# Patient Record
Sex: FEMALE | Race: WHITE | Employment: UNEMPLOYED | URBAN - METROPOLITAN AREA
[De-identification: names, ages, dates, MRNs, and addresses within clinical notes are randomized per-mention and may not be internally consistent; named-entity substitution may affect disease eponyms.]

---

## 2017-10-03 ENCOUNTER — HOSPITAL ENCOUNTER (EMERGENCY)
Age: 47
Discharge: HOME | End: 2017-10-03
Attending: EMERGENCY MEDICINE

## 2017-10-03 VITALS
HEIGHT: 67 IN | SYSTOLIC BLOOD PRESSURE: 140 MMHG | RESPIRATION RATE: 18 BRPM | HEART RATE: 89 BPM | DIASTOLIC BLOOD PRESSURE: 90 MMHG | TEMPERATURE: 98.9 F | OXYGEN SATURATION: 99 % | BODY MASS INDEX: 25.9 KG/M2 | WEIGHT: 165 LBS

## 2017-10-03 DIAGNOSIS — L03.211 FACIAL CELLULITIS: ICD-10-CM

## 2017-10-03 DIAGNOSIS — T78.40XA ALLERGIC REACTION, INITIAL ENCOUNTER: Primary | ICD-10-CM

## 2017-10-03 PROCEDURE — 99282 EMERGENCY DEPT VISIT SF MDM: CPT

## 2017-10-03 RX ORDER — SULFAMETHOXAZOLE AND TRIMETHOPRIM 800; 160 MG/1; MG/1
1 TABLET ORAL 2 TIMES DAILY
Qty: 20 TABLET | Refills: 0 | Status: SHIPPED | OUTPATIENT
Start: 2017-10-03 | End: 2017-10-13

## 2017-10-03 RX ORDER — BUSPIRONE HYDROCHLORIDE 10 MG/1
10 TABLET ORAL 3 TIMES DAILY
COMMUNITY
End: 2018-02-15

## 2017-10-03 RX ORDER — PREDNISONE 20 MG/1
20 TABLET ORAL DAILY
Qty: 10 TABLET | Refills: 0 | Status: SHIPPED | OUTPATIENT
Start: 2017-10-03 | End: 2021-04-21

## 2017-10-03 RX ORDER — DIPHENHYDRAMINE HCL 25 MG
25 CAPSULE ORAL EVERY 6 HOURS PRN
Qty: 20 CAPSULE | Refills: 0 | Status: SHIPPED | OUTPATIENT
Start: 2017-10-03 | End: 2021-04-21

## 2017-10-03 ASSESSMENT — PAIN SCALES - GENERAL: PAINLEVEL_OUTOF10: 10

## 2017-10-03 ASSESSMENT — ENCOUNTER SYMPTOMS
DIARRHEA: 0
SHORTNESS OF BREATH: 0
CONSTIPATION: 0
COUGH: 0
ABDOMINAL PAIN: 0
RHINORRHEA: 0
VOMITING: 0
SORE THROAT: 0
COLOR CHANGE: 0
WHEEZING: 0
NAUSEA: 0
FACIAL SWELLING: 1
SINUS PRESSURE: 0

## 2017-10-03 ASSESSMENT — PAIN DESCRIPTION - PAIN TYPE: TYPE: ACUTE PAIN

## 2017-10-03 ASSESSMENT — PAIN DESCRIPTION - LOCATION: LOCATION: FACE

## 2017-10-03 ASSESSMENT — PAIN DESCRIPTION - DESCRIPTORS: DESCRIPTORS: BURNING

## 2017-10-03 NOTE — ED NOTES
Pt to ED with facial swelling redness and burning pain after having a allergic reaction to new makeup from last week.      Gita Mccabe RN  10/03/17 7042

## 2017-10-03 NOTE — ED PROVIDER NOTES
[address] [facility] ED  Emergency Department  Faculty Attestation     Pt Name: Ghazala Dasilva  MRN: [id number]  [place] 1970  Date of evaluation: 10/3/17    I was personally available for consultation in the Emergency Department. Have reviewed everything on the chart that is available and agree with the documentation provided by the [facility] AND [facility], including discussion about the assessment, treatment plan and disposition. Ghazala Dasilva is a 52 y.o. female who presents with Rash (facial rash w/ burning pasin past 4 days)      Vitals:   Vitals:    10/03/17 1318   BP: (!) 140/90   Pulse: 89   Resp: 18   Temp: 98.9 °F (37.2 °C)   TempSrc: Oral   SpO2: 99%   Weight: 165 lb (74.8 kg)   Height: 5' 7\" (1.702 m)       Impression:   1. Allergic reaction, initial encounter    2.  Facial cellulitis        Nuha Briggs MD  Attending Emergency Physician      (Please note that portions of this note were completed with a voice recognition program.  Efforts were made to edit the dictations but occasionally words are mis-transcribed.)       Nuha Briggs MD  10/03/17 0405

## 2017-10-03 NOTE — ED AVS SNAPSHOT
Important Information if you smoke or are exposed to smoking       SMOKING: QUIT SMOKING. THIS IS THE MOST IMPORTANT ACTION YOU CAN TAKE TO IMPROVE YOUR CURRENT AND FUTURE HEALTH. Call the [facility] [place] NOW ([phone])    Smoking harms nonsmokers. When nonsmokers are around people who smoke, they absorb nicotine, carbon monoxide, and other ingredients of tobacco smoke. DO NOT SMOKE AROUND CHILDREN     Children exposed to secondhand smoke are at an increased risk of:  Sudden Infant Death Syndrome (SIDS), acute respiratory infections, inflammation of the middle ear, and severe asthma. Over a longer time, it causes heart disease and lung cancer. There is no safe level of exposure to secondhand smoke. Important information for a smoker       SMOKING: QUIT SMOKING. THIS IS THE MOST IMPORTANT ACTION YOU CAN TAKE TO IMPROVE YOUR CURRENT AND FUTURE HEALTH. Call the [facility] [place] NOW ([phone])    Smoking harms nonsmokers. When nonsmokers are around people who smoke, they absorb nicotine, carbon monoxide, and other ingredients of tobacco smoke. DO NOT SMOKE AROUND CHILDREN     Children exposed to secondhand smoke are at an increased risk of:  Sudden Infant Death Syndrome (SIDS), acute respiratory infections, inflammation of the middle ear, and severe asthma. Over a longer time, it causes heart disease and lung cancer. There is no safe level of exposure to secondhand smoke. [employer] Signup     MyChart allows you to send messages to your doctor, view your test results, renew your prescriptions, schedule appointments, view visit notes, and more. How Do I Sign Up? 1. In your Internet browser, go to [email or web address]  2. Click on the Sign Up Now link in the Sign In box. You will see the New Member Sign Up page. 3. Enter your Igea Access Code exactly as it appears below. You will not need to use this code after youve completed the sign-up process. If you do not sign up before the expiration date, you must request a new code. Igea Access Code: [email or web address]  Expires: 12/2/2017  1:28 PM    4. Enter your Social Security Number ([id number]) and Date of Birth (mm/dd/yyyy) as indicated and click Submit. You will be taken to the next sign-up page. 5. Create a Inoappst ID. This will be your Igea login ID and cannot be changed, so think of one that is secure and easy to remember. 6. Create a Igea password. You can change your password at any time. 7. Enter your Password Reset Question and Answer. This can be used at a later time if you forget your password. 8. Enter your e-mail address. You will receive e-mail notification when new information is available in [address]. 9. Click Sign Up. You can now view your medical record. Additional Information  If you have questions, please contact the physician practice where you receive care. Remember, Igea is NOT to be used for urgent needs. For medical emergencies, dial 911. For questions regarding your Inoapp[employer] account call [phone]. If you have a clinical question, please call your doctor's office. View your information online  ? Review your current list of  medications, immunization, and allergies. ? Review your future test results online . ? Review your discharge instructions provided by your caregivers at discharge    Certain functionality such as prescription refills, scheduling appointments or sending messages to your provider are not activated if your provider does not use [employer] in his/her office    For questions regarding your Inoapp[employer] account call [phone]. If you have a clinical question, please call your doctor's office.          The information on all pages of the After Visit Summary has been reviewed ¨ Wash the wound with clean water 2 times a day. Don't use hydrogen peroxide or alcohol, which can slow healing. ¨ You may cover the wound with a thin layer of petroleum jelly, such as Vaseline, and a nonstick bandage. ¨ Apply more petroleum jelly and replace the bandage as needed. · Be safe with medicines. Take pain medicines exactly as directed. ¨ If the doctor gave you a prescription medicine for pain, take it as prescribed. ¨ If you are not taking a prescription pain medicine, ask your doctor if you can take an over-the-counter medicine. To prevent cellulitis in the future  · Try to prevent cuts, scrapes, or other injuries to your skin. Cellulitis most often occurs where there is a break in the skin. · If you get a scrape, cut, mild burn, or bite, wash the wound with clean water as soon as you can to help avoid infection. Don't use hydrogen peroxide or alcohol, which can slow healing. · If you have swelling in your legs (edema), support stockings and good skin care may help prevent leg sores and cellulitis. · Take care of your feet, especially if you have diabetes or other conditions that increase the risk of infection. Wear shoes and socks. Do not go barefoot. If you have athlete's foot or other skin problems on your feet, talk to your doctor about how to treat them. When should you call for help? Call your doctor now or seek immediate medical care if:  · You have signs that your infection is getting worse, such as:  ¨ Increased pain, swelling, warmth, or redness. ¨ Red streaks leading from the area. ¨ Pus draining from the area. ¨ A fever. · You get a rash. Watch closely for changes in your health, and be sure to contact your doctor if:  · You are not getting better after 1 day (24 hours). · You do not get better as expected. Where can you learn more? Go to [email or web address] and sign in to your MyChart account. Enter N609 in the [facility] box to learn more about \"Cellulitis: Care Instructions. \"     If you do not have an account, please click on the \"Sign Up Now\" link. Current as of: October 13, 2016  Content Version: 11.3  © 8383-8605 [employer]. Care instructions adapted under license by [facility] ([facility]). If you have questions about a medical condition or this instruction, always ask your healthcare professional. [address] any warranty or liability for your use of this information.

## 2017-10-03 NOTE — ED PROVIDER NOTES
[address] [place] [address] ED  eMERGENCY dEPARTMENT eNCOUnter      Pt Name: Ghazala Dasilva  MRN: [id number]  [place] 1970  Date of evaluation: 10/3/2017  Provider: Tiffanie Esteban NP, Daniel 1943       Chief Complaint   Patient presents with    Rash     facial rash w/ burning pasin past 4 days         HISTORY OF PRESENT ILLNESS  (Location/Symptom, Timing/Onset, Context/Setting, Quality, Duration, Modifying Factors, Severity.)   Ghazala Dasilva is a 52 y.o. female who presents to the emergency department Today by private vehicle for evaluation of a facial rash with swelling. Patient states 3 or 4 days ago she had put a new makeup products on her face. She states it until he broke out with redness and swelling to her face after this happened. She states that she was not seen by anybody for this. Now she has open sores with an increase in redness and tenderness to the face. She rates her pain as 10 on a 0-10 scale. She denies any associated fevers or chills. She states that she has had a slight headache and is slightly nauseated. Nursing Notes were reviewed. ALLERGIES     Compazine [prochlorperazine maleate]; Imitrex [sumatriptan];  Thorazine [chlorpromazine]; Cardizem [diltiazem]; and Pcn [penicillins]    CURRENT MEDICATIONS       Discharge Medication List as of 10/3/2017  1:28 PM      CONTINUE these medications which have NOT CHANGED    Details   busPIRone (BUSPAR) 10 MG tablet Take 10 mg by mouth 3 times dailyHistorical Med      SUBOXONE 8-2 MG FILM VENTURA      naloxone (NARCAN) 2 MG/2ML injection Historical Med      polyethylene glycol (GLYCOLAX) powder Historical Med      traZODone (DESYREL) 50 MG tablet Historical Med      ibuprofen (ADVIL;MOTRIN) 600 MG tablet Take 1 tablet by mouth every 6 hours as needed for Pain, Disp-30 tablet, R-0      albuterol sulfate HFA (PROVENTIL HFA) 108 (90 BASE) MCG/ACT inhaler Inhale 1-2 puffs into the lungs every 4 hours as needed for Wheezing, Disp-1 Inhaler, does not drink alcohol or use illicit drugs. REVIEW OF SYSTEMS    (2-9 systems for level 4, 10 or more for level 5)     Review of Systems   Constitutional: Negative for chills, fever and unexpected weight change. HENT: Positive for facial swelling. Negative for congestion, rhinorrhea, sinus pressure and sore throat. Respiratory: Negative for cough, shortness of breath and wheezing. Cardiovascular: Negative for chest pain and palpitations. Gastrointestinal: Negative for abdominal pain, constipation, diarrhea, nausea and vomiting. Genitourinary: Negative for dysuria and hematuria. Musculoskeletal: Negative for arthralgias and myalgias. Skin: Negative for color change and rash. Neurological: Negative for dizziness, weakness and headaches. Hematological: Negative for adenopathy. Except as noted above the remainder of the review of systems was reviewed and negative. PHYSICAL EXAM    (up to 7 for level 4, 8 or more for level 5)   ED Triage Vitals   BP Temp Temp Source Pulse Resp SpO2 Height Weight   10/03/17 1318 10/03/17 1318 10/03/17 1318 10/03/17 1318 10/03/17 1318 10/03/17 1318 10/03/17 1318 10/03/17 1318   140/90 98.9 °F (37.2 °C) Oral 89 18 99 % 5' 7\" (1.702 m) 165 lb (74.8 kg)       Physical Exam   Constitutional: She is oriented to person, place, and time. She appears well-developed and well-nourished. HENT:   Head: Normocephalic and atraumatic. Mouth/Throat: Oropharynx is clear and moist.   Eyes: Conjunctivae and EOM are normal. Pupils are equal, round, and reactive to light. Neck: Normal range of motion. Neck supple. Cardiovascular: Normal rate and regular rhythm. Pulmonary/Chest: Effort normal and breath sounds normal. No stridor. No respiratory distress. Abdominal: Soft. Bowel sounds are normal.   Musculoskeletal: Normal range of motion. Lymphadenopathy:     She has no cervical adenopathy. Neurological: She is alert and oriented to person, place, and time.

## 2018-02-15 ENCOUNTER — HOSPITAL ENCOUNTER (EMERGENCY)
Age: 48
Discharge: HOME | End: 2018-02-15
Attending: EMERGENCY MEDICINE

## 2018-02-15 ENCOUNTER — APPOINTMENT (OUTPATIENT)
Dept: GENERAL RADIOLOGY | Age: 48
End: 2018-02-15

## 2018-02-15 VITALS
HEIGHT: 67 IN | RESPIRATION RATE: 18 BRPM | HEART RATE: 101 BPM | WEIGHT: 169.8 LBS | BODY MASS INDEX: 26.65 KG/M2 | DIASTOLIC BLOOD PRESSURE: 66 MMHG | TEMPERATURE: 98.1 F | OXYGEN SATURATION: 97 % | SYSTOLIC BLOOD PRESSURE: 122 MMHG

## 2018-02-15 DIAGNOSIS — B34.9 VIRAL ILLNESS: Primary | ICD-10-CM

## 2018-02-15 LAB
DIRECT EXAM: NORMAL
Lab: NORMAL
SPECIMEN DESCRIPTION: NORMAL
STATUS: NORMAL

## 2018-02-15 PROCEDURE — 71046 X-RAY EXAM CHEST 2 VIEWS: CPT

## 2018-02-15 PROCEDURE — 87804 INFLUENZA ASSAY W/OPTIC: CPT

## 2018-02-15 PROCEDURE — 99284 EMERGENCY DEPT VISIT MOD MDM: CPT

## 2018-02-15 ASSESSMENT — PAIN DESCRIPTION - DESCRIPTORS: DESCRIPTORS: ACHING

## 2018-02-15 ASSESSMENT — PAIN SCALES - WONG BAKER: WONGBAKER_NUMERICALRESPONSE: 4

## 2018-02-15 ASSESSMENT — PAIN DESCRIPTION - LOCATION: LOCATION: HEAD;GENERALIZED

## 2018-02-15 ASSESSMENT — PAIN SCALES - GENERAL: PAINLEVEL_OUTOF10: 4

## 2018-02-15 ASSESSMENT — PAIN DESCRIPTION - FREQUENCY: FREQUENCY: CONTINUOUS

## 2018-02-16 NOTE — ED NOTES
Pt ambulatory to room 6 with c/o fevers, generalized body aches, and N/V/D, onset approximately 3 days ago. Pt reports she has a hx of opiate abuse, states that she took her last PO Suboxone on Sunday and did heroin on Tuesday to \"Help with the withdrawal symptoms\". Pt reports N/V ceased yesterday, denies any since but does report diarrhea still. Pt reports intermittent lower abd cramping, denies any at this time. Pt is afebrile at this time. Pt denies any CP, SOB, or urinary symptoms. Respirations even and non labored. Skin PWD, MMM. NAD noted.       Jaime Vasquez RN  02/15/18 5538

## 2019-08-20 ENCOUNTER — HOSPITAL ENCOUNTER (EMERGENCY)
Age: 49
Discharge: HOME | End: 2019-08-20
Attending: EMERGENCY MEDICINE

## 2019-08-20 ENCOUNTER — APPOINTMENT (OUTPATIENT)
Dept: GENERAL RADIOLOGY | Age: 49
End: 2019-08-20

## 2019-08-20 VITALS
SYSTOLIC BLOOD PRESSURE: 118 MMHG | BODY MASS INDEX: 33.74 KG/M2 | TEMPERATURE: 98.8 F | HEART RATE: 80 BPM | HEIGHT: 67 IN | RESPIRATION RATE: 18 BRPM | OXYGEN SATURATION: 98 % | DIASTOLIC BLOOD PRESSURE: 75 MMHG | WEIGHT: 215 LBS

## 2019-08-20 DIAGNOSIS — L03.115 CELLULITIS OF RIGHT FOOT: Primary | ICD-10-CM

## 2019-08-20 DIAGNOSIS — M79.671 RIGHT FOOT PAIN: ICD-10-CM

## 2019-08-20 PROCEDURE — 99283 EMERGENCY DEPT VISIT LOW MDM: CPT

## 2019-08-20 PROCEDURE — 6370000000 HC RX 637 (ALT 250 FOR IP): Performed by: EMERGENCY MEDICINE

## 2019-08-20 PROCEDURE — 73630 X-RAY EXAM OF FOOT: CPT

## 2019-08-20 RX ORDER — NAPROXEN 500 MG/1
500 TABLET ORAL ONCE
Status: COMPLETED | OUTPATIENT
Start: 2019-08-20 | End: 2019-08-20

## 2019-08-20 RX ORDER — FLUOXETINE HYDROCHLORIDE 40 MG/1
50 CAPSULE ORAL DAILY
COMMUNITY
End: 2021-12-08

## 2019-08-20 RX ORDER — GABAPENTIN 600 MG/1
600 TABLET ORAL 3 TIMES DAILY
COMMUNITY

## 2019-08-20 RX ORDER — CEPHALEXIN 500 MG/1
1000 CAPSULE ORAL 2 TIMES DAILY
Qty: 28 CAPSULE | Refills: 0 | Status: SHIPPED | OUTPATIENT
Start: 2019-08-20 | End: 2019-08-27

## 2019-08-20 RX ORDER — CEPHALEXIN 500 MG/1
1000 CAPSULE ORAL ONCE
Status: COMPLETED | OUTPATIENT
Start: 2019-08-20 | End: 2019-08-20

## 2019-08-20 RX ADMIN — NAPROXEN 500 MG: 500 TABLET ORAL at 15:40

## 2019-08-20 RX ADMIN — CEPHALEXIN 1000 MG: 500 CAPSULE ORAL at 15:38

## 2019-08-20 ASSESSMENT — PAIN SCALES - GENERAL
PAINLEVEL_OUTOF10: 10
PAINLEVEL_OUTOF10: 6
PAINLEVEL_OUTOF10: 8

## 2019-08-20 ASSESSMENT — ENCOUNTER SYMPTOMS
BACK PAIN: 0
COLOR CHANGE: 1

## 2019-08-20 ASSESSMENT — PAIN DESCRIPTION - ORIENTATION: ORIENTATION: RIGHT

## 2019-08-20 ASSESSMENT — PAIN DESCRIPTION - LOCATION: LOCATION: FOOT

## 2019-08-20 ASSESSMENT — PAIN DESCRIPTION - DESCRIPTORS: DESCRIPTORS: PRESSURE;ACHING;TIGHTNESS

## 2019-08-20 ASSESSMENT — PAIN DESCRIPTION - PAIN TYPE: TYPE: ACUTE PAIN

## 2019-08-20 NOTE — ED PROVIDER NOTES
EMERGENCY DEPARTMENT ENCOUNTER    Pt Name: Araceli George  MRN: [id number]  [place] 1970  Date of evaluation: 8/20/19  CHIEF COMPLAINT       Chief Complaint   Patient presents with    Foot Pain     rt foot pain with swelling /redness past 4 days     HISTORY OF PRESENT ILLNESS   Presents with c/o right foot pain since 8/15. Unsure if c/w insect bite, recurrent fracture from old injury, or cellulitis. Denies recent trauma. Previous h/o cellulitis. Notes pain, rather than itching. Has not tried Motrin, though she tolerates it. Patient is on Gabapentin, but no for peripheral neuropathy. States she has normal sensation in her feet and does not suspect foreign body. H/o hysterectomy    The history is provided by the patient and the spouse. Foot Problem   Location:  Foot  Time since incident:  6 days  Injury: no    Foot location:  R foot  Pain details:     Quality:  Aching    Radiates to:  Does not radiate    Severity:  Moderate    Onset quality:  Gradual    Duration:  6 days    Timing:  Constant    Progression:  Worsening  Chronicity:  New  Foreign body present:  No foreign bodies  Relieved by:  Nothing  Worsened by:  Bearing weight (palpation)  Ineffective treatments: aspirin. Associated symptoms: swelling    Associated symptoms: no back pain, no fever, no itching, no numbness and no tingling    Risk factors: no frequent fractures    Risk factors comment:  Previous cellulitis      REVIEW OF SYSTEMS     Review of Systems   Constitutional: Negative for fever. Musculoskeletal: Negative for back pain. Skin: Positive for color change. Negative for itching. Neurological: Negative for weakness and numbness.      PASTMEDICAL HISTORY     Past Medical History:   Diagnosis Date    Asthma     Connective tissue disease, undifferentiated (HCC) 1987    Dr. Janette Riley ([place]he[place].)    DDD (degenerative disc disease) 2000    Dr. Reyes [facility] Depression     Lumbar radicular pain     Migraine 1988    Dr. Riley Thapa Mitral valve prolapse     Dr. Thea Wiggins addiction ([facility] [address] 2013    Dr. Tete Hackett / on 10/3/17 pt states she completed tx 2016     SURGICAL HISTORY       Past Surgical History:   Procedure Laterality Date   [address]    as child    HYSTERECTOMY      2007    NERVE BLOCK Right 2015    right lumbar JHONY    NOSE SURGERY      After nose was broken. Dr. Kodak Amin Left     Dr. Tiffanie San      Dr. Richardson Files    Dr. Paris Cure       Discharge Medication List as of 2019  3:58 PM      CONTINUE these medications which have NOT CHANGED    Details   METHADONE HCL PO Take 140 mg by mouthHistorical Med      FLUoxetine (PROZAC) 40 MG capsule Take 40 mg by mouth dailyHistorical Med      gabapentin (NEURONTIN) 600 MG tablet Take 600 mg by mouth 3 times daily. Historical Med      predniSONE (DELTASONE) 20 MG tablet Take 1 tablet by mouth daily Take 3 tablets day 1-2. 2 tablets day 3-4 and 1 tablet day 5, Disp-10 tablet, R-0Print      diphenhydrAMINE (BENADRYL) 25 MG capsule Take 1 capsule by mouth every 6 hours as needed for Itching, Disp-20 capsule, R-0Print      naloxone (NARCAN) 2 MG/2ML injection Historical Med      ibuprofen (ADVIL;MOTRIN) 600 MG tablet Take 1 tablet by mouth every 6 hours as needed for Pain, Disp-30 tablet, R-0      albuterol sulfate HFA (PROVENTIL HFA) 108 (90 BASE) MCG/ACT inhaler Inhale 1-2 puffs into the lungs every 4 hours as needed for Wheezing, Disp-1 Inhaler, R-0           ALLERGIES     is allergic to compazine [prochlorperazine maleate]; imitrex [sumatriptan]; thorazine [chlorpromazine]; cardizem [diltiazem]; and pcn [penicillins]. FAMILY HISTORY     She indicated that her mother is . She indicated that her father is alive. She indicated that her brother is alive. She indicated that her maternal grandmother is . She indicated that her maternal grandfather is .  She

## 2019-08-20 NOTE — ED NOTES
Bed: 17  Expected date:   Expected time:   Means of arrival:   Comments:  ashutosh March RN  08/20/19 4926

## 2020-02-20 ENCOUNTER — HOSPITAL ENCOUNTER (EMERGENCY)
Age: 50
Discharge: HOME | End: 2020-02-20
Attending: EMERGENCY MEDICINE

## 2020-02-20 ENCOUNTER — APPOINTMENT (OUTPATIENT)
Dept: GENERAL RADIOLOGY | Age: 50
End: 2020-02-20

## 2020-02-20 VITALS
HEIGHT: 67 IN | WEIGHT: 205 LBS | TEMPERATURE: 98.2 F | DIASTOLIC BLOOD PRESSURE: 83 MMHG | SYSTOLIC BLOOD PRESSURE: 118 MMHG | RESPIRATION RATE: 16 BRPM | OXYGEN SATURATION: 96 % | BODY MASS INDEX: 32.18 KG/M2 | HEART RATE: 91 BPM

## 2020-02-20 PROCEDURE — 99284 EMERGENCY DEPT VISIT MOD MDM: CPT

## 2020-02-20 PROCEDURE — 6370000000 HC RX 637 (ALT 250 FOR IP): Performed by: PHYSICIAN ASSISTANT

## 2020-02-20 PROCEDURE — 73130 X-RAY EXAM OF HAND: CPT

## 2020-02-20 RX ORDER — IBUPROFEN 600 MG/1
600 TABLET ORAL ONCE
Status: COMPLETED | OUTPATIENT
Start: 2020-02-20 | End: 2020-02-20

## 2020-02-20 RX ORDER — HYDROCODONE BITARTRATE AND ACETAMINOPHEN 5; 325 MG/1; MG/1
1 TABLET ORAL ONCE
Status: DISCONTINUED | OUTPATIENT
Start: 2020-02-20 | End: 2020-02-20 | Stop reason: HOSPADM

## 2020-02-20 RX ORDER — IBUPROFEN 600 MG/1
600 TABLET ORAL EVERY 6 HOURS PRN
Qty: 30 TABLET | Refills: 0 | Status: ON HOLD | OUTPATIENT
Start: 2020-02-20 | End: 2021-03-05

## 2020-02-20 RX ADMIN — IBUPROFEN 600 MG: 600 TABLET, FILM COATED ORAL at 12:21

## 2020-02-20 ASSESSMENT — PAIN DESCRIPTION - DESCRIPTORS: DESCRIPTORS: SHARP

## 2020-02-20 ASSESSMENT — PAIN SCALES - GENERAL
PAINLEVEL_OUTOF10: 6
PAINLEVEL_OUTOF10: 6

## 2020-02-20 ASSESSMENT — PAIN DESCRIPTION - PAIN TYPE: TYPE: ACUTE PAIN

## 2020-02-20 ASSESSMENT — PAIN DESCRIPTION - LOCATION: LOCATION: WRIST

## 2020-02-20 ASSESSMENT — PAIN DESCRIPTION - ORIENTATION: ORIENTATION: RIGHT

## 2020-02-20 NOTE — ED PROVIDER NOTES
[address]  Emergency Department  Independent Attestation     Pt Name: Lenny Hughes  MRN: [id number]  [place] 1970  Date of evaluation: 2/20/20       Lenny Hughes is a 52 y.o. female who presents with Wrist Injury (right)      I was personally available for consultation in the Emergency Department.     Ray Palomino DO  Attending Emergency Physician  [address] ED      (Please note that portions of this note were completed with a voice recognition program.  Efforts were made to edit the dictations but occasionally words are mis-transcribed.)        Ray Palomino DO  02/20/20 1234

## 2020-02-20 NOTE — ED PROVIDER NOTES
Right 2015    right lumbar JHONY    NOSE SURGERY      After nose was broken. Dr. Flaquito Mcpherson Left 2007    Dr. Wilmer Davenport      Dr. Elizabeth Zepeda       Discharge Medication List as of 2020 12:19 PM      CONTINUE these medications which have NOT CHANGED    Details   METHADONE HCL PO Take 140 mg by mouthHistorical Med      FLUoxetine (PROZAC) 40 MG capsule Take 40 mg by mouth dailyHistorical Med      gabapentin (NEURONTIN) 600 MG tablet Take 600 mg by mouth 3 times daily. Historical Med      predniSONE (DELTASONE) 20 MG tablet Take 1 tablet by mouth daily Take 3 tablets day 1-2. 2 tablets day 3-4 and 1 tablet day 5, Disp-10 tablet, R-0Print      diphenhydrAMINE (BENADRYL) 25 MG capsule Take 1 capsule by mouth every 6 hours as needed for Itching, Disp-20 capsule, R-0Print      naloxone (NARCAN) 2 MG/2ML injection Historical Med      !! ibuprofen (ADVIL;MOTRIN) 600 MG tablet Take 1 tablet by mouth every 6 hours as needed for Pain, Disp-30 tablet, R-0      albuterol sulfate HFA (PROVENTIL HFA) 108 (90 BASE) MCG/ACT inhaler Inhale 1-2 puffs into the lungs every 4 hours as needed for Wheezing, Disp-1 Inhaler, R-0       !! - Potential duplicate medications found. Please discuss with provider. ALLERGIES     is allergic to compazine [prochlorperazine maleate]; imitrex [sumatriptan]; thorazine [chlorpromazine]; cardizem [diltiazem]; and pcn [penicillins]. FAMILY HISTORY     She indicated that her mother is . She indicated that her father is alive. She indicated that her brother is alive. She indicated that her maternal grandmother is . She indicated that her maternal grandfather is . She indicated that her paternal grandmother is . She indicated that her paternal grandfather is . She indicated that her daughter is alive. She indicated that both of her sons are alive. SOCIAL HISTORY      reports that she has been smoking cigarettes. She has been smoking about 0.50 packs per day. She has never used smokeless tobacco. She reports current drug use. Drug: Marijuana. She reports that she does not drink alcohol. PHYSICAL EXAM     INITIAL VITALS: /83   Pulse 91   Temp 98.2 °F (36.8 °C)   Resp 16   Ht 5' 7\" (1.702 m)   Wt 205 lb (93 kg)   SpO2 96%   BMI 32.11 kg/m²      Physical Exam  Vitals signs and nursing note reviewed. Constitutional:       Appearance: She is well-developed. HENT:      Head: Normocephalic and atraumatic. Cardiovascular:      Rate and Rhythm: Normal rate and regular rhythm. Pulses: Normal pulses. Heart sounds: Normal heart sounds. Pulmonary:      Effort: Pulmonary effort is normal.      Breath sounds: Normal breath sounds. Musculoskeletal:         General: Swelling, tenderness, deformity and signs of injury present. Right wrist: She exhibits decreased range of motion, tenderness, bony tenderness, swelling and deformity. Skin:     Capillary Refill: Capillary refill takes less than 2 seconds. Neurological:      Mental Status: She is alert and oriented to person, place, and time. MEDICAL DECISION MAKING:     Splint ice elevate Tylenol Motrin for pain. Close follow-up with orthopedics. Keep splint dry. Return if any worsening symptoms any other concerns. DIAGNOSTIC RESULTS     EKG: All EKG's are interpreted by the Emergency Department Physician who either signs or Co-signs this chart in the absence of acardiologist.        RADIOLOGY:Allplain film, CT, MRI, and formal ultrasound images (except ED bedside ultrasound) are read by the radiologist and the images and interpretations are directly viewed by the emergency physician. LABS:All lab results were reviewed by myself, and all abnormals are listed below.   Labs Reviewed - No data to display      EMERGENCY DEPARTMENT COURSE:   Vitals:    Vitals:

## 2021-03-03 ENCOUNTER — APPOINTMENT (OUTPATIENT)
Dept: GENERAL RADIOLOGY | Age: 51
DRG: 220 | End: 2021-03-03
Payer: MEDICARE

## 2021-03-03 ENCOUNTER — HOSPITAL ENCOUNTER (INPATIENT)
Age: 51
LOS: 22 days | Discharge: HOME | DRG: 220 | End: 2021-03-26
Attending: EMERGENCY MEDICINE | Admitting: SURGERY
Payer: MEDICARE

## 2021-03-03 DIAGNOSIS — K25.1 ACUTE GASTRIC ULCER WITH PERFORATION (HCC): Primary | ICD-10-CM

## 2021-03-03 LAB
ABSOLUTE EOS #: 0 K/UL (ref 0–0.4)
ABSOLUTE IMMATURE GRANULOCYTE: 0 K/UL (ref 0–0.3)
ABSOLUTE LYMPH #: 2.32 K/UL (ref 1–4.8)
ABSOLUTE MONO #: 2.32 K/UL (ref 0.1–0.8)
ALBUMIN SERPL-MCNC: 3.7 G/DL (ref 3.5–5.2)
ALBUMIN/GLOBULIN RATIO: 0.8 (ref 1–2.5)
ALP BLD-CCNC: 98 U/L (ref 35–104)
ALT SERPL-CCNC: 9 U/L (ref 5–33)
ANION GAP SERPL CALCULATED.3IONS-SCNC: 11 MMOL/L (ref 9–17)
AST SERPL-CCNC: 17 U/L
BASOPHILS # BLD: 0 % (ref 0–2)
BASOPHILS ABSOLUTE: 0 K/UL (ref 0–0.2)
BILIRUB SERPL-MCNC: 0.81 MG/DL (ref 0.3–1.2)
BUN BLDV-MCNC: 19 MG/DL (ref 6–20)
BUN/CREAT BLD: ABNORMAL (ref 9–20)
CALCIUM SERPL-MCNC: 9 MG/DL (ref 8.6–10.4)
CHLORIDE BLD-SCNC: 96 MMOL/L (ref 98–107)
CO2: 26 MMOL/L (ref 20–31)
CREAT SERPL-MCNC: 0.58 MG/DL (ref 0.5–0.9)
DIFFERENTIAL TYPE: ABNORMAL
EOSINOPHILS RELATIVE PERCENT: 0 % (ref 1–4)
GFR AFRICAN AMERICAN: >60 ML/MIN
GFR NON-AFRICAN AMERICAN: >60 ML/MIN
GFR SERPL CREATININE-BSD FRML MDRD: ABNORMAL ML/MIN/{1.73_M2}
GFR SERPL CREATININE-BSD FRML MDRD: ABNORMAL ML/MIN/{1.73_M2}
GLUCOSE BLD-MCNC: 130 MG/DL (ref 70–99)
HCT VFR BLD CALC: 49.7 % (ref 36.3–47.1)
HEMOGLOBIN: 16.2 G/DL (ref 11.9–15.1)
IMMATURE GRANULOCYTES: 0 %
INR BLD: 1.2
LACTIC ACID, SEPSIS WHOLE BLOOD: 1.8 MMOL/L (ref 0.5–1.9)
LACTIC ACID, SEPSIS: NORMAL MMOL/L (ref 0.5–1.9)
LIPASE: 9 U/L (ref 13–60)
LYMPHOCYTES # BLD: 7 % (ref 24–44)
MAGNESIUM: 1.9 MG/DL (ref 1.6–2.6)
MCH RBC QN AUTO: 27.9 PG (ref 25.2–33.5)
MCHC RBC AUTO-ENTMCNC: 32.6 G/DL (ref 28.4–34.8)
MCV RBC AUTO: 85.5 FL (ref 82.6–102.9)
MONOCYTES # BLD: 7 % (ref 1–7)
MORPHOLOGY: NORMAL
NRBC AUTOMATED: 0 PER 100 WBC
PARTIAL THROMBOPLASTIN TIME: 26.2 SEC (ref 20.5–30.5)
PDW BLD-RTO: 13.1 % (ref 11.8–14.4)
PLATELET # BLD: 369 K/UL (ref 138–453)
PLATELET ESTIMATE: ABNORMAL
PMV BLD AUTO: 9.5 FL (ref 8.1–13.5)
POTASSIUM SERPL-SCNC: 3.4 MMOL/L (ref 3.7–5.3)
PROCALCITONIN: 1.36 NG/ML
PROTHROMBIN TIME: 12.4 SEC (ref 9.1–12.3)
RBC # BLD: 5.81 M/UL (ref 3.95–5.11)
RBC # BLD: ABNORMAL 10*6/UL
SEG NEUTROPHILS: 86 % (ref 36–66)
SEGMENTED NEUTROPHILS ABSOLUTE COUNT: 28.46 K/UL (ref 1.8–7.7)
SODIUM BLD-SCNC: 133 MMOL/L (ref 135–144)
TOTAL PROTEIN: 8.1 G/DL (ref 6.4–8.3)
WBC # BLD: 33.1 K/UL (ref 3.5–11.3)
WBC # BLD: ABNORMAL 10*3/UL

## 2021-03-03 PROCEDURE — 87040 BLOOD CULTURE FOR BACTERIA: CPT

## 2021-03-03 PROCEDURE — 51701 INSERT BLADDER CATHETER: CPT

## 2021-03-03 PROCEDURE — 36415 COLL VENOUS BLD VENIPUNCTURE: CPT

## 2021-03-03 PROCEDURE — 84145 PROCALCITONIN (PCT): CPT

## 2021-03-03 PROCEDURE — 6370000000 HC RX 637 (ALT 250 FOR IP): Performed by: STUDENT IN AN ORGANIZED HEALTH CARE EDUCATION/TRAINING PROGRAM

## 2021-03-03 PROCEDURE — 80053 COMPREHEN METABOLIC PANEL: CPT

## 2021-03-03 PROCEDURE — 96366 THER/PROPH/DIAG IV INF ADDON: CPT

## 2021-03-03 PROCEDURE — 83605 ASSAY OF LACTIC ACID: CPT

## 2021-03-03 PROCEDURE — 96372 THER/PROPH/DIAG INJ SC/IM: CPT

## 2021-03-03 PROCEDURE — 2580000003 HC RX 258: Performed by: STUDENT IN AN ORGANIZED HEALTH CARE EDUCATION/TRAINING PROGRAM

## 2021-03-03 PROCEDURE — 84484 ASSAY OF TROPONIN QUANT: CPT

## 2021-03-03 PROCEDURE — 6360000002 HC RX W HCPCS: Performed by: EMERGENCY MEDICINE

## 2021-03-03 PROCEDURE — 96375 TX/PRO/DX INJ NEW DRUG ADDON: CPT

## 2021-03-03 PROCEDURE — 93005 ELECTROCARDIOGRAM TRACING: CPT | Performed by: STUDENT IN AN ORGANIZED HEALTH CARE EDUCATION/TRAINING PROGRAM

## 2021-03-03 PROCEDURE — 85610 PROTHROMBIN TIME: CPT

## 2021-03-03 PROCEDURE — U0002 COVID-19 LAB TEST NON-CDC: HCPCS

## 2021-03-03 PROCEDURE — 83735 ASSAY OF MAGNESIUM: CPT

## 2021-03-03 PROCEDURE — 71045 X-RAY EXAM CHEST 1 VIEW: CPT

## 2021-03-03 PROCEDURE — 99284 EMERGENCY DEPT VISIT MOD MDM: CPT

## 2021-03-03 PROCEDURE — 85730 THROMBOPLASTIN TIME PARTIAL: CPT

## 2021-03-03 PROCEDURE — 96365 THER/PROPH/DIAG IV INF INIT: CPT

## 2021-03-03 PROCEDURE — 83690 ASSAY OF LIPASE: CPT

## 2021-03-03 PROCEDURE — 2500000003 HC RX 250 WO HCPCS: Performed by: STUDENT IN AN ORGANIZED HEALTH CARE EDUCATION/TRAINING PROGRAM

## 2021-03-03 PROCEDURE — 85025 COMPLETE CBC W/AUTO DIFF WBC: CPT

## 2021-03-03 PROCEDURE — 87086 URINE CULTURE/COLONY COUNT: CPT

## 2021-03-03 PROCEDURE — 6360000002 HC RX W HCPCS: Performed by: STUDENT IN AN ORGANIZED HEALTH CARE EDUCATION/TRAINING PROGRAM

## 2021-03-03 PROCEDURE — 96368 THER/DIAG CONCURRENT INF: CPT

## 2021-03-03 RX ORDER — SODIUM CHLORIDE, SODIUM LACTATE, POTASSIUM CHLORIDE, AND CALCIUM CHLORIDE .6; .31; .03; .02 G/100ML; G/100ML; G/100ML; G/100ML
30 INJECTION, SOLUTION INTRAVENOUS ONCE
Status: DISCONTINUED | OUTPATIENT
Start: 2021-03-03 | End: 2021-03-03

## 2021-03-03 RX ORDER — KETOROLAC TROMETHAMINE 15 MG/ML
15 INJECTION, SOLUTION INTRAMUSCULAR; INTRAVENOUS ONCE
Status: COMPLETED | OUTPATIENT
Start: 2021-03-03 | End: 2021-03-03

## 2021-03-03 RX ORDER — ONDANSETRON 2 MG/ML
4 INJECTION INTRAMUSCULAR; INTRAVENOUS ONCE
Status: COMPLETED | OUTPATIENT
Start: 2021-03-03 | End: 2021-03-03

## 2021-03-03 RX ORDER — ACETAMINOPHEN 500 MG
1000 TABLET ORAL ONCE
Status: COMPLETED | OUTPATIENT
Start: 2021-03-03 | End: 2021-03-03

## 2021-03-03 RX ORDER — SODIUM CHLORIDE, SODIUM LACTATE, POTASSIUM CHLORIDE, AND CALCIUM CHLORIDE .6; .31; .03; .02 G/100ML; G/100ML; G/100ML; G/100ML
30 INJECTION, SOLUTION INTRAVENOUS ONCE
Status: COMPLETED | OUTPATIENT
Start: 2021-03-03 | End: 2021-03-04

## 2021-03-03 RX ADMIN — ONDANSETRON 4 MG: 2 INJECTION INTRAMUSCULAR; INTRAVENOUS at 22:11

## 2021-03-03 RX ADMIN — ACETAMINOPHEN 1000 MG: 500 TABLET ORAL at 23:18

## 2021-03-03 RX ADMIN — Medication 1750 MG: at 23:59

## 2021-03-03 RX ADMIN — SODIUM CHLORIDE, POTASSIUM CHLORIDE, SODIUM LACTATE AND CALCIUM CHLORIDE 1000 ML: 600; 310; 30; 20 INJECTION, SOLUTION INTRAVENOUS at 22:11

## 2021-03-03 RX ADMIN — CEFEPIME HYDROCHLORIDE 1000 MG: 1 INJECTION, POWDER, FOR SOLUTION INTRAMUSCULAR; INTRAVENOUS at 23:04

## 2021-03-03 RX ADMIN — KETOROLAC TROMETHAMINE 15 MG: 15 INJECTION, SOLUTION INTRAMUSCULAR; INTRAVENOUS at 22:11

## 2021-03-03 ASSESSMENT — PAIN SCALES - GENERAL
PAINLEVEL_OUTOF10: 8
PAINLEVEL_OUTOF10: 8

## 2021-03-03 ASSESSMENT — PAIN DESCRIPTION - LOCATION: LOCATION: HEAD

## 2021-03-04 ENCOUNTER — APPOINTMENT (OUTPATIENT)
Dept: CT IMAGING | Age: 51
DRG: 220 | End: 2021-03-04
Payer: MEDICARE

## 2021-03-04 PROBLEM — K25.2 ACUTE PERFORATED GASTRIC ULCER WITH HEMORRHAGE (HCC): Status: ACTIVE | Noted: 2021-03-04

## 2021-03-04 LAB
ABSOLUTE EOS #: 0 K/UL (ref 0–0.4)
ABSOLUTE IMMATURE GRANULOCYTE: 0 K/UL (ref 0–0.3)
ABSOLUTE LYMPH #: 0.93 K/UL (ref 1–4.8)
ABSOLUTE MONO #: 1.86 K/UL (ref 0.1–0.8)
ANION GAP SERPL CALCULATED.3IONS-SCNC: 8 MMOL/L (ref 9–17)
BASOPHILS # BLD: 0 % (ref 0–2)
BASOPHILS ABSOLUTE: 0 K/UL (ref 0–0.2)
BILIRUBIN URINE: NEGATIVE
BUN BLDV-MCNC: 14 MG/DL (ref 6–20)
BUN/CREAT BLD: ABNORMAL (ref 9–20)
CALCIUM SERPL-MCNC: 8.4 MG/DL (ref 8.6–10.4)
CHLORIDE BLD-SCNC: 97 MMOL/L (ref 98–107)
CO2: 28 MMOL/L (ref 20–31)
COLOR: YELLOW
COMMENT UA: ABNORMAL
CREAT SERPL-MCNC: 0.53 MG/DL (ref 0.5–0.9)
CULTURE: NORMAL
DIFFERENTIAL TYPE: ABNORMAL
DIRECT EXAM: NORMAL
EOSINOPHILS RELATIVE PERCENT: 0 % (ref 1–4)
GFR AFRICAN AMERICAN: >60 ML/MIN
GFR NON-AFRICAN AMERICAN: >60 ML/MIN
GFR SERPL CREATININE-BSD FRML MDRD: ABNORMAL ML/MIN/{1.73_M2}
GFR SERPL CREATININE-BSD FRML MDRD: ABNORMAL ML/MIN/{1.73_M2}
GLUCOSE BLD-MCNC: 102 MG/DL (ref 70–99)
GLUCOSE URINE: NEGATIVE
HCT VFR BLD CALC: 42 % (ref 36.3–47.1)
HEMOGLOBIN: 13.8 G/DL (ref 11.9–15.1)
IMMATURE GRANULOCYTES: 0 %
INR BLD: 1.1
KETONES, URINE: NEGATIVE
LACTIC ACID, SEPSIS WHOLE BLOOD: 1 MMOL/L (ref 0.5–1.9)
LACTIC ACID, SEPSIS: NORMAL MMOL/L (ref 0.5–1.9)
LACTIC ACID, WHOLE BLOOD: 1.3 MMOL/L (ref 0.7–2.1)
LACTIC ACID: NORMAL MMOL/L
LEUKOCYTE ESTERASE, URINE: NEGATIVE
LYMPHOCYTES # BLD: 3 % (ref 24–44)
Lab: NORMAL
MAGNESIUM: 2.3 MG/DL (ref 1.6–2.6)
MCH RBC QN AUTO: 28.6 PG (ref 25.2–33.5)
MCHC RBC AUTO-ENTMCNC: 32.9 G/DL (ref 28.4–34.8)
MCV RBC AUTO: 87 FL (ref 82.6–102.9)
MONOCYTES # BLD: 6 % (ref 1–7)
MORPHOLOGY: NORMAL
NITRITE, URINE: NEGATIVE
NRBC AUTOMATED: 0 PER 100 WBC
PDW BLD-RTO: 12.9 % (ref 11.8–14.4)
PH UA: 6.5 (ref 5–8)
PHOSPHORUS: 2 MG/DL (ref 2.6–4.5)
PHOSPHORUS: 3 MG/DL (ref 2.6–4.5)
PLATELET # BLD: 271 K/UL (ref 138–453)
PLATELET ESTIMATE: ABNORMAL
PMV BLD AUTO: 9.5 FL (ref 8.1–13.5)
POTASSIUM SERPL-SCNC: 2.9 MMOL/L (ref 3.7–5.3)
POTASSIUM SERPL-SCNC: 3.5 MMOL/L (ref 3.7–5.3)
PROTEIN UA: ABNORMAL
PROTHROMBIN TIME: 11.8 SEC (ref 9.1–12.3)
RBC # BLD: 4.83 M/UL (ref 3.95–5.11)
RBC # BLD: ABNORMAL 10*6/UL
SARS-COV-2, RAPID: NOT DETECTED
SEG NEUTROPHILS: 91 % (ref 36–66)
SEGMENTED NEUTROPHILS ABSOLUTE COUNT: 28.21 K/UL (ref 1.8–7.7)
SODIUM BLD-SCNC: 133 MMOL/L (ref 135–144)
SPECIFIC GRAVITY UA: 1.04 (ref 1–1.03)
SPECIMEN DESCRIPTION: NORMAL
SPECIMEN DESCRIPTION: NORMAL
TROPONIN INTERP: NORMAL
TROPONIN INTERP: NORMAL
TROPONIN T: NORMAL NG/ML
TROPONIN T: NORMAL NG/ML
TROPONIN, HIGH SENSITIVITY: 6 NG/L (ref 0–14)
TROPONIN, HIGH SENSITIVITY: 9 NG/L (ref 0–14)
TURBIDITY: CLEAR
URINE HGB: ABNORMAL
UROBILINOGEN, URINE: NORMAL
WBC # BLD: 31 K/UL (ref 3.5–11.3)
WBC # BLD: ABNORMAL 10*3/UL

## 2021-03-04 PROCEDURE — 74174 CTA ABD&PLVS W/CONTRAST: CPT

## 2021-03-04 PROCEDURE — 2580000003 HC RX 258: Performed by: STUDENT IN AN ORGANIZED HEALTH CARE EDUCATION/TRAINING PROGRAM

## 2021-03-04 PROCEDURE — 84132 ASSAY OF SERUM POTASSIUM: CPT

## 2021-03-04 PROCEDURE — 84484 ASSAY OF TROPONIN QUANT: CPT

## 2021-03-04 PROCEDURE — 87075 CULTR BACTERIA EXCEPT BLOOD: CPT

## 2021-03-04 PROCEDURE — 6360000002 HC RX W HCPCS: Performed by: STUDENT IN AN ORGANIZED HEALTH CARE EDUCATION/TRAINING PROGRAM

## 2021-03-04 PROCEDURE — 83735 ASSAY OF MAGNESIUM: CPT

## 2021-03-04 PROCEDURE — 2500000003 HC RX 250 WO HCPCS: Performed by: STUDENT IN AN ORGANIZED HEALTH CARE EDUCATION/TRAINING PROGRAM

## 2021-03-04 PROCEDURE — 6360000002 HC RX W HCPCS: Performed by: RADIOLOGY

## 2021-03-04 PROCEDURE — 6360000004 HC RX CONTRAST MEDICATION: Performed by: STUDENT IN AN ORGANIZED HEALTH CARE EDUCATION/TRAINING PROGRAM

## 2021-03-04 PROCEDURE — 2580000003 HC RX 258: Performed by: PHYSICIAN ASSISTANT

## 2021-03-04 PROCEDURE — 6370000000 HC RX 637 (ALT 250 FOR IP): Performed by: STUDENT IN AN ORGANIZED HEALTH CARE EDUCATION/TRAINING PROGRAM

## 2021-03-04 PROCEDURE — 2709999900 CT ABSCESS DRAINAGE

## 2021-03-04 PROCEDURE — 94640 AIRWAY INHALATION TREATMENT: CPT

## 2021-03-04 PROCEDURE — 85025 COMPLETE CBC W/AUTO DIFF WBC: CPT

## 2021-03-04 PROCEDURE — 93005 ELECTROCARDIOGRAM TRACING: CPT | Performed by: STUDENT IN AN ORGANIZED HEALTH CARE EDUCATION/TRAINING PROGRAM

## 2021-03-04 PROCEDURE — 96375 TX/PRO/DX INJ NEW DRUG ADDON: CPT

## 2021-03-04 PROCEDURE — C9113 INJ PANTOPRAZOLE SODIUM, VIA: HCPCS | Performed by: STUDENT IN AN ORGANIZED HEALTH CARE EDUCATION/TRAINING PROGRAM

## 2021-03-04 PROCEDURE — 85610 PROTHROMBIN TIME: CPT

## 2021-03-04 PROCEDURE — 0W9G30Z DRAINAGE OF PERITONEAL CAVITY WITH DRAINAGE DEVICE, PERCUTANEOUS APPROACH: ICD-10-PCS | Performed by: RADIOLOGY

## 2021-03-04 PROCEDURE — 87205 SMEAR GRAM STAIN: CPT

## 2021-03-04 PROCEDURE — 84100 ASSAY OF PHOSPHORUS: CPT

## 2021-03-04 PROCEDURE — 36415 COLL VENOUS BLD VENIPUNCTURE: CPT

## 2021-03-04 PROCEDURE — 87070 CULTURE OTHR SPECIMN AEROBIC: CPT

## 2021-03-04 PROCEDURE — 2060000000 HC ICU INTERMEDIATE R&B

## 2021-03-04 PROCEDURE — 94760 N-INVAS EAR/PLS OXIMETRY 1: CPT

## 2021-03-04 PROCEDURE — 80048 BASIC METABOLIC PNL TOTAL CA: CPT

## 2021-03-04 PROCEDURE — 2709999900 HC NON-CHARGEABLE SUPPLY

## 2021-03-04 PROCEDURE — 83605 ASSAY OF LACTIC ACID: CPT

## 2021-03-04 PROCEDURE — 81003 URINALYSIS AUTO W/O SCOPE: CPT

## 2021-03-04 RX ORDER — POTASSIUM CHLORIDE 7.45 MG/ML
10 INJECTION INTRAVENOUS PRN
Status: DISCONTINUED | OUTPATIENT
Start: 2021-03-04 | End: 2021-03-04

## 2021-03-04 RX ORDER — FLUCONAZOLE 2 MG/ML
400 INJECTION, SOLUTION INTRAVENOUS EVERY 24 HOURS
Status: DISCONTINUED | OUTPATIENT
Start: 2021-03-05 | End: 2021-03-05

## 2021-03-04 RX ORDER — FLUCONAZOLE 2 MG/ML
400 INJECTION, SOLUTION INTRAVENOUS EVERY 24 HOURS
Status: DISCONTINUED | OUTPATIENT
Start: 2021-03-04 | End: 2021-03-04

## 2021-03-04 RX ORDER — MAGNESIUM SULFATE IN WATER 40 MG/ML
2000 INJECTION, SOLUTION INTRAVENOUS ONCE
Status: COMPLETED | OUTPATIENT
Start: 2021-03-04 | End: 2021-03-04

## 2021-03-04 RX ORDER — ONDANSETRON 2 MG/ML
4 INJECTION INTRAMUSCULAR; INTRAVENOUS ONCE
Status: DISCONTINUED | OUTPATIENT
Start: 2021-03-04 | End: 2021-03-04

## 2021-03-04 RX ORDER — ACETAMINOPHEN 650 MG/1
650 SUPPOSITORY RECTAL EVERY 4 HOURS PRN
Status: DISCONTINUED | OUTPATIENT
Start: 2021-03-04 | End: 2021-03-07

## 2021-03-04 RX ORDER — IPRATROPIUM BROMIDE AND ALBUTEROL SULFATE 2.5; .5 MG/3ML; MG/3ML
1 SOLUTION RESPIRATORY (INHALATION) 2 TIMES DAILY
Status: DISCONTINUED | OUTPATIENT
Start: 2021-03-04 | End: 2021-03-26 | Stop reason: HOSPADM

## 2021-03-04 RX ORDER — FENTANYL CITRATE 50 UG/ML
INJECTION, SOLUTION INTRAMUSCULAR; INTRAVENOUS
Status: COMPLETED | OUTPATIENT
Start: 2021-03-04 | End: 2021-03-04

## 2021-03-04 RX ORDER — SODIUM CHLORIDE 0.9 % (FLUSH) 0.9 %
10 SYRINGE (ML) INJECTION EVERY 12 HOURS SCHEDULED
Status: DISCONTINUED | OUTPATIENT
Start: 2021-03-04 | End: 2021-03-09

## 2021-03-04 RX ORDER — ONDANSETRON 2 MG/ML
4 INJECTION INTRAMUSCULAR; INTRAVENOUS EVERY 6 HOURS PRN
Status: DISCONTINUED | OUTPATIENT
Start: 2021-03-04 | End: 2021-03-26 | Stop reason: HOSPADM

## 2021-03-04 RX ORDER — MIDAZOLAM HYDROCHLORIDE 2 MG/2ML
INJECTION, SOLUTION INTRAMUSCULAR; INTRAVENOUS
Status: COMPLETED | OUTPATIENT
Start: 2021-03-04 | End: 2021-03-04

## 2021-03-04 RX ORDER — ACETAMINOPHEN 325 MG/1
650 TABLET ORAL EVERY 4 HOURS PRN
Status: DISCONTINUED | OUTPATIENT
Start: 2021-03-04 | End: 2021-03-04

## 2021-03-04 RX ORDER — SODIUM CHLORIDE 9 MG/ML
10 INJECTION INTRAVENOUS 2 TIMES DAILY
Status: DISCONTINUED | OUTPATIENT
Start: 2021-03-04 | End: 2021-03-09

## 2021-03-04 RX ORDER — MORPHINE SULFATE 4 MG/ML
4 INJECTION, SOLUTION INTRAMUSCULAR; INTRAVENOUS ONCE
Status: COMPLETED | OUTPATIENT
Start: 2021-03-04 | End: 2021-03-04

## 2021-03-04 RX ORDER — SODIUM CHLORIDE 0.9 % (FLUSH) 0.9 %
10 SYRINGE (ML) INJECTION 2 TIMES DAILY
Status: DISCONTINUED | OUTPATIENT
Start: 2021-03-04 | End: 2021-03-09

## 2021-03-04 RX ORDER — POTASSIUM CHLORIDE 7.45 MG/ML
40 INJECTION INTRAVENOUS ONCE
Status: DISCONTINUED | OUTPATIENT
Start: 2021-03-04 | End: 2021-03-04

## 2021-03-04 RX ORDER — DICYCLOMINE HYDROCHLORIDE 10 MG/ML
20 INJECTION INTRAMUSCULAR ONCE
Status: COMPLETED | OUTPATIENT
Start: 2021-03-04 | End: 2021-03-04

## 2021-03-04 RX ORDER — SODIUM CHLORIDE, SODIUM LACTATE, POTASSIUM CHLORIDE, CALCIUM CHLORIDE 600; 310; 30; 20 MG/100ML; MG/100ML; MG/100ML; MG/100ML
INJECTION, SOLUTION INTRAVENOUS CONTINUOUS
Status: DISCONTINUED | OUTPATIENT
Start: 2021-03-04 | End: 2021-03-05

## 2021-03-04 RX ORDER — BISACODYL 10 MG
10 SUPPOSITORY, RECTAL RECTAL DAILY PRN
Status: DISCONTINUED | OUTPATIENT
Start: 2021-03-04 | End: 2021-03-26 | Stop reason: HOSPADM

## 2021-03-04 RX ORDER — CIPROFLOXACIN 2 MG/ML
400 INJECTION, SOLUTION INTRAVENOUS EVERY 12 HOURS
Status: DISPENSED | OUTPATIENT
Start: 2021-03-04 | End: 2021-03-09

## 2021-03-04 RX ORDER — PANTOPRAZOLE SODIUM 40 MG/10ML
40 INJECTION, POWDER, LYOPHILIZED, FOR SOLUTION INTRAVENOUS 2 TIMES DAILY
Status: DISCONTINUED | OUTPATIENT
Start: 2021-03-04 | End: 2021-03-08

## 2021-03-04 RX ORDER — MORPHINE SULFATE 4 MG/ML
2 INJECTION, SOLUTION INTRAMUSCULAR; INTRAVENOUS EVERY 4 HOURS PRN
Status: DISCONTINUED | OUTPATIENT
Start: 2021-03-04 | End: 2021-03-05

## 2021-03-04 RX ORDER — SODIUM CHLORIDE 0.9 % (FLUSH) 0.9 %
10 SYRINGE (ML) INJECTION PRN
Status: DISCONTINUED | OUTPATIENT
Start: 2021-03-04 | End: 2021-03-26 | Stop reason: HOSPADM

## 2021-03-04 RX ADMIN — IOPAMIDOL 100 ML: 755 INJECTION, SOLUTION INTRAVENOUS at 01:47

## 2021-03-04 RX ADMIN — POTASSIUM CHLORIDE 10 MEQ: 7.46 INJECTION, SOLUTION INTRAVENOUS at 07:44

## 2021-03-04 RX ADMIN — ONDANSETRON 4 MG: 2 INJECTION INTRAMUSCULAR; INTRAVENOUS at 20:11

## 2021-03-04 RX ADMIN — METRONIDAZOLE 500 MG: 500 INJECTION, SOLUTION INTRAVENOUS at 20:11

## 2021-03-04 RX ADMIN — MIDAZOLAM HYDROCHLORIDE 0.5 MG: 1 INJECTION, SOLUTION INTRAMUSCULAR; INTRAVENOUS at 09:20

## 2021-03-04 RX ADMIN — ONDANSETRON 4 MG: 2 INJECTION INTRAMUSCULAR; INTRAVENOUS at 06:22

## 2021-03-04 RX ADMIN — MORPHINE SULFATE 2 MG: 4 INJECTION INTRAVENOUS at 08:29

## 2021-03-04 RX ADMIN — METRONIDAZOLE 500 MG: 500 INJECTION, SOLUTION INTRAVENOUS at 13:04

## 2021-03-04 RX ADMIN — ONDANSETRON 4 MG: 2 INJECTION INTRAMUSCULAR; INTRAVENOUS at 13:04

## 2021-03-04 RX ADMIN — FLUCONAZOLE 400 MG: 2 INJECTION, SOLUTION INTRAVENOUS at 06:38

## 2021-03-04 RX ADMIN — MIDAZOLAM HYDROCHLORIDE 1 MG: 1 INJECTION, SOLUTION INTRAMUSCULAR; INTRAVENOUS at 09:14

## 2021-03-04 RX ADMIN — SODIUM CHLORIDE, PRESERVATIVE FREE 10 ML: 5 INJECTION INTRAVENOUS at 20:12

## 2021-03-04 RX ADMIN — POTASSIUM PHOSPHATE, MONOBASIC AND POTASSIUM PHOSPHATE, DIBASIC 30 MMOL: 224; 236 INJECTION, SOLUTION, CONCENTRATE INTRAVENOUS at 11:33

## 2021-03-04 RX ADMIN — SODIUM CHLORIDE, PRESERVATIVE FREE 10 ML: 5 INJECTION INTRAVENOUS at 20:11

## 2021-03-04 RX ADMIN — PANTOPRAZOLE SODIUM 40 MG: 40 INJECTION, POWDER, FOR SOLUTION INTRAVENOUS at 11:34

## 2021-03-04 RX ADMIN — CIPROFLOXACIN 400 MG: 2 INJECTION, SOLUTION INTRAVENOUS at 12:59

## 2021-03-04 RX ADMIN — MORPHINE SULFATE 4 MG: 4 INJECTION INTRAVENOUS at 03:07

## 2021-03-04 RX ADMIN — FENTANYL CITRATE 50 MCG: 50 INJECTION, SOLUTION INTRAMUSCULAR; INTRAVENOUS at 09:12

## 2021-03-04 RX ADMIN — SODIUM CHLORIDE, POTASSIUM CHLORIDE, SODIUM LACTATE AND CALCIUM CHLORIDE 2790 ML: 600; 310; 30; 20 INJECTION, SOLUTION INTRAVENOUS at 01:36

## 2021-03-04 RX ADMIN — SODIUM CHLORIDE, POTASSIUM CHLORIDE, SODIUM LACTATE AND CALCIUM CHLORIDE: 600; 310; 30; 20 INJECTION, SOLUTION INTRAVENOUS at 06:22

## 2021-03-04 RX ADMIN — MORPHINE SULFATE 2 MG: 4 INJECTION INTRAVENOUS at 21:43

## 2021-03-04 RX ADMIN — IPRATROPIUM BROMIDE AND ALBUTEROL SULFATE 1 AMPULE: .5; 3 SOLUTION RESPIRATORY (INHALATION) at 08:24

## 2021-03-04 RX ADMIN — DICYCLOMINE HYDROCHLORIDE 20 MG: 10 INJECTION INTRAMUSCULAR at 01:15

## 2021-03-04 RX ADMIN — MORPHINE SULFATE 2 MG: 4 INJECTION INTRAVENOUS at 13:04

## 2021-03-04 RX ADMIN — MORPHINE SULFATE 2 MG: 4 INJECTION INTRAVENOUS at 17:08

## 2021-03-04 RX ADMIN — METRONIDAZOLE 500 MG: 500 INJECTION, SOLUTION INTRAVENOUS at 04:35

## 2021-03-04 RX ADMIN — FENTANYL CITRATE 50 MCG: 50 INJECTION, SOLUTION INTRAMUSCULAR; INTRAVENOUS at 09:20

## 2021-03-04 RX ADMIN — PANTOPRAZOLE SODIUM 40 MG: 40 INJECTION, POWDER, FOR SOLUTION INTRAVENOUS at 20:11

## 2021-03-04 RX ADMIN — SODIUM CHLORIDE, POTASSIUM CHLORIDE, SODIUM LACTATE AND CALCIUM CHLORIDE: 600; 310; 30; 20 INJECTION, SOLUTION INTRAVENOUS at 15:24

## 2021-03-04 RX ADMIN — SODIUM CHLORIDE, PRESERVATIVE FREE 10 ML: 5 INJECTION INTRAVENOUS at 11:34

## 2021-03-04 RX ADMIN — MAGNESIUM SULFATE 2000 MG: 2 INJECTION INTRAVENOUS at 02:34

## 2021-03-04 ASSESSMENT — ENCOUNTER SYMPTOMS
EYE ITCHING: 0
EYE REDNESS: 0
COUGH: 0
BLOOD IN STOOL: 0
RHINORRHEA: 0
ABDOMINAL PAIN: 1
VOMITING: 1
NAUSEA: 1
SHORTNESS OF BREATH: 0
DIARRHEA: 1
SORE THROAT: 0

## 2021-03-04 ASSESSMENT — PAIN DESCRIPTION - PAIN TYPE
TYPE: ACUTE PAIN

## 2021-03-04 ASSESSMENT — PAIN DESCRIPTION - ORIENTATION: ORIENTATION: MID

## 2021-03-04 ASSESSMENT — PAIN DESCRIPTION - FREQUENCY: FREQUENCY: CONTINUOUS

## 2021-03-04 ASSESSMENT — PAIN DESCRIPTION - LOCATION
LOCATION: ABDOMEN
LOCATION: ABDOMEN

## 2021-03-04 ASSESSMENT — PAIN SCALES - GENERAL
PAINLEVEL_OUTOF10: 9
PAINLEVEL_OUTOF10: 9
PAINLEVEL_OUTOF10: 8
PAINLEVEL_OUTOF10: 8
PAINLEVEL_OUTOF10: 9
PAINLEVEL_OUTOF10: 9

## 2021-03-04 NOTE — ED NOTES
Bed: 11  Expected date:   Expected time:   Means of arrival:   Comments:  LSYoanna Bueno RN  03/03/21 2123

## 2021-03-04 NOTE — H&P
Consult - General Surgery    PATIENT NAME: Teddy Walton  AGE: 48 y.o. MEDICAL RECORD NO. [id number]  DATE: 3/4/2021  SURGEON: Pilo Simons  PRIMARY CARE PHYSICIAN: Yan Olivier MD    Patient evaluated at the request of  Dr. Ronda Bhatti  Reason for evaluation: possible perforated gastric ulcer    Patient information was obtained from patient. History/Exam limitations: none. Patient presented to the Emergency Department by ambulance     IMPRESSION:   1. 48 y.o. female with 3 days of abdominal pain, N/V, diarrhea      MEDICAL DECISION MAKING AND PLAN:     · CT abdomen/pelvis with pneumoperitoneum and large air-fluid collection. Appears to be contained. Differential includes gastric ulcer vs abscess. Will discuss possibility of drain placement with IR. If unable to access or if patient's clinical picture worsens, will need to consider more aggressive surgical approach. · Covid: Negative  · Diet: N.p.o.  · Hold AC  · Admit to: step down      HISTORY:   History of Chief Complaint:    Ibrahima Tafoya is a 48 y.o. female with history of GERD, connective tissue disease, IVDU who presents with 3 days of abdominal pain. Patient reports 4 days ago the pain was precipitated by N/V, and diarrhea, and reports the abdominal pain came on 3/1 gradually. Pain is in the epigastric and left upper quadrant region. Denies radiation. Patient denies CP, S OB; admits fever up to 101 at home. Patient reports occasional acid reflux. Patient denies hematochezia and hematemesis, however she reports she did have some emesis that looked like coffee grounds. Patient denies ever receiving a colonoscopy or EGD. Patient has a history of IV heroin use, clean for 3 years this month. Patient is a smoker with 40 pack years. Denies heavy alcohol use, denies history of pancreatitis. Patient reports she takes [place] lot\" of aspirin daily for her migraines. Last meal was Sunday, 2/28.     Past Medical History   has a past medical history of Asthma, Connective tissue disease, undifferentiated ([facility] [address] DDD (degenerative disc disease), Depression, Lumbar radicular pain, Migraine, Mitral valve prolapse, and Opiate addiction ([facility] [address] Past Surgical History   has a past surgical history that includes tomy and bso (cervix removed) (2007); hernia repair (1972); Nose surgery (1989); Tubal ligation (1997); ovarian cyst removal (Left, 2007); Nerve Block (Right, 7/27/2015); and Hysterectomy. Medications  Prior to Admission medications    Medication Sig Start Date End Date Taking? Authorizing Provider   ibuprofen (IBU) 600 MG tablet Take 1 tablet by mouth every 6 hours as needed for Pain 2/20/20   Faustino Potter PA-C   METHADONE HCL PO Take 140 mg by mouth    Historical Provider, MD   FLUoxetine (PROZAC) 40 MG capsule Take 40 mg by mouth daily    Historical Provider, MD   gabapentin (NEURONTIN) 600 MG tablet Take 600 mg by mouth 3 times daily.     Historical Provider, MD   predniSONE (DELTASONE) 20 MG tablet Take 1 tablet by mouth daily Take 3 tablets day 1-2. 2 tablets day 3-4 and 1 tablet day 5 10/3/17   ABRAHAM Miranda CNP   diphenhydrAMINE (BENADRYL) 25 MG capsule Take 1 capsule by mouth every 6 hours as needed for Itching 10/3/17   ABRAHAM Miranda CNP   naloxone [facility]) 2 MG/2ML injection  11/1/16   Historical Provider, MD   ibuprofen (ADVIL;MOTRIN) 600 MG tablet Take 1 tablet by mouth every 6 hours as needed for Pain 10/16/16   Donaldo Clarke MD   albuterol sulfate HFA (PROVENTIL HFA) 108 (90 BASE) MCG/ACT inhaler Inhale 1-2 puffs into the lungs every 4 hours as needed for Wheezing 3/29/16   Angelo Narayanan MD    Scheduled Meds:   magnesium sulfate  2,000 mg Intravenous Once    vancomycin  1,500 mg Intravenous Q12H    vancomycin (VANCOCIN) intermittent dosing (placeholder)   Other RX Placeholder    metroNIDAZOLE  500 mg Intravenous Once    morphine  4 mg Intravenous Once    lactated ringers bolus  30 mL/kg Intravenous Once     Continuous Infusions:  PRN Meds:. Allergies  is allergic to compazine [prochlorperazine maleate]; imitrex [sumatriptan]; thorazine [chlorpromazine]; cardizem [diltiazem]; and pcn [penicillins]. Family History  family history includes ADHD in her son; Alcohol Abuse in her paternal grandfather; Anxiety Disorder in her daughter; Arthritis in her father and mother; Asthma in her daughter and son; Depression in her daughter and son; Diabetes in her maternal grandmother and son; Heart Attack in her father, maternal grandmother, and paternal grandfather; High Cholesterol in her father; Mental Illness in her paternal grandmother; Other in her brother and son; Other (age of onset: 64) in her mother; Other (age of onset: 6) in her maternal grandfather. Social History   reports that she has been smoking cigarettes. She has been smoking about 1.00 pack per day. She has never used smokeless tobacco.   reports no history of alcohol use. reports previous drug use. Drugs: Marijuana, IV, and Opiates . Review of Systems  As reviewed in HPI    PHYSICAL:   VITALS:  weight is 205 lb (93 kg). Her oral temperature is 100.7 °F (38.2 °C). Her blood pressure is 136/86 and her pulse is 109. Her respiration is 22 and oxygen saturation is 95%. CONSTITUTIONAL: awake, alert, cooperative, moderate distress due to pain, HEENT: Head is normocephalic, atraumatic. EOMI, PERRLA  NECK: Soft, trachea midline and straight  LUNGS: Chest expands equally bilaterally upon respiration, no accessory muscle used. Unlabored breathing on RA  CARDIOVASCULAR: Heart sounds are normal.  Regular rate and rhythm without murmur, gallop or rub. ABDOMEN: Obese, soft, mild distention, diffuse TTP with exquisite TTP LUQ and epigastric region with focal peritonitis at these locations. Voluntary guarding. Without rigidity/rebound. NEUROLOGIC: CN II-XII are grossly intact.  There are no focalizing motor or sensory deficits  EXTREMITIES: no cyanosis, clubbing or edema    LABS: Recent Labs     03/03/21 2151   WBC 33.1*   HGB 16.2*   HCT 49.7*      *   K 3.4*   CL 96*   CO2 26   BUN 19   CREATININE 0.58   MG 1.9   CALCIUM 9.0   INR 1.2   AST 17   ALT 9   BILITOT 0.81     Recent Labs     03/03/21 2151   ALKPHOS 98   ALT 9   AST 17   BILITOT 0.81   LABALBU 3.7   LIPASE 9*     CBC with Differential:    Lab Results   Component Value Date    WBC 33.1 03/03/2021    RBC 5.81 03/03/2021    RBC 5.02 09/02/2017    HGB 16.2 03/03/2021    HCT 49.7 03/03/2021     03/03/2021    MCV 85.5 03/03/2021    MCH 27.9 03/03/2021    MCHC 32.6 03/03/2021    RDW 13.1 03/03/2021    LYMPHOPCT 7 03/03/2021    LYMPHOPCT 29.7 09/02/2017    MONOPCT 7 03/03/2021    MONOPCT 0.6 09/02/2017    BASOPCT 0 03/03/2021    BASOPCT 0.1 09/02/2017    MONOSABS 2.32 03/03/2021    LYMPHSABS 2.32 03/03/2021    EOSABS 0.00 03/03/2021    BASOSABS 0.00 03/03/2021    DIFFTYPE NOT REPORTED 03/03/2021     CMP:    Lab Results   Component Value Date     03/03/2021    K 3.4 03/03/2021    CL 96 03/03/2021    CO2 26 03/03/2021    BUN 19 03/03/2021    CREATININE 0.58 03/03/2021    GFRAA >60 03/03/2021    LABGLOM >60 03/03/2021    GLUCOSE 130 03/03/2021    GLUCOSE 90 09/02/2017    PROT 8.1 03/03/2021    LABALBU 3.7 03/03/2021    CALCIUM 9.0 03/03/2021    BILITOT 0.81 03/03/2021    ALKPHOS 98 03/03/2021    AST 17 03/03/2021    ALT 9 03/03/2021     BMP:    Lab Results   Component Value Date     03/03/2021    K 3.4 03/03/2021    CL 96 03/03/2021    CO2 26 03/03/2021    BUN 19 03/03/2021    LABALBU 3.7 03/03/2021    CREATININE 0.58 03/03/2021    CALCIUM 9.0 03/03/2021    GFRAA >60 03/03/2021    LABGLOM >60 03/03/2021    GLUCOSE 130 03/03/2021    GLUCOSE 90 09/02/2017     Urine Culture:  No components found for: ADAMAINE  Blood Culture:  No components found for: CBLOOD, CFUNGUSBL    RADIOLOGY:   Xr Chest Portable    Result Date: 3/3/2021  Linear atelectasis/scarring at the lung bases more pronounced on the right.

## 2021-03-04 NOTE — PRE SEDATION
Sedation Pre-Procedure Note    Patient Name: Judy Donovan   YOB: 1970  Room/Bed: GERMAN/GERMAN  Medical Record Number: [id number]  Date: 3/4/2021   Time: 9:00AM    Indication:  Abdominal abscess    Consent: I have discussed with the patient and/or the patient representative the indication, alternatives, and the possible risks and/or complications of the planned procedure and the anesthesia methods. The patient and/or patient representative appear to understand and agree to proceed. Vital Signs:   Vitals:    03/04/21 0925   BP: (!) 164/101   Pulse: 101   Resp: 20   Temp:    SpO2: 96%       Past Medical History:   has a past medical history of Asthma, Connective tissue disease, undifferentiated ([address] DDD (degenerative disc disease), Depression, Lumbar radicular pain, Migraine, Mitral valve prolapse, and Opiate addiction ([facility] [address] Past Surgical History:   has a past surgical history that includes tomy and bso (cervix removed) (2007); hernia repair (1972); Nose surgery (1989); Tubal ligation (1997); ovarian cyst removal (Left, 2007); Nerve Block (Right, 7/27/2015); and Hysterectomy. Medications:   Scheduled Meds:    ipratropium-albuterol  1 ampule Inhalation BID    sodium chloride flush  10 mL Intravenous 2 times per day    [START ON 3/5/2021] fluconazole  400 mg Intravenous Q24H    pantoprazole  40 mg Intravenous BID    And    sodium chloride (PF)  10 mL Intravenous BID    ciprofloxacin  400 mg Intravenous Q12H    metroNIDAZOLE  500 mg Intravenous Q8H    potassium phosphate IVPB  30 mmol Intravenous Once    sodium chloride flush  10 mL Intracatheter BID     Continuous Infusions:    lactated ringers 125 mL/hr at 03/04/21 0622     PRN Meds: sodium chloride flush, morphine, acetaminophen, bisacodyl, ondansetron, potassium chloride  Home Meds:   Prior to Admission medications    Medication Sig Start Date End Date Taking?  Authorizing Provider   ibuprofen (IBU) 600 MG tablet Take 1 tablet by mouth every 6 hours as needed for Pain 2/20/20   Maria De Jesus Potter PA-C   METHADONE HCL PO Take 140 mg by mouth    Historical Provider, MD   FLUoxetine (PROZAC) 40 MG capsule Take 40 mg by mouth daily    Historical Provider, MD   gabapentin (NEURONTIN) 600 MG tablet Take 600 mg by mouth 3 times daily. Historical Provider, MD   predniSONE (DELTASONE) 20 MG tablet Take 1 tablet by mouth daily Take 3 tablets day 1-2. 2 tablets day 3-4 and 1 tablet day 5 10/3/17   ABRAHAM Escobedo CNP   diphenhydrAMINE (BENADRYL) 25 MG capsule Take 1 capsule by mouth every 6 hours as needed for Itching 10/3/17   ABRAHAM Escobedo CNP   naloxone [facility]) 2 MG/2ML injection  11/1/16   Historical Provider, MD   ibuprofen (ADVIL;MOTRIN) 600 MG tablet Take 1 tablet by mouth every 6 hours as needed for Pain 10/16/16   Isabela Witt MD   albuterol sulfate HFA (PROVENTIL HFA) 108 (90 BASE) MCG/ACT inhaler Inhale 1-2 puffs into the lungs every 4 hours as needed for Wheezing 3/29/16   May Wallace MD     Coumadin Use Last 7 Days:  no  Antiplatelet drug therapy use last 7 days: no  Other anticoagulant use last 7 days: no  Additional Medication Information:  See [facility] rec      Pre-Sedation Documentation and Exam:   I have personally completed a history, physical exam & review of systems for this patient (see notes).     Mallampati Airway Assessment:  Mallampati Class II - (soft palate, fauces & uvula are visible)    Prior History of Anesthesia Complications:   none    ASA Classification:  Class 2 - A normal healthy patient with mild systemic disease    Sedation/ Anesthesia Plan:   intravenous sedation    Medications Planned:   midazolam (Versed) intravenously and fentanyl intravenously    Patient is an appropriate candidate for plan of sedation: yes    Electronically signed by CHANDRIKA Ruby on 3/4/2021 at 9:34 AM

## 2021-03-04 NOTE — POST SEDATION
Sedation Post Procedure Note    Patient Name: Megha Taylor   YOB: 1970  Room/Bed: GERMAN/GERMAN  Medical Record Number: [id number]  Date: 3/4/2021   Time: 9:35 AM         Physicians/Assistants: Clary Soulier Redfox, PA    Procedure Performed:  Abdominal abscess    Post-Sedation Vital Signs:  Vitals:    03/04/21 0925   BP: (!) 164/101   Pulse: 101   Resp: 20   Temp:    SpO2: 96%      Vital signs were reviewed and were stable after the procedure (see flow sheet for vitals)            Post-Sedation Exam: Pt remains stable           Complications: none    Electronically signed by CHANDRIKA Link on 3/4/2021 at 9:35 AM

## 2021-03-04 NOTE — CARE COORDINATION
Case Management Initial Discharge Plan  [facility],         Met with:patient to discuss discharge plans. Information verified: address, contacts, phone number, , insurance Yes, two addresses listed. Pt lives at [address] [facility], [address] [place] [address] [address] [address] address. Updated face sheet faxed to [phone]    Emergency Contact/Next of Kin name & number: pt's spouse [phone]    PCP: Ruperto Michelle MD  Date of last visit: unsure of last date- pt wants to start seeing this physician once she has insurance coverage    Insurance Provider: none, THIERRY Villalta with HEL[employer] to see pt    Discharge Planning    Living Arrangements:  Spouse/Significant Other   Support Systems:  Spouse/Significant Other    Home has is a duplex and pt lives on the lower level  3 stairs to climb to get into front door    Patient able to perform ADL's:Independent    Current Services (outpatient & in home) none  DME equipment: none  DME provider: n/a    Receiving oral anticoagulation therapy? No    If indicated:   Physician managing anticoagulation treatment: n/a  Where does patient obtain lab work for ATC treatment? n/a      Potential Assistance Needed:  Prescription Assistance    Patient agreeable to home care: No  [place] of choice provided:  n/a    Prior SNF/Rehab Placement and Facility: none  Agreeable to SNF/Rehab: No  [place] of choice provided: n/a     Evaluation: yes    Expected Discharge date:  21    Patient expects to be discharged to:  Home  Follow Up Appointment: Best Day/ Time: Monday AM    Transportation provider: spouse  Transportation arrangements needed for discharge: No    Readmission Risk              Risk of Unplanned Readmission:        11             Does patient have a readmission risk score greater than 14?: No  If yes, follow-up appointment must be made within 7 days of discharge.      Goals of Care:       Discharge Plan: Home independently- has transportation , no insurance- will need Med Assist.SW consulted for hx of drug abuse, pt sees physician at [facility]        Electronically signed by Kari Ortiz RN on 3/4/21 at 6:24 PM EST

## 2021-03-04 NOTE — ED NOTES
Writer spoke with Dr. Tanner Pope in regards to pt's potassium being 2.9. Dr. Tanner Pope to place orders for potassium replacement.      Nu Whittaker RN  03/04/21 0172

## 2021-03-04 NOTE — ED PROVIDER NOTES
[facility]   Emergency Department  Emergency Medicine Attending Sign-out     Care of Gaye Vale was assumed from previous attending Dr. Franci Dillard and is being seen for Headache, Emesis, and Diarrhea  . The patient's initial evaluation and plan have been discussed with the prior provider who initially evaluated the patient. Attestation  I was available and discussed any additional care issues that arose and coordinated the management plans with the resident(s) caring for the patient during my duty period. Any areas of disagreement with resident's documentation of care or procedures are noted on the chart. I was personally present for the key portions of any/all procedures, during my duty period. I have documented in the chart those procedures where I was not present during the key portions. BRIEF PATIENT SUMMARY/MDM COURSE PER INITIAL PROVIDER:   RECENT VITALS:     Temp: 100.7 °F (38.2 °C),  Pulse: 131, Resp: 22, BP: (!) 165/106, SpO2: 94 %    This patient is a 48 y.o. Female with n/v, abdomina pain, and headache. Concern for pain out of proportion to exam.  Does have elevated wbc.   Awaiting CTA     DIAGNOSTICS/MEDICATIONS:     MEDICATIONS GIVEN:  ED Medication Orders (From admission, onward)    Start Ordered     Status Ordering Provider    03/03/21 2345 03/03/21 2342  lactated ringers bolus  ONCE      Acknowledged Sterling Rolon    03/03/21 2245 03/03/21 2237  vancomycin (VANCOCIN) 1750 mg in dextrose 5% 500 mL IVPB  ONCE     Question:  Antimicrobial Indications  Answer:  Sepsis of Unknown Etiology    Last MAR action: New Bag - by Ruthann Nagel on 03/03/21 at 2359 Maximilian TAMAYO    03/03/21 2230 03/03/21 2223  cefepime (MAXIPIME) 1000 mg IVPB minibag  ONCE     Question:  Antimicrobial Indications  Answer:  Sepsis of Unknown Etiology    Last MAR action: Stopped - by Ruthann Nagel on 03/04/21 at 0000 Sterling Rolon    03/03/21 2215 03/03/21 2207  ondansetron [facility]) injection 4 mg  ONCE      Last MAR action: Given - by Elaine Cosby on 03/03/21 at 2211 [facility]    03/03/21 2145 03/03/21 2144  ketorolac (TORADOL) injection 15 mg  ONCE      Last MAR action: Given - by Elaine Cosby on 03/03/21 at 2211 Don Ek    03/03/21 2145 03/03/21 2144  acetaminophen (TYLENOL) tablet 1,000 mg  ONCE      Last MAR action: Given - by Elaine Cosby on 03/03/21 at 2318 Elvi TAMAYO          LABS    Labs Reviewed   PROCALCITONIN - Abnormal; Notable for the following components:       Result Value    Procalcitonin 1.36 (*)     All other components within normal limits   CBC WITH AUTO DIFFERENTIAL - Abnormal; Notable for the following components:    WBC 33.1 (*)     RBC 5.81 (*)     Hemoglobin 16.2 (*)     Hematocrit 49.7 (*)     Seg Neutrophils 86 (*)     Lymphocytes 7 (*)     Eosinophils % 0 (*)     Segs Absolute 28.46 (*)     Absolute Mono # 2.32 (*)     All other components within normal limits   COMPREHENSIVE METABOLIC PANEL W/ REFLEX TO MG FOR LOW K - Abnormal; Notable for the following components:    Glucose 130 (*)     Sodium 133 (*)     Potassium 3.4 (*)     Chloride 96 (*)     Albumin/Globulin Ratio 0.8 (*)     All other components within normal limits   PROTIME-INR - Abnormal; Notable for the following components:    Protime 12.4 (*)     All other components within normal limits   LIPASE - Abnormal; Notable for the following components:    Lipase 9 (*)     All other components within normal limits   COVID-19, RAPID   CULTURE, URINE   CULTURE, BLOOD 1   CULTURE, BLOOD 2   LACTATE, SEPSIS   LACTATE, SEPSIS   APTT   MAGNESIUM   URINALYSIS       RADIOLOGY  Xr Chest Portable    Result Date: 3/3/2021  EXAMINATION: ONE XRAY VIEW OF THE CHEST 3/3/2021 9:44 pm COMPARISON: 02/15/2018 HISTORY: ORDERING SYSTEM PROVIDED HISTORY: sepsis TECHNOLOGIST PROVIDED HISTORY: sepsis Reason for Exam: upr,vomitting,headache,diarrhea,concern for covid FINDINGS: There are linear atelectasis/scarring at the lung bases more pronounced on the right side. No focal consolidation, pleural effusion or pneumothorax. Cardiomediastinal silhouette and hilar contours are stable and within normal range. . The osseous structures are stable. Linear atelectasis/scarring at the lung bases more pronounced on the right. Otherwise unremarkable chest radiograph. No focal consolidation. OUTSTANDING TASKS / ADDITIONAL COMMENTS   1. CTA     CT scan showed a contained gastric curve. Surgery consult. Plan for possible IR drainage. Repeat EKG shows QT has improved to 454.  No signs of acute ischemia    Lieutenant Alex MD  Emergency Medicine Attending  Leonor Cast [phone]       Jeramy Rios MD  03/04/21 [facility] [address] Doris Sanchez MD  03/04/21 0732

## 2021-03-04 NOTE — PROGRESS NOTES
Occupational Therapy Not Seen Note    DATE: 3/4/2021  Name: Gaye Vale  : 1970  MRN: [id number]    Patient not available for Occupational Therapy due to:     Other: Pt with drainage of abdominal abscess under sedation    Next Scheduled Treatment: 2021    Electronically signed by EMILY Winston on 3/4/2021 at 11:42 AM

## 2021-03-04 NOTE — ED PROVIDER NOTES
[facility]  ED  Emergency Department Encounter  Emergency Medicine Resident     Pt Name: Av Wetzel  MRN: [id number]  [place] 1970  Date ofevaluation: 3/4/21  PCP:  Julia Vidal MD    [address] [place] [address]       Chief Complaint   Patient presents with    Headache    Emesis    Diarrhea       HISTORY OF PRESENT ILLNESS  (Location/Symptom, Timing/Onset, Context/Setting, Quality, Duration, Modifying Factors, Severity, Associated signs/symptoms)     Av Wetzel is a 48 y.o. female who presents with a headache, nausea, vomiting, abdominal pain diarrhea. Patient reports that for last 2 days she has had a constant 8/10 abdominal pain as well as an 8/10 headache with some associated nonbloody emesis and nonbloody diarrhea. Reports her symptoms have been constant since onset, and that her abdominal pain is worsened on the car ride over from the bumps. Also reports of associated cold sweats. She reports that she is a history of migraine headaches and that this feels somewhat similar. No chest pain, shortness of breath, urinary complaints, vaginal bleeding or discharge. No prior history of abdominal surgeries. 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 .  Denies any other drug use or alcohol use. Does report tobacco use. PAST MEDICAL / SURGICAL / SOCIAL / FAMILY HISTORY      has a past medical history of Asthma, Connective tissue disease, undifferentiated ([address] DDD (degenerative disc disease), Depression, Lumbar radicular pain, Migraine, Mitral valve prolapse, and Opiate addiction ([address] has a past surgical history that includes tomy and bso (cervix removed) (2007); hernia repair (1972); Nose surgery (1989); Tubal ligation (1997); ovarian cyst removal (Left, 2007); Nerve Block (Right, 7/27/2015); and Hysterectomy.     Social History     Socioeconomic History    Marital status:      Spouse name: Not on file    Number of children: Not on file    Years of education: Not on file    Highest education level: Not on file   Occupational History    Not on file   Social Needs    Financial resource strain: Not on file    Food insecurity     Worry: Not on file     Inability: Not on file    Transportation needs     Medical: Not on file     Non-medical: Not on file   Tobacco Use    Smoking status: Current Every Day Smoker     Packs/day: 1.00     Types: Cigarettes    Smokeless tobacco: Never Used   Substance and Sexual Activity    Alcohol use: No     Alcohol/week: 0.0 standard drinks    Drug use: Not Currently     Types: Marijuana, IV, Opiates      Comment:  been a year since last used marijuana, heroin last used 2/13/2018    Sexual activity: Yes     Partners: Male   Lifestyle    Physical activity     Days per week: Not on file     Minutes per session: Not on file    Stress: Not on file   Relationships    Social connections     Talks on phone: Not on file     Gets together: Not on file     Attends Adventism service: Not on file     Active member of club or organization: Not on file     Attends meetings of clubs or organizations: Not on file     Relationship status: Not on file    Intimate partner violence     Fear of current or ex partner: Not on file     Emotionally abused: Not on file     Physically abused: Not on file     Forced sexual activity: Not on file   Other Topics Concern    Not on file   Social History Narrative    Not on file       Family History   Problem Relation Age of Onset    Other Mother 64        Multi Organ Failure    Arthritis Mother     High Cholesterol Father     Heart Attack Father     Arthritis Father         Rheumatoid    Other Brother         Vericose Veins    Diabetes Maternal Grandmother     Heart Attack Maternal Grandmother     Other Maternal Grandfather 8        Black Lung, Clotting Disorder, Vericose Veins    Mental Illness Paternal Grandmother     Alcohol Abuse Paternal Grandfather     Heart Attack Paternal Grandfather     Depression pain, diarrhea, nausea and vomiting. Negative for blood in stool. Genitourinary: Negative for dysuria, frequency, hematuria, vaginal bleeding and vaginal discharge. Musculoskeletal: Negative for arthralgias and myalgias. Allergic/Immunologic: Negative for environmental allergies and food allergies. Neurological: Positive for headaches. Negative for weakness and numbness. PHYSICAL EXAM   (up to 7 for level 4, 8 or more for level 5)      INITIAL VITALS:   BP (!) 165/106   Pulse 131   Temp 100.7 °F (38.2 °C) (Oral)   Resp 22   Wt 205 lb (93 kg)   SpO2 94%   BMI 32.11 kg/m²     Physical Exam  Vitals signs and nursing note reviewed. Constitutional:       General: She is not in acute distress. Appearance: Normal appearance. She is not ill-appearing, toxic-appearing or diaphoretic. HENT:      Head: Normocephalic and atraumatic. Mouth/Throat:      Mouth: Mucous membranes are dry. Pharynx: Oropharynx is clear. No oropharyngeal exudate or posterior oropharyngeal erythema. Eyes:      General: No scleral icterus. Extraocular Movements: Extraocular movements intact. Pupils: Pupils are equal, round, and reactive to light. Neck:      Musculoskeletal: Neck supple. No neck rigidity. Cardiovascular:      Rate and Rhythm: Regular rhythm. Tachycardia present. Pulmonary:      Effort: Pulmonary effort is normal. No respiratory distress. Breath sounds: Normal breath sounds. No stridor. No wheezing, rhonchi or rales. Abdominal:      General: There is no distension. Palpations: Abdomen is soft. There is no mass. Tenderness: There is abdominal tenderness (moderate) in the right lower quadrant and periumbilical area. There is no right CVA tenderness, left CVA tenderness, guarding or rebound. Musculoskeletal:      Right lower leg: No edema. Left lower leg: No edema. Skin:     General: Skin is warm and dry. Coloration: Skin is not jaundiced.    Neurological: General: No focal deficit present. Mental Status: She is alert and oriented to person, place, and time. Comments: EOMI. PERRL. Sensation intact throughout face. Smile symmetric. Uvula and palate rise midline. Shoulder shrug equal bilaterally. Tongue protrudes midline. Normal FNF, normal heel to shin. Full strength and sensation in upper and lower extremities bilaterally.     Psychiatric:         Mood and Affect: Mood normal.         Behavior: Behavior normal.         DIAGNOSTICS     PLAN (LABS / IMAGING / EKG):  Orders Placed This Encounter   Procedures    Culture, Urine    Culture, Blood 1    Culture, Blood 2    COVID-19, Rapid    XR CHEST PORTABLE    CTA ABDOMEN PELVIS W CONTRAST    Procalcitonin    CBC auto differential    Comprehensive Metabolic Panel w/ Reflex to MG    Urinalysis    Lactate, Sepsis    APTT    Protime-INR    Lipase    Magnesium    Troponin    Height and weight    Pharmacy to Dose: Vancomycin    Initiate Oxygen Therapy Protocol    EKG 12 lead       MEDICATIONS ORDERED:  Orders Placed This Encounter   Medications    DISCONTD: lactated ringers bolus    ketorolac (TORADOL) injection 15 mg    acetaminophen (TYLENOL) tablet 1,000 mg    ondansetron (ZOFRAN) injection 4 mg    cefepime (MAXIPIME) 1000 mg IVPB minibag     Order Specific Question:   Antimicrobial Indications     Answer:   Sepsis of Unknown Etiology    vancomycin (VANCOCIN) 1750 mg in dextrose 5% 500 mL IVPB     Order Specific Question:   Antimicrobial Indications     Answer:   Sepsis of Unknown Etiology    lactated ringers bolus    magnesium sulfate 2000 mg in 50 mL IVPB premix    dicyclomine (BENTYL) injection 20 mg    vancomycin (VANCOCIN) 1500 mg in dextrose 5 % 250 mL IVPB     Order Specific Question:   Antimicrobial Indications     Answer:   Sepsis of Unknown Etiology    vancomycin (VANCOCIN) intermittent dosing (placeholder)     Order Specific Question:   Antimicrobial Indications     Answer: Sepsis of Unknown Etiology       DIAGNOSTIC RESULTS / EMERGENCYDEPARTMENT COURSE / MDM     LABS:  Results for orders placed or performed during the hospital encounter of 03/03/21   COVID-19, Rapid    Specimen: Nasopharyngeal Swab   Result Value Ref Range    Specimen Description . NASOPHARYNGEAL SWAB     SARS-CoV-2, Rapid Not Detected Not Detected   Procalcitonin   Result Value Ref Range    Procalcitonin 1.36 (H) <0.09 ng/mL   CBC auto differential   Result Value Ref Range    WBC 33.1 (HH) 3.5 - 11.3 k/uL    RBC 5.81 (H) 3.95 - 5.11 m/uL    Hemoglobin 16.2 (H) 11.9 - 15.1 g/dL    Hematocrit 49.7 (H) 36.3 - 47.1 %    MCV 85.5 82.6 - 102.9 fL    MCH 27.9 25.2 - 33.5 pg    MCHC 32.6 28.4 - 34.8 g/dL    RDW 13.1 11.8 - 14.4 %    Platelets 952 887 - 054 k/uL    MPV 9.5 8.1 - 13.5 fL    NRBC Automated 0.0 0.0 per 100 WBC    Differential Type NOT REPORTED     WBC Morphology NOT REPORTED     RBC Morphology NOT REPORTED     Platelet Estimate NOT REPORTED     Immature Granulocytes 0 0 %    Seg Neutrophils 86 (H) 36 - 66 %    Lymphocytes 7 (L) 24 - 44 %    Monocytes 7 1 - 7 %    Eosinophils % 0 (L) 1 - 4 %    Basophils 0 0 - 2 %    Absolute Immature Granulocyte 0.00 0.00 - 0.30 k/uL    Segs Absolute 28.46 (H) 1.8 - 7.7 k/uL    Absolute Lymph # 2.32 1.0 - 4.8 k/uL    Absolute Mono # 2.32 (H) 0.1 - 0.8 k/uL    Absolute Eos # 0.00 0.0 - 0.4 k/uL    Basophils Absolute 0.00 0.0 - 0.2 k/uL    Morphology Normal    Comprehensive Metabolic Panel w/ Reflex to MG   Result Value Ref Range    Glucose 130 (H) 70 - 99 mg/dL    BUN 19 6 - 20 mg/dL    CREATININE 0.58 0.50 - 0.90 mg/dL    Bun/Cre Ratio NOT REPORTED 9 - 20    Calcium 9.0 8.6 - 10.4 mg/dL    Sodium 133 (L) 135 - 144 mmol/L    Potassium 3.4 (L) 3.7 - 5.3 mmol/L    Chloride 96 (L) 98 - 107 mmol/L    CO2 26 20 - 31 mmol/L    Anion Gap 11 9 - 17 mmol/L    Alkaline Phosphatase 98 35 - 104 U/L    ALT 9 5 - 33 U/L    AST 17 <32 U/L    Total Bilirubin 0.81 0.3 - 1.2 mg/dL    Total Protein 8.1 6.4 - 8.3 g/dL    Albumin 3.7 3.5 - 5.2 g/dL    Albumin/Globulin Ratio 0.8 (L) 1.0 - 2.5    GFR Non-African American >60 >60 mL/min    GFR African American >60 >60 mL/min    GFR Comment          GFR Staging NOT REPORTED    Lactate, Sepsis   Result Value Ref Range    Lactic Acid, Sepsis NOT REPORTED 0.5 - 1.9 mmol/L    Lactic Acid, Sepsis, Whole Blood 1.8 0.5 - 1.9 mmol/L   Lactate, Sepsis   Result Value Ref Range    Lactic Acid, Sepsis NOT REPORTED 0.5 - 1.9 mmol/L    Lactic Acid, Sepsis, Whole Blood 1.0 0.5 - 1.9 mmol/L   APTT   Result Value Ref Range    PTT 26.2 20.5 - 30.5 sec   Protime-INR   Result Value Ref Range    Protime 12.4 (H) 9.1 - 12.3 sec    INR 1.2    Lipase   Result Value Ref Range    Lipase 9 (L) 13 - 60 U/L   Magnesium   Result Value Ref Range    Magnesium 1.9 1.6 - 2.6 mg/dL       RADIOLOGY:  XR CHEST PORTABLE   Final Result   Linear atelectasis/scarring at the lung bases more pronounced on the right. Otherwise unremarkable chest radiograph. No focal consolidation. CTA ABDOMEN PELVIS W CONTRAST    (Results Pending)        EKG  Rhythm: sinus tachycardia  Rate: tachycardia  Axis: normal  Ectopy: none  Conduction: normal  ST Segments: depression in  v5, v6, I, II and III  T Waves: normal  Q Waves: none    Clinical Impression: non-specific EKG    Normal Interval Reference:  P-wave <110 ms  -200 ms  QRS <100 ms  QT <420 ms  QTc 330-470 ms    All EKG's are interpreted by the Emergency Department Physician who either signs or Co-signsthis chart in the absence of a cardiologist.    EMERGENCY DEPARTMENT COURSE:    ED Course as of Mar 05 1252   Thu Mar 04, 2021   69 Kyra Franz Talked to [facility], who state that there is a perforated gastric ulcer. General surgery contacted. [CS]   0300 Patient was admitted to general surgery under their care. They suspect that this is a contained abscess and required IR drainage.   Await for transfer to the floor.    [CS]   [id number] note that portions of this note were completed with a voice recognition program.  Efforts were made to edit thedictations but occasionally words are mis-transcribed.)       Nikki Nichols,   Resident  03/04/21 2616 Lianna Vázquez,   Resident  03/05/21 6059

## 2021-03-04 NOTE — ED NOTES
Per admitting service, pt is to have the potassium phosphate 30 mmol administered after the first potassium 10 mEq is complete and then the PRN potassium replacement will resume.      Deloris Aguilar RN  03/04/21 5950

## 2021-03-04 NOTE — ED NOTES
called [facility] per request of patient via RN and reported to methadone program that patient was being admitted into hospital on 3/4/2021 as patient was concerned she would be removed from the program for non-attendance. This note will not be viewable in MyChart for the following reason(s). This is a Psychotherapy Note.        CRISTIAN Centeno  03/04/21 1849

## 2021-03-04 NOTE — ED PROVIDER NOTES
[facility] ED  eMERGENCY dEPARTMENT eNCOUnter   Attending Attestation     Pt Name: Thom Mora  MRN: [id number]  [place] 1970  Date of evaluation: 3/3/21       Thom Mora is a 48 y.o. female who presents with Headache, Emesis, and Diarrhea      History: Patient presents with headache, vomiting, diarrhea, abdominal pain. Patient says has had this since Monday. Patient has a fever. Difficult to obtain much more history based on her acute pain and nausea. Exam: Heart rate elevated. Lungs are clear to auscultation bilaterally. Abdomen is soft, tender over the epigastrium and periumbilical areas. Patient is in acute pain. Plan for abdominal pain work-up, pain control, nausea control, plan for discharge. EKG shows sinus tachycardia with rate 130 bpm.  Normal axis. No ST elevation or depression. T waves upright. Nonspecific EKG without any blocks or arrhythmias. QT prolonged. I performed a history and physical examination of the patient and discussed management with the resident. I reviewed the residents note and agree with the documented findings and plan of care. Any areas of disagreement are noted on the chart. I was personally present for the key portions of any procedures. I have documented in the chart those procedures where I was not present during the key portions. I have personally reviewed all images and agree with the resident's interpretation. I have reviewed the emergency nurses triage note. I agree with the chief complaint, past medical history, past surgical history, allergies, medications, social and family history as documented unless otherwise noted below. Documentation of the HPI, Physical Exam and Medical Decision Making performed by medical students or scribes is based on my personal performance of the HPI, PE and MDM.  For Phys Assistant/ Nurse Practitioner cases/documentation I have had a face to face evaluation of this patient and have completed at least one if not all key elements of the E/M (history, physical exam, and MDM). Additional findings are as noted. For APC cases I have personally evaluated and examined the patient in conjunction with the APC and agree with the treatment plan and disposition of the patient as recorded by the APC.     Juan Wilson MD  Attending Emergency  Physician       Nette Barron MD  03/03/21 9650

## 2021-03-04 NOTE — ED PROVIDER NOTES
[address] ED  Emergency Department  Emergency Medicine Resident Sign-out     Care of Jason Pulliam was assumed from Dr. Ryan Orellana and is being seen for Headache, Emesis, and Diarrhea  . The patient's initial evaluation and plan have been discussed with the prior provider who initially evaluated the patient.      EMERGENCY DEPARTMENT COURSE / MEDICAL DECISION MAKING:       MEDICATIONS GIVEN:  Orders Placed This Encounter   Medications    DISCONTD: lactated ringers bolus    ketorolac (TORADOL) injection 15 mg    acetaminophen (TYLENOL) tablet 1,000 mg    ondansetron (ZOFRAN) injection 4 mg    cefepime (MAXIPIME) 1000 mg IVPB minibag     Order Specific Question:   Antimicrobial Indications     Answer:   Sepsis of Unknown Etiology    vancomycin (VANCOCIN) 1750 mg in dextrose 5% 500 mL IVPB     Order Specific Question:   Antimicrobial Indications     Answer:   Sepsis of Unknown Etiology    lactated ringers bolus    magnesium sulfate 2000 mg in 50 mL IVPB premix    dicyclomine (BENTYL) injection 20 mg    vancomycin (VANCOCIN) 1500 mg in dextrose 5 % 250 mL IVPB     Order Specific Question:   Antimicrobial Indications     Answer:   Sepsis of Unknown Etiology    vancomycin (VANCOCIN) intermittent dosing (placeholder)     Order Specific Question:   Antimicrobial Indications     Answer:   Sepsis of Unknown Etiology    iopamidol (ISOVUE-370) 76 % injection 100 mL    metronidazole (FLAGYL) 500 mg in NaCl 100 mL IVPB premix     Order Specific Question:   Antimicrobial Indications     Answer:   Intra-Abdominal Infection    morphine injection 4 mg    DISCONTD: ondansetron (ZOFRAN) injection 4 mg       LABS / RADIOLOGY:     Labs Reviewed   PROCALCITONIN - Abnormal; Notable for the following components:       Result Value    Procalcitonin 1.36 (*)     All other components within normal limits   CBC WITH AUTO DIFFERENTIAL - Abnormal; Notable for the following components:    WBC 33.1 (*)     RBC 5.81 (*)     Hemoglobin 16.2 (*)     Hematocrit 49.7 (*)     Seg Neutrophils 86 (*)     Lymphocytes 7 (*)     Eosinophils % 0 (*)     Segs Absolute 28.46 (*)     Absolute Mono # 2.32 (*)     All other components within normal limits   COMPREHENSIVE METABOLIC PANEL W/ REFLEX TO MG FOR LOW K - Abnormal; Notable for the following components:    Glucose 130 (*)     Sodium 133 (*)     Potassium 3.4 (*)     Chloride 96 (*)     Albumin/Globulin Ratio 0.8 (*)     All other components within normal limits   URINALYSIS - Abnormal; Notable for the following components:    Specific Gravity, UA 1.044 (*)     Urine Hgb TRACE (*)     Protein, UA 2+ (*)     All other components within normal limits   PROTIME-INR - Abnormal; Notable for the following components:    Protime 12.4 (*)     All other components within normal limits   LIPASE - Abnormal; Notable for the following components:    Lipase 9 (*)     All other components within normal limits   COVID-19, RAPID   CULTURE, URINE   CULTURE, BLOOD 1   CULTURE, BLOOD 2   LACTATE, SEPSIS   LACTATE, SEPSIS   APTT   MAGNESIUM   TROPONIN   PROTIME-INR   TROPONIN       Xr Chest Portable    Result Date: 3/3/2021  EXAMINATION: ONE XRAY VIEW OF THE CHEST 3/3/2021 9:44 pm COMPARISON: 02/15/2018 HISTORY: ORDERING SYSTEM PROVIDED HISTORY: sepsis TECHNOLOGIST PROVIDED HISTORY: sepsis Reason for Exam: upr,vomitting,headache,diarrhea,concern for covid FINDINGS: There are linear atelectasis/scarring at the lung bases more pronounced on the right side. No focal consolidation, pleural effusion or pneumothorax. Cardiomediastinal silhouette and hilar contours are stable and within normal range. . The osseous structures are stable. Linear atelectasis/scarring at the lung bases more pronounced on the right. Otherwise unremarkable chest radiograph. No focal consolidation. RECENT VITALS:     Temp: 99 °F (37.2 °C),  Pulse: 100, Resp: 22, BP: (!) 143/91, SpO2: 96 %    This patient is a 48 y.o. Female with nausea vomiting abdominal pain with a headache for the past 3 days. Patient headache is nonconcerning as it is her normal migraine and she has no signs of meningismus. Patient notes that it hurts to the car ride over whenever they hit a bump on with having pain on the periumbilical and right lower quadrant. Exam reveals severe tenderness to palpation to the right lower quadrant. Lab work was concerning for a white count of 33 and a procalcitonin of 1.36. Await CT imaging. Patient already been started on cefepime and vancomycin. EKG is concerning for prolonged QTC of 576 and diffuse ST depressions likely from demand ischemia. Patient was given magnesium IV.      OUTSTANDING TASKS / RECOMMENDATIONS:    1. Await CT imaging  2. Admission to the hospital     FINAL IMPRESSION:     1. Acute gastric ulcer with perforation ([facility] [address]        DISPOSITION:         DISPOSITION:  []  Discharge   []  Transfer -    [x]  Admission -  General Surgery   []  Against Medical Advice   []  Eloped   FOLLOW-UP: No follow-up provider specified.    DISCHARGE MEDICATIONS: New Prescriptions    No medications on file           Al Suggs DO  Emergency Medicine Resident  [facility]        Hardik ObrienLincoln  Resident  03/04/21 9649

## 2021-03-04 NOTE — ED NOTES
Son and daughter at bedside w/ . Dr. Apurva Alatorre at bedside to update on plan of care.       Sarah Alejo RN  03/04/21 1266

## 2021-03-04 NOTE — ED NOTES
Pt. Requesting a call to [facility] center to inform staff that she is being treated at this facility. States that she is a recovering IV heroine user and if she miss 3 appt. s they may kick her out. Writer informed resident. Writer to call in the morning d/t [facility] being closed.        Laisha Corral RN  03/04/21 8688

## 2021-03-04 NOTE — ED NOTES
Pt. To the ED via [facility], c/o n/v/d for the past 2 days. Prior to arrival pt.had positive orthostatics for EMS systolic b/p 135J sitting 160s standing. Upon arrival pt. Is A&Ox4, resp. Even and non-labored c/o headache. Dr. Glen Arzate and med student at bedside to assess.  in waiting room will allow back when pt. Is settled.       Saadia Suh RN  03/03/21 2781

## 2021-03-04 NOTE — PLAN OF CARE
Problem: Pain:  Goal: Pain level will decrease  Description: Pain level will decrease  Outcome: Ongoing     Problem: Pain:  Goal: Control of acute pain  Description: Control of acute pain  Outcome: Ongoing     Problem: Pain:  Goal: Control of chronic pain  Description: Control of chronic pain  Outcome: Ongoing   Pt able to communicate pain as needed, uses call light appropriately. Pt satisfied with pain interventions.    Electronically signed by Jennifer Rock RN on 3/4/2021 at 2:01 PM

## 2021-03-04 NOTE — PROGRESS NOTES
Physical Therapy    DATE: 3/4/2021    NAME: Rojelio Dalton  MRN: [id number]   : 1970      Patient not seen this date for Physical Therapy due to:    Surgery/Procedure: Pt with drainage of abdominal abscess under sedation. Will check 3/5.       Electronically signed by Maria Luisa Salazar PT on 3/4/2021 at 11:01 AM

## 2021-03-05 ENCOUNTER — ANESTHESIA EVENT (OUTPATIENT)
Dept: OPERATING ROOM | Age: 51
DRG: 220 | End: 2021-03-05
Payer: MEDICARE

## 2021-03-05 ENCOUNTER — ANESTHESIA (OUTPATIENT)
Dept: OPERATING ROOM | Age: 51
DRG: 220 | End: 2021-03-05
Payer: MEDICARE

## 2021-03-05 VITALS — TEMPERATURE: 100 F | DIASTOLIC BLOOD PRESSURE: 134 MMHG | SYSTOLIC BLOOD PRESSURE: 166 MMHG | OXYGEN SATURATION: 100 %

## 2021-03-05 PROBLEM — K65.1 INTRA-ABDOMINAL ABSCESS (HCC): Status: ACTIVE | Noted: 2021-03-05

## 2021-03-05 LAB
ABSOLUTE EOS #: 0 K/UL (ref 0–0.4)
ABSOLUTE EOS #: <0.03 K/UL (ref 0–0.44)
ABSOLUTE IMMATURE GRANULOCYTE: 0 K/UL (ref 0–0.3)
ABSOLUTE IMMATURE GRANULOCYTE: 0.09 K/UL (ref 0–0.3)
ABSOLUTE LYMPH #: 0.72 K/UL (ref 1–4.8)
ABSOLUTE LYMPH #: 1.42 K/UL (ref 1.1–3.7)
ABSOLUTE MONO #: 1.25 K/UL (ref 0.1–0.8)
ABSOLUTE MONO #: 1.38 K/UL (ref 0.1–1.2)
ANION GAP SERPL CALCULATED.3IONS-SCNC: 11 MMOL/L (ref 9–17)
ANION GAP SERPL CALCULATED.3IONS-SCNC: 8 MMOL/L (ref 9–17)
BASOPHILS # BLD: 0 % (ref 0–2)
BASOPHILS # BLD: 0 % (ref 0–2)
BASOPHILS ABSOLUTE: 0 K/UL (ref 0–0.2)
BASOPHILS ABSOLUTE: 0.04 K/UL (ref 0–0.2)
BUN BLDV-MCNC: 8 MG/DL (ref 6–20)
BUN BLDV-MCNC: 9 MG/DL (ref 6–20)
BUN/CREAT BLD: ABNORMAL (ref 9–20)
BUN/CREAT BLD: ABNORMAL (ref 9–20)
CALCIUM IONIZED: 0.99 MMOL/L (ref 1.13–1.33)
CALCIUM SERPL-MCNC: 7 MG/DL (ref 8.6–10.4)
CALCIUM SERPL-MCNC: 7.9 MG/DL (ref 8.6–10.4)
CHLORIDE BLD-SCNC: 99 MMOL/L (ref 98–107)
CHLORIDE BLD-SCNC: 99 MMOL/L (ref 98–107)
CO2: 23 MMOL/L (ref 20–31)
CO2: 27 MMOL/L (ref 20–31)
CREAT SERPL-MCNC: 0.42 MG/DL (ref 0.5–0.9)
CREAT SERPL-MCNC: 0.55 MG/DL (ref 0.5–0.9)
CULTURE: NO GROWTH
DIFFERENTIAL TYPE: ABNORMAL
DIFFERENTIAL TYPE: ABNORMAL
EKG ATRIAL RATE: 390 BPM
EKG ATRIAL RATE: 95 BPM
EKG P AXIS: 47 DEGREES
EKG P AXIS: 71 DEGREES
EKG P-R INTERVAL: 130 MS
EKG Q-T INTERVAL: 362 MS
EKG Q-T INTERVAL: 392 MS
EKG QRS DURATION: 84 MS
EKG QRS DURATION: 84 MS
EKG QTC CALCULATION (BAZETT): 454 MS
EKG QTC CALCULATION (BAZETT): 576 MS
EKG R AXIS: 0 DEGREES
EKG R AXIS: 30 DEGREES
EKG T AXIS: 71 DEGREES
EKG T AXIS: 81 DEGREES
EKG VENTRICULAR RATE: 130 BPM
EKG VENTRICULAR RATE: 95 BPM
EOSINOPHILS RELATIVE PERCENT: 0 % (ref 1–4)
EOSINOPHILS RELATIVE PERCENT: 0 % (ref 1–4)
GFR AFRICAN AMERICAN: >60 ML/MIN
GFR AFRICAN AMERICAN: >60 ML/MIN
GFR NON-AFRICAN AMERICAN: >60 ML/MIN
GFR NON-AFRICAN AMERICAN: >60 ML/MIN
GFR SERPL CREATININE-BSD FRML MDRD: ABNORMAL ML/MIN/{1.73_M2}
GLUCOSE BLD-MCNC: 108 MG/DL (ref 70–99)
GLUCOSE BLD-MCNC: 67 MG/DL (ref 70–99)
GLUCOSE BLD-MCNC: 71 MG/DL (ref 65–105)
HCT VFR BLD CALC: 39.4 % (ref 36.3–47.1)
HCT VFR BLD CALC: 44.5 % (ref 36.3–47.1)
HEMOGLOBIN: 12.5 G/DL (ref 11.9–15.1)
HEMOGLOBIN: 14.4 G/DL (ref 11.9–15.1)
IMMATURE GRANULOCYTES: 0 %
IMMATURE GRANULOCYTES: 1 %
LYMPHOCYTES # BLD: 4 % (ref 24–44)
LYMPHOCYTES # BLD: 8 % (ref 24–43)
Lab: NORMAL
MAGNESIUM: 1.6 MG/DL (ref 1.6–2.6)
MAGNESIUM: 1.8 MG/DL (ref 1.6–2.6)
MCH RBC QN AUTO: 28 PG (ref 25.2–33.5)
MCH RBC QN AUTO: 28.2 PG (ref 25.2–33.5)
MCHC RBC AUTO-ENTMCNC: 31.7 G/DL (ref 28.4–34.8)
MCHC RBC AUTO-ENTMCNC: 32.4 G/DL (ref 28.4–34.8)
MCV RBC AUTO: 86.6 FL (ref 82.6–102.9)
MCV RBC AUTO: 88.7 FL (ref 82.6–102.9)
MONOCYTES # BLD: 7 % (ref 1–7)
MONOCYTES # BLD: 8 % (ref 3–12)
MORPHOLOGY: NORMAL
NRBC AUTOMATED: 0 PER 100 WBC
NRBC AUTOMATED: 0 PER 100 WBC
PDW BLD-RTO: 12.7 % (ref 11.8–14.4)
PDW BLD-RTO: 12.8 % (ref 11.8–14.4)
PHOSPHORUS: 2.7 MG/DL (ref 2.6–4.5)
PHOSPHORUS: 3 MG/DL (ref 2.6–4.5)
PLATELET # BLD: 235 K/UL (ref 138–453)
PLATELET # BLD: 300 K/UL (ref 138–453)
PLATELET ESTIMATE: ABNORMAL
PLATELET ESTIMATE: ABNORMAL
PMV BLD AUTO: 10.1 FL (ref 8.1–13.5)
PMV BLD AUTO: 9.9 FL (ref 8.1–13.5)
POTASSIUM SERPL-SCNC: 3.1 MMOL/L (ref 3.7–5.3)
POTASSIUM SERPL-SCNC: 3.6 MMOL/L (ref 3.7–5.3)
PROCALCITONIN: 0.53 NG/ML
RBC # BLD: 4.44 M/UL (ref 3.95–5.11)
RBC # BLD: 5.14 M/UL (ref 3.95–5.11)
RBC # BLD: ABNORMAL 10*6/UL
RBC # BLD: ABNORMAL 10*6/UL
SEG NEUTROPHILS: 83 % (ref 36–65)
SEG NEUTROPHILS: 89 % (ref 36–66)
SEGMENTED NEUTROPHILS ABSOLUTE COUNT: 14.45 K/UL (ref 1.5–8.1)
SEGMENTED NEUTROPHILS ABSOLUTE COUNT: 15.93 K/UL (ref 1.8–7.7)
SODIUM BLD-SCNC: 133 MMOL/L (ref 135–144)
SODIUM BLD-SCNC: 134 MMOL/L (ref 135–144)
SPECIMEN DESCRIPTION: NORMAL
WBC # BLD: 17.4 K/UL (ref 3.5–11.3)
WBC # BLD: 17.9 K/UL (ref 3.5–11.3)
WBC # BLD: ABNORMAL 10*3/UL
WBC # BLD: ABNORMAL 10*3/UL

## 2021-03-05 PROCEDURE — 93010 ELECTROCARDIOGRAM REPORT: CPT | Performed by: INTERNAL MEDICINE

## 2021-03-05 PROCEDURE — C9113 INJ PANTOPRAZOLE SODIUM, VIA: HCPCS | Performed by: STUDENT IN AN ORGANIZED HEALTH CARE EDUCATION/TRAINING PROGRAM

## 2021-03-05 PROCEDURE — 2580000003 HC RX 258: Performed by: STUDENT IN AN ORGANIZED HEALTH CARE EDUCATION/TRAINING PROGRAM

## 2021-03-05 PROCEDURE — 88307 TISSUE EXAM BY PATHOLOGIST: CPT

## 2021-03-05 PROCEDURE — 0DB60ZZ EXCISION OF STOMACH, OPEN APPROACH: ICD-10-PCS | Performed by: SURGERY

## 2021-03-05 PROCEDURE — 2580000003 HC RX 258: Performed by: NURSE ANESTHETIST, CERTIFIED REGISTERED

## 2021-03-05 PROCEDURE — 2580000003 HC RX 258: Performed by: SURGERY

## 2021-03-05 PROCEDURE — 88342 IMHCHEM/IMCYTCHM 1ST ANTB: CPT

## 2021-03-05 PROCEDURE — 2500000003 HC RX 250 WO HCPCS: Performed by: NURSE PRACTITIONER

## 2021-03-05 PROCEDURE — 2500000003 HC RX 250 WO HCPCS: Performed by: NURSE ANESTHETIST, CERTIFIED REGISTERED

## 2021-03-05 PROCEDURE — 2500000003 HC RX 250 WO HCPCS: Performed by: STUDENT IN AN ORGANIZED HEALTH CARE EDUCATION/TRAINING PROGRAM

## 2021-03-05 PROCEDURE — 2700000000 HC OXYGEN THERAPY PER DAY

## 2021-03-05 PROCEDURE — 6360000002 HC RX W HCPCS: Performed by: STUDENT IN AN ORGANIZED HEALTH CARE EDUCATION/TRAINING PROGRAM

## 2021-03-05 PROCEDURE — 82947 ASSAY GLUCOSE BLOOD QUANT: CPT

## 2021-03-05 PROCEDURE — 6360000002 HC RX W HCPCS: Performed by: NURSE ANESTHETIST, CERTIFIED REGISTERED

## 2021-03-05 PROCEDURE — 84145 PROCALCITONIN (PCT): CPT

## 2021-03-05 PROCEDURE — 0D160ZA BYPASS STOMACH TO JEJUNUM, OPEN APPROACH: ICD-10-PCS | Performed by: SURGERY

## 2021-03-05 PROCEDURE — 36415 COLL VENOUS BLD VENIPUNCTURE: CPT

## 2021-03-05 PROCEDURE — 2709999900 HC NON-CHARGEABLE SUPPLY: Performed by: SURGERY

## 2021-03-05 PROCEDURE — 3600000004 HC SURGERY LEVEL 4 BASE: Performed by: SURGERY

## 2021-03-05 PROCEDURE — 87086 URINE CULTURE/COLONY COUNT: CPT

## 2021-03-05 PROCEDURE — 94761 N-INVAS EAR/PLS OXIMETRY MLT: CPT

## 2021-03-05 PROCEDURE — 7100000000 HC PACU RECOVERY - FIRST 15 MIN: Performed by: SURGERY

## 2021-03-05 PROCEDURE — 2720000010 HC SURG SUPPLY STERILE: Performed by: SURGERY

## 2021-03-05 PROCEDURE — 82330 ASSAY OF CALCIUM: CPT

## 2021-03-05 PROCEDURE — 85025 COMPLETE CBC W/AUTO DIFF WBC: CPT

## 2021-03-05 PROCEDURE — 6360000002 HC RX W HCPCS: Performed by: ANESTHESIOLOGY

## 2021-03-05 PROCEDURE — 2000000000 HC ICU R&B

## 2021-03-05 PROCEDURE — 3600000014 HC SURGERY LEVEL 4 ADDTL 15MIN: Performed by: SURGERY

## 2021-03-05 PROCEDURE — 80048 BASIC METABOLIC PNL TOTAL CA: CPT

## 2021-03-05 PROCEDURE — 84100 ASSAY OF PHOSPHORUS: CPT

## 2021-03-05 PROCEDURE — 3700000000 HC ANESTHESIA ATTENDED CARE: Performed by: SURGERY

## 2021-03-05 PROCEDURE — 83735 ASSAY OF MAGNESIUM: CPT

## 2021-03-05 PROCEDURE — 7100000001 HC PACU RECOVERY - ADDTL 15 MIN: Performed by: SURGERY

## 2021-03-05 PROCEDURE — 3700000001 HC ADD 15 MINUTES (ANESTHESIA): Performed by: SURGERY

## 2021-03-05 RX ORDER — MAGNESIUM SULFATE IN WATER 40 MG/ML
2000 INJECTION, SOLUTION INTRAVENOUS ONCE
Status: COMPLETED | OUTPATIENT
Start: 2021-03-05 | End: 2021-03-06

## 2021-03-05 RX ORDER — POTASSIUM CHLORIDE 7.45 MG/ML
10 INJECTION INTRAVENOUS
Status: DISCONTINUED | OUTPATIENT
Start: 2021-03-05 | End: 2021-03-05

## 2021-03-05 RX ORDER — ONDANSETRON 2 MG/ML
INJECTION INTRAMUSCULAR; INTRAVENOUS PRN
Status: DISCONTINUED | OUTPATIENT
Start: 2021-03-05 | End: 2021-03-05 | Stop reason: SDUPTHER

## 2021-03-05 RX ORDER — DEXTROSE, SODIUM CHLORIDE, AND POTASSIUM CHLORIDE 5; .45; .15 G/100ML; G/100ML; G/100ML
INJECTION INTRAVENOUS CONTINUOUS
Status: DISCONTINUED | OUTPATIENT
Start: 2021-03-05 | End: 2021-03-05

## 2021-03-05 RX ORDER — POTASSIUM CHLORIDE 7.45 MG/ML
10 INJECTION INTRAVENOUS ONCE
Status: COMPLETED | OUTPATIENT
Start: 2021-03-05 | End: 2021-03-05

## 2021-03-05 RX ORDER — SODIUM CHLORIDE 9 MG/ML
INJECTION, SOLUTION INTRAVENOUS CONTINUOUS PRN
Status: DISCONTINUED | OUTPATIENT
Start: 2021-03-05 | End: 2021-03-05 | Stop reason: SDUPTHER

## 2021-03-05 RX ORDER — NEOSTIGMINE METHYLSULFATE 5 MG/5 ML
SYRINGE (ML) INTRAVENOUS PRN
Status: DISCONTINUED | OUTPATIENT
Start: 2021-03-05 | End: 2021-03-05 | Stop reason: SDUPTHER

## 2021-03-05 RX ORDER — NALOXONE HYDROCHLORIDE 0.4 MG/ML
0.4 INJECTION, SOLUTION INTRAMUSCULAR; INTRAVENOUS; SUBCUTANEOUS PRN
Status: DISCONTINUED | OUTPATIENT
Start: 2021-03-05 | End: 2021-03-06

## 2021-03-05 RX ORDER — FENTANYL CITRATE 50 UG/ML
50 INJECTION, SOLUTION INTRAMUSCULAR; INTRAVENOUS EVERY 5 MIN PRN
Status: COMPLETED | OUTPATIENT
Start: 2021-03-05 | End: 2021-03-05

## 2021-03-05 RX ORDER — GLYCOPYRROLATE 1 MG/5 ML
SYRINGE (ML) INTRAVENOUS PRN
Status: DISCONTINUED | OUTPATIENT
Start: 2021-03-05 | End: 2021-03-05 | Stop reason: SDUPTHER

## 2021-03-05 RX ORDER — ROCURONIUM BROMIDE 10 MG/ML
INJECTION, SOLUTION INTRAVENOUS PRN
Status: DISCONTINUED | OUTPATIENT
Start: 2021-03-05 | End: 2021-03-05 | Stop reason: SDUPTHER

## 2021-03-05 RX ORDER — MEPERIDINE HYDROCHLORIDE 50 MG/ML
12.5 INJECTION INTRAMUSCULAR; INTRAVENOUS; SUBCUTANEOUS EVERY 5 MIN PRN
Status: DISCONTINUED | OUTPATIENT
Start: 2021-03-05 | End: 2021-03-05 | Stop reason: HOSPADM

## 2021-03-05 RX ORDER — LIDOCAINE HYDROCHLORIDE 10 MG/ML
INJECTION, SOLUTION EPIDURAL; INFILTRATION; INTRACAUDAL; PERINEURAL PRN
Status: DISCONTINUED | OUTPATIENT
Start: 2021-03-05 | End: 2021-03-05 | Stop reason: SDUPTHER

## 2021-03-05 RX ORDER — PROPOFOL 10 MG/ML
INJECTION, EMULSION INTRAVENOUS PRN
Status: DISCONTINUED | OUTPATIENT
Start: 2021-03-05 | End: 2021-03-05 | Stop reason: SDUPTHER

## 2021-03-05 RX ORDER — POTASSIUM CHLORIDE 7.45 MG/ML
10 INJECTION INTRAVENOUS ONCE
Status: COMPLETED | OUTPATIENT
Start: 2021-03-05 | End: 2021-03-06

## 2021-03-05 RX ORDER — POTASSIUM CHLORIDE 7.45 MG/ML
10 INJECTION INTRAVENOUS ONCE
Status: DISCONTINUED | OUTPATIENT
Start: 2021-03-05 | End: 2021-03-15

## 2021-03-05 RX ORDER — ONDANSETRON 2 MG/ML
4 INJECTION INTRAMUSCULAR; INTRAVENOUS
Status: COMPLETED | OUTPATIENT
Start: 2021-03-05 | End: 2021-03-05

## 2021-03-05 RX ORDER — FLUCONAZOLE 2 MG/ML
400 INJECTION, SOLUTION INTRAVENOUS EVERY 24 HOURS
Status: COMPLETED | OUTPATIENT
Start: 2021-03-06 | End: 2021-03-09

## 2021-03-05 RX ORDER — CALCIUM GLUCONATE 20 MG/ML
2000 INJECTION, SOLUTION INTRAVENOUS ONCE
Status: COMPLETED | OUTPATIENT
Start: 2021-03-05 | End: 2021-03-05

## 2021-03-05 RX ORDER — DEXAMETHASONE SODIUM PHOSPHATE 4 MG/ML
INJECTION, SOLUTION INTRA-ARTICULAR; INTRALESIONAL; INTRAMUSCULAR; INTRAVENOUS; SOFT TISSUE PRN
Status: DISCONTINUED | OUTPATIENT
Start: 2021-03-05 | End: 2021-03-05 | Stop reason: SDUPTHER

## 2021-03-05 RX ORDER — CIPROFLOXACIN 2 MG/ML
INJECTION, SOLUTION INTRAVENOUS PRN
Status: DISCONTINUED | OUTPATIENT
Start: 2021-03-05 | End: 2021-03-05 | Stop reason: SDUPTHER

## 2021-03-05 RX ORDER — MIDAZOLAM HYDROCHLORIDE 1 MG/ML
INJECTION INTRAMUSCULAR; INTRAVENOUS PRN
Status: DISCONTINUED | OUTPATIENT
Start: 2021-03-05 | End: 2021-03-05 | Stop reason: SDUPTHER

## 2021-03-05 RX ORDER — MAGNESIUM SULFATE 1 G/100ML
1000 INJECTION INTRAVENOUS
Status: DISCONTINUED | OUTPATIENT
Start: 2021-03-05 | End: 2021-03-05

## 2021-03-05 RX ORDER — DEXTROSE MONOHYDRATE 25 G/50ML
12.5 INJECTION, SOLUTION INTRAVENOUS PRN
Status: DISCONTINUED | OUTPATIENT
Start: 2021-03-05 | End: 2021-03-09

## 2021-03-05 RX ORDER — FENTANYL CITRATE 50 UG/ML
25 INJECTION, SOLUTION INTRAMUSCULAR; INTRAVENOUS EVERY 5 MIN PRN
Status: DISCONTINUED | OUTPATIENT
Start: 2021-03-05 | End: 2021-03-05 | Stop reason: HOSPADM

## 2021-03-05 RX ORDER — SODIUM CHLORIDE, SODIUM LACTATE, POTASSIUM CHLORIDE, CALCIUM CHLORIDE 600; 310; 30; 20 MG/100ML; MG/100ML; MG/100ML; MG/100ML
INJECTION, SOLUTION INTRAVENOUS CONTINUOUS
Status: DISCONTINUED | OUTPATIENT
Start: 2021-03-05 | End: 2021-03-06

## 2021-03-05 RX ORDER — SODIUM CHLORIDE, SODIUM LACTATE, POTASSIUM CHLORIDE, CALCIUM CHLORIDE 600; 310; 30; 20 MG/100ML; MG/100ML; MG/100ML; MG/100ML
INJECTION, SOLUTION INTRAVENOUS CONTINUOUS PRN
Status: DISCONTINUED | OUTPATIENT
Start: 2021-03-05 | End: 2021-03-05 | Stop reason: SDUPTHER

## 2021-03-05 RX ORDER — FENTANYL CITRATE 50 UG/ML
INJECTION, SOLUTION INTRAMUSCULAR; INTRAVENOUS PRN
Status: DISCONTINUED | OUTPATIENT
Start: 2021-03-05 | End: 2021-03-05 | Stop reason: SDUPTHER

## 2021-03-05 RX ORDER — MAGNESIUM HYDROXIDE 1200 MG/15ML
LIQUID ORAL CONTINUOUS PRN
Status: COMPLETED | OUTPATIENT
Start: 2021-03-05 | End: 2021-03-05

## 2021-03-05 RX ORDER — FENTANYL CITRATE 50 UG/ML
50 INJECTION, SOLUTION INTRAMUSCULAR; INTRAVENOUS
Status: DISCONTINUED | OUTPATIENT
Start: 2021-03-05 | End: 2021-03-05

## 2021-03-05 RX ORDER — ONDANSETRON 2 MG/ML
4 INJECTION INTRAMUSCULAR; INTRAVENOUS ONCE
Status: COMPLETED | OUTPATIENT
Start: 2021-03-05 | End: 2021-03-05

## 2021-03-05 RX ADMIN — SODIUM CHLORIDE, POTASSIUM CHLORIDE, SODIUM LACTATE AND CALCIUM CHLORIDE: 600; 310; 30; 20 INJECTION, SOLUTION INTRAVENOUS at 20:03

## 2021-03-05 RX ADMIN — PROPOFOL 200 MG: 10 INJECTION, EMULSION INTRAVENOUS at 11:58

## 2021-03-05 RX ADMIN — ROCURONIUM BROMIDE 20 MG: 10 INJECTION INTRAVENOUS at 12:38

## 2021-03-05 RX ADMIN — PROPOFOL 30 MG: 10 INJECTION, EMULSION INTRAVENOUS at 15:01

## 2021-03-05 RX ADMIN — FENTANYL CITRATE 50 MCG: 50 INJECTION, SOLUTION INTRAMUSCULAR; INTRAVENOUS at 16:15

## 2021-03-05 RX ADMIN — SODIUM CHLORIDE, PRESERVATIVE FREE 10 ML: 5 INJECTION INTRAVENOUS at 20:11

## 2021-03-05 RX ADMIN — ONDANSETRON 4 MG: 2 INJECTION INTRAMUSCULAR; INTRAVENOUS at 16:28

## 2021-03-05 RX ADMIN — MAGNESIUM SULFATE HEPTAHYDRATE 1000 MG: 1 INJECTION, SOLUTION INTRAVENOUS at 20:05

## 2021-03-05 RX ADMIN — PANTOPRAZOLE SODIUM 40 MG: 40 INJECTION, POWDER, FOR SOLUTION INTRAVENOUS at 20:03

## 2021-03-05 RX ADMIN — METRONIDAZOLE 500 MG: 500 INJECTION, SOLUTION INTRAVENOUS at 21:00

## 2021-03-05 RX ADMIN — SODIUM CHLORIDE, POTASSIUM CHLORIDE, SODIUM LACTATE AND CALCIUM CHLORIDE: 600; 310; 30; 20 INJECTION, SOLUTION INTRAVENOUS at 13:53

## 2021-03-05 RX ADMIN — MORPHINE SULFATE 2 MG: 4 INJECTION INTRAVENOUS at 09:54

## 2021-03-05 RX ADMIN — FENTANYL CITRATE 50 MCG: 50 INJECTION, SOLUTION INTRAMUSCULAR; INTRAVENOUS at 16:22

## 2021-03-05 RX ADMIN — MORPHINE SULFATE 2 MG: 4 INJECTION INTRAVENOUS at 01:51

## 2021-03-05 RX ADMIN — POTASSIUM CHLORIDE 10 MEQ: 7.46 INJECTION, SOLUTION INTRAVENOUS at 09:54

## 2021-03-05 RX ADMIN — FENTANYL CITRATE 50 MCG: 50 INJECTION, SOLUTION INTRAMUSCULAR; INTRAVENOUS at 12:38

## 2021-03-05 RX ADMIN — Medication 3 MG: at 15:44

## 2021-03-05 RX ADMIN — POTASSIUM CHLORIDE, DEXTROSE MONOHYDRATE AND SODIUM CHLORIDE: 150; 5; 450 INJECTION, SOLUTION INTRAVENOUS at 18:29

## 2021-03-05 RX ADMIN — FENTANYL CITRATE 100 MCG: 50 INJECTION, SOLUTION INTRAMUSCULAR; INTRAVENOUS at 11:58

## 2021-03-05 RX ADMIN — KETAMINE HYDROCHLORIDE 0.2 MG/KG/HR: 50 INJECTION INTRAMUSCULAR; INTRAVENOUS at 18:24

## 2021-03-05 RX ADMIN — FENTANYL CITRATE 50 MCG: 50 INJECTION, SOLUTION INTRAMUSCULAR; INTRAVENOUS at 12:22

## 2021-03-05 RX ADMIN — PANTOPRAZOLE SODIUM 40 MG: 40 INJECTION, POWDER, FOR SOLUTION INTRAVENOUS at 08:28

## 2021-03-05 RX ADMIN — CIPROFLOXACIN 400 MG: 2 INJECTION, SOLUTION INTRAVENOUS at 12:19

## 2021-03-05 RX ADMIN — DEXAMETHASONE SODIUM PHOSPHATE 4 MG: 4 INJECTION, SOLUTION INTRAMUSCULAR; INTRAVENOUS at 12:30

## 2021-03-05 RX ADMIN — ROCURONIUM BROMIDE 10 MG: 10 INJECTION INTRAVENOUS at 14:56

## 2021-03-05 RX ADMIN — ROCURONIUM BROMIDE 10 MG: 10 INJECTION INTRAVENOUS at 13:54

## 2021-03-05 RX ADMIN — ROCURONIUM BROMIDE 10 MG: 10 INJECTION INTRAVENOUS at 14:23

## 2021-03-05 RX ADMIN — POTASSIUM CHLORIDE 10 MEQ: 7.46 INJECTION, SOLUTION INTRAVENOUS at 17:54

## 2021-03-05 RX ADMIN — POTASSIUM CHLORIDE 10 MEQ: 7.46 INJECTION, SOLUTION INTRAVENOUS at 08:34

## 2021-03-05 RX ADMIN — Medication 75 MCG/HR: at 18:05

## 2021-03-05 RX ADMIN — HYDROMORPHONE HYDROCHLORIDE 0.5 MG: 1 INJECTION, SOLUTION INTRAMUSCULAR; INTRAVENOUS; SUBCUTANEOUS at 15:46

## 2021-03-05 RX ADMIN — ROCURONIUM BROMIDE 50 MG: 10 INJECTION INTRAVENOUS at 11:58

## 2021-03-05 RX ADMIN — METRONIDAZOLE 500 MG: 500 INJECTION, SOLUTION INTRAVENOUS at 04:57

## 2021-03-05 RX ADMIN — HYDROMORPHONE HYDROCHLORIDE 0.5 MG: 1 INJECTION, SOLUTION INTRAMUSCULAR; INTRAVENOUS; SUBCUTANEOUS at 13:30

## 2021-03-05 RX ADMIN — SODIUM CHLORIDE, POTASSIUM CHLORIDE, SODIUM LACTATE AND CALCIUM CHLORIDE: 600; 310; 30; 20 INJECTION, SOLUTION INTRAVENOUS at 11:51

## 2021-03-05 RX ADMIN — PROPOFOL 50 MG: 10 INJECTION, EMULSION INTRAVENOUS at 13:27

## 2021-03-05 RX ADMIN — POTASSIUM CHLORIDE 10 MEQ: 7.46 INJECTION, SOLUTION INTRAVENOUS at 07:55

## 2021-03-05 RX ADMIN — ONDANSETRON 4 MG: 2 INJECTION INTRAMUSCULAR; INTRAVENOUS at 15:37

## 2021-03-05 RX ADMIN — ONDANSETRON 4 MG: 2 INJECTION INTRAMUSCULAR; INTRAVENOUS at 22:21

## 2021-03-05 RX ADMIN — POTASSIUM CHLORIDE 10 MEQ: 7.46 INJECTION, SOLUTION INTRAVENOUS at 11:29

## 2021-03-05 RX ADMIN — LIDOCAINE HYDROCHLORIDE 50 MG: 10 INJECTION, SOLUTION EPIDURAL; INFILTRATION; INTRACAUDAL; PERINEURAL at 11:58

## 2021-03-05 RX ADMIN — ENOXAPARIN SODIUM 40 MG: 40 INJECTION SUBCUTANEOUS at 08:30

## 2021-03-05 RX ADMIN — CIPROFLOXACIN 400 MG: 2 INJECTION, SOLUTION INTRAVENOUS at 00:36

## 2021-03-05 RX ADMIN — FLUCONAZOLE 400 MG: 2 INJECTION, SOLUTION INTRAVENOUS at 06:04

## 2021-03-05 RX ADMIN — SODIUM CHLORIDE: 9 INJECTION, SOLUTION INTRAVENOUS at 12:03

## 2021-03-05 RX ADMIN — ROCURONIUM BROMIDE 10 MG: 10 INJECTION INTRAVENOUS at 13:27

## 2021-03-05 RX ADMIN — SODIUM CHLORIDE, PRESERVATIVE FREE 10 ML: 5 INJECTION INTRAVENOUS at 08:29

## 2021-03-05 RX ADMIN — FENTANYL CITRATE 50 MCG: 50 INJECTION, SOLUTION INTRAMUSCULAR; INTRAVENOUS at 13:25

## 2021-03-05 RX ADMIN — FENTANYL CITRATE 50 MCG: 50 INJECTION, SOLUTION INTRAMUSCULAR; INTRAVENOUS at 16:36

## 2021-03-05 RX ADMIN — FENTANYL CITRATE 50 MCG: 50 INJECTION, SOLUTION INTRAMUSCULAR; INTRAVENOUS at 12:26

## 2021-03-05 RX ADMIN — CALCIUM GLUCONATE 2000 MG: 20 INJECTION, SOLUTION INTRAVENOUS at 20:05

## 2021-03-05 RX ADMIN — HYDROMORPHONE HYDROCHLORIDE 0.5 MG: 1 INJECTION, SOLUTION INTRAMUSCULAR; INTRAVENOUS; SUBCUTANEOUS at 17:00

## 2021-03-05 RX ADMIN — Medication 0.6 MG: at 15:44

## 2021-03-05 RX ADMIN — POTASSIUM CHLORIDE 10 MEQ: 7.46 INJECTION, SOLUTION INTRAVENOUS at 21:22

## 2021-03-05 RX ADMIN — FENTANYL CITRATE 50 MCG: 50 INJECTION, SOLUTION INTRAMUSCULAR; INTRAVENOUS at 16:41

## 2021-03-05 RX ADMIN — ONDANSETRON 4 MG: 2 INJECTION INTRAMUSCULAR; INTRAVENOUS at 11:06

## 2021-03-05 RX ADMIN — MORPHINE SULFATE 2 MG: 4 INJECTION INTRAVENOUS at 17:49

## 2021-03-05 RX ADMIN — HYDROMORPHONE HYDROCHLORIDE 0.5 MG: 1 INJECTION, SOLUTION INTRAMUSCULAR; INTRAVENOUS; SUBCUTANEOUS at 16:55

## 2021-03-05 RX ADMIN — METRONIDAZOLE 500 MG: 500 INJECTION, SOLUTION INTRAVENOUS at 12:27

## 2021-03-05 RX ADMIN — POTASSIUM CHLORIDE, DEXTROSE MONOHYDRATE AND SODIUM CHLORIDE: 150; 5; 450 INJECTION, SOLUTION INTRAVENOUS at 08:30

## 2021-03-05 RX ADMIN — ONDANSETRON 4 MG: 2 INJECTION INTRAMUSCULAR; INTRAVENOUS at 17:49

## 2021-03-05 RX ADMIN — MORPHINE SULFATE 2 MG: 4 INJECTION INTRAVENOUS at 06:04

## 2021-03-05 RX ADMIN — SODIUM CHLORIDE, POTASSIUM CHLORIDE, SODIUM LACTATE AND CALCIUM CHLORIDE: 600; 310; 30; 20 INJECTION, SOLUTION INTRAVENOUS at 00:36

## 2021-03-05 RX ADMIN — MIDAZOLAM HYDROCHLORIDE 2 MG: 1 INJECTION, SOLUTION INTRAMUSCULAR; INTRAVENOUS at 12:06

## 2021-03-05 RX ADMIN — ONDANSETRON 4 MG: 2 INJECTION INTRAMUSCULAR; INTRAVENOUS at 04:54

## 2021-03-05 ASSESSMENT — PULMONARY FUNCTION TESTS
PIF_VALUE: 22
PIF_VALUE: 21
PIF_VALUE: 21
PIF_VALUE: 18
PIF_VALUE: 22
PIF_VALUE: 8
PIF_VALUE: 20
PIF_VALUE: 19
PIF_VALUE: 6
PIF_VALUE: 28
PIF_VALUE: 20
PIF_VALUE: 19
PIF_VALUE: 20
PIF_VALUE: 20
PIF_VALUE: 21
PIF_VALUE: 20
PIF_VALUE: 0
PIF_VALUE: 20
PIF_VALUE: 19
PIF_VALUE: 20
PIF_VALUE: 19
PIF_VALUE: 23
PIF_VALUE: 23
PIF_VALUE: 20
PIF_VALUE: 2
PIF_VALUE: 19
PIF_VALUE: 1
PIF_VALUE: 20
PIF_VALUE: 19
PIF_VALUE: 3
PIF_VALUE: 15
PIF_VALUE: 19
PIF_VALUE: 19
PIF_VALUE: 20
PIF_VALUE: 0
PIF_VALUE: 20
PIF_VALUE: 19
PIF_VALUE: 18
PIF_VALUE: 32
PIF_VALUE: 4
PIF_VALUE: 21
PIF_VALUE: 20
PIF_VALUE: 19
PIF_VALUE: 20
PIF_VALUE: 20
PIF_VALUE: 22
PIF_VALUE: 20
PIF_VALUE: 19
PIF_VALUE: 19
PIF_VALUE: 20
PIF_VALUE: 19
PIF_VALUE: 20
PIF_VALUE: 0
PIF_VALUE: 22
PIF_VALUE: 19
PIF_VALUE: 20
PIF_VALUE: 5
PIF_VALUE: 20
PIF_VALUE: 19
PIF_VALUE: 11
PIF_VALUE: 20
PIF_VALUE: 19
PIF_VALUE: 2
PIF_VALUE: 19
PIF_VALUE: 21
PIF_VALUE: 20
PIF_VALUE: 20
PIF_VALUE: 19
PIF_VALUE: 19
PIF_VALUE: 21
PIF_VALUE: 20
PIF_VALUE: 19
PIF_VALUE: 19
PIF_VALUE: 22
PIF_VALUE: 18
PIF_VALUE: 22
PIF_VALUE: 20
PIF_VALUE: 19
PIF_VALUE: 22
PIF_VALUE: 20
PIF_VALUE: 19
PIF_VALUE: 19
PIF_VALUE: 21
PIF_VALUE: 20
PIF_VALUE: 20
PIF_VALUE: 24
PIF_VALUE: 20
PIF_VALUE: 21
PIF_VALUE: 19
PIF_VALUE: 21
PIF_VALUE: 21
PIF_VALUE: 2
PIF_VALUE: 20
PIF_VALUE: 22
PIF_VALUE: 19
PIF_VALUE: 19
PIF_VALUE: 20
PIF_VALUE: 19
PIF_VALUE: 19
PIF_VALUE: 20
PIF_VALUE: 7
PIF_VALUE: 6
PIF_VALUE: 20
PIF_VALUE: 22
PIF_VALUE: 19
PIF_VALUE: 20
PIF_VALUE: 20
PIF_VALUE: 19
PIF_VALUE: 18
PIF_VALUE: 22
PIF_VALUE: 21
PIF_VALUE: 20
PIF_VALUE: 19
PIF_VALUE: 20
PIF_VALUE: 9
PIF_VALUE: 20
PIF_VALUE: 20

## 2021-03-05 ASSESSMENT — PAIN DESCRIPTION - DESCRIPTORS
DESCRIPTORS: BURNING
DESCRIPTORS: CRAMPING;DISCOMFORT;SHARP

## 2021-03-05 ASSESSMENT — PAIN DESCRIPTION - PROGRESSION
CLINICAL_PROGRESSION: NOT CHANGED

## 2021-03-05 ASSESSMENT — PAIN DESCRIPTION - PAIN TYPE
TYPE: ACUTE PAIN
TYPE: SURGICAL PAIN
TYPE: SURGICAL PAIN

## 2021-03-05 ASSESSMENT — PAIN SCALES - GENERAL
PAINLEVEL_OUTOF10: 9
PAINLEVEL_OUTOF10: 9
PAINLEVEL_OUTOF10: 10
PAINLEVEL_OUTOF10: 9
PAINLEVEL_OUTOF10: 8
PAINLEVEL_OUTOF10: 5
PAINLEVEL_OUTOF10: 10
PAINLEVEL_OUTOF10: 9
PAINLEVEL_OUTOF10: 10
PAINLEVEL_OUTOF10: 10

## 2021-03-05 ASSESSMENT — PAIN - FUNCTIONAL ASSESSMENT: PAIN_FUNCTIONAL_ASSESSMENT: PREVENTS OR INTERFERES WITH MANY ACTIVE NOT PASSIVE ACTIVITIES

## 2021-03-05 ASSESSMENT — PAIN DESCRIPTION - FREQUENCY: FREQUENCY: CONTINUOUS

## 2021-03-05 ASSESSMENT — PAIN DESCRIPTION - ORIENTATION: ORIENTATION: ANTERIOR;MID;POSTERIOR

## 2021-03-05 ASSESSMENT — PAIN DESCRIPTION - LOCATION: LOCATION: ABDOMEN;BACK

## 2021-03-05 NOTE — ANESTHESIA PRE PROCEDURE
Department of Anesthesiology  Preprocedure Note       Name:  Thaddeus Otero   Age:  48 y.o.  :  1970                                          MRN:  [id number]         Date:  3/5/2021      Surgeon: Darlene Amaya):  Jayne Lewis MD    Procedure: Procedure(s):  **ADD-ON**  EXPLORATORY LAPAROTOMY    Medications prior to admission:   Prior to Admission medications    Medication Sig Start Date End Date Taking? Authorizing Provider   ibuprofen (IBU) 600 MG tablet Take 1 tablet by mouth every 6 hours as needed for Pain 20   Kenneth Potter PA-C   METHADONE HCL PO Take 140 mg by mouth    Historical Provider, MD   FLUoxetine (PROZAC) 40 MG capsule Take 40 mg by mouth daily    Historical Provider, MD   gabapentin (NEURONTIN) 600 MG tablet Take 600 mg by mouth 3 times daily.     Historical Provider, MD   predniSONE (DELTASONE) 20 MG tablet Take 1 tablet by mouth daily Take 3 tablets day 1-2. 2 tablets day 3-4 and 1 tablet day 5 10/3/17   ABRAHAM Gutierrez CNP   diphenhydrAMINE (BENADRYL) 25 MG capsule Take 1 capsule by mouth every 6 hours as needed for Itching 10/3/17   ABRAHAM Gutierrez - CNP   naloxone [facility]) 2 MG/2ML injection  16   Historical Provider, MD   ibuprofen (ADVIL;MOTRIN) 600 MG tablet Take 1 tablet by mouth every 6 hours as needed for Pain 10/16/16   Jayjay Azevedo MD   albuterol sulfate HFA (PROVENTIL HFA) 108 (90 BASE) MCG/ACT inhaler Inhale 1-2 puffs into the lungs every 4 hours as needed for Wheezing 3/29/16   Corinne Ayoub MD       Current medications:    Current Facility-Administered Medications   Medication Dose Route Frequency Provider Last Rate Last Admin    potassium chloride 10 mEq/100 mL IVPB (Peripheral Line)  10 mEq Intravenous Q1H Liborio Client,  mL/hr at 21 0954 10 mEq at 21 0954    [Held by provider] enoxaparin (LOVENOX) injection 40 mg  40 mg Subcutaneous Daily Liborio Client, DO   40 mg at 21 0830    dextrose 50 % IV solution  12.5 g Intravenous PRN Maynor Andreas, APRN - CNP        glucagon (rDNA) injection 1 mg  1 mg Intramuscular PRN Maynor Andreas, APRN - CNP        dextrose 5 % and 0.45 % NaCl with KCl 20 mEq infusion   Intravenous Continuous Maynor Andreas, APRN -  mL/hr at 03/05/21 0830 New Bag at 03/05/21 0830    ipratropium-albuterol (DUONEB) nebulizer solution 1 ampule  1 ampule Inhalation BID Danny Brar, DO   1 ampule at 03/04/21 0824    sodium chloride flush 0.9 % injection 10 mL  10 mL Intravenous 2 times per day Danny Brar, DO   10 mL at 03/04/21 2011    sodium chloride flush 0.9 % injection 10 mL  10 mL Intravenous PRN Danny Brar, DO        morphine injection 2 mg  2 mg Intravenous Q4H PRN Danny Brar, DO   2 mg at 03/05/21 8440    acetaminophen (TYLENOL) suppository 650 mg  650 mg Rectal Q4H PRN Danny Brar, DO        bisacodyl (DULCOLAX) suppository 10 mg  10 mg Rectal Daily PRN Danny Brar, DO        ondansetron [facility]) injection 4 mg  4 mg Intravenous Q6H PRN Danny Brar, DO   4 mg at 03/05/21 1106    fluconazole (DIFLUCAN) in 0.9 % sodium chloride IVPB 400 mg  400 mg Intravenous Q24H Keith Callaway  mL/hr at 03/05/21 0604 400 mg at 03/05/21 0604    pantoprazole (PROTONIX) injection 40 mg  40 mg Intravenous BID Keith Callaway MD   40 mg at 03/05/21 9416    And    sodium chloride (PF) 0.9 % injection 10 mL  10 mL Intravenous BID Keith Callaway MD   10 mL at 03/05/21 0829    ciprofloxacin (CIPRO) IVPB 400 mg  400 mg Intravenous Q12H Falguni Og DO   Stopped at 03/05/21 0140    metronidazole (FLAGYL) 500 mg in NaCl 100 mL IVPB premix  500 mg Intravenous Q8H Falguni Og, DO   Stopped at 03/05/21 0604    sodium chloride flush 0.9 % injection 10 mL  10 mL Intracatheter BID CHANDRIKA Chavez   10 mL at 03/04/21 2012     Facility-Administered Medications Ordered in Other Encounters   Medication Dose Route Frequency Provider Last Rate Last Neville Salazar sodium chloride flush 0.9 % injection 10 mL  10 mL Intravenous PRN Wilber Givens MD   10 mL at 07/27/15 0840       Allergies: Allergies   Allergen Reactions    Compazine [Prochlorperazine Maleate] Other (See Comments)     Seizure like syndrome, eyes roll to back of head    Imitrex [Sumatriptan] Other (See Comments)     TIA    Thorazine [Chlorpromazine] Other (See Comments)     Seizure like syndrome    Cardizem [Diltiazem] Rash    Pcn [Penicillins] Rash       Problem List:    Patient Active Problem List   Diagnosis Code    Migraine G43.909    Opiate dependence ([facility] [address] F11.20    Chronic pain G89.29    Obesity E66.9    Patient overweight E66.3    Lumbar radicular pain M54.16    Lumbar disc disease M51.9    Closed nondisplaced fracture of fifth metatarsal bone of right foot S92.354A    Intra-abdominal abscess (HCC) K65.1       Past Medical History:        Diagnosis Date    Asthma     Connective tissue disease, undifferentiated (HCC) 1987    Dr. Gasper Galeazzi ([place]    DDD (degenerative disc disease) 2000    Dr. Agustín Garcia Depression     Lumbar radicular pain     Migraine 1988    Dr. Carola Henderson Mitral valve prolapse 1978    Dr. Randine Nageotte addiction ([facility] [address] 01/02/2013    Dr. Garrison Artist / on 10/3/17 pt states she completed tx 2016       Past Surgical History:        Procedure Laterality Date    CT ABSCESS DRAIN SUBCUTANEOUS  3/4/2021    CT ABSCESS DRAIN SUBCUTANEOUS 3/4/2021 [facility] CT SCAN    HERNIA REPAIR  1972    as child    HYSTERECTOMY      2007    NERVE BLOCK Right 7/27/2015    right lumbar JHONY    NOSE SURGERY  1989    After nose was broken. Dr. Hdz Height Left 2007    Dr. Nara Ferreira  2007    Dr. Alexander Torrez       Social History:    Social History     Tobacco Use    Smoking status: Current Every Day Smoker     Packs/day: 1.00     Types: Cigarettes    Smokeless tobacco: Never Used   Substance Use Topics    Alcohol use:  No Alcohol/week: 0.0 standard drinks                                Ready to quit: Not Answered  Counseling given: Not Answered      Vital Signs (Current):   Vitals:    03/05/21 0000 03/05/21 0007 03/05/21 0303 03/05/21 0800   BP:  (!) 141/86 (!) 142/83 (!) 149/90   Pulse: 81 82 89 93   Resp:  23 28 25   Temp:   99.3 °F (37.4 °C) 98.7 °F (37.1 °C)   TempSrc:   Oral Axillary   SpO2:  (!) 89% 93% 90%   Weight:       Height:                                                  BP Readings from Last 3 Encounters:   03/05/21 (!) 149/90   02/20/20 118/83   08/20/19 118/75       NPO Status:                                                                                 BMI:   Wt Readings from Last 3 Encounters:   03/04/21 200 lb (90.7 kg)   02/20/20 205 lb (93 kg)   08/20/19 215 lb (97.5 kg)     Body mass index is 31.32 kg/m².     CBC:   Lab Results   Component Value Date    WBC 17.4 03/05/2021    RBC 4.44 03/05/2021    RBC 5.02 09/02/2017    HGB 12.5 03/05/2021    HCT 39.4 03/05/2021    MCV 88.7 03/05/2021    RDW 12.7 03/05/2021     03/05/2021       CMP:   Lab Results   Component Value Date     03/05/2021    K 3.1 03/05/2021    CL 99 03/05/2021    CO2 27 03/05/2021    BUN 9 03/05/2021    CREATININE 0.55 03/05/2021    GFRAA >60 03/05/2021    LABGLOM >60 03/05/2021    GLUCOSE 67 03/05/2021    GLUCOSE 90 09/02/2017    PROT 8.1 03/03/2021    CALCIUM 7.9 03/05/2021    BILITOT 0.81 03/03/2021    ALKPHOS 98 03/03/2021    AST 17 03/03/2021    ALT 9 03/03/2021       POC Tests:   Recent Labs     03/05/21  0817   POCGLU 71       Coags:   Lab Results   Component Value Date    PROTIME 11.8 03/04/2021    INR 1.1 03/04/2021    APTT 26.2 03/03/2021       HCG (If Applicable): No results found for: PREGTESTUR, PREGSERUM, HCG, HCGQUANT     ABGs: No results found for: PHART, PO2ART, IER2UKK, VWI1VVN, BEART, L1CBTEQA     Type & Screen (If Applicable):  No results found for: LABABO, LABRH    Drug/Infectious Status (If Applicable):  Lab Results   Component Value Date    HEPCAB NONREACTIVE 10/27/2016       COVID-19 Screening (If Applicable):   Lab Results   Component Value Date    COVID19 Not Detected 03/03/2021         Anesthesia Evaluation    Airway: Mallampati: II        Dental:          Pulmonary:normal exam    (+) asthma:                            Cardiovascular:Negative CV ROS                      Neuro/Psych:   (+) neuromuscular disease:, headaches: migraine headaches, psychiatric history:            GI/Hepatic/Renal: Neg GI/Hepatic/Renal ROS            Endo/Other:    (+) : arthritis:., .                 Abdominal:           Vascular: negative vascular ROS. Anesthesia Plan      general     ASA 3 - emergent       Induction: intravenous. arterial line  MIPS: Postoperative opioids intended. Anesthetic plan and risks discussed with patient. Plan discussed with CRNA.                   Jacky Lopez MD   3/5/2021

## 2021-03-05 NOTE — DISCHARGE INSTR - COC
Continuity of Care Form    Patient Name: Tr Bowers   :  1970  MRN:  [id number]    Admit date:  3/3/2021  Discharge date:  ***    Code Status Order: Full Code   Advance Directives:   Advance Care Flowsheet Documentation       Date/Time Healthcare Directive Type of Healthcare Directive Copy in [address] Agent's Name Healthcare Agent's Phone Number    21 1020  No, patient does not have an advance directive for healthcare treatment -- -- -- -- --            Admitting Physician:  Rodger Weiner MD  PCP: Danis Martinez MD    Discharging Nurse: [facility] Unit/Room#: 9976/8259-22  Discharging Unit Phone Number: ***    Emergency Contact:   Extended Emergency Contact Information  Primary Emergency Contact: Cayden Walton  Address: [address] Phone: [phone]  Relation: Spouse  Secondary Emergency Contact: [address] Phone: [phone]  Relation: Child   needed? No    Past Surgical History:  Past Surgical History:   Procedure Laterality Date    CT ABSCESS DRAIN SUBCUTANEOUS  2021    CT ABSCESS DRAIN SUBCUTANEOUS 3/4/2021 [facility] CT SCAN    GASTRECTOMY  2021    EXPLORATORY LAPAROTOMY, PARTIAL GASTRECTOMY    HERNIA REPAIR  1972    as child    HYSTERECTOMY      2007    NERVE BLOCK Right 2015    right lumbar JHONY    NOSE SURGERY      After nose was broken. Dr. Cheo Rasmussen     Dr. Geronimo Baugh  2007    Dr. Chantelle Chirinos       Immunization History: There is no immunization history on file for this patient.     Active Problems:  Patient Active Problem List   Diagnosis Code    Migraine G43.909    Opiate dependence (HCC) F11.20    Chronic pain G89.29    Obesity E66.9    Patient overweight E66.3    Lumbar radicular pain M54.16    Lumbar disc disease M51.9    Closed nondisplaced fracture of fifth metatarsal bone of right foot S92.354A    Intra-abdominal abscess (HCC) K65.1       Isolation/Infection:   Isolation            No Isolation          Patient Infection Status       Infection Onset Added Last Indicated Last Indicated By Review Planned Expiration Resolved Resolved By    None active    Resolved    COVID-19 Rule Out 21 COVID-19, Rapid (Ordered)   21 Rule-Out Test Resulted            Nurse Assessment:  Last Vital Signs: BP (!) 159/101   Pulse 117   Temp 98 °F (36.7 °C) (Oral)   Resp 29   Ht 5' 7\" (1.702 m)   Wt 198 lb 4.8 oz (89.9 kg)   SpO2 94%   BMI 31.06 kg/m²     Last documented pain score (0-10 scale): Pain Level: 10  Last Weight:   Wt Readings from Last 1 Encounters:   21 198 lb 4.8 oz (89.9 kg)     Mental Status:  {IP PT MENTAL STATUS::::0}    IV Access:  { ANG IV ACCESS:812878802:::0}    Nursing Mobility/ADLs:  Walking   {CHP DME ADLs:157601591:::0}  Transfer  {CHP DME ADLs:421313903:::0}  Bathing  {CHP DME ADLs:589217532:::0}  Dressing  {CHP DME ADLs:852549547:::0}  Toileting  {CHP DME ADLs:344109861:::0}  Feeding  {CHP DME ADLs:935886731:::0}  Med Admin  {CHP DME ADLs:669110096:::0}  Med Delivery   { ANG MED Delivery:580736724:::0}    Wound Care Documentation and Therapy:      Change the Midline packing with plain packing Daily, ok for the patient to shower. Measure MILTON drain output daily. Elimination:  Continence:   · Bowel: {YES / OP:00682}  · Bladder: {YES / A}  Urinary Catheter: {Urinary Catheter:015134405:::0}   Colostomy/Ileostomy/Ileal Conduit: {YES / UI:00117}       Date of Last BM: ***    Intake/Output Summary (Last 24 hours) at 3/5/2021 1842  Last data filed at 3/5/2021 1834  Gross per 24 hour   Intake 2000 ml   Output 1748 ml   Net 252 ml     I/O last 3 completed shifts:   In: 1684 [I.V.:5316]  Out: 1800 [Urine:800; Drains:1000]    Safety Concerns:     [facility] Safety Concerns:384268275:::0}    Impairments/Disabilities:      [address] FRY Impairments/Disabilities:504180301:::0}    Nutrition Therapy:  Current Nutrition Therapy:   [address] Sharma ANG Diet List:865612448:::0}    Routes of Feeding: {CHP DME Other Feedings:260824452:::0}  Liquids: {Slp liquid thickness:97521}  Daily Fluid Restriction: {CHP DME Yes amt example:094447280:::0}  Last Modified Barium Swallow with Video (Video Swallowing Test): {Done Not Done LVYU:828553036:::5}    Treatments at the Time of Hospital Discharge:   Respiratory Treatments: ***  Oxygen Therapy:  {Therapy; copd oxygen:33680:::0}  Ventilator:    {[facility] Vent List:698439739:::0}    Rehab Therapies: {THERAPEUTIC INTERVENTION:2161290995}  Weight Bearing Status/Restrictions: {[facility] Weight Bearin:::0}  Other Medical Equipment (for information only, NOT a DME order):  {EQUIPMENT:608456598}  Other Treatments: ***    Patient's personal belongings (please select all that are sent with patient):  {CHP DME Belongings:087532132:::0}    RN SIGNATURE:  {Esignature:531942001:::0}    CASE MANAGEMENT/SOCIAL WORK SECTION    Inpatient Status Date: ***    Readmission Risk Assessment Score:  Readmission Risk              Risk of Unplanned Readmission:        19           Discharging to Facility/ Agency   · Name:   · Address:  · Phone:  · Fax:    Dialysis Facility (if applicable)   · Name:  · Address:  · Dialysis Schedule:  · Phone:  · Fax:    / signature: {Esignature:877664918:::0}    PHYSICIAN SECTION    Prognosis: Good    Condition at Discharge: Stable    Rehab Potential (if transferring to Rehab): {Prognosis:8164775541:::0}    Recommended Labs or Other Treatments After Discharge: ***    Physician Certification: I certify the above information and transfer of Rojelio Dalton  is necessary for the continuing treatment of the diagnosis listed and that she requires Home Care for less 30 days.      Update Admission H&P: No change in H&P    PHYSICIAN SIGNATURE:  Electronically signed by Angela Bonilla MD on 3/19/21 at 9:42 AM EST

## 2021-03-05 NOTE — PROGRESS NOTES
Called [facility] methadone clinic. They state patient is currently taking methadone 140 mg daily and her last dose was on 3/2/21.     Electronically signed by ABRAHAM Reina CNP on 3/5/2021 at 11:55 AM

## 2021-03-05 NOTE — FLOWSHEET NOTE
Assessment: Slade Gunn is a 48 y.o. female. Pt states she had a headache, nausea, and diarrhea. Patient reports that her symptoms have been constant for a while. Pt not feeling very well. Intervention:  was rounding on floor.  was bedside support offering spiritual care and support during hospital stay. Outcome: Chaplains are available 24/7 [facility] [employer].       03/04/21 1020   Encounter Summary   Services provided to: Patient   Referral/Consult From: Rounding   Continue Visiting   (3/4/21)   Complexity of Encounter Low   Length of Encounter 15 minutes   Spiritual Assessment Completed Yes   Spiritual/Adventism   Type Spiritual support   Assessment Approachable   Intervention Nurtured hope   Outcome Comfort   Advance Directives (For Healthcare)   Healthcare Directive No, patient does not have an advance directive for healthcare treatment   Values / Beliefs   Do you have any ethnic, cultural, sacramental, or spiritual Sabianism needs you would like us to be aware of while you are in the hospital? No

## 2021-03-05 NOTE — ANESTHESIA PROCEDURE NOTES
Arterial Line:    An arterial line was placed using surface landmarks, in the OR for the following indication(s): continuous blood pressure monitoring and blood sampling needed. A 20 gauge (size), 4.45 cm (length), Arrow (type) catheter was placed, Seldinger technique used, into the left radial artery, secured by Tegaderm and tape. Anesthesia type: General    Events:  patient tolerated procedure well with no complications.   3/5/2021 12:05 PM3/5/2021 12:09 PM  Anesthesiologist: Andra Layton MD  Performed: Anesthesiologist   Preanesthetic Checklist  Completed: patient identified, IV checked, site marked, risks and benefits discussed, surgical consent, monitors and equipment checked, pre-op evaluation, timeout performed, anesthesia consent given, oxygen available and patient being monitored

## 2021-03-05 NOTE — PROGRESS NOTES
Occupational Therapy Not Seen Note    DATE: 3/5/2021  Name: Uriel Zurita  : 1970  MRN: [id number]    Patient not available for Occupational Therapy due to:    Surgery/Procedure: Ex Lap     Next Scheduled Treatment: Ck 3/6     Electronically signed by Farida Mariano OT on 3/5/2021 at 1:06 PM

## 2021-03-05 NOTE — PROGRESS NOTES
POST OP NOTE    SUBJECTIVE  Pt s/p exploratory laparotomy for intraabdominal abscess and gastric perforation. OBJECTIVE  VITALS:  BP (!) 152/98   Pulse 117   Temp 99.3 °F (37.4 °C)   Resp (!) 34   Ht 5' 7\" (1.702 m)   Wt 198 lb 4.8 oz (89.9 kg)   SpO2 95%   BMI 31.06 kg/m²         GENERAL:  awake and alert. mild distress, No acute distress  CARDIOVASCULAR:  tachycardic with regular rhythm   LUNGS:  CTA Bilaterally, diminished breath sounds at bases, no rhonchi or wheezing  ABDOMEN:   Soft, diffuse tenderness to palpation, drains in LLQ (serosangenous output) and RUQ  INCISION: Incision clean/dry/intact    ASSESSMENT  1. POD# 0 s/p ex lap with partial gastrectomy, gastrojejunostomy     PLAN  1. Pain management- ketamine gtt, fentanyl pca   2.  DVT proph- lovenox 40mg daily  3. GI proph- protonix     Neuro:   Paincontrol/sedation: Fentanyl gtt, ketamine gtt, precedex gtt   Strict NPO   Hx of opoid abuse; on methadone 140mg daily at home    Resp:   Hx of smoking; 1ppd   95% on 4L NC     CV:   Telemetry monitoring     GI/Nutrition:   NG/OG to LIS   Strict NPO   TPN   Protonix     /Fluids/Electrolytes:   Alvarez to monitor I/Os   Total IVF 125cc/h    Hypokalemia; replaced this pre-operatively, replaced further post operatively   Ionized Ca 0.99 - repletion ordered post operatively    Mg 1.6 - repletion ordered post operatively     Heme:   Post op Hgb 14.4   EBL 30cc    ID:   Flagyl, cipro, diflucan started 3/4   Leukocytosis 17.9 from 17.4    procalcitonin 0.53   Monitor for fevers    MSK:   PT/OT    Skin:   Daily dressing changes     Dispo:   Continue ICU        Katharina Sue  Trauma/Surgery Service  3/5/2021 at 5:55 PM

## 2021-03-05 NOTE — PLAN OF CARE
Problem: Pain:  Goal: Pain level will decrease  Description: Pain level will decrease  Outcome: Ongoing     Problem: Pain:  Goal: Control of acute pain  Description: Control of acute pain  Outcome: Ongoing     Problem: Pain:  Goal: Control of chronic pain  Description: Control of chronic pain  Outcome: Ongoing   Electronically signed by Garrison Javier RN on 3/5/2021 at 2:30 PM

## 2021-03-05 NOTE — PROGRESS NOTES
PROGRESS NOTE    PATIENT NAME: Torrie [facility] RECORD NO. [id number]  DATE: 3/5/2021  SURGEON: Dr. Ross Court: Corinne Ayoub MD    HD: # 1    ASSESSMENT    Patient Active Problem List   Diagnosis    Migraine    Opi[facility] dependence ([facility] [address]    Chronic pain    Obesity    Patient overweight    Lumbar radicular pain    Lumbar disc disease    Closed nondisplaced fracture of fifth metatarsal bone of right foot    Intra-abdominal abscess ([facility] [address]     S/p 3/4 IR guided drain placement for LUQ intraabdominal abscess      MEDICAL DECISION MAKING AND PLAN    1. Neuro- Analgesia multimodal pain therapy  2. CV- continue to monitor BP/HR  3. Resp- continue to encourage incentive spirometry and deep breathing every hour while awake per RT and RN  4. GI- Diet NPO strict, monitor for bowel function, heath drain output, cont serial abdominal exams   5. Renal- heath UOP, accurate I/Os, replace electrolytes PRN, hypokalemia replace K+ IV, IVF LR  6. Msk- continue to encourage ambulation/activity, PT/OT eval and Tx   7. ID- cont Abx cipro/flagyl/diflucan, heath WBC, heath for fevers  8. Ppx- ok for DVT ppx, GI ppx protonix, endo heath BG   9. Pending clinical course will discuss timing of an UGI       65 Kyra Pearson has slightly improved since yesterday. S/p IR drainage of abdominal abscess. Pt denies any current f/c, n/v, sob or chest pain. Abd pain present but slightly improved from yesterday. Denies any flatus or BM. Denies any appetite.     LUQ drain  1L opaque/dark fluid  Tmax 37.5    OBJECTIVE  VITALS: Temp: Temp: 98.7 °F (37.1 °C)Temp  Av °F (37.2 °C)  Min: 98.7 °F (37.1 °C)  Max: 99.5 °F (87.9 °C) BP Systolic (06AUQ), SXD:334 , Min:109 , RYN:890   Diastolic (19TYQ), ZUP:93, Min:66, Max:103   Pulse Pulse  Av.1  Min: 78  Max: 105 Resp Resp  Av.4  Min: 14  Max: 31 Pulse ox SpO2  Av.9 %  Min: 89 %  Max: 98 %  GENERAL: alert, moderate distress  NEURO: follows commands HEENT: normocephalic, EOMI  LUNGS: equal chest rise and fall, non labored breathing   HEART: normal rate and regular rhythm  ABDOMEN: soft, mild tenderness to mid and LUQ quadrants, non-distended, no guarding or peritoneal signs present, LUQ drain in place opaque/dark fluid in collection bag   EXTERMITY: no cyanosis, clubbing or edema    I/O last 3 completed shifts: In: 7995 [I.V.:4316]  Out: 1000 [Drains:1000]    Drain/tube output: In: 9674 [I.V.:4316]  Out: 1000 [Drains:1000]    LAB:  CBC:   Recent Labs     03/03/21 2151 03/04/21  0630 03/05/21  0439   WBC 33.1* 31.0* 17.4*   HGB 16.2* 13.8 12.5   HCT 49.7* 42.0 39.4   MCV 85.5 87.0 88.7    271 235     BMP:   Recent Labs     03/03/21 2151 03/04/21 0630 03/04/21  1524 03/05/21  0439   * 133*  --  134*   K 3.4* 2.9* 3.5* 3.1*   CL 96* 97*  --  99   CO2 26 28  --  27   BUN 19 14  --  9   CREATININE 0.58 0.53  --  0.55   GLUCOSE 130* 102*  --  67*     COAGS:   Recent Labs     03/03/21 2151 03/04/21  0356   APTT 26.2  --    PROT 8.1  --    INR 1.2 1.1       RADIOLOGY:      Bette Kulkarni DO  3/5/21, 8:15 AM         Attending Note    Drainage has appearance of gastric juices. Patient does not feel any better; have offered patient exploratory laparotomy and she accepts. I have reviewed the above [facility] note(s) and I either performed the key elements of the medical history and physical exam or was present with the resident when the key elements of the medical history and physical exam were performed. I have discussed the findings, established the care plan and recommendations with Resident, TECSS RN, bedside nurse.     Cleo Whitman MD  3/5/2021  11:36 AM

## 2021-03-05 NOTE — PROGRESS NOTES
Physical Therapy    DATE: 3/5/2021    NAME: Alix Rojas  MRN: [id number]   : 1970      Patient not seen this date for Physical Therapy due to:    Surgery/Procedure: Ex Lap   ck 3/6      Electronically signed by Yann Hernandez PT on 3/5/2021 at 3:09 PM

## 2021-03-05 NOTE — ANESTHESIA POSTPROCEDURE EVALUATION
Department of Anesthesiology  Postprocedure Note    Patient: Thom Mora  MRN: [id number]  [place]: 1970  Date of evaluation: 3/5/2021  Time:  4:17 PM     Procedure Summary     Date: 03/05/21 Room / Location: [address]    Anesthesia Start: 6772 Anesthesia Stop: 9006    Procedure: EXPLORATORY LAPAROTOMY, PARTIAL GASTRECTOMY (N/A ) Diagnosis: (ADD-ON)    Surgeons: Willam Andre MD Responsible Provider: Marc Bolton MD    Anesthesia Type: general ASA Status: 3 - Emergent          Anesthesia Type: general    Jamarcus Phase I: Jamarcus Score: 8    Jamarcus Phase II:      Last vitals: Reviewed and per EMR flowsheets.        Anesthesia Post Evaluation    Patient location during evaluation: PACU  Patient participation: complete - patient participated  Level of consciousness: awake  Pain score: 1  Airway patency: patent  Nausea & Vomiting: no nausea and no vomiting  Complications: no  Cardiovascular status: blood pressure returned to baseline and hemodynamically stable  Respiratory status: acceptable  Hydration status: euvolemic

## 2021-03-05 NOTE — OP NOTE
Operative Note  Patient: Roger Nolasco  YOB: 1970  MRN: [id number]     Date of Procedure: 3/5/2021     Pre-Op Diagnosis: intra-abdominal abscess      Post-Op Diagnosis: Perforated gastric ulcer       Procedure(s):  EXPLORATORY LAPAROTOMY, abdominal washout, Billroth II procedure, MILTON drain placement x2     Surgeon(s):  Lucero Azevedo MD     Assistant:  * No surgical staff found *     Anesthesia: General     Estimated Blood Loss (mL): 57DM     Complications: none     Specimens:   * No specimens in log *     Implants:  * No implants in log *      Drains:        Closed/Suction Drain Left Abdomen  12 Cymro (Active)   Dressing Status Clean;Dry; Intact 03/05/21 0800   Drainage Appearance Michelle Hotter 03/05/21 0800   Status Open to gravity drainage 03/05/21 0800   Output (ml) 125 ml 03/05/21 0650       [REMOVED] Closed/Suction Drain Left Abdomen Bulb 12 Cymro (Removed)   Status Open to gravity drainage 03/04/21 0923         Findings:  Wound class IV, present at time of procedure was bilious peritonitis, purulent opaque fluid, abscess in lesser sac, distal gastrectomy and antecolic gastrojejunostomy created, NG tube guided past the GJ anastomosis approx 15-20cm into the alimentary limb, NG tube bridled to nose at 80 cm, left sided MILTON drain placed in lesser sac/posterior to stomach, right MILTON drain placed anterior to the [address] anastomosis       Detailed Description of Procedure: Indication for procedure: This is 48 y.o. F who was taken to the OR for exploratory laparotomy. Pt presented 3/4/21 with CT findings of a large LUQ intraabdominal abscess and pneumoperitoneum that was originally treated with IR guided drainage. Pt clinically had little improvement and given drain output amount and type was taken to the OR FOR ex lap, possible bowel resection and all indicated procedures.  This procedure, including risks, benefits, alternatives and complications have been fully reviewed with  and informed consent has been obtained. All questions were answered appropriately. Description of procedure:  Patient was wheeled to the OR suite and was positioned on the OR table in supine position. General anesthesia was started per anesthesia. IV Abx were given jerome-operatively. Time out was called. The abdomen was prepped and draped in sterile fashion. Midline umbilical incision with 10 blade scalpel was created from subxiphoid to 2 cm below umbilicus, electrocautery was used for dissection down to the anterior fascia and the peritoneal cavity was entered carefully. The left upper quadrant drain had been prepped into the field and the course of this was tracked intraperitoneally to find its entrance point into the lesser sac. It appeared to gone through a portion of the transverse colon mesentery and the drain was removed, no bleeding or acute issues were noted. The stomach was interrogated and decision was made to enter the lesser sac via takedown of the gastrocolic ligament by use of LigaSure device. Upon entrance into the lesser sac there was noted to be residual purulent opaque fluid, abscess and bilious peritonitis evidence. This was copiously irrigated with 2 L of sterile saline. There was a significant amount of inflammation and indurated tissue noted. Careful dissection was carried out to expose the complete portion of the distal posterior stomach. There was a suspected perforation at the distal portion of the posterior stomach near the pylorus but difficult to assess from the lesser sac so decision was made to make an anterior gastrostomy for better visualization. A artery perforation was indeed found in the distal portion of the stomach approximately 1 cm away from the pylorus on the posterior aspect of the stomach. A red rubber catheter was inserted through this and was grabbed through the lesser sac and the catheter was clamped to itself.   Given the size and location of the perforation decision was made to perform perform a antrectomy/distal gastrectomy and gastrojejunostomy. The stomach was mobilized including the proximal portion of D1 in order for firing of a 75 YAZ blue load stapler approximately 2 cm distal to the pylorus. Prior to this the right gastroepiploic artery and vein were identified along the greater curvature and suture-ligated. The right gastric artery was identified along the lesser curvature and suture-ligated. The proximal portion of the stomach was then mobilized and 375 YAZ staplers were used to transect proximal to the perforation site and just proximal to the anterior gastrostomy site in order to include this in the resection. The specimen was then passed off to pathology. The area was then irrigated again with normal saline and hemostasis was assured. Next the ligament of Treitz was found and the small bowel was ran in its entirety following with inspection of the right and left and transverse colon. No signs of pathology or perforation were noted. The ligament of Treitz was then again found and approximately measured out 40 cm distal this in order to bring up that portion of the jejunum for creation of the gastrojejunostomy. This portion of the jejunum easily reached the distal end of the gastrectomy. A 19 Mongolian drain was then placed in the lesser sac and exited out through the left upper quadrant via a retrocolic peritoneal exit. Some of the omentum was then placed in this area over the drain. The piece of jejunum that was decided upon for gastrojejunostomy was then brought in antecolic fashion up to the distal gastrectomy site. Again no tension was noted and there is appropriate length of jejunum for a tension-free anastomosis. 3-0 silk stay sutures were placed along the stomach and jejunum approximately 8 cm apart. Decision was made to create a handsewn anastomosis starting with the posterior layer which was completed with 3-0 silks.   Ensuring that the antimesenteric border of the jejunum was set for the anastomosis. Enterotomies were then created on the stomach and jejunum. A running 3-0 Vicryl x2 was used starting from the middle running laterally both ways respectively until meeting anteriorly completing the anastomosis. Lembert sutures were then used with 3-0 silk over the anterior anastomotic site. Prior to this anesthesia placed a NG tube via the nasogastric route and this was passed down the alimentary limb approximately 15 to 20 cm distal to our anastomosis. The tube was noted to be 80 cm at the nares and was bridled. At this time the anastomosis was again inspected and noted to be hemostatic and under no tension. A 19 [place] Zayda MILTON drain was then placed anterior to the anastomosis and exited through the right upper quadrant. No mesenteric defects or abnormal contour of the bowel was noted. At this time the abdomen was again inspected and noted to be hemostatic and irrigated once more with copious sterile saline. The abdominal fascia was then closed with looped 0 PDS in a running fashion starting from the inferior and superior portion meeting in the center. The subcutaneous tissues were then irrigated. The skin was then closed loosely with staples and Betadine jacques were placed in between the staples. This was then covered with fluffs and an Marathon dressing. All instrument, needle, and sponge counts were correct. Patient tolerated the procedure well without any complications. Patient was extubated and transferred to PACU in stable condition.       Dr. Camille Alfaro was present for the entirety of case. Thank you,    Electronically signed by Arslan Coon DO on 3/5/2021 at 11:37 AM         Attending Note      I have reviewed the above TECSS note and I either performed the key elements of the procedure or was present with the resident when the key elements of the procedure were performed.  I have discussed the findings, established the care plan and

## 2021-03-05 NOTE — PROGRESS NOTES
Occupational Therapy Not Seen Note    DATE: 3/5/2021  Name: Av Wetzel  : 1970  MRN: [id number]    Patient not available for Occupational Therapy due to:    Patient Declined: Pt reports not feeling well right now despite education on importance of OOB activity.      Next Scheduled Treatment: Ck in pm as able or 3/6     Electronically signed by Cristina Grissom OT on 3/5/2021 at 8:20 AM

## 2021-03-06 LAB
ABSOLUTE EOS #: 0.11 K/UL (ref 0–0.44)
ABSOLUTE IMMATURE GRANULOCYTE: 0.08 K/UL (ref 0–0.3)
ABSOLUTE LYMPH #: 1 K/UL (ref 1.1–3.7)
ABSOLUTE MONO #: 1.16 K/UL (ref 0.1–1.2)
ANION GAP SERPL CALCULATED.3IONS-SCNC: 11 MMOL/L (ref 9–17)
BASOPHILS # BLD: 0 % (ref 0–2)
BASOPHILS ABSOLUTE: 0.04 K/UL (ref 0–0.2)
BUN BLDV-MCNC: 7 MG/DL (ref 6–20)
BUN/CREAT BLD: ABNORMAL (ref 9–20)
CALCIUM IONIZED: 0.94 MMOL/L (ref 1.13–1.33)
CALCIUM SERPL-MCNC: 7.4 MG/DL (ref 8.6–10.4)
CHLORIDE BLD-SCNC: 97 MMOL/L (ref 98–107)
CO2: 24 MMOL/L (ref 20–31)
CREAT SERPL-MCNC: 0.49 MG/DL (ref 0.5–0.9)
CULTURE: NO GROWTH
DIFFERENTIAL TYPE: ABNORMAL
EOSINOPHILS RELATIVE PERCENT: 1 % (ref 1–4)
GFR AFRICAN AMERICAN: >60 ML/MIN
GFR NON-AFRICAN AMERICAN: >60 ML/MIN
GFR SERPL CREATININE-BSD FRML MDRD: ABNORMAL ML/MIN/{1.73_M2}
GFR SERPL CREATININE-BSD FRML MDRD: ABNORMAL ML/MIN/{1.73_M2}
GLUCOSE BLD-MCNC: 115 MG/DL (ref 65–105)
GLUCOSE BLD-MCNC: 128 MG/DL (ref 70–99)
GLUCOSE BLD-MCNC: 80 MG/DL (ref 65–105)
HCT VFR BLD CALC: 40.4 % (ref 36.3–47.1)
HEMOGLOBIN: 12.9 G/DL (ref 11.9–15.1)
IMMATURE GRANULOCYTES: 0 %
LYMPHOCYTES # BLD: 6 % (ref 24–43)
Lab: NORMAL
MAGNESIUM: 2.1 MG/DL (ref 1.6–2.6)
MCH RBC QN AUTO: 28.1 PG (ref 25.2–33.5)
MCHC RBC AUTO-ENTMCNC: 31.9 G/DL (ref 28.4–34.8)
MCV RBC AUTO: 88 FL (ref 82.6–102.9)
MONOCYTES # BLD: 7 % (ref 3–12)
NRBC AUTOMATED: 0 PER 100 WBC
PDW BLD-RTO: 12.8 % (ref 11.8–14.4)
PHOSPHORUS: 3.2 MG/DL (ref 2.6–4.5)
PLATELET # BLD: 280 K/UL (ref 138–453)
PLATELET ESTIMATE: ABNORMAL
PMV BLD AUTO: 9.5 FL (ref 8.1–13.5)
POTASSIUM SERPL-SCNC: 3.8 MMOL/L (ref 3.7–5.3)
RBC # BLD: 4.59 M/UL (ref 3.95–5.11)
RBC # BLD: ABNORMAL 10*6/UL
SEG NEUTROPHILS: 86 % (ref 36–65)
SEGMENTED NEUTROPHILS ABSOLUTE COUNT: 15.59 K/UL (ref 1.5–8.1)
SODIUM BLD-SCNC: 132 MMOL/L (ref 135–144)
SPECIMEN DESCRIPTION: NORMAL
WBC # BLD: 18 K/UL (ref 3.5–11.3)
WBC # BLD: ABNORMAL 10*3/UL

## 2021-03-06 PROCEDURE — 82947 ASSAY GLUCOSE BLOOD QUANT: CPT

## 2021-03-06 PROCEDURE — 6360000002 HC RX W HCPCS: Performed by: STUDENT IN AN ORGANIZED HEALTH CARE EDUCATION/TRAINING PROGRAM

## 2021-03-06 PROCEDURE — 94640 AIRWAY INHALATION TREATMENT: CPT

## 2021-03-06 PROCEDURE — 2580000003 HC RX 258: Performed by: STUDENT IN AN ORGANIZED HEALTH CARE EDUCATION/TRAINING PROGRAM

## 2021-03-06 PROCEDURE — 2500000003 HC RX 250 WO HCPCS: Performed by: STUDENT IN AN ORGANIZED HEALTH CARE EDUCATION/TRAINING PROGRAM

## 2021-03-06 PROCEDURE — 97530 THERAPEUTIC ACTIVITIES: CPT

## 2021-03-06 PROCEDURE — 36415 COLL VENOUS BLD VENIPUNCTURE: CPT

## 2021-03-06 PROCEDURE — 97166 OT EVAL MOD COMPLEX 45 MIN: CPT

## 2021-03-06 PROCEDURE — 83735 ASSAY OF MAGNESIUM: CPT

## 2021-03-06 PROCEDURE — 6360000002 HC RX W HCPCS

## 2021-03-06 PROCEDURE — 6370000000 HC RX 637 (ALT 250 FOR IP): Performed by: STUDENT IN AN ORGANIZED HEALTH CARE EDUCATION/TRAINING PROGRAM

## 2021-03-06 PROCEDURE — 2700000000 HC OXYGEN THERAPY PER DAY

## 2021-03-06 PROCEDURE — 85025 COMPLETE CBC W/AUTO DIFF WBC: CPT

## 2021-03-06 PROCEDURE — 2000000000 HC ICU R&B

## 2021-03-06 PROCEDURE — 82330 ASSAY OF CALCIUM: CPT

## 2021-03-06 PROCEDURE — 97535 SELF CARE MNGMENT TRAINING: CPT

## 2021-03-06 PROCEDURE — 84100 ASSAY OF PHOSPHORUS: CPT

## 2021-03-06 PROCEDURE — 97162 PT EVAL MOD COMPLEX 30 MIN: CPT

## 2021-03-06 PROCEDURE — 94761 N-INVAS EAR/PLS OXIMETRY MLT: CPT

## 2021-03-06 PROCEDURE — C9113 INJ PANTOPRAZOLE SODIUM, VIA: HCPCS | Performed by: STUDENT IN AN ORGANIZED HEALTH CARE EDUCATION/TRAINING PROGRAM

## 2021-03-06 PROCEDURE — 80048 BASIC METABOLIC PNL TOTAL CA: CPT

## 2021-03-06 RX ORDER — FLUOXETINE HYDROCHLORIDE 20 MG/1
40 CAPSULE ORAL DAILY
Status: DISCONTINUED | OUTPATIENT
Start: 2021-03-06 | End: 2021-03-26 | Stop reason: HOSPADM

## 2021-03-06 RX ORDER — QUETIAPINE FUMARATE 25 MG/1
75 TABLET, FILM COATED ORAL 2 TIMES DAILY
Status: DISCONTINUED | OUTPATIENT
Start: 2021-03-06 | End: 2021-03-11

## 2021-03-06 RX ORDER — HALOPERIDOL 5 MG/ML
2.5 INJECTION INTRAMUSCULAR ONCE
Status: COMPLETED | OUTPATIENT
Start: 2021-03-06 | End: 2021-03-06

## 2021-03-06 RX ORDER — METHADONE HYDROCHLORIDE 5 MG/5ML
140 SOLUTION ORAL DAILY
Status: DISCONTINUED | OUTPATIENT
Start: 2021-03-06 | End: 2021-03-24

## 2021-03-06 RX ORDER — METHOCARBAMOL 750 MG/1
750 TABLET, FILM COATED ORAL EVERY 6 HOURS
Status: DISCONTINUED | OUTPATIENT
Start: 2021-03-06 | End: 2021-03-11

## 2021-03-06 RX ORDER — ACETAMINOPHEN 500 MG
1000 TABLET ORAL EVERY 8 HOURS PRN
Status: DISCONTINUED | OUTPATIENT
Start: 2021-03-06 | End: 2021-03-07

## 2021-03-06 RX ORDER — OXYCODONE HYDROCHLORIDE 5 MG/1
5 TABLET ORAL EVERY 6 HOURS PRN
Status: DISCONTINUED | OUTPATIENT
Start: 2021-03-06 | End: 2021-03-07

## 2021-03-06 RX ORDER — CALCIUM GLUCONATE 20 MG/ML
INJECTION, SOLUTION INTRAVENOUS
Status: COMPLETED
Start: 2021-03-06 | End: 2021-03-06

## 2021-03-06 RX ORDER — GABAPENTIN 300 MG/1
300 CAPSULE ORAL EVERY 8 HOURS
Status: DISCONTINUED | OUTPATIENT
Start: 2021-03-06 | End: 2021-03-07

## 2021-03-06 RX ADMIN — CIPROFLOXACIN 400 MG: 2 INJECTION, SOLUTION INTRAVENOUS at 13:27

## 2021-03-06 RX ADMIN — Medication: at 04:28

## 2021-03-06 RX ADMIN — GABAPENTIN 300 MG: 300 CAPSULE ORAL at 19:52

## 2021-03-06 RX ADMIN — SODIUM CHLORIDE, PRESERVATIVE FREE 10 ML: 5 INJECTION INTRAVENOUS at 11:01

## 2021-03-06 RX ADMIN — ENOXAPARIN SODIUM 40 MG: 40 INJECTION SUBCUTANEOUS at 08:42

## 2021-03-06 RX ADMIN — METHOCARBAMOL TABLETS 750 MG: 750 TABLET, COATED ORAL at 17:04

## 2021-03-06 RX ADMIN — FLUCONAZOLE 400 MG: 2 INJECTION, SOLUTION INTRAVENOUS at 06:00

## 2021-03-06 RX ADMIN — PANTOPRAZOLE SODIUM 40 MG: 40 INJECTION, POWDER, FOR SOLUTION INTRAVENOUS at 19:52

## 2021-03-06 RX ADMIN — SODIUM CHLORIDE, POTASSIUM CHLORIDE, SODIUM LACTATE AND CALCIUM CHLORIDE: 600; 310; 30; 20 INJECTION, SOLUTION INTRAVENOUS at 06:33

## 2021-03-06 RX ADMIN — POTASSIUM CHLORIDE 10 MEQ: 7.46 INJECTION, SOLUTION INTRAVENOUS at 00:10

## 2021-03-06 RX ADMIN — METRONIDAZOLE 500 MG: 500 INJECTION, SOLUTION INTRAVENOUS at 19:51

## 2021-03-06 RX ADMIN — SODIUM CHLORIDE, PRESERVATIVE FREE 10 ML: 5 INJECTION INTRAVENOUS at 08:42

## 2021-03-06 RX ADMIN — MAGNESIUM SULFATE 2000 MG: 2 INJECTION INTRAVENOUS at 00:09

## 2021-03-06 RX ADMIN — CALCIUM GLUCONATE: 20 INJECTION, SOLUTION INTRAVENOUS at 18:11

## 2021-03-06 RX ADMIN — CALCIUM GLUCONATE 2000 MG: 20 INJECTION, SOLUTION INTRAVENOUS at 17:04

## 2021-03-06 RX ADMIN — METRONIDAZOLE 500 MG: 500 INJECTION, SOLUTION INTRAVENOUS at 13:27

## 2021-03-06 RX ADMIN — QUETIAPINE FUMARATE 75 MG: 25 TABLET ORAL at 11:01

## 2021-03-06 RX ADMIN — SODIUM CHLORIDE, PRESERVATIVE FREE 10 ML: 5 INJECTION INTRAVENOUS at 09:00

## 2021-03-06 RX ADMIN — CIPROFLOXACIN 400 MG: 2 INJECTION, SOLUTION INTRAVENOUS at 02:00

## 2021-03-06 RX ADMIN — METRONIDAZOLE 500 MG: 500 INJECTION, SOLUTION INTRAVENOUS at 05:41

## 2021-03-06 RX ADMIN — METHOCARBAMOL TABLETS 750 MG: 750 TABLET, COATED ORAL at 11:01

## 2021-03-06 RX ADMIN — OXYCODONE HYDROCHLORIDE 5 MG: 5 TABLET ORAL at 17:25

## 2021-03-06 RX ADMIN — HALOPERIDOL LACTATE 2.5 MG: 5 INJECTION, SOLUTION INTRAMUSCULAR at 04:17

## 2021-03-06 RX ADMIN — METHADONE HYDROCHLORIDE 140 MG: 5 SOLUTION ORAL at 13:26

## 2021-03-06 RX ADMIN — QUETIAPINE FUMARATE 75 MG: 25 TABLET ORAL at 19:51

## 2021-03-06 RX ADMIN — SODIUM CHLORIDE, PRESERVATIVE FREE 10 ML: 5 INJECTION INTRAVENOUS at 19:52

## 2021-03-06 RX ADMIN — GABAPENTIN 300 MG: 300 CAPSULE ORAL at 11:01

## 2021-03-06 RX ADMIN — IPRATROPIUM BROMIDE AND ALBUTEROL SULFATE 1 AMPULE: .5; 3 SOLUTION RESPIRATORY (INHALATION) at 21:48

## 2021-03-06 RX ADMIN — FLUOXETINE HYDROCHLORIDE 40 MG: 20 CAPSULE ORAL at 11:01

## 2021-03-06 RX ADMIN — PANTOPRAZOLE SODIUM 40 MG: 40 INJECTION, POWDER, FOR SOLUTION INTRAVENOUS at 11:00

## 2021-03-06 RX ADMIN — ONDANSETRON 4 MG: 2 INJECTION INTRAMUSCULAR; INTRAVENOUS at 10:11

## 2021-03-06 RX ADMIN — IPRATROPIUM BROMIDE AND ALBUTEROL SULFATE 1 AMPULE: .5; 3 SOLUTION RESPIRATORY (INHALATION) at 07:25

## 2021-03-06 RX ADMIN — ONDANSETRON 4 MG: 2 INJECTION INTRAMUSCULAR; INTRAVENOUS at 04:17

## 2021-03-06 ASSESSMENT — PAIN DESCRIPTION - DESCRIPTORS: DESCRIPTORS: CONSTANT;CRAMPING;SHARP

## 2021-03-06 ASSESSMENT — PAIN DESCRIPTION - LOCATION
LOCATION: ABDOMEN;BACK
LOCATION: ABDOMEN;BACK

## 2021-03-06 ASSESSMENT — PAIN DESCRIPTION - PROGRESSION
CLINICAL_PROGRESSION: NOT CHANGED

## 2021-03-06 ASSESSMENT — PAIN SCALES - GENERAL
PAINLEVEL_OUTOF10: 10
PAINLEVEL_OUTOF10: 8
PAINLEVEL_OUTOF10: 10
PAINLEVEL_OUTOF10: 9
PAINLEVEL_OUTOF10: 9

## 2021-03-06 ASSESSMENT — PAIN DESCRIPTION - ONSET
ONSET: ON-GOING
ONSET: ON-GOING

## 2021-03-06 ASSESSMENT — PAIN DESCRIPTION - FREQUENCY: FREQUENCY: CONTINUOUS

## 2021-03-06 ASSESSMENT — PAIN DESCRIPTION - ORIENTATION
ORIENTATION: ANTERIOR;MID;POSTERIOR
ORIENTATION: ANTERIOR;POSTERIOR;MID

## 2021-03-06 NOTE — PROGRESS NOTES
Physical Therapy    Facility/Department: [address]  Initial Assessment    NAME: Gaye Vale  : 1970  MRN: [id number]    Date of Service: 3/6/2021  From H and P:50 y.o. female with 3 days of abdominal pain, N/V, diarrhea. S/p exploratory laparotomy.     Discharge Recommendations:  Patient would benefit from continued therapy after discharge   PT Equipment Recommendations  Equipment Needed: Yes  Mobility Devices: [place]: Rolling    Assessment   Body structures, Functions, Activity limitations: Increased pain;Decreased safe awareness;Decreased endurance;Decreased functional mobility ; Decreased strength;Decreased balance  Assessment: Patient in severe pain, reluctant to move with therapy staff, but willing to be led through sitting up and standing up. Unable to step/ambulate with pain and the effort of standing. Pain 9/10. Patient needs further PT to regain functional independence. Prognosis: Good  Decision Making: Medium Complexity  Clinical Presentation: evolving  PT Education: Goals;Transfer Training;Functional Mobility Training;Plan of Care;General Safety;PT Role;Precautions  REQUIRES PT FOLLOW UP: Yes  Activity Tolerance  Activity Tolerance: Patient limited by pain       Patient Diagnosis(es): The encounter diagnosis was Acute gastric ulcer with perforation ([address] has a past medical history of Asthma, Connective tissue disease, undifferentiated ([address] DDD (degenerative disc disease), Depression, Lumbar radicular pain, Migraine, Mitral valve prolapse, and Opiate addiction ([address] has a past surgical history that includes tomy and bso (cervix removed) (); hernia repair (); Nose surgery (); Tubal ligation (); ovarian cyst removal (Left, ); Nerve Block (Right, 2015); Hysterectomy; CT ABSCESS DRAINAGE (2021); and gastrectomy (2021).     Restrictions  Restrictions/Precautions  Restrictions/Precautions: Fall Risk  Required Braces or Orthoses?: No  Required Braces or Orthoses  Other: Abdominal Binder  Position Activity Restriction  Other position/activity restrictions: Up as tolerated. Has NG, abdominal incision/binder, 4 LPM oxygen, two MILTON drains          Subjective  General  Chart Reviewed: Yes  Patient assessed for rehabilitation services?: Yes  Family / Caregiver Present: No  Follows Commands: Within Functional Limits  Pain Screening  Patient Currently in Pain: Yes  Pain Assessment  Pain Assessment: 0-10  Pain Level: 9  Pain Type: Surgical pain  Pain Location: Abdomen  Vital Signs  Patient Currently in Pain: Yes       Orientation  Orientation  Overall Orientation Status: Within Functional Limits  Social/Functional History  Social/Functional History  Lives With: Family, Spouse  Type of Home: (lower level duplex)  Home Layout: One level  Home Access: Stairs to enter without rails  Entrance Stairs - Number of Steps: 3  Bathroom Shower/Tub: Tub/Shower unit  Bathroom Toilet: Standard  Home Equipment: (pt reports no use of DME at baseline)  ADL Assistance: [address]: Independent  Homemaking Responsibilities: Yes  Ambulation Assistance: Independent  Transfer Assistance: Independent  Active : Yes  Mode of Transportation: Car  Occupation: Unemployed  Leisure & Hobbies: playing games on phone, spending time with [employer]  Cognition   Cognition  Overall Cognitive Status: WFL    Objective     Observation/Palpation  Observation: Abdominal binder on, abdominal incisions. Strength RLE  Comment: 3+/5  Strength LLE  Comment: 3+/5        Bed mobility  Supine to Sit: 2 Person assistance;Minimal assistance  Sit to Supine: Moderate assistance  Scooting: Moderate assistance  Transfers  Sit to Stand: Minimal Assistance;2 Person Assistance  Stand to sit: 2 Person Assistance;Minimal Assistance  Comment: Stood 45 seconds with walker. Severe pain.   Ambulation  Ambulation?: No     Balance  Sitting - Static: Fair  Sitting - Dynamic: Fair  Standing - Static: Poor;+  Standing - Dynamic: Poor        Plan   Plan  Times per week: 5-6x/wk  Current Treatment Recommendations: Strengthening, Gait Training, Patient/Caregiver Education & Training, Stair training, Pain Management, Functional Mobility Training, Endurance Training, Home Exercise Program, Transfer Training, Safety Education & Training  Safety Devices  Type of devices: All fall risk precautions in place, Call light within reach, Left in bed, Gait belt, Nurse notified                        AM-PAC Score  AM-PAC Inpatient Mobility Raw Score : 11 (03/06/21 1455)  AM-PAC Inpatient T-Scale Score : 33.86 (03/06/21 1455)  Mobility Inpatient CMS 0-100% Score: 72.57 (03/06/21 1455)  Mobility Inpatient CMS G-Code Modifier : CL (03/06/21 1455)          Goals  Short term goals  Time Frame for Short term goals: 14 visits  Short term goal 1: Supine to/from sit with SBA  Short term goal 2: Sit to/from stand with SBA  Short term goal 3: Ambulate 76' with walker with CGA. Short term goal 4: Negotiate up and down 4 steps with one railing with CGA.        Therapy Time   Individual Concurrent Group Co-treatment   Time In 9721         Time Out 1340         Minutes 25         Timed Code Treatment Minutes: 8 Minutes       Jorgito Richter PT

## 2021-03-06 NOTE — PROGRESS NOTES
Activity Restriction  Other position/activity restrictions: Up as tolerated. Has NG, abdominal incision/binder, 4 LPM oxygen    Subjective   General  Patient assessed for rehabilitation services?: Yes  General Comment  Comments: RN ok'd for OT. Pt in pain but tolerated well  Patient Currently in Pain: Yes  Pain Assessment  Pain Assessment: 0-10  Pain Level: 9  Pain Type: Surgical pain  Pain Location: Abdomen  Non-Pharmaceutical Pain Intervention(s): Therapeutic presence; Ambulation/Increased Activity; Distraction  Response to Pain Intervention: Patient Satisfied  Vital Signs  Patient Currently in Pain: Yes  Social/Functional History  Social/Functional History  Lives With: Family, Spouse  Type of Home: (lower level duplex)  Home Layout: One level  Home Access: Stairs to enter without rails  Entrance Stairs - Number of Steps: 3  Bathroom Shower/Tub: Tub/Shower unit  Bathroom Toilet: Standard  Home Equipment: (pt reports no use of DME at baseline)  ADL Assistance: [address]: Independent  Homemaking Responsibilities: Yes  Ambulation Assistance: Independent  Transfer Assistance: Independent  Active : Yes  Mode of Transportation: Car  Occupation: Unemployed  [address]: playing games on phone, spending time with [employer]     Objective   Vision: Impaired  Vision Exceptions: Wears glasses for reading  Hearing: Within functional limits    Orientation  Overall Orientation Status: Within Functional Limits  Observation/Palpation  Observation: Abdominal binder on, abdominal incisions.   Balance  Sitting Balance: Contact guard assistance(pt sat EOB ~5 min)  Standing Balance: Minimal assistance  Standing Balance  Time: ~1 min  Functional Mobility  Functional - Mobility Device: Rolling Walker  Activity: Other  Assist Level: Minimal assistance  Functional Mobility Comments: sidesteps towards HOB  ADL  Feeding: Independent  Grooming: Independent  UE Bathing: Stand by assistance  LE Bathing: Moderate assistance  UE Dressing: Stand by assistance  LE Dressing: Maximum assistance  Toileting: Moderate assistance  Additional Comments: OT facilitated pt washing face supine in bed with washcloth ind. Tone RUE  RUE Tone: Normotonic  Tone LUE  LUE Tone: Normotonic  Coordination  Movements Are Fluid And Coordinated: Yes     Bed mobility  Supine to Sit: 2 Person assistance;Minimal assistance  Sit to Supine: Moderate assistance  Scooting:  Moderate assistance  Comment: pt used log roll technique and req'd mod v/c to not contract abdomen during all bed mobility tasks  Transfers  Sit to stand: Minimal assistance  Stand to sit: Minimal assistance  Transfer Comments: with RW     Cognition  Overall Cognitive Status: WFL        Sensation  Overall Sensation Status: WFL      LUE AROM (degrees)  LUE AROM : WFL  Left Hand AROM (degrees)  Left Hand AROM: WFL  RUE AROM (degrees)  RUE AROM : WFL  Right Hand AROM (degrees)  Right Hand AROM: WFL  LUE Strength  Gross LUE Strength: WFL  L Hand General: 4+/5  RUE Strength  Gross RUE Strength: WFL  R Hand General: 4+/5    Plan   Plan  Times per week: 2-3x/wk    AM-PAC Score  AM-[facility] Inpatient Daily Activity Raw Score: 17 (03/06/21 1443)  AM-PAC Inpatient ADL T-Scale Score : 37.26 (03/06/21 1443)  ADL Inpatient CMS 0-100% Score: 50.11 (03/06/21 1443)  ADL Inpatient CMS G-Code Modifier : CK (03/06/21 1443)    Goals  Short term goals  Time Frame for Short term goals: pt will, by discharge  Short term goal 1: dem UB ADL ind/mod I with AE PRN  Short term goal 2: dem LB ADL/toileting with min A and AE PRN  Short term goal 3: complete functional mobility/transfer with CGA and LRD PRN  Short term goal 4: engage in functional activity for 20+ minutes for improved functional endurance  Short term goal 5: engage in functional activity requiring dynamic standing with CGA and LRD PRN for 5+ minutes       Therapy Time   Individual Concurrent Group Co-treatment   Time In 8126         Time Out 6104 Minutes 25      Co-eval with PT   Timed Code Treatment Minutes: 8 Minutes       Bin Mac Read

## 2021-03-06 NOTE — PLAN OF CARE
Nutrition Problem #1: Inadequate oral intake  Intervention: Food and/or Nutrient Delivery: Continue NPO, Start Tube Feeding  Nutritional Goals: Pt to meet % of est'd needs via EN

## 2021-03-06 NOTE — PLAN OF CARE
Problem: Pain:  Description: Pain management should include both nonpharmacologic and pharmacologic interventions. Goal: Pain level will decrease  Description: Pain level will decrease  3/5/2021 1929 by Shirleyann Kayser, RN  Outcome: Ongoing  3/5/2021 1429 by Oliva Velásquez RN  Outcome: Ongoing  Goal: Control of acute pain  Description: Control of acute pain  3/5/2021 1929 by Shirleyann Kayser, RN  Outcome: Ongoing  3/5/2021 1429 by Oliva Velásquez RN  Outcome: Ongoing  Goal: Control of chronic pain  Description: Control of chronic pain  3/5/2021 1929 by Shirleyann Kayser, RN  Outcome: Ongoing  3/5/2021 1429 by Oliva Velásquez RN  Outcome: Ongoing     Problem: Discharge Planning:  Goal: Participates in care planning  Description: Participates in care planning  Outcome: Ongoing  Goal: Discharged to appropriate level of care  Description: Discharged to appropriate level of care  Outcome: Ongoing     Problem: Anxiety/Stress:  Goal: Level of anxiety will decrease  Description: Level of anxiety will decrease  Outcome: Ongoing     Problem: Aspiration:  Goal: Absence of aspiration  Description: Absence of aspiration  Outcome: Ongoing     Problem:  Bowel Function - Altered:  Goal: Bowel elimination is within specified parameters  Description: Bowel elimination is within specified parameters  Outcome: Ongoing     Problem: Cardiac Output - Decreased:  Goal: Hemodynamic stability will improve  Description: Hemodynamic stability will improve  Outcome: Ongoing     Problem: Fluid Volume - Imbalance:  Goal: Absence of imbalanced fluid volume signs and symptoms  Description: Absence of imbalanced fluid volume signs and symptoms  Outcome: Ongoing     Problem: Nutrition Deficit:  Goal: Ability to achieve adequate nutritional intake will improve  Description: Ability to achieve adequate nutritional intake will improve  Outcome: Ongoing     Problem: Pain:  Description: Pain management should include both nonpharmacologic and

## 2021-03-06 NOTE — PLAN OF CARE
Ability to achieve adequate nutritional intake will improve  Description: Ability to achieve adequate nutritional intake will improve  Outcome: Ongoing     Problem: Pain:  Goal: Pain level will decrease  Description: Pain level will decrease  3/6/2021 1252 by Joselin Sorto RN  Outcome: Ongoing  3/6/2021 0656 by Reagan Woods RN  Outcome: Ongoing  Goal: Recognizes and communicates pain  Description: Recognizes and communicates pain  Outcome: Ongoing  Goal: Control of acute pain  Description: Control of acute pain  Outcome: Ongoing  Goal: Control of chronic pain  Description: Control of chronic pain  Outcome: Ongoing     Problem: Serum Glucose Level - Abnormal:  Goal: Ability to maintain appropriate glucose levels will improve to within specified parameters  Description: Ability to maintain appropriate glucose levels will improve to within specified parameters  Outcome: Ongoing     Problem: Skin Integrity - Impaired:  Goal: Will show no infection signs and symptoms  Description: Will show no infection signs and symptoms  Outcome: Ongoing  Goal: Absence of new skin breakdown  Description: Absence of new skin breakdown  Outcome: Ongoing     Problem: Sleep Pattern Disturbance:  Goal: Appears well-rested  Description: Appears well-rested  Outcome: Ongoing     Problem: Tissue Perfusion, Altered:  Goal: Circulatory function within specified parameters  Description: Circulatory function within specified parameters  Outcome: Ongoing     Problem: Nutrition  Goal: Optimal nutrition therapy  Description: Nutrition Problem #1: Inadequate oral intake  Intervention: Food and/or Nutrient Delivery: Continue NPO, Start Tube Feeding  Nutritional Goals: Pt to meet % of est'd needs via EN     Outcome: Ongoing     Problem: Skin Integrity:  Goal: Will show no infection signs and symptoms  Description: Will show no infection signs and symptoms  Outcome: Ongoing  Goal: Absence of new skin breakdown  Description: Absence of new skin

## 2021-03-06 NOTE — CARE COORDINATION
Consult received this date re:hx of drug abuse-pt has doctor at [facility]  Chart reviewed  Noted pt seen by ED SW 3/4 and informed [facility] of admission to hospital.  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 Pt plans to continue treatment at [facility] after d/c.

## 2021-03-06 NOTE — PLAN OF CARE
Absence of imbalanced fluid volume signs and symptoms  3/5/2021 1929 by Lori Gamboa RN  Outcome: Ongoing     Problem: Nutrition Deficit:  Goal: Ability to achieve adequate nutritional intake will improve  Description: Ability to achieve adequate nutritional intake will improve  3/5/2021 1929 by Lori Gamboa RN  Outcome: Ongoing     Problem: Pain:  Goal: Pain level will decrease  Description: Pain level will decrease  3/6/2021 0656 by Keith Castillo RN  Outcome: Ongoing  3/5/2021 1929 by Lori Gamboa RN  Outcome: Ongoing  Goal: Recognizes and communicates pain  Description: Recognizes and communicates pain  3/5/2021 1929 by Lori Gamboa RN  Outcome: Ongoing  Goal: Control of acute pain  Description: Control of acute pain  3/5/2021 1929 by Lori Gamboa RN  Outcome: Ongoing  Goal: Control of chronic pain  Description: Control of chronic pain  3/5/2021 1929 by Lori Gamboa RN  Outcome: Ongoing     Problem: Serum Glucose Level - Abnormal:  Goal: Ability to maintain appropriate glucose levels will improve to within specified parameters  Description: Ability to maintain appropriate glucose levels will improve to within specified parameters  3/5/2021 1929 by Lori Gamboa RN  Outcome: Ongoing     Problem: Skin Integrity - Impaired:  Goal: Will show no infection signs and symptoms  Description: Will show no infection signs and symptoms  3/5/2021 1929 by Lori Gamboa RN  Outcome: Ongoing  Goal: Absence of new skin breakdown  Description: Absence of new skin breakdown  3/5/2021 1929 by Lori Gamboa RN  Outcome: Ongoing     Problem: Sleep Pattern Disturbance:  Goal: Appears well-rested  Description: Appears well-rested  3/5/2021 1929 by Lori Gamboa RN  Outcome: Ongoing     Problem: Tissue Perfusion, Altered:  Goal: Circulatory function within specified parameters  Description: Circulatory function within specified parameters  3/5/2021 1929 by Lori Gamboa RN Outcome: Ongoing

## 2021-03-06 NOTE — PROGRESS NOTES
ICU PROGRESS NOTE      PATIENT NAME: Torrie [facility] RECORD NO. [id number]  DATE: 3/6/2021    PRIMARY CARE PHYSICIAN: Miguel Schrader MD    HD: # 2    ASSESSMENT    Patient Active Problem List   Diagnosis    Migraine    Opiate dependence ([facility] [address]    Chronic pain    Obesity    Patient overweight    Lumbar radicular pain    Lumbar disc disease    Closed nondisplaced fracture of fifth metatarsal bone of right foot    Intra-abdominal abscess [facility])       MEDICAL DECISION MAKING AND PLAN  1. Neuro:  1. Pain/sedation: DC fent PCA and ketamine gtt  2. Continue home methadone 140mg daily  3. MMPT: tylenol, robaxin, gabapentin (NO NSAIDS)  4. Lian 5q6 PRN  2. CV  1. HR 80-90s  2. Mild HTN overnight; likely due to pain/agitation; SBP < 180  3. Pulm  1. Extubated post op  2. IS: 1500  3. 4L NC; keep sats > 88%  4. Duoneb q4h  4. GI/Nutrition  1. Will start trickle TF via Naso-jejunal tube  2. L MILTON 200cc serous fluid out overnight; R MILTON with 50cc serous fluid overnight  5. Renal/lytes  1. /3.8/97/24/7/0.49  2. I/O 24h: [id number]; neg +3436  3. Alvarez catheter in place to monitor urine output  4. Consider lasix for diuresis   5. Hold IVF  6. Heme  1. 12.9 (14.4, 12.5)  2. Platelets 704  8. Endocrine        1. Glu well controlled; will start HISS w/ TF  7. Musculoskeletal  1. PT/OT  8. Skin  1. Dressing change 3/8  9. ID/Micro  1. Afebrile  2. WBC 18 (17.9)  3. procal yesterday 0.53  4. abd fluid cult + for many budding yeast  5. Continue flagyl, cipro, diflucan  10. Family/dispo  1. Possible DC to floor if pain controlled  11. Lines  1. Alvarez; monitor urine output  2. Keep MILTON drains in place  3. PIV  4.  NJ tube     CHECKLIST    CAM-ICU RASS: Negative  RESTRAINTS: not indiciated  IVF: 125cc/h  NUTRITION: starting TF  ANTIBIOTICS: continue flagyl, cipro, diflucan  GI: protonix  DVT: lovenox  GLYCEMIC CONTROL: HISS  HOB >45: ok  MOBILITY: ok for up as tolerated    SUBJECTIVE    Livia Walton was agitated overnight. Difficult pain control despite fentanyl PCA. Good urine output. Afebrile. Maintained saturations on 4L NC. Slightly hypertensive overnight. OBJECTIVE  VITALS: Temp: Temp: 98.6 °F (37 °C)Temp  Av.7 °F (37.1 °C)  Min: 96.2 °F (35.7 °C)  Max: 100.4 °F (38 °C) BP Systolic (50WRR), IKM:107 , Min:104 , PNX:635   Diastolic (63CLJ), TXC:86, Min:68, Max:134   Pulse Pulse  Av.3  Min: 86  Max: 117 Resp Resp  Av.8  Min: 0  Max: 46 Pulse ox SpO2  Av.5 %  Min: 89 %  Max: 100 %    CONSTITUTIONAL: no acute distress, alert and oriented  HEENT: autraumatic. PERRL  LUNGS: equal chest rise bilaterally, NC in place  CV: heart regular rate and rhythm  GI: mild diffuse tenderness, midline incision well approximated without surrounding erythema. inferior portion of dressing stained with iodine. MILTON drains in place  MUSCULOSKELETAL: moving all extremities with normal range of motion  NEUROLOGIC: alert and oriented. No focal deficits  SKIN: warm and dry.        LAB:  CBC:   Recent Labs     21  1702 21  0458   WBC 17.4* 17.9* 18.0*   HGB 12.5 14.4 12.9   HCT 39.4 44.5 40.4   MCV 88.7 86.6 88.0    300 280     BMP:   Recent Labs     21  1702 21  0458   * 133* 132*   K 3.1* 3.6* 3.8   CL 99 99 97*   CO2 27 23 24   BUN 9 8 7   CREATININE 0.55 0.42* 0.49*   GLUCOSE 67* 108* [id number] RAMAN Atkins, DO  3/6/21, 6:46 AM

## 2021-03-06 NOTE — PROGRESS NOTES
Comprehensive Nutrition Assessment    Type and Reason for Visit:  Initial, Consult(Tube Feedings Recommendations Only - Provider to Manage)    Nutrition Recommendations/Plan:   -Continue NPO Status   -Continue trickle tube feeding of Pivot 1.5 (Immune Enhancing) @ 25 mL/hr x 24 hrs ~> 900 kcals, 56 gms protein    -Recommend goal rate @ 55 mL/hr ~> 1980 kcals, 124 gms protein   -Will monitor EN, labs and weights     Nutrition Assessment:   Pt admitted d/t abdominal pain, n/v/d. Pt currently NPO and consulted for tube feed rec's only. Pt noted w/ absent bowel sounds. No wt hx in EMR. Will monitor EN and wt trends. Malnutrition Assessment:  Malnutrition Status:  Insufficient data    Context:  Acute Illness     Findings of the 6 clinical characteristics of malnutrition:  Energy Intake:  Mild decrease in energy intake (Comment)  Weight Loss:  Unable to assess     Body Fat Loss:  Unable to assess     Muscle Mass Loss:  Unable to assess    Fluid Accumulation:  No significant fluid accumulation     Strength:  Not Performed    Estimated Daily Nutrient Needs:  Energy (kcal):  1.2-1.3 ~> 0099-0085 kcals/d; Weight Used for Energy Requirements:  Admission     Protein (g):  1.3-1.5 gm/kg ~> 79-92 gms/d; Weight Used for Protein Requirements:  Ideal    Nutrition Related Findings:  Na 132      Wounds:  Open Wounds       Current Nutrition Therapies:    DIET TUBE FEED CONTINUOUS/CYCLIC NPO; Immune Enhancing; Nasogastric; Continuous; 25; 25    Anthropometric Measures:  · Height: 5' 7\" (170.2 cm)  · Current Body Weight: 198 lb (89.8 kg)   · Admission Body Weight: 205 lb (93 kg)   · Ideal Body Weight: 135 lbs; % Ideal Body Weight 146.7 %   · BMI: 31  · BMI Categories: Obese Class 1 (BMI 30.0-34. 9)       Nutrition Diagnosis:   · Inadequate oral intake related to (medical condition) as evidenced by NPO or clear liquid status due to medical condition, nutrition support - enteral nutrition      Nutrition Interventions:   Food and/or Nutrient Delivery:  Continue NPO, Start Tube Feeding  Nutrition Education/Counseling:  Education not indicated   Coordination of Nutrition Care:  Continue to monitor while inpatient    Goals: Set   Pt to meet % of est'd needs via EN       Nutrition Monitoring and Evaluation:   Food/Nutrient Intake Outcomes:  Enteral Nutrition Intake/Tolerance  Physical Signs/Symptoms Outcomes:  Biochemical Data, Nutrition Focused Physical Findings, Skin, Weight, GI Status, Fluid Status or Edema     Discharge Planning:     Too soon to determine     Electronically signed by Lynn Torres RD, LD on 3/6/21 at 10:23 AM EST    Contact: [phone]

## 2021-03-07 LAB
ABSOLUTE EOS #: 0 K/UL (ref 0–0.4)
ABSOLUTE IMMATURE GRANULOCYTE: 0 K/UL (ref 0–0.3)
ABSOLUTE LYMPH #: 1.9 K/UL (ref 1–4.8)
ABSOLUTE MONO #: 2.18 K/UL (ref 0.1–0.8)
ALBUMIN SERPL-MCNC: 2.4 G/DL (ref 3.5–5.2)
ALBUMIN/GLOBULIN RATIO: 0.8 (ref 1–2.5)
ALP BLD-CCNC: 61 U/L (ref 35–104)
ALT SERPL-CCNC: 10 U/L (ref 5–33)
ANION GAP SERPL CALCULATED.3IONS-SCNC: 9 MMOL/L (ref 9–17)
AST SERPL-CCNC: 16 U/L
BASOPHILS # BLD: 0 % (ref 0–2)
BASOPHILS ABSOLUTE: 0 K/UL (ref 0–0.2)
BILIRUB SERPL-MCNC: 0.34 MG/DL (ref 0.3–1.2)
BUN BLDV-MCNC: 7 MG/DL (ref 6–20)
BUN/CREAT BLD: ABNORMAL (ref 9–20)
CALCIUM IONIZED: 0.9 MMOL/L (ref 1.13–1.33)
CALCIUM SERPL-MCNC: 7.3 MG/DL (ref 8.6–10.4)
CHLORIDE BLD-SCNC: 98 MMOL/L (ref 98–107)
CO2: 27 MMOL/L (ref 20–31)
CREAT SERPL-MCNC: 0.52 MG/DL (ref 0.5–0.9)
DIFFERENTIAL TYPE: ABNORMAL
EOSINOPHILS RELATIVE PERCENT: 0 % (ref 1–4)
GFR AFRICAN AMERICAN: >60 ML/MIN
GFR NON-AFRICAN AMERICAN: >60 ML/MIN
GFR SERPL CREATININE-BSD FRML MDRD: ABNORMAL ML/MIN/{1.73_M2}
GFR SERPL CREATININE-BSD FRML MDRD: ABNORMAL ML/MIN/{1.73_M2}
GLUCOSE BLD-MCNC: 100 MG/DL (ref 65–105)
GLUCOSE BLD-MCNC: 106 MG/DL (ref 65–105)
GLUCOSE BLD-MCNC: 113 MG/DL (ref 65–105)
GLUCOSE BLD-MCNC: 117 MG/DL (ref 70–99)
GLUCOSE BLD-MCNC: 118 MG/DL (ref 65–105)
GLUCOSE BLD-MCNC: 126 MG/DL (ref 65–105)
GLUCOSE BLD-MCNC: 88 MG/DL (ref 65–105)
HCT VFR BLD CALC: 38.2 % (ref 36.3–47.1)
HEMOGLOBIN: 12.1 G/DL (ref 11.9–15.1)
IMMATURE GRANULOCYTES: 0 %
LYMPHOCYTES # BLD: 14 % (ref 24–44)
MAGNESIUM: 2.1 MG/DL (ref 1.6–2.6)
MCH RBC QN AUTO: 28.2 PG (ref 25.2–33.5)
MCHC RBC AUTO-ENTMCNC: 31.7 G/DL (ref 28.4–34.8)
MCV RBC AUTO: 89 FL (ref 82.6–102.9)
MONOCYTES # BLD: 16 % (ref 1–7)
MORPHOLOGY: NORMAL
NRBC AUTOMATED: 0 PER 100 WBC
PDW BLD-RTO: 13.2 % (ref 11.8–14.4)
PHOSPHORUS: 3 MG/DL (ref 2.6–4.5)
PLATELET # BLD: 263 K/UL (ref 138–453)
PLATELET ESTIMATE: ABNORMAL
PMV BLD AUTO: 9.3 FL (ref 8.1–13.5)
POTASSIUM SERPL-SCNC: 3.4 MMOL/L (ref 3.7–5.3)
RBC # BLD: 4.29 M/UL (ref 3.95–5.11)
RBC # BLD: ABNORMAL 10*6/UL
SEG NEUTROPHILS: 70 % (ref 36–66)
SEGMENTED NEUTROPHILS ABSOLUTE COUNT: 9.52 K/UL (ref 1.8–7.7)
SODIUM BLD-SCNC: 134 MMOL/L (ref 135–144)
TOTAL PROTEIN: 5.6 G/DL (ref 6.4–8.3)
TRIGL SERPL-MCNC: 67 MG/DL
WBC # BLD: 13.6 K/UL (ref 3.5–11.3)
WBC # BLD: ABNORMAL 10*3/UL

## 2021-03-07 PROCEDURE — 2580000003 HC RX 258: Performed by: STUDENT IN AN ORGANIZED HEALTH CARE EDUCATION/TRAINING PROGRAM

## 2021-03-07 PROCEDURE — 94760 N-INVAS EAR/PLS OXIMETRY 1: CPT

## 2021-03-07 PROCEDURE — 83735 ASSAY OF MAGNESIUM: CPT

## 2021-03-07 PROCEDURE — 6370000000 HC RX 637 (ALT 250 FOR IP): Performed by: STUDENT IN AN ORGANIZED HEALTH CARE EDUCATION/TRAINING PROGRAM

## 2021-03-07 PROCEDURE — 36415 COLL VENOUS BLD VENIPUNCTURE: CPT

## 2021-03-07 PROCEDURE — 82947 ASSAY GLUCOSE BLOOD QUANT: CPT

## 2021-03-07 PROCEDURE — 84478 ASSAY OF TRIGLYCERIDES: CPT

## 2021-03-07 PROCEDURE — 2500000003 HC RX 250 WO HCPCS: Performed by: STUDENT IN AN ORGANIZED HEALTH CARE EDUCATION/TRAINING PROGRAM

## 2021-03-07 PROCEDURE — 82330 ASSAY OF CALCIUM: CPT

## 2021-03-07 PROCEDURE — 2060000000 HC ICU INTERMEDIATE R&B

## 2021-03-07 PROCEDURE — 94761 N-INVAS EAR/PLS OXIMETRY MLT: CPT

## 2021-03-07 PROCEDURE — 84100 ASSAY OF PHOSPHORUS: CPT

## 2021-03-07 PROCEDURE — 6360000002 HC RX W HCPCS: Performed by: STUDENT IN AN ORGANIZED HEALTH CARE EDUCATION/TRAINING PROGRAM

## 2021-03-07 PROCEDURE — 2700000000 HC OXYGEN THERAPY PER DAY

## 2021-03-07 PROCEDURE — 80053 COMPREHEN METABOLIC PANEL: CPT

## 2021-03-07 PROCEDURE — 85025 COMPLETE CBC W/AUTO DIFF WBC: CPT

## 2021-03-07 PROCEDURE — C9113 INJ PANTOPRAZOLE SODIUM, VIA: HCPCS | Performed by: STUDENT IN AN ORGANIZED HEALTH CARE EDUCATION/TRAINING PROGRAM

## 2021-03-07 PROCEDURE — 94640 AIRWAY INHALATION TREATMENT: CPT

## 2021-03-07 RX ORDER — PROPRANOLOL HYDROCHLORIDE 10 MG/1
10 TABLET ORAL 3 TIMES DAILY
Status: DISCONTINUED | OUTPATIENT
Start: 2021-03-07 | End: 2021-03-11

## 2021-03-07 RX ORDER — ACETAMINOPHEN 500 MG
1000 TABLET ORAL EVERY 8 HOURS
Status: DISCONTINUED | OUTPATIENT
Start: 2021-03-07 | End: 2021-03-26 | Stop reason: HOSPADM

## 2021-03-07 RX ORDER — CALCIUM GLUCONATE 20 MG/ML
1000 INJECTION, SOLUTION INTRAVENOUS ONCE
Status: COMPLETED | OUTPATIENT
Start: 2021-03-07 | End: 2021-03-07

## 2021-03-07 RX ORDER — GABAPENTIN 300 MG/1
600 CAPSULE ORAL EVERY 8 HOURS
Status: DISCONTINUED | OUTPATIENT
Start: 2021-03-07 | End: 2021-03-11

## 2021-03-07 RX ADMIN — METHOCARBAMOL TABLETS 750 MG: 750 TABLET, COATED ORAL at 10:59

## 2021-03-07 RX ADMIN — METRONIDAZOLE 500 MG: 500 INJECTION, SOLUTION INTRAVENOUS at 13:03

## 2021-03-07 RX ADMIN — PROPRANOLOL HYDROCHLORIDE 10 MG: 10 TABLET ORAL at 19:47

## 2021-03-07 RX ADMIN — OXYCODONE HYDROCHLORIDE 5 MG: 5 TABLET ORAL at 13:10

## 2021-03-07 RX ADMIN — QUETIAPINE FUMARATE 75 MG: 25 TABLET ORAL at 09:17

## 2021-03-07 RX ADMIN — QUETIAPINE FUMARATE 75 MG: 25 TABLET ORAL at 19:47

## 2021-03-07 RX ADMIN — SODIUM CHLORIDE, PRESERVATIVE FREE 10 ML: 5 INJECTION INTRAVENOUS at 19:50

## 2021-03-07 RX ADMIN — PROPRANOLOL HYDROCHLORIDE 10 MG: 10 TABLET ORAL at 10:59

## 2021-03-07 RX ADMIN — OXYCODONE HYDROCHLORIDE 5 MG: 5 TABLET ORAL at 00:19

## 2021-03-07 RX ADMIN — METHADONE HYDROCHLORIDE 140 MG: 5 SOLUTION ORAL at 09:17

## 2021-03-07 RX ADMIN — SODIUM CHLORIDE, PRESERVATIVE FREE 10 ML: 5 INJECTION INTRAVENOUS at 09:18

## 2021-03-07 RX ADMIN — IPRATROPIUM BROMIDE AND ALBUTEROL SULFATE 1 AMPULE: .5; 3 SOLUTION RESPIRATORY (INHALATION) at 20:11

## 2021-03-07 RX ADMIN — METHOCARBAMOL TABLETS 750 MG: 750 TABLET, COATED ORAL at 04:45

## 2021-03-07 RX ADMIN — GABAPENTIN 600 MG: 300 CAPSULE ORAL at 10:59

## 2021-03-07 RX ADMIN — CIPROFLOXACIN 400 MG: 2 INJECTION, SOLUTION INTRAVENOUS at 13:03

## 2021-03-07 RX ADMIN — FLUCONAZOLE 400 MG: 2 INJECTION, SOLUTION INTRAVENOUS at 06:25

## 2021-03-07 RX ADMIN — ACETAMINOPHEN 1000 MG: 500 TABLET ORAL at 07:45

## 2021-03-07 RX ADMIN — PANTOPRAZOLE SODIUM 40 MG: 40 INJECTION, POWDER, FOR SOLUTION INTRAVENOUS at 09:17

## 2021-03-07 RX ADMIN — METHOCARBAMOL TABLETS 750 MG: 750 TABLET, COATED ORAL at 00:19

## 2021-03-07 RX ADMIN — GABAPENTIN 600 MG: 300 CAPSULE ORAL at 18:27

## 2021-03-07 RX ADMIN — PANTOPRAZOLE SODIUM 40 MG: 40 INJECTION, POWDER, FOR SOLUTION INTRAVENOUS at 19:47

## 2021-03-07 RX ADMIN — GABAPENTIN 300 MG: 300 CAPSULE ORAL at 04:45

## 2021-03-07 RX ADMIN — METHOCARBAMOL TABLETS 750 MG: 750 TABLET, COATED ORAL at 22:03

## 2021-03-07 RX ADMIN — POTASSIUM BICARBONATE 40 MEQ: 782 TABLET, EFFERVESCENT ORAL at 13:01

## 2021-03-07 RX ADMIN — POTASSIUM BICARBONATE 40 MEQ: 782 TABLET, EFFERVESCENT ORAL at 07:42

## 2021-03-07 RX ADMIN — METHOCARBAMOL TABLETS 750 MG: 750 TABLET, COATED ORAL at 15:47

## 2021-03-07 RX ADMIN — CALCIUM GLUCONATE 1000 MG: 20 INJECTION, SOLUTION INTRAVENOUS at 07:42

## 2021-03-07 RX ADMIN — SODIUM CHLORIDE, PRESERVATIVE FREE 10 ML: 5 INJECTION INTRAVENOUS at 19:51

## 2021-03-07 RX ADMIN — ACETAMINOPHEN 1000 MG: 500 TABLET ORAL at 23:38

## 2021-03-07 RX ADMIN — METRONIDAZOLE 500 MG: 500 INJECTION, SOLUTION INTRAVENOUS at 21:03

## 2021-03-07 RX ADMIN — SODIUM CHLORIDE, PRESERVATIVE FREE 10 ML: 5 INJECTION INTRAVENOUS at 19:46

## 2021-03-07 RX ADMIN — PROPRANOLOL HYDROCHLORIDE 10 MG: 10 TABLET ORAL at 15:24

## 2021-03-07 RX ADMIN — CIPROFLOXACIN 400 MG: 2 INJECTION, SOLUTION INTRAVENOUS at 00:19

## 2021-03-07 RX ADMIN — METRONIDAZOLE 500 MG: 500 INJECTION, SOLUTION INTRAVENOUS at 04:45

## 2021-03-07 RX ADMIN — IPRATROPIUM BROMIDE AND ALBUTEROL SULFATE 1 AMPULE: .5; 3 SOLUTION RESPIRATORY (INHALATION) at 07:19

## 2021-03-07 RX ADMIN — FLUOXETINE HYDROCHLORIDE 40 MG: 20 CAPSULE ORAL at 09:17

## 2021-03-07 RX ADMIN — ENOXAPARIN SODIUM 40 MG: 40 INJECTION SUBCUTANEOUS at 09:17

## 2021-03-07 RX ADMIN — ACETAMINOPHEN 1000 MG: 500 TABLET ORAL at 15:23

## 2021-03-07 ASSESSMENT — PAIN DESCRIPTION - PROGRESSION
CLINICAL_PROGRESSION: NOT CHANGED

## 2021-03-07 ASSESSMENT — PAIN SCALES - GENERAL
PAINLEVEL_OUTOF10: 8
PAINLEVEL_OUTOF10: 8

## 2021-03-07 ASSESSMENT — PAIN DESCRIPTION - LOCATION: LOCATION: ABDOMEN

## 2021-03-07 NOTE — PROGRESS NOTES
Physical Therapy    DATE: 3/7/2021    NAME: Ibrahima Tafoya  MRN: [id number]   : 1970      Patient not seen this date for Physical Therapy due to:     Other: Pt being transferred to room 424, will check back 3/8      Electronically signed by John Joseph PTA on 3/7/2021 at 3:10 PM

## 2021-03-07 NOTE — PLAN OF CARE
Problem: Pain:  Goal: Pain level will decrease  Description: Pain level will decrease  Outcome: Ongoing  Goal: Control of acute pain  Description: Control of acute pain  Outcome: Ongoing  Goal: Control of chronic pain  Description: Control of chronic pain  Outcome: Ongoing     Problem: Discharge Planning:  Goal: Participates in care planning  Description: Participates in care planning  Outcome: Ongoing  Goal: Discharged to appropriate level of care  Description: Discharged to appropriate level of care  Outcome: Ongoing     Problem: Anxiety/Stress:  Goal: Level of anxiety will decrease  Description: Level of anxiety will decrease  Outcome: Ongoing     Problem: Aspiration:  Goal: Absence of aspiration  Description: Absence of aspiration  Outcome: Ongoing     Problem:  Bowel Function - Altered:  Goal: Bowel elimination is within specified parameters  Description: Bowel elimination is within specified parameters  Outcome: Ongoing     Problem: Cardiac Output - Decreased:  Goal: Hemodynamic stability will improve  Description: Hemodynamic stability will improve  Outcome: Ongoing     Problem: Fluid Volume - Imbalance:  Goal: Absence of imbalanced fluid volume signs and symptoms  Description: Absence of imbalanced fluid volume signs and symptoms  Outcome: Ongoing     Problem: Nutrition Deficit:  Goal: Ability to achieve adequate nutritional intake will improve  Description: Ability to achieve adequate nutritional intake will improve  Outcome: Ongoing     Problem: Pain:  Goal: Pain level will decrease  Description: Pain level will decrease  Outcome: Ongoing  Goal: Recognizes and communicates pain  Description: Recognizes and communicates pain  Outcome: Ongoing  Goal: Control of acute pain  Description: Control of acute pain  Outcome: Ongoing  Goal: Control of chronic pain  Description: Control of chronic pain  Outcome: Ongoing     Problem: Serum Glucose Level - Abnormal:  Goal: Ability to maintain appropriate glucose levels

## 2021-03-07 NOTE — PROGRESS NOTES
ICU PROGRESS NOTE        PATIENT NAME: Torrie [facility] RECORD NO. [id number]  DATE: 3/7/2021    PRIMARY CARE PHYSICIAN: Vicki Davidson MD    HD: # 3    ASSESSMENT    Patient Active Problem List   Diagnosis    Migraine    Opiate dependence ([facility] [address]    Chronic pain    Obesity    Patient overweight    Lumbar radicular pain    Lumbar disc disease    Closed nondisplaced fracture of fifth metatarsal bone of right foot    Intra-abdominal abscess [facility])       MEDICAL DECISION MAKING AND PLAN  1. Neuro:  1. Pain/sedation: methadone 140mg daily  2. MMPT: tylenol, robaxin, gabapentin (NO NSAIDS); increase tono 600 Q8h  3. Lian 5q6 PRN (2 given in past 24h)  2. CV  1. -124  2. Consider starting propranolol today  3. Mild persistent HTN  3. Pulm  1. Extubated post op  2. IS: 1500  3. 4L NC; keep sats > 88%  4. Duoneb q4h  4. GI/Nutrition  1. tolerated trickle TF via Naso-jejunal tube; will advance   2. Minimal output from MILTON drains 20-30cc serosanguineous drainage from each  5. Renal/lytes  1. /3.4/98/27/7/0.52  2. I/O 24h: 2507/3565; -1058  3. Alvarez catheter in place to monitor urine output  4. Consider lasix for diuresis   5. Hold IVF  6. Heme  1. 12.1 (12.9)  2. Platelets 535  8. Endocrine        1. Glu well controlled; will start HISS w/ TF        2. 6 units in past 24h  7. Musculoskeletal  1. PT/OT  8. Skin  1. Dressing change 3/8  9. ID/Micro  1. Afebrile  2. WBC 18 (17.9)  3. procal yesterday 0.53  4. abd fluid cult + for many budding yeast  5. Continue flagyl, cipro, diflucan (day 4)  10. Family/dispo  1. Possible DC to floor if pain controlled  11. Lines  1. Alvarez; monitor urine output  2. Keep MILTON drains in place  3. PIV  4.  NJ tube           CHECKLIST     CAM-ICU RASS: Negative  RESTRAINTS: not indiciated  IVF: 0  NUTRITION: starting TF; will advance today  ANTIBIOTICS: continue flagyl, cipro, diflucan (abx day 4)  GI: protonix  DVT: lovenox  GLYCEMIC CONTROL: HISS  HOB >45: ok  MOBILITY: ok for up as tolerated    65 Kyra Pearson did not have any acute events overnight. Mild persistent tachycardia. No chest pain or shortness of breath. No flatus or BM. Good urine output. Tolerated trickle TF. Pain well controlled this morning. Afebrile. OBJECTIVE  VITALS: Temp: Temp: 98.6 °F (37 °C)Temp  Av.5 °F (36.9 °C)  Min: 98.2 °F (36.8 °C)  Max: 98.7 °F (85.1 °C) BP Systolic (61HDO), ODO:244 , Min:144 , LMM:982   Diastolic (92GNI), NZA:69, Min:90, Max:106   Pulse Pulse  Av.4  Min: 110  Max: 127 Resp Resp  Av  Min: 19  Max: 36 Pulse ox SpO2  Av.1 %  Min: 89 %  Max: 96 %    CONSTITUTIONAL: no acute distress, alert and oriented  HEENT: autraumatic. PERRL  LUNGS: equal chest rise bilaterally, NC in place  CV: heart regular rate and rhythm  GI: mild diffuse tenderness, midline incision well approximated without surrounding erythema. inferior portion of dressing saturated with iodine. MILTON drains in place  MUSCULOSKELETAL: moving all extremities with normal range of motion  NEUROLOGIC: alert and oriented. No focal deficits  SKIN: warm and dry.      LAB:  CBC:   Recent Labs     21  1702 21  0458 21  0550   WBC 17.9* 18.0* 13.6*   HGB 14.4 12.9 12.1   HCT 44.5 40.4 38.2   MCV 86.6 88.0 89.0    280 263     BMP:   Recent Labs     21  1702 21  0458 21  0550   * 132* 134*   K 3.6* 3.8 3.4*   CL 99 97* 98   CO2 23 24 27   BUN 8 7 7   CREATININE 0.42* 0.49* 0.52   GLUCOSE 108* 128* 117*       Karri Marte DO  3/7/21, 7:03 AM

## 2021-03-07 NOTE — PLAN OF CARE
Problem: Pain:  Goal: Pain level will decrease  Description: Pain level will decrease  3/7/2021 1605 by Victorino Beltre RN  Outcome: Met This Shift     Problem: Anxiety/Stress:  Goal: Level of anxiety will decrease  Description: Level of anxiety will decrease  3/7/2021 1605 by Victorino Beltre RN  Outcome: Met This Shift

## 2021-03-08 ENCOUNTER — APPOINTMENT (OUTPATIENT)
Dept: GENERAL RADIOLOGY | Age: 51
DRG: 220 | End: 2021-03-08
Payer: MEDICARE

## 2021-03-08 LAB
ABSOLUTE EOS #: 0.15 K/UL (ref 0–0.4)
ABSOLUTE IMMATURE GRANULOCYTE: 0 K/UL (ref 0–0.3)
ABSOLUTE LYMPH #: 1.31 K/UL (ref 1–4.8)
ABSOLUTE MONO #: 2.03 K/UL (ref 0.1–0.8)
ANION GAP SERPL CALCULATED.3IONS-SCNC: 7 MMOL/L (ref 9–17)
BASOPHILS # BLD: 0 % (ref 0–2)
BASOPHILS ABSOLUTE: 0 K/UL (ref 0–0.2)
BUN BLDV-MCNC: 10 MG/DL (ref 6–20)
BUN/CREAT BLD: ABNORMAL (ref 9–20)
CALCIUM IONIZED: 1.02 MMOL/L (ref 1.13–1.33)
CALCIUM SERPL-MCNC: 7.5 MG/DL (ref 8.6–10.4)
CHLORIDE BLD-SCNC: 99 MMOL/L (ref 98–107)
CO2: 28 MMOL/L (ref 20–31)
CREAT SERPL-MCNC: 0.46 MG/DL (ref 0.5–0.9)
DIFFERENTIAL TYPE: ABNORMAL
EOSINOPHILS RELATIVE PERCENT: 1 % (ref 1–4)
GFR AFRICAN AMERICAN: >60 ML/MIN
GFR NON-AFRICAN AMERICAN: >60 ML/MIN
GFR SERPL CREATININE-BSD FRML MDRD: ABNORMAL ML/MIN/{1.73_M2}
GFR SERPL CREATININE-BSD FRML MDRD: ABNORMAL ML/MIN/{1.73_M2}
GLUCOSE BLD-MCNC: 112 MG/DL (ref 65–105)
GLUCOSE BLD-MCNC: 114 MG/DL (ref 65–105)
GLUCOSE BLD-MCNC: 117 MG/DL (ref 70–99)
GLUCOSE BLD-MCNC: 139 MG/DL (ref 65–105)
GLUCOSE BLD-MCNC: 153 MG/DL (ref 65–105)
GLUCOSE BLD-MCNC: 87 MG/DL (ref 65–105)
GLUCOSE BLD-MCNC: 94 MG/DL (ref 65–105)
GLUCOSE BLD-MCNC: 99 MG/DL (ref 65–105)
HCT VFR BLD CALC: 41.1 % (ref 36.3–47.1)
HEMOGLOBIN: 12.6 G/DL (ref 11.9–15.1)
IMMATURE GRANULOCYTES: 0 %
LYMPHOCYTES # BLD: 9 % (ref 24–44)
MAGNESIUM: 2.4 MG/DL (ref 1.6–2.6)
MCH RBC QN AUTO: 28.4 PG (ref 25.2–33.5)
MCHC RBC AUTO-ENTMCNC: 30.7 G/DL (ref 28.4–34.8)
MCV RBC AUTO: 92.6 FL (ref 82.6–102.9)
MONOCYTES # BLD: 14 % (ref 1–7)
MORPHOLOGY: NORMAL
NRBC AUTOMATED: 0 PER 100 WBC
PDW BLD-RTO: 13.4 % (ref 11.8–14.4)
PHOSPHORUS: 3.3 MG/DL (ref 2.6–4.5)
PLATELET # BLD: 352 K/UL (ref 138–453)
PLATELET ESTIMATE: ABNORMAL
PMV BLD AUTO: 9.7 FL (ref 8.1–13.5)
POTASSIUM SERPL-SCNC: 4.1 MMOL/L (ref 3.7–5.3)
RBC # BLD: 4.44 M/UL (ref 3.95–5.11)
RBC # BLD: ABNORMAL 10*6/UL
SEG NEUTROPHILS: 76 % (ref 36–66)
SEGMENTED NEUTROPHILS ABSOLUTE COUNT: 11.01 K/UL (ref 1.8–7.7)
SODIUM BLD-SCNC: 134 MMOL/L (ref 135–144)
SURGICAL PATHOLOGY REPORT: NORMAL
WBC # BLD: 14.5 K/UL (ref 3.5–11.3)
WBC # BLD: ABNORMAL 10*3/UL

## 2021-03-08 PROCEDURE — 97116 GAIT TRAINING THERAPY: CPT

## 2021-03-08 PROCEDURE — 2580000003 HC RX 258: Performed by: STUDENT IN AN ORGANIZED HEALTH CARE EDUCATION/TRAINING PROGRAM

## 2021-03-08 PROCEDURE — 2500000003 HC RX 250 WO HCPCS: Performed by: NURSE PRACTITIONER

## 2021-03-08 PROCEDURE — 82947 ASSAY GLUCOSE BLOOD QUANT: CPT

## 2021-03-08 PROCEDURE — 36415 COLL VENOUS BLD VENIPUNCTURE: CPT

## 2021-03-08 PROCEDURE — 84100 ASSAY OF PHOSPHORUS: CPT

## 2021-03-08 PROCEDURE — 6360000002 HC RX W HCPCS: Performed by: STUDENT IN AN ORGANIZED HEALTH CARE EDUCATION/TRAINING PROGRAM

## 2021-03-08 PROCEDURE — 94761 N-INVAS EAR/PLS OXIMETRY MLT: CPT

## 2021-03-08 PROCEDURE — 74240 X-RAY XM UPR GI TRC 1CNTRST: CPT

## 2021-03-08 PROCEDURE — 94640 AIRWAY INHALATION TREATMENT: CPT

## 2021-03-08 PROCEDURE — 2060000000 HC ICU INTERMEDIATE R&B

## 2021-03-08 PROCEDURE — 94760 N-INVAS EAR/PLS OXIMETRY 1: CPT

## 2021-03-08 PROCEDURE — 6370000000 HC RX 637 (ALT 250 FOR IP): Performed by: STUDENT IN AN ORGANIZED HEALTH CARE EDUCATION/TRAINING PROGRAM

## 2021-03-08 PROCEDURE — 85025 COMPLETE CBC W/AUTO DIFF WBC: CPT

## 2021-03-08 PROCEDURE — 6370000000 HC RX 637 (ALT 250 FOR IP): Performed by: NURSE PRACTITIONER

## 2021-03-08 PROCEDURE — 80048 BASIC METABOLIC PNL TOTAL CA: CPT

## 2021-03-08 PROCEDURE — 2500000003 HC RX 250 WO HCPCS: Performed by: STUDENT IN AN ORGANIZED HEALTH CARE EDUCATION/TRAINING PROGRAM

## 2021-03-08 PROCEDURE — 6360000004 HC RX CONTRAST MEDICATION: Performed by: SURGERY

## 2021-03-08 PROCEDURE — 82330 ASSAY OF CALCIUM: CPT

## 2021-03-08 PROCEDURE — 83735 ASSAY OF MAGNESIUM: CPT

## 2021-03-08 PROCEDURE — 6370000000 HC RX 637 (ALT 250 FOR IP): Performed by: EMERGENCY MEDICINE

## 2021-03-08 PROCEDURE — 2700000000 HC OXYGEN THERAPY PER DAY

## 2021-03-08 PROCEDURE — 6360000002 HC RX W HCPCS: Performed by: NURSE PRACTITIONER

## 2021-03-08 RX ORDER — SUCRALFATE 1 G/1
1 TABLET ORAL EVERY 6 HOURS SCHEDULED
Status: DISCONTINUED | OUTPATIENT
Start: 2021-03-09 | End: 2021-03-26 | Stop reason: HOSPADM

## 2021-03-08 RX ORDER — PANTOPRAZOLE SODIUM 40 MG/1
40 TABLET, DELAYED RELEASE ORAL
Status: DISCONTINUED | OUTPATIENT
Start: 2021-03-08 | End: 2021-03-26 | Stop reason: HOSPADM

## 2021-03-08 RX ORDER — POLYETHYLENE GLYCOL 3350 17 G/17G
17 POWDER, FOR SOLUTION ORAL DAILY
Status: DISCONTINUED | OUTPATIENT
Start: 2021-03-08 | End: 2021-03-08

## 2021-03-08 RX ORDER — POLYETHYLENE GLYCOL 3350 17 G/17G
17 POWDER, FOR SOLUTION ORAL ONCE
Status: DISCONTINUED | OUTPATIENT
Start: 2021-03-08 | End: 2021-03-08

## 2021-03-08 RX ORDER — DOCUSATE SODIUM 100 MG/1
100 CAPSULE, LIQUID FILLED ORAL DAILY
Status: DISCONTINUED | OUTPATIENT
Start: 2021-03-08 | End: 2021-03-08

## 2021-03-08 RX ORDER — MAGNESIUM CARB/ALUMINUM HYDROX 105-160MG
30 TABLET,CHEWABLE ORAL ONCE
Status: COMPLETED | OUTPATIENT
Start: 2021-03-08 | End: 2021-03-08

## 2021-03-08 RX ADMIN — ONDANSETRON 4 MG: 2 INJECTION INTRAMUSCULAR; INTRAVENOUS at 12:15

## 2021-03-08 RX ADMIN — ACETAMINOPHEN 1000 MG: 500 TABLET ORAL at 16:07

## 2021-03-08 RX ADMIN — FLUOXETINE HYDROCHLORIDE 40 MG: 20 CAPSULE ORAL at 08:40

## 2021-03-08 RX ADMIN — METRONIDAZOLE 500 MG: 500 INJECTION, SOLUTION INTRAVENOUS at 04:53

## 2021-03-08 RX ADMIN — IPRATROPIUM BROMIDE AND ALBUTEROL SULFATE 1 AMPULE: .5; 3 SOLUTION RESPIRATORY (INHALATION) at 20:26

## 2021-03-08 RX ADMIN — METHOCARBAMOL TABLETS 750 MG: 750 TABLET, COATED ORAL at 23:58

## 2021-03-08 RX ADMIN — DOCUSATE SODIUM 100 MG: 50 LIQUID ORAL at 21:19

## 2021-03-08 RX ADMIN — FLUCONAZOLE 400 MG: 2 INJECTION, SOLUTION INTRAVENOUS at 05:56

## 2021-03-08 RX ADMIN — CIPROFLOXACIN 400 MG: 2 INJECTION, SOLUTION INTRAVENOUS at 00:50

## 2021-03-08 RX ADMIN — METHOCARBAMOL TABLETS 750 MG: 750 TABLET, COATED ORAL at 08:40

## 2021-03-08 RX ADMIN — GABAPENTIN 600 MG: 300 CAPSULE ORAL at 02:53

## 2021-03-08 RX ADMIN — METHOCARBAMOL TABLETS 750 MG: 750 TABLET, COATED ORAL at 03:45

## 2021-03-08 RX ADMIN — ENOXAPARIN SODIUM 40 MG: 40 INJECTION SUBCUTANEOUS at 08:40

## 2021-03-08 RX ADMIN — METRONIDAZOLE 500 MG: 500 INJECTION, SOLUTION INTRAVENOUS at 21:15

## 2021-03-08 RX ADMIN — INSULIN LISPRO 3 UNITS: 100 INJECTION, SOLUTION INTRAVENOUS; SUBCUTANEOUS at 16:57

## 2021-03-08 RX ADMIN — CIPROFLOXACIN 400 MG: 2 INJECTION, SOLUTION INTRAVENOUS at 16:06

## 2021-03-08 RX ADMIN — GABAPENTIN 600 MG: 300 CAPSULE ORAL at 18:42

## 2021-03-08 RX ADMIN — QUETIAPINE FUMARATE 75 MG: 25 TABLET ORAL at 21:17

## 2021-03-08 RX ADMIN — MINERAL OIL 30 ML: 1000 SOLUTION ORAL at 16:07

## 2021-03-08 RX ADMIN — QUETIAPINE FUMARATE 75 MG: 25 TABLET ORAL at 08:40

## 2021-03-08 RX ADMIN — IPRATROPIUM BROMIDE AND ALBUTEROL SULFATE 1 AMPULE: .5; 3 SOLUTION RESPIRATORY (INHALATION) at 08:25

## 2021-03-08 RX ADMIN — SUCRALFATE 1 G: 1 TABLET ORAL at 23:58

## 2021-03-08 RX ADMIN — METHADONE HYDROCHLORIDE 140 MG: 5 SOLUTION ORAL at 09:25

## 2021-03-08 RX ADMIN — IOHEXOL 100 ML: 240 INJECTION, SOLUTION INTRATHECAL; INTRAVASCULAR; INTRAVENOUS; ORAL at 11:36

## 2021-03-08 RX ADMIN — ONDANSETRON 4 MG: 2 INJECTION INTRAMUSCULAR; INTRAVENOUS at 19:24

## 2021-03-08 RX ADMIN — PROPRANOLOL HYDROCHLORIDE 10 MG: 10 TABLET ORAL at 21:17

## 2021-03-08 RX ADMIN — PROPRANOLOL HYDROCHLORIDE 10 MG: 10 TABLET ORAL at 15:22

## 2021-03-08 RX ADMIN — SODIUM CHLORIDE, PRESERVATIVE FREE 10 ML: 5 INJECTION INTRAVENOUS at 21:22

## 2021-03-08 RX ADMIN — ACETAMINOPHEN 1000 MG: 500 TABLET ORAL at 09:24

## 2021-03-08 RX ADMIN — DOCUSATE SODIUM 100 MG: 100 CAPSULE, LIQUID FILLED ORAL at 08:40

## 2021-03-08 RX ADMIN — METRONIDAZOLE 500 MG: 500 INJECTION, SOLUTION INTRAVENOUS at 17:20

## 2021-03-08 RX ADMIN — MAGNESIUM HYDROXIDE 30 ML: 400 SUSPENSION ORAL at 16:07

## 2021-03-08 RX ADMIN — PROPRANOLOL HYDROCHLORIDE 10 MG: 10 TABLET ORAL at 08:40

## 2021-03-08 RX ADMIN — METHOCARBAMOL TABLETS 750 MG: 750 TABLET, COATED ORAL at 16:07

## 2021-03-08 RX ADMIN — GABAPENTIN 600 MG: 300 CAPSULE ORAL at 12:06

## 2021-03-08 RX ADMIN — PANTOPRAZOLE SODIUM 40 MG: 40 TABLET, DELAYED RELEASE ORAL at 16:57

## 2021-03-08 RX ADMIN — POTASSIUM BICARBONATE 40 MEQ: 782 TABLET, EFFERVESCENT ORAL at 08:49

## 2021-03-08 RX ADMIN — ACETAMINOPHEN 1000 MG: 500 TABLET ORAL at 23:58

## 2021-03-08 ASSESSMENT — PAIN SCALES - GENERAL
PAINLEVEL_OUTOF10: 5
PAINLEVEL_OUTOF10: 7
PAINLEVEL_OUTOF10: 6
PAINLEVEL_OUTOF10: 6

## 2021-03-08 NOTE — CARE COORDINATION
TRANSITIONAL CARE PLANNING/ 2 Rehab Zachary Day: 4    Reason for Admission: Acute perforated gastric ulcer with hemorrhage ([facility] [K25.2]     Treatment Plan of Care: remains on methadone, on O2 4L nc, NJT with TF, MILTON x2, dressing changes to abd, on IV antx    Tests/Procedures still needed: daily labs, upper GI today    Barriers to Discharge: Medical clearance    Readmission Risk              Risk of Unplanned Readmission:        23            Patient goals/Treatment: Goal is home independent, monitor HC and O2 needs

## 2021-03-08 NOTE — PLAN OF CARE
Nutrition Problem #1: Inadequate oral intake  Intervention: Food and/or Nutrient Delivery: Continue NPO, Modify Tube Feeding(Suggest increasing TF goal rate to 50 mL/hr to more appropriately meet estimated needs.)  Nutritional Goals: Pt to meet % of est'd needs via EN

## 2021-03-08 NOTE — PROGRESS NOTES
General  Response To Previous Treatment: Patient with no complaints from previous session. Family / Caregiver Present: No  Subjective  Subjective: RN and pt agreed to PT, pt awake in bed upon arrival and requesting to use restroom, pt c/o 8/10 abd pain  Pain Screening  Patient Currently in Pain: Yes  Vital Signs  Patient Currently in Pain: Yes       Orientation  Orientation  Overall Orientation Status: Within Functional Limits       Objective   Bed mobility  Rolling to Left: Minimal assistance  Supine to Sit: Minimal assistance  Sit to Supine: (Pt left in chair upon exiting)  Scooting: Minimal assistance  Transfers  Sit to Stand: Contact guard assistance  Stand to sit: Contact guard assistance  Ambulation  Ambulation?: Yes  Ambulation 1  Surface: level tile  Device: Rolling Walker  Assistance: Contact guard assistance;Stand by assistance  Quality of Gait: Flexed posture  Gait Deviations: Decreased step height  Distance: 15ft x 2, 100ft  Comments: Distance limited by fatigue  Stairs/Curb  Stairs?: No     Balance  Sitting - Static: Good  Sitting - Dynamic: Good;-  Standing - Static: +;Fair  Standing - Dynamic: Fair  Exercises  Comments: Refused stating \"I'm just tired\"                          Goals  Short term goals  Time Frame for Short term goals: 14 visits  Short term goal 1: Supine to/from sit with SBA  Short term goal 2: Sit to/from stand with SBA  Short term goal 3: Ambulate 76' with walker with CGA. Short term goal 4: Negotiate up and down 4 steps with one railing with CGA. Plan    Plan  Times per week: 5-6x/wk  Current Treatment Recommendations: Strengthening, Gait Training, Patient/Caregiver Education & Training, Stair training, Pain Management, Functional Mobility Training, Endurance Training, Home Exercise Program, Transfer Training, Safety Education & Training  Safety Devices  Type of devices:  All fall risk precautions in place, Call light within reach, Gait belt, Nurse notified, Left in chair Therapy Time   Individual Concurrent Group Co-treatment   Time In 0930         Time Out 1516         Minutes 21         Timed Code Treatment Minutes: 1530 N Misael Delaney, PTA

## 2021-03-08 NOTE — PROGRESS NOTES
Comprehensive Nutrition Assessment    Type and Reason for Visit:  Reassess    Nutrition Recommendations/Plan: Continue TF of Pivot 1.5 (Immune Enhancing) - suggest increased goal rate of 50 mL/hr to more appropriately meet estimated needs = 1800 kcals, 113 gm pro/day. Monitor TF tolerance/adequacy of intakes, labs, weights, and plan of care. Monitor for start of oral diet as able/appropriate. Nutrition Assessment:  Pt tolerating TF of Immune Enhancing formula at current goal rate of 45 mL/hr. RN reports pt had an Upper GI follow-through today. Unsure of plans for start of oral diet. Discussed with RN recommendations for increased TF goal rate to more appropriately meet estimated needs. Labs reviewed: Na 134 mmol/L, Ca 7.5 mg/dL. Meds reviewed. Malnutrition Assessment:  Malnutrition Status: At risk for malnutrition    Context:  Acute Illness     Findings of the 6 clinical characteristics of malnutrition:  Energy Intake:  Mild decrease in energy intake - Improved with start of nutrition support. Weight Loss:  Unable to assess     Body Fat Loss:  No significant body fat loss   Muscle Mass Loss:  No significant muscle mass loss  Fluid Accumulation:  No significant fluid accumulation   Strength:  Not Performed    Estimated Daily Nutrient Needs:  Energy (kcal):  MSJ x 1.2-1.3 = 0294-1821 kcals/day; Weight Used for Energy Requirements:  Admission     Protein (g):  1.5 gm/kg = 90 gm pro/day; Weight Used for Protein Requirements:  Ideal        Nutrition Related Findings:  Labs/Meds reviewed. NG in place. BM 3/1. Wounds:  Surgical Incision(to abdomen)       Current Nutrition Therapies:    DIET TUBE FEED CONTINUOUS/CYCLIC NPO;  Immune Enhancing; Nasogastric; Continuous; 45; 45  Current Tube Feeding (TF) Orders:  · Feeding Route: Nasogastric  · Formula: Immune Enhancing  · Schedule: Continuous  · Current TF & Flush Orders Provides: Pivot 1.5 (Immune Enhancing) at 45 mL/hr = 1620 kcals, 101 gm pro/day  · Goal TF & Flush Orders Provides: Pivot 1.5 (Immune Enhancing) at 50 mL/hr = 1800 kcals, 113 gm pro/day    Anthropometric Measures:  · Height: 5' 7\" (170.2 cm)  · Current Body Weight: 198 lb 6.6 oz (90 kg)   · Admission Body Weight: 205 lb (93 kg)    · Ideal Body Weight: 135 lbs; % Ideal Body Weight 147 %   · BMI: 31.1  · BMI Categories: Obese Class 1 (BMI 30.0-34. 9)       Nutrition Diagnosis:   · Inadequate oral intake related to (medical condition) as evidenced by NPO or clear liquid status due to medical condition, nutrition support - enteral nutrition    Nutrition Interventions:   Food and/or Nutrient Delivery:  Continue NPO, Modify Tube Feeding(Suggest increasing TF goal rate to 50 mL/hr to more appropriately meet estimated needs.)  Nutrition Education/Counseling:  No recommendation at this time   Coordination of Nutrition Care:  Continue to monitor while inpatient    Goals:  Pt to meet % of est'd needs via EN       Nutrition Monitoring and Evaluation:   Food/Nutrient Intake Outcomes:  Enteral Nutrition Intake/Tolerance, Diet Advancement/Tolerance  Physical Signs/Symptoms Outcomes:  Biochemical Data, GI Status, Fluid Status or Edema, Hemodynamic Status, Nutrition Focused Physical Findings, Skin, Weight     Electronically signed by Jobe Rubinstein, RD, LD on 3/8/21 at 1:58 PM EST    Contact: [phone]

## 2021-03-08 NOTE — PLAN OF CARE
Problem: Pain:  Goal: Control of acute pain  Description: Control of acute pain  Outcome: Ongoing     Problem: Anxiety/Stress:  Goal: Level of anxiety will decrease  Description: Level of anxiety will decrease  3/8/2021 0539 by Anderson Bates RN  Outcome: Ongoing     Problem:  Bowel Function - Altered:  Goal: Bowel elimination is within specified parameters  Description: Bowel elimination is within specified parameters  Outcome: Ongoing     Problem: Nutrition Deficit:  Goal: Ability to achieve adequate nutritional intake will improve  Description: Ability to achieve adequate nutritional intake will improve  Outcome: Ongoing     Problem: Sleep Pattern Disturbance:  Goal: Appears well-rested  Description: Appears well-rested  Outcome: Ongoing

## 2021-03-08 NOTE — PROGRESS NOTES
PROGRESS NOTE        PATIENT NAME: Torrie [facility] RECORD NO. [id number]  DATE: 3/8/2021    PRIMARY CARE PHYSICIAN: Daniel Bender MD    HD: # 4    ASSESSMENT    Patient Active Problem List   Diagnosis    Migraine    Opiate dependence ([facility] [address]    Chronic pain    Obesity    Patient overweight    Lumbar radicular pain    Lumbar disc disease    Closed nondisplaced fracture of fifth metatarsal bone of right foot    Intra-abdominal abscess [facility])       MEDICAL DECISION MAKING AND PLAN  1. Neuro:  1. Pain/sedation: methadone 140mg daily  2. MMPT: tylenol, robaxin, gabapentin (NO NSAIDS); increase tono 600 Q8h  3. Lian 5q6 PRN  2. CV-  1. HR 85-91, improved  2. Propranolol: 10mg TID  3. Mild persistent HTN  3. Pulm  1. Extubated post op  2. IS: 1500  3. 4L NC; keep sats > 88%  4. Duoneb q4h  4. GI/Nutrition  1. tolerated trickle TF via Naso-jejunal tube; will advance   2. Minimal output from MILTON drains 20-30cc serosanguineous drainage from each  3. Upper GI today  5. Renal/lytes  1. UOP: unmeasured outputs  2. Alvarez catheter removed yesterday, urinating independently since  6. Heme  1. 12.6 (12.1)  2. Platelets 543  8. Endocrine        1. Glu well controlled; will start HISS w/ TF      Musculoskeletal  1. PT/OT  8. Skin  1. Dressing change today  9. ID/Micro  1. Afebrile  2. WBC 14.5 from 18  3. abd fluid cult + for many budding yeast  4. Continue flagyl, cipro, diflucan (day 5)  10. Family/dispo  11. Lines  1. Keep MILTON drains in place  2. PIV  3. NJ tube           CHECKLIST     CAM-ICU RASS: Negative  RESTRAINTS: not indiciated  IVF: 0  NUTRITION: starting TF; will advance today  ANTIBIOTICS: continue flagyl, cipro, diflucan (abx day 5)  GI: protonix  DVT: lovenox  GLYCEMIC CONTROL: HISS  HOB >45: ok  MOBILITY: ok for up as tolerated    SUBJECTIVE    Patient seen and examined this morning, no overnight events. Patient reports her pain is better controlled today, on MMT.   Tolerating TF at goal.  Good UOP, urinating independently since Alvarez removed yesterday. Afebrile, T-max 99.8    OBJECTIVE  VITALS: Temp: Temp: 98.6 °F (37 °C)Temp  Av.8 °F (37.1 °C)  Min: 98 °F (36.7 °C)  Max: 99.8 °F (26.6 °C) BP Systolic (88AWA), WDR:554 , Min:138 , NID:428   Diastolic (78APD), ZFZ:69, Min:85, Max:99   Pulse Pulse  Av  Min: 85  Max: 91 Resp Resp  Av.6  Min: 16  Max: 22 Pulse ox SpO2  Av %  Min: 93 %  Max: 97 %    CONSTITUTIONAL: no acute distress, alert and oriented  HEENT: autraumatic. PERRL  LUNGS: equal chest rise bilaterally, NC in place  CV: heart regular rate and rhythm  GI: mild diffuse tenderness, midline incision well approximated without surrounding erythema. inferior portion of dressing saturated with iodine. MILTON drains in place  MUSCULOSKELETAL: moving all extremities with normal range of motion  NEUROLOGIC: alert and oriented. No focal deficits  SKIN: warm and dry. LAB:  CBC:   Recent Labs     21  0458 21  0550 21  0636   WBC 18.0* 13.6* 14.5*   HGB 12.9 12.1 12.6   HCT 40.4 38.2 41.1   MCV 88.0 89.0 92.6    263 352     BMP:   Recent Labs     21  0458 21  0550 21  0636   * 134* 134*   K 3.8 3.4* 4.1   CL 97* 98 99   CO2 24 27 28   BUN 7 7 10   CREATININE 0.49* 0.52 0.46*   GLUCOSE 128* 117* [address] Dannielle Lang DO  3/7/21, 8:02 AM              Trauma Attending Attestation      I have reviewed the above GCS note(s) and confirmed the key elements of the medical history and physical exam. I have seen and examined the pt. I have discussed the findings, established the care plan and recommendations with Resident, GCS RN, bedside nurse.   Leukocytosis holding will follow   UGI neg - will start po       Venancio Pizarro DO  3/8/2021  7:12 PM

## 2021-03-09 LAB
ABSOLUTE EOS #: 0 K/UL (ref 0–0.4)
ABSOLUTE EOS #: 0.62 K/UL (ref 0–0.44)
ABSOLUTE IMMATURE GRANULOCYTE: 0 K/UL (ref 0–0.3)
ABSOLUTE IMMATURE GRANULOCYTE: 0.31 K/UL (ref 0–0.3)
ABSOLUTE LYMPH #: 1.85 K/UL (ref 1.1–3.7)
ABSOLUTE LYMPH #: 2.77 K/UL (ref 1–4.8)
ABSOLUTE MONO #: 0.65 K/UL (ref 0.1–0.8)
ABSOLUTE MONO #: 1.54 K/UL (ref 0.1–1.2)
ANION GAP SERPL CALCULATED.3IONS-SCNC: 7 MMOL/L (ref 9–17)
ANION GAP SERPL CALCULATED.3IONS-SCNC: 8 MMOL/L (ref 9–17)
BASOPHILS # BLD: 0 % (ref 0–2)
BASOPHILS # BLD: 1 % (ref 0–2)
BASOPHILS ABSOLUTE: 0 K/UL (ref 0–0.2)
BASOPHILS ABSOLUTE: 0.15 K/UL (ref 0–0.2)
BUN BLDV-MCNC: 14 MG/DL (ref 6–20)
BUN BLDV-MCNC: 14 MG/DL (ref 6–20)
BUN/CREAT BLD: ABNORMAL (ref 9–20)
BUN/CREAT BLD: ABNORMAL (ref 9–20)
CALCIUM SERPL-MCNC: 7.8 MG/DL (ref 8.6–10.4)
CALCIUM SERPL-MCNC: 7.9 MG/DL (ref 8.6–10.4)
CHLORIDE BLD-SCNC: 99 MMOL/L (ref 98–107)
CHLORIDE BLD-SCNC: 99 MMOL/L (ref 98–107)
CO2: 27 MMOL/L (ref 20–31)
CO2: 28 MMOL/L (ref 20–31)
CREAT SERPL-MCNC: 0.53 MG/DL (ref 0.5–0.9)
CREAT SERPL-MCNC: 0.56 MG/DL (ref 0.5–0.9)
CULTURE: ABNORMAL
DIFFERENTIAL TYPE: ABNORMAL
DIFFERENTIAL TYPE: ABNORMAL
DIRECT EXAM: ABNORMAL
DIRECT EXAM: ABNORMAL
EOSINOPHILS RELATIVE PERCENT: 0 % (ref 1–4)
EOSINOPHILS RELATIVE PERCENT: 4 % (ref 1–4)
GFR AFRICAN AMERICAN: >60 ML/MIN
GFR AFRICAN AMERICAN: >60 ML/MIN
GFR NON-AFRICAN AMERICAN: >60 ML/MIN
GFR NON-AFRICAN AMERICAN: >60 ML/MIN
GFR SERPL CREATININE-BSD FRML MDRD: ABNORMAL ML/MIN/{1.73_M2}
GLUCOSE BLD-MCNC: 109 MG/DL (ref 65–105)
GLUCOSE BLD-MCNC: 126 MG/DL (ref 65–105)
GLUCOSE BLD-MCNC: 84 MG/DL (ref 65–105)
GLUCOSE BLD-MCNC: 93 MG/DL (ref 70–99)
GLUCOSE BLD-MCNC: 93 MG/DL (ref 70–99)
GLUCOSE BLD-MCNC: 99 MG/DL (ref 65–105)
HCT VFR BLD CALC: 41 % (ref 36.3–47.1)
HCT VFR BLD CALC: 41.7 % (ref 36.3–47.1)
HEMOGLOBIN: 12.7 G/DL (ref 11.9–15.1)
HEMOGLOBIN: 12.8 G/DL (ref 11.9–15.1)
IMMATURE GRANULOCYTES: 0 %
IMMATURE GRANULOCYTES: 2 %
LYMPHOCYTES # BLD: 12 % (ref 24–43)
LYMPHOCYTES # BLD: 17 % (ref 24–44)
Lab: ABNORMAL
MCH RBC QN AUTO: 27.8 PG (ref 25.2–33.5)
MCH RBC QN AUTO: 28 PG (ref 25.2–33.5)
MCHC RBC AUTO-ENTMCNC: 30.7 G/DL (ref 28.4–34.8)
MCHC RBC AUTO-ENTMCNC: 31 G/DL (ref 28.4–34.8)
MCV RBC AUTO: 90.3 FL (ref 82.6–102.9)
MCV RBC AUTO: 90.7 FL (ref 82.6–102.9)
MONOCYTES # BLD: 10 % (ref 3–12)
MONOCYTES # BLD: 4 % (ref 1–7)
MORPHOLOGY: NORMAL
MORPHOLOGY: NORMAL
NRBC AUTOMATED: 0 PER 100 WBC
NRBC AUTOMATED: 0 PER 100 WBC
PDW BLD-RTO: 13.3 % (ref 11.8–14.4)
PDW BLD-RTO: 13.4 % (ref 11.8–14.4)
PLATELET # BLD: 364 K/UL (ref 138–453)
PLATELET # BLD: 372 K/UL (ref 138–453)
PLATELET ESTIMATE: ABNORMAL
PLATELET ESTIMATE: ABNORMAL
PMV BLD AUTO: 8.9 FL (ref 8.1–13.5)
PMV BLD AUTO: 9.2 FL (ref 8.1–13.5)
POTASSIUM SERPL-SCNC: 3.8 MMOL/L (ref 3.7–5.3)
POTASSIUM SERPL-SCNC: 3.9 MMOL/L (ref 3.7–5.3)
PROCALCITONIN: 0.14 NG/ML
RBC # BLD: 4.54 M/UL (ref 3.95–5.11)
RBC # BLD: 4.6 M/UL (ref 3.95–5.11)
RBC # BLD: ABNORMAL 10*6/UL
RBC # BLD: ABNORMAL 10*6/UL
SEG NEUTROPHILS: 71 % (ref 36–65)
SEG NEUTROPHILS: 79 % (ref 36–66)
SEGMENTED NEUTROPHILS ABSOLUTE COUNT: 10.93 K/UL (ref 1.5–8.1)
SEGMENTED NEUTROPHILS ABSOLUTE COUNT: 12.88 K/UL (ref 1.8–7.7)
SODIUM BLD-SCNC: 133 MMOL/L (ref 135–144)
SODIUM BLD-SCNC: 135 MMOL/L (ref 135–144)
SPECIMEN DESCRIPTION: ABNORMAL
WBC # BLD: 15.4 K/UL (ref 3.5–11.3)
WBC # BLD: 16.3 K/UL (ref 3.5–11.3)
WBC # BLD: ABNORMAL 10*3/UL
WBC # BLD: ABNORMAL 10*3/UL

## 2021-03-09 PROCEDURE — 6370000000 HC RX 637 (ALT 250 FOR IP): Performed by: STUDENT IN AN ORGANIZED HEALTH CARE EDUCATION/TRAINING PROGRAM

## 2021-03-09 PROCEDURE — 36415 COLL VENOUS BLD VENIPUNCTURE: CPT

## 2021-03-09 PROCEDURE — 2700000000 HC OXYGEN THERAPY PER DAY

## 2021-03-09 PROCEDURE — 84145 PROCALCITONIN (PCT): CPT

## 2021-03-09 PROCEDURE — 6370000000 HC RX 637 (ALT 250 FOR IP): Performed by: EMERGENCY MEDICINE

## 2021-03-09 PROCEDURE — 85025 COMPLETE CBC W/AUTO DIFF WBC: CPT

## 2021-03-09 PROCEDURE — 6370000000 HC RX 637 (ALT 250 FOR IP): Performed by: NURSE PRACTITIONER

## 2021-03-09 PROCEDURE — 6360000002 HC RX W HCPCS: Performed by: STUDENT IN AN ORGANIZED HEALTH CARE EDUCATION/TRAINING PROGRAM

## 2021-03-09 PROCEDURE — 94760 N-INVAS EAR/PLS OXIMETRY 1: CPT

## 2021-03-09 PROCEDURE — 2060000000 HC ICU INTERMEDIATE R&B

## 2021-03-09 PROCEDURE — 2500000003 HC RX 250 WO HCPCS: Performed by: NURSE PRACTITIONER

## 2021-03-09 PROCEDURE — 80048 BASIC METABOLIC PNL TOTAL CA: CPT

## 2021-03-09 PROCEDURE — 6360000002 HC RX W HCPCS: Performed by: NURSE PRACTITIONER

## 2021-03-09 PROCEDURE — 97535 SELF CARE MNGMENT TRAINING: CPT

## 2021-03-09 PROCEDURE — 82947 ASSAY GLUCOSE BLOOD QUANT: CPT

## 2021-03-09 PROCEDURE — 94640 AIRWAY INHALATION TREATMENT: CPT

## 2021-03-09 RX ORDER — METOCLOPRAMIDE HYDROCHLORIDE 5 MG/ML
10 INJECTION INTRAMUSCULAR; INTRAVENOUS EVERY 6 HOURS
Status: DISCONTINUED | OUTPATIENT
Start: 2021-03-09 | End: 2021-03-11

## 2021-03-09 RX ADMIN — GABAPENTIN 600 MG: 300 CAPSULE ORAL at 18:46

## 2021-03-09 RX ADMIN — PROPRANOLOL HYDROCHLORIDE 10 MG: 10 TABLET ORAL at 21:00

## 2021-03-09 RX ADMIN — IPRATROPIUM BROMIDE AND ALBUTEROL SULFATE 1 AMPULE: .5; 3 SOLUTION RESPIRATORY (INHALATION) at 10:23

## 2021-03-09 RX ADMIN — SUCRALFATE 1 G: 1 TABLET ORAL at 12:30

## 2021-03-09 RX ADMIN — METRONIDAZOLE 500 MG: 500 INJECTION, SOLUTION INTRAVENOUS at 21:00

## 2021-03-09 RX ADMIN — ENOXAPARIN SODIUM 40 MG: 40 INJECTION SUBCUTANEOUS at 09:26

## 2021-03-09 RX ADMIN — DOCUSATE SODIUM 100 MG: 50 LIQUID ORAL at 20:59

## 2021-03-09 RX ADMIN — PANTOPRAZOLE SODIUM 40 MG: 40 TABLET, DELAYED RELEASE ORAL at 06:00

## 2021-03-09 RX ADMIN — GABAPENTIN 600 MG: 300 CAPSULE ORAL at 03:30

## 2021-03-09 RX ADMIN — METHOCARBAMOL TABLETS 750 MG: 750 TABLET, COATED ORAL at 16:39

## 2021-03-09 RX ADMIN — CIPROFLOXACIN 400 MG: 2 INJECTION, SOLUTION INTRAVENOUS at 01:28

## 2021-03-09 RX ADMIN — FLUOXETINE HYDROCHLORIDE 40 MG: 20 CAPSULE ORAL at 09:25

## 2021-03-09 RX ADMIN — SUCRALFATE 1 G: 1 TABLET ORAL at 06:00

## 2021-03-09 RX ADMIN — PANTOPRAZOLE SODIUM 40 MG: 40 TABLET, DELAYED RELEASE ORAL at 16:39

## 2021-03-09 RX ADMIN — SUCRALFATE 1 G: 1 TABLET ORAL at 23:41

## 2021-03-09 RX ADMIN — POTASSIUM BICARBONATE 40 MEQ: 782 TABLET, EFFERVESCENT ORAL at 09:28

## 2021-03-09 RX ADMIN — ACETAMINOPHEN 1000 MG: 500 TABLET ORAL at 07:53

## 2021-03-09 RX ADMIN — GABAPENTIN 600 MG: 300 CAPSULE ORAL at 10:47

## 2021-03-09 RX ADMIN — METHOCARBAMOL TABLETS 750 MG: 750 TABLET, COATED ORAL at 22:26

## 2021-03-09 RX ADMIN — METRONIDAZOLE 500 MG: 500 INJECTION, SOLUTION INTRAVENOUS at 04:50

## 2021-03-09 RX ADMIN — METHOCARBAMOL TABLETS 750 MG: 750 TABLET, COATED ORAL at 10:47

## 2021-03-09 RX ADMIN — CIPROFLOXACIN 400 MG: 2 INJECTION, SOLUTION INTRAVENOUS at 11:51

## 2021-03-09 RX ADMIN — QUETIAPINE FUMARATE 75 MG: 25 TABLET ORAL at 09:25

## 2021-03-09 RX ADMIN — PROPRANOLOL HYDROCHLORIDE 10 MG: 10 TABLET ORAL at 09:25

## 2021-03-09 RX ADMIN — ACETAMINOPHEN 1000 MG: 500 TABLET ORAL at 23:38

## 2021-03-09 RX ADMIN — QUETIAPINE FUMARATE 75 MG: 25 TABLET ORAL at 20:59

## 2021-03-09 RX ADMIN — METHOCARBAMOL TABLETS 750 MG: 750 TABLET, COATED ORAL at 03:32

## 2021-03-09 RX ADMIN — METRONIDAZOLE 500 MG: 500 INJECTION, SOLUTION INTRAVENOUS at 13:17

## 2021-03-09 RX ADMIN — SUCRALFATE 1 G: 1 TABLET ORAL at 18:46

## 2021-03-09 RX ADMIN — DOCUSATE SODIUM 100 MG: 50 LIQUID ORAL at 09:25

## 2021-03-09 RX ADMIN — ACETAMINOPHEN 1000 MG: 500 TABLET ORAL at 16:39

## 2021-03-09 RX ADMIN — IPRATROPIUM BROMIDE AND ALBUTEROL SULFATE 1 AMPULE: .5; 3 SOLUTION RESPIRATORY (INHALATION) at 21:09

## 2021-03-09 RX ADMIN — METOCLOPRAMIDE 10 MG: 5 INJECTION, SOLUTION INTRAMUSCULAR; INTRAVENOUS at 22:26

## 2021-03-09 RX ADMIN — PROPRANOLOL HYDROCHLORIDE 10 MG: 10 TABLET ORAL at 14:03

## 2021-03-09 RX ADMIN — METHADONE HYDROCHLORIDE 140 MG: 5 SOLUTION ORAL at 09:26

## 2021-03-09 RX ADMIN — FLUCONAZOLE 400 MG: 2 INJECTION, SOLUTION INTRAVENOUS at 05:59

## 2021-03-09 ASSESSMENT — PAIN DESCRIPTION - LOCATION: LOCATION: ABDOMEN

## 2021-03-09 ASSESSMENT — PAIN SCALES - GENERAL
PAINLEVEL_OUTOF10: 5
PAINLEVEL_OUTOF10: 0
PAINLEVEL_OUTOF10: 9
PAINLEVEL_OUTOF10: 8

## 2021-03-09 NOTE — PROGRESS NOTES
PROGRESS NOTE        PATIENT NAME: Torrie [facility] RECORD NO. [id number]  DATE: 3/9/2021    PRIMARY CARE PHYSICIAN: Rebecca Gillespie MD    HD: # 5    ASSESSMENT    Patient Active Problem List   Diagnosis    Migraine    Opiate dependence ([facility] [address]    Chronic pain    Obesity    Patient overweight    Lumbar radicular pain    Lumbar disc disease    Closed nondisplaced fracture of fifth metatarsal bone of right foot    Intra-abdominal abscess [facility])       MEDICAL DECISION MAKING AND PLAN  1. Neuro:  1. Pain/sedation: methadone 140mg daily  2. MMPT: tylenol, robaxin, tono 600 Q8h  2. CV-  1. HR ,  2. Propranolol: 10mg TID  3. Mild persistent HTN  3. Pulm  1. Extubated post op  2. IS: 1500  3.  maintain sats > 88%  4. Duoneb q4h  4. GI/Nutrition  1. NG tube removed overnight. 2. Minimal output from MILTON drains 20-30cc serosanguineous drainage from left, right minimal <5cc  3. Upper GI study negative. Advanced diet, Clear liquids. Tolerating   5. Renal/lytes  1. UOP: unmeasured outputs  2. Urinating independently   6. Heme  1. 12.7 (12.6)  2. Platelets 977  8. Endocrine        1. Glu well controlled; will start HISS w/ TF      Musculoskeletal  1. PT/OT  8. Skin  1. Dressing change yesterday. Nursing to change PRN   9. ID/Micro  1. Afebrile  2. WBC 16.3 from 14.5  3. abd fluid cult + for many budding yeast  4. Continue flagyl, cipro, diflucan for last doses today. (day 6)  10. Family/dispo  11. Lines  1. Keep MILTON drains in place  2. PIV         CHECKLIST     CAM-ICU RASS: Negative  RESTRAINTS: not indiciated  IVF: 0  NUTRITION: CLD advance as tolerated  ANTIBIOTICS: continue flagyl, cipro, diflucan last doses today (day 6)   GI: protonix, carafate  DVT: lovenox  GLYCEMIC CONTROL: HISS  HOB >45: ok  MOBILITY: ok for up as tolerated    SUBJECTIVE    Patient seen and examined this morning, no overnight events. Patient reports her pain is better controlled today, on MMT.   NGT removed yesterday evening,

## 2021-03-09 NOTE — CARE COORDINATION
Transitional planning. Anastasiya Lowery called, pt has hx of IV drug use, will need clarification on IV drug hx and whether or not pt is going home on IV antibiotics.

## 2021-03-09 NOTE — PROGRESS NOTES
Occupational Therapy  Facility/Department: [address] STEPDOWN  Daily Treatment Note  NAME: Uriel Zurita  : 1970  MRN: [id number]    Date of Service: 3/9/2021    Discharge Recommendations:  Patient would benefit from continued therapy after discharge, Continue to assess pending progress  OT Equipment Recommendations  Equipment Needed: Yes  ADL Assistive Devices: Sock-Aid Jobst;Dressing Stick;Long-handled Shoe Horn;Elastic Shoe Laces; Long-handled Sponge;Reacher    Assessment   Performance deficits / Impairments: Decreased functional mobility ; Decreased endurance;Decreased balance;Decreased high-level IADLs;Decreased ADL status  Prognosis: Good  Decision Making: Medium Complexity  Patient Education: OT role, POC, log roll technique,  energy conservation tech- Good return  REQUIRES OT FOLLOW UP: Yes  Activity Tolerance  Activity Tolerance: Patient Tolerated treatment well;Patient limited by pain  Safety Devices  Safety Devices in place: Yes  Type of devices: Nurse notified; Left in bed;Call light within reach; Bed alarm in place; Patient at risk for falls  Restraints  Initially in place: No         Patient Diagnosis(es): The encounter diagnosis was Acute gastric ulcer with perforation ([facility] [address] has a past medical history of Asthma, Connective tissue disease, undifferentiated ([address] DDD (degenerative disc disease), Depression, Lumbar radicular pain, Migraine, Mitral valve prolapse, and Opiate addiction ([address] has a past surgical history that includes tomy and bso (cervix removed) (); hernia repair (); Nose surgery (); Tubal ligation (); ovarian cyst removal (Left, ); Nerve Block (Right, 2015); Hysterectomy; CT ABSCESS DRAINAGE (2021); gastrectomy (2021); and laparotomy (N/A, 3/5/2021).     Restrictions  Restrictions/Precautions  Restrictions/Precautions: Fall Risk  Required Braces or Orthoses?: No  Required Braces or Orthoses  Other: Abdominal Binder  Position Activity Restriction  Other position/activity restrictions: Up as tolerated, abdominal incision/binder     Subjective   General  Patient assessed for rehabilitation services?: Yes  Family / Caregiver Present: No  General Comment  Comments: RN ok'd for OT treatment. Pt agreeable to session, pleasent/cooperative throughout. Pain Assessment  Pain Assessment: 0-10  Pain Level: 9  Pain Type: Acute pain  Pain Location: Abdomen  Vital Signs  Patient Currently in Pain: Yes     Orientation  Orientation  Overall Orientation Status: Within Functional Limits     Objective    ADL  Grooming: Modified independent (Standing sinkside with setup to complete oral hygeine, wash face and brush hair. SBA for standing balance.)  LE Dressing: Modified independent (Donned socks sitting EOB with figure four tech)  Toileting: Modified independent (SBA for functional transfer. Pt completed clothing mangement and perciare sitting on toilet)  Additional Comments: Pt required Mod A to rai abdominal binder sitting EOB. Declined further ADLs d/t pain and fatigue. Instrumental ADL's  Instrumental ADLs: No     Balance  Sitting Balance: Stand by assistance(EOB ~3 minutes to don socks)  Standing Balance: Stand by assistance  Standing Balance  Time: ~15 minutes  Activity: functional mobility around room, standing sinkside for ADLs, functional transfers  Comment: steady, no LOB    Functional Mobility  Functional - Mobility Device: Rolling Walker  Activity: Other  Assist Level: Stand by assistance  Functional Mobility Comments: SBA for functional mobility to/from bathroom with no AD. Pt steady, no LOB. Slow ambulation d/t pain.     Toilet Transfers  Equipment Used: Standard toilet  Toilet Transfer: Stand by assistance  Toilet Transfers Comments: No grab bars, but use of sink to prop on L side    Bed mobility  Supine to Sit: Contact guard assistance(Use of bedrail)  Sit to Supine: Contact guard assistance  Scooting: Contact guard assistance  Comment: Log roll

## 2021-03-09 NOTE — PROGRESS NOTES
Physical Therapy    DATE: 3/9/2021    NAME: Chalo Gambino  MRN: [id number]   : 1970      Patient not seen this date for Physical Therapy due to:    Patient Declined: Pt refused rehab despite max encouragement states she has been up since midnight and wants to rest. Will check back as time allows.       Electronically signed by Jesus Causey PTA on 3/9/2021 at 11:49 AM

## 2021-03-10 LAB
ABSOLUTE EOS #: 0.29 K/UL (ref 0–0.4)
ABSOLUTE IMMATURE GRANULOCYTE: 0.15 K/UL (ref 0–0.3)
ABSOLUTE LYMPH #: 2.5 K/UL (ref 1–4.8)
ABSOLUTE MONO #: 1.18 K/UL (ref 0.1–0.8)
ANION GAP SERPL CALCULATED.3IONS-SCNC: 6 MMOL/L (ref 9–17)
BASOPHILS # BLD: 0 % (ref 0–2)
BASOPHILS ABSOLUTE: 0 K/UL (ref 0–0.2)
BUN BLDV-MCNC: 11 MG/DL (ref 6–20)
BUN/CREAT BLD: ABNORMAL (ref 9–20)
CALCIUM SERPL-MCNC: 7.9 MG/DL (ref 8.6–10.4)
CHLORIDE BLD-SCNC: 102 MMOL/L (ref 98–107)
CO2: 27 MMOL/L (ref 20–31)
CREAT SERPL-MCNC: 0.61 MG/DL (ref 0.5–0.9)
CULTURE: NORMAL
CULTURE: NORMAL
DIFFERENTIAL TYPE: ABNORMAL
EOSINOPHILS RELATIVE PERCENT: 2 % (ref 1–4)
GFR AFRICAN AMERICAN: >60 ML/MIN
GFR NON-AFRICAN AMERICAN: >60 ML/MIN
GFR SERPL CREATININE-BSD FRML MDRD: ABNORMAL ML/MIN/{1.73_M2}
GFR SERPL CREATININE-BSD FRML MDRD: ABNORMAL ML/MIN/{1.73_M2}
GLUCOSE BLD-MCNC: 102 MG/DL (ref 70–99)
GLUCOSE BLD-MCNC: 93 MG/DL (ref 65–105)
GLUCOSE BLD-MCNC: 94 MG/DL (ref 65–105)
GLUCOSE BLD-MCNC: 94 MG/DL (ref 65–105)
HCT VFR BLD CALC: 40.3 % (ref 36.3–47.1)
HEMOGLOBIN: 12.2 G/DL (ref 11.9–15.1)
IMMATURE GRANULOCYTES: 1 %
LYMPHOCYTES # BLD: 17 % (ref 24–44)
Lab: NORMAL
Lab: NORMAL
MCH RBC QN AUTO: 27.5 PG (ref 25.2–33.5)
MCHC RBC AUTO-ENTMCNC: 30.3 G/DL (ref 28.4–34.8)
MCV RBC AUTO: 90.8 FL (ref 82.6–102.9)
MONOCYTES # BLD: 8 % (ref 1–7)
MORPHOLOGY: NORMAL
NRBC AUTOMATED: 0 PER 100 WBC
PDW BLD-RTO: 13.5 % (ref 11.8–14.4)
PLATELET # BLD: 387 K/UL (ref 138–453)
PLATELET ESTIMATE: ABNORMAL
PMV BLD AUTO: 9.1 FL (ref 8.1–13.5)
POTASSIUM SERPL-SCNC: 3.9 MMOL/L (ref 3.7–5.3)
RBC # BLD: 4.44 M/UL (ref 3.95–5.11)
RBC # BLD: ABNORMAL 10*6/UL
SEG NEUTROPHILS: 72 % (ref 36–66)
SEGMENTED NEUTROPHILS ABSOLUTE COUNT: 10.58 K/UL (ref 1.8–7.7)
SODIUM BLD-SCNC: 135 MMOL/L (ref 135–144)
SPECIMEN DESCRIPTION: NORMAL
SPECIMEN DESCRIPTION: NORMAL
WBC # BLD: 14.7 K/UL (ref 3.5–11.3)
WBC # BLD: ABNORMAL 10*3/UL

## 2021-03-10 PROCEDURE — 2060000000 HC ICU INTERMEDIATE R&B

## 2021-03-10 PROCEDURE — 2580000003 HC RX 258: Performed by: STUDENT IN AN ORGANIZED HEALTH CARE EDUCATION/TRAINING PROGRAM

## 2021-03-10 PROCEDURE — 6370000000 HC RX 637 (ALT 250 FOR IP): Performed by: STUDENT IN AN ORGANIZED HEALTH CARE EDUCATION/TRAINING PROGRAM

## 2021-03-10 PROCEDURE — 85025 COMPLETE CBC W/AUTO DIFF WBC: CPT

## 2021-03-10 PROCEDURE — 6370000000 HC RX 637 (ALT 250 FOR IP): Performed by: NURSE PRACTITIONER

## 2021-03-10 PROCEDURE — 36415 COLL VENOUS BLD VENIPUNCTURE: CPT

## 2021-03-10 PROCEDURE — 97535 SELF CARE MNGMENT TRAINING: CPT

## 2021-03-10 PROCEDURE — 6370000000 HC RX 637 (ALT 250 FOR IP): Performed by: EMERGENCY MEDICINE

## 2021-03-10 PROCEDURE — 94640 AIRWAY INHALATION TREATMENT: CPT

## 2021-03-10 PROCEDURE — 2700000000 HC OXYGEN THERAPY PER DAY

## 2021-03-10 PROCEDURE — 94761 N-INVAS EAR/PLS OXIMETRY MLT: CPT

## 2021-03-10 PROCEDURE — 6360000002 HC RX W HCPCS: Performed by: STUDENT IN AN ORGANIZED HEALTH CARE EDUCATION/TRAINING PROGRAM

## 2021-03-10 PROCEDURE — 82947 ASSAY GLUCOSE BLOOD QUANT: CPT

## 2021-03-10 PROCEDURE — 80048 BASIC METABOLIC PNL TOTAL CA: CPT

## 2021-03-10 RX ADMIN — GABAPENTIN 600 MG: 300 CAPSULE ORAL at 18:58

## 2021-03-10 RX ADMIN — GABAPENTIN 600 MG: 300 CAPSULE ORAL at 03:08

## 2021-03-10 RX ADMIN — PANTOPRAZOLE SODIUM 40 MG: 40 TABLET, DELAYED RELEASE ORAL at 16:28

## 2021-03-10 RX ADMIN — SODIUM CHLORIDE, PRESERVATIVE FREE 10 ML: 5 INJECTION INTRAVENOUS at 20:49

## 2021-03-10 RX ADMIN — DOCUSATE SODIUM 100 MG: 50 LIQUID ORAL at 09:12

## 2021-03-10 RX ADMIN — METOCLOPRAMIDE 10 MG: 5 INJECTION, SOLUTION INTRAMUSCULAR; INTRAVENOUS at 20:48

## 2021-03-10 RX ADMIN — METHOCARBAMOL TABLETS 750 MG: 750 TABLET, COATED ORAL at 16:28

## 2021-03-10 RX ADMIN — PANTOPRAZOLE SODIUM 40 MG: 40 TABLET, DELAYED RELEASE ORAL at 06:13

## 2021-03-10 RX ADMIN — IPRATROPIUM BROMIDE AND ALBUTEROL SULFATE 1 AMPULE: .5; 3 SOLUTION RESPIRATORY (INHALATION) at 07:56

## 2021-03-10 RX ADMIN — ENOXAPARIN SODIUM 40 MG: 40 INJECTION SUBCUTANEOUS at 09:13

## 2021-03-10 RX ADMIN — GABAPENTIN 600 MG: 300 CAPSULE ORAL at 09:22

## 2021-03-10 RX ADMIN — METHADONE HYDROCHLORIDE 140 MG: 5 SOLUTION ORAL at 09:11

## 2021-03-10 RX ADMIN — ACETAMINOPHEN 1000 MG: 500 TABLET ORAL at 23:16

## 2021-03-10 RX ADMIN — METOCLOPRAMIDE 10 MG: 5 INJECTION, SOLUTION INTRAMUSCULAR; INTRAVENOUS at 03:08

## 2021-03-10 RX ADMIN — QUETIAPINE FUMARATE 75 MG: 25 TABLET ORAL at 20:57

## 2021-03-10 RX ADMIN — METHOCARBAMOL TABLETS 750 MG: 750 TABLET, COATED ORAL at 04:35

## 2021-03-10 RX ADMIN — METHOCARBAMOL TABLETS 750 MG: 750 TABLET, COATED ORAL at 20:48

## 2021-03-10 RX ADMIN — IPRATROPIUM BROMIDE AND ALBUTEROL SULFATE 1 AMPULE: .5; 3 SOLUTION RESPIRATORY (INHALATION) at 19:50

## 2021-03-10 RX ADMIN — METHOCARBAMOL TABLETS 750 MG: 750 TABLET, COATED ORAL at 09:22

## 2021-03-10 RX ADMIN — PROPRANOLOL HYDROCHLORIDE 10 MG: 10 TABLET ORAL at 14:53

## 2021-03-10 RX ADMIN — SUCRALFATE 1 G: 1 TABLET ORAL at 23:16

## 2021-03-10 RX ADMIN — ACETAMINOPHEN 1000 MG: 500 TABLET ORAL at 09:12

## 2021-03-10 RX ADMIN — ACETAMINOPHEN 1000 MG: 500 TABLET ORAL at 14:53

## 2021-03-10 RX ADMIN — PROPRANOLOL HYDROCHLORIDE 10 MG: 10 TABLET ORAL at 20:48

## 2021-03-10 RX ADMIN — SUCRALFATE 1 G: 1 TABLET ORAL at 16:30

## 2021-03-10 RX ADMIN — METOCLOPRAMIDE 10 MG: 5 INJECTION, SOLUTION INTRAMUSCULAR; INTRAVENOUS at 09:14

## 2021-03-10 RX ADMIN — QUETIAPINE FUMARATE 75 MG: 25 TABLET ORAL at 09:16

## 2021-03-10 RX ADMIN — PROPRANOLOL HYDROCHLORIDE 10 MG: 10 TABLET ORAL at 09:15

## 2021-03-10 RX ADMIN — FLUOXETINE HYDROCHLORIDE 40 MG: 20 CAPSULE ORAL at 09:20

## 2021-03-10 RX ADMIN — SUCRALFATE 1 G: 1 TABLET ORAL at 06:13

## 2021-03-10 ASSESSMENT — PAIN SCALES - GENERAL
PAINLEVEL_OUTOF10: 7
PAINLEVEL_OUTOF10: 9
PAINLEVEL_OUTOF10: 9

## 2021-03-10 ASSESSMENT — PAIN DESCRIPTION - LOCATION: LOCATION: ABDOMEN

## 2021-03-10 ASSESSMENT — PAIN DESCRIPTION - PAIN TYPE
TYPE: ACUTE PAIN
TYPE: ACUTE PAIN

## 2021-03-10 NOTE — FLOWSHEET NOTE
DATE: 3/10/2021    NAME: Robert Nair  MRN: [id number]   : 1970      Patient not seen this date for Physical Therapy due to:    Patient Declined: Pain (just getting pain meds),  D/C home later today per case management      Electronically signed by Odell Newman PTA on 3/10/2021 at 11:57 AM

## 2021-03-10 NOTE — PROGRESS NOTES
Comprehensive Nutrition Assessment    Type and Reason for Visit:  Reassess    Nutrition Recommendations/Plan: Continue current Low Fiber diet with Ensure Enlive oral supplements. Encourage/monitor PO intakes as tolerated. Will monitor labs, weights, and plan of care. Nutrition Assessment:  Pt has been started on a liquid diet with advancement to a Low Fiber diet this morning. NG removed and TF have been discontinued. Pt slowly increasing her PO intakes. Taking 50% of meals and taking fluids well. Encouraged intakes of oral supplements. Labs/Meds reviewed. Malnutrition Assessment:  Malnutrition Status: At risk for malnutrition    Context:  Acute Illness     Findings of the 6 clinical characteristics of malnutrition:  Energy Intake:  Mild decrease in energy intake - Improved with start of nutrition support. Weight Loss:  Unable to assess     Body Fat Loss:  No significant body fat loss  Muscle Mass Loss:  No significant muscle mass loss  Fluid Accumulation:  No significant fluid accumulation   Strength:  Not Performed    Estimated Daily Nutrient Needs:  Energy (kcal):  MSJ x 1.2-1.3 = 8246-9422 kcals/day; Weight Used for Energy Requirements:  Admission     Protein (g):  1.5 gm/kg = 90 gm pro/day; Weight Used for Protein Requirements:  Ideal          Nutrition Related Findings:  Labs/Meds reviewed.  3/10. Wounds:  Surgical Incision(to abdomen)       Current Nutrition Therapies:    Dietary Nutrition Supplements: Standard High Calorie Oral Supplement  DIET LOW FIBER; Anthropometric Measures:  · Height: 5' 7\" (170.2 cm)  · Current Body Weight: 198 lb 6.6 oz (90 kg)   · Admission Body Weight: 205 lb (93 kg)    · Ideal Body Weight: 135 lbs; % Ideal Body Weight 147 %   · BMI: 31.1  · BMI Categories: Obese Class 1 (BMI 30.0-34. 9)       Nutrition Diagnosis:   · Inadequate oral intake related tocurrent medical condition as evidenced by intake 0-25%, intake 26-50%, slow diet advancement; need for ONS    Nutrition Interventions:   Food and/or Nutrient Delivery:  Continue Current Diet, Continue Oral Nutrition Supplement  Nutrition Education/Counseling:  No recommendation at this time   Coordination of Nutrition Care:  Continue to monitor while inpatient    Goals:  Meet % of estimated nutrition needs with PO intakes.        Nutrition Monitoring and Evaluation:   Food/Nutrient Intake Outcomes:  Food and Nutrient Intake, Supplement Intake  Physical Signs/Symptoms Outcomes:  Biochemical Data, GI Status, Hemodynamic Status, Nutrition Focused Physical Findings, Skin, Weight     Electronically signed by Bassam King RD, LD on 3/10/21 at 3:02 PM EST    Contact: [phone]

## 2021-03-10 NOTE — PROGRESS NOTES
PROGRESS NOTE        PATIENT NAME: Torrie Banner RECORD NO. [id number]  DATE: 3/10/2021    PRIMARY CARE PHYSICIAN: Ester Garcia MD    HD: # 6    ASSESSMENT    Patient Active Problem List   Diagnosis    Migraine    Opiate dependence ([facility] [address]    Chronic pain    Obesity    Patient overweight    Lumbar radicular pain    Lumbar disc disease    Closed nondisplaced fracture of fifth metatarsal bone of right foot    Intra-abdominal abscess [facility])       MEDICAL DECISION MAKING AND PLAN  1. Neuro:  1. Pain/sedation: methadone 140mg daily  2. MMPT: tylenol, robaxin, tono 600 Q8h  2. CV-  1. HR 90-100s,  2. Propranolol: 10mg TID  3. Mild persistent HTN  3. Pulm  1. Extubated post op  2. IS: 1800  3.  maintain sats > 88%  4. Duoneb q4h  4. GI/Nutrition  1. RJP: 0cc, will remove today. LJP: 240/24hr  2. Diet: tolerating FLD  5. Renal/lytes  1. UOP: unmeasured outputs  2. Urinating independently   6. Heme  1. 12.2, stable  2. Platelets 997  8. Endocrine        1. Glu well controlled      Musculoskeletal  1. PT/OT  8. Skin  1. Dressing change yesterday. Nursing to change PRN   9. ID/Micro  1. Afebrile  2. WBC 14.7 from 15.4 from 16.3 from 14.5  3. Off ABX yesterday  10. Family/dispo  11. Lines  1. Keep MILTON drains in place  2. PIV         CHECKLIST     CAM-ICU RASS: Negative  RESTRAINTS: not indiciated  IVF: 0  NUTRITION: CLD advance as tolerated  ANTIBIOTICS: continue flagyl, cipro, diflucan last doses today (day 6)   GI: protonix, carafate  DVT: lovenox  GLYCEMIC CONTROL: HISS  HOB >45: ok  MOBILITY: ok for up as tolerated    SUBJECTIVE    Patient seen and examined this morning, no overnight events. Patient reports her pain is better controlled today, on MMT. Tolerating full liquids. Good UOP, urinating independently. Afebrile, T-max 98.8. IS 1800.      OBJECTIVE  VITALS: Temp: Temp: 98.5 °F (36.9 °C)Temp  Av.5 °F (36.9 °C)  Min: 98.3 °F (36.8 °C)  Max: 98.7 °F (75.3 °C) BP Systolic (96QHH), ESC:971 , Min:110 , ZHN:484   Diastolic (46MZN), PK, Min:71, Max:101   Pulse Pulse  Av.3  Min: 93  Max: 104 Resp Resp  Avg: 15.5  Min: 12  Max: 19 Pulse ox SpO2  Av %  Min: 90 %  Max: 96 %    CONSTITUTIONAL: no acute distress, alert and oriented  HEENT: autraumatic. PERRL  LUNGS: equal chest rise bilaterally, NC in place  CV: heart regular rate and rhythm  GI: mild diffuse tenderness, midline incision well approximated without surrounding erythema. inferior portion of dressing saturated with iodine. MILTON drains in place  MUSCULOSKELETAL: moving all extremities with normal range of motion  NEUROLOGIC: alert and oriented. No focal deficits  SKIN: warm and dry.      LAB:  CBC:   Recent Labs     21  0726 21  0807 03/10/21  0625   WBC 16.3* 15.4* 14.7*   HGB 12.7 12.8 12.2   HCT 41.0 41.7 40.3   MCV 90.3 90.7 90.8    364 387     BMP:   Recent Labs     21  0726 21  0807 03/10/21  0625    133* 135   K 3.9 3.8 3.9   CL 99 99 102   CO2 28 27 27   BUN 14 14 11   CREATININE 0.56 0.53 0.61   GLUCOSE 93 93 102*         [facility]  3/10/2021  7:50 AM

## 2021-03-10 NOTE — PLAN OF CARE
Problem: Pain:  Goal: Control of acute pain  Description: Control of acute pain  Outcome: Ongoing     Problem:  Bowel Function - Altered:  Goal: Bowel elimination is within specified parameters  Description: Bowel elimination is within specified parameters  Outcome: Ongoing     Problem: Fluid Volume - Imbalance:  Goal: Absence of imbalanced fluid volume signs and symptoms  Description: Absence of imbalanced fluid volume signs and symptoms  Outcome: Ongoing     Problem: Nutrition Deficit:  Goal: Ability to achieve adequate nutritional intake will improve  Description: Ability to achieve adequate nutritional intake will improve  Outcome: Ongoing

## 2021-03-10 NOTE — PROGRESS NOTES
assessed for rehabilitation services?: Yes  Family / Caregiver Present: No  General Comment  Comments: RN ok'd for OT treatment. Pt agreeable to session, pleasent/cooperative throughout. Vital Signs  Patient Currently in Pain: Denies   Orientation  Orientation  Overall Orientation Status: Within Functional Limits  Objective    ADL  Grooming: Modified independent (Standing sinkside with setup to complete oral hygeine, wash face and brush hair. SBA for standing balance.)  Additional Comments: Abdominal binder donned upon arrival  Balance  Sitting Balance: Stand by assistance(EOB approx 3 min)  Standing Balance: Stand by assistance  Standing Balance  Time: ~15 minutes  Activity: functional mobility around room, standing sinkside for ADLs, functional transfers  Comment: steady, no LOB  Functional Mobility  Functional - Mobility Device: No device  Activity: To/from bathroom  Assist Level: Stand by assistance  Functional Mobility Comments: SBA for functional mobility to/from bathroom with no AD. Pt steady, no LOB. Bed mobility  Supine to Sit: Contact guard assistance  Sit to Supine: Stand by assistance  Scooting: Contact guard assistance  Comment: Log roll tech with good return. Increased effort/time. ~30 degrees HOB elevated.   Cognition  Overall Cognitive Status: [facility]  Plan   Plan  Times per week: 2-3x/wk  Current Treatment Recommendations: Safety Education & Training, Patient/Caregiver Education & Training, Self-Care / ADL, Functional Mobility Training, Home Management Training, Endurance Training  Goals  Short term goals  Time Frame for Short term goals: pt will, by discharge  Short term goal 1: dem UB ADL ind/mod I with AE PRN  Short term goal 2: dem LB ADL/toileting with min A and AE PRN  Short term goal 3: complete functional mobility/transfer with CGA and LRD PRN  Short term goal 4: engage in functional activity for 20+ minutes for improved functional endurance  Short term goal 5: engage in functional activity

## 2021-03-10 NOTE — PLAN OF CARE
Nutrition Problem #1: Inadequate oral intake  Intervention: Food and/or Nutrient Delivery: Continue Current Diet, Continue Oral Nutrition Supplement  Nutritional Goals: Meet % of estimated nutrition needs with PO intakes.

## 2021-03-11 ENCOUNTER — ANESTHESIA (OUTPATIENT)
Dept: OPERATING ROOM | Age: 51
DRG: 220 | End: 2021-03-11
Payer: MEDICARE

## 2021-03-11 ENCOUNTER — APPOINTMENT (OUTPATIENT)
Dept: GENERAL RADIOLOGY | Age: 51
DRG: 220 | End: 2021-03-11
Payer: MEDICARE

## 2021-03-11 ENCOUNTER — ANESTHESIA EVENT (OUTPATIENT)
Dept: OPERATING ROOM | Age: 51
DRG: 220 | End: 2021-03-11
Payer: MEDICARE

## 2021-03-11 VITALS — DIASTOLIC BLOOD PRESSURE: 114 MMHG | SYSTOLIC BLOOD PRESSURE: 156 MMHG | OXYGEN SATURATION: 100 % | TEMPERATURE: 92.8 F

## 2021-03-11 LAB
ABSOLUTE EOS #: 0.18 K/UL (ref 0–0.4)
ABSOLUTE IMMATURE GRANULOCYTE: 0.18 K/UL (ref 0–0.3)
ABSOLUTE LYMPH #: 2.2 K/UL (ref 1–4.8)
ABSOLUTE MONO #: 1.46 K/UL (ref 0.1–0.8)
ANION GAP SERPL CALCULATED.3IONS-SCNC: 8 MMOL/L (ref 9–17)
BASOPHILS # BLD: 0 % (ref 0–2)
BASOPHILS ABSOLUTE: 0 K/UL (ref 0–0.2)
BUN BLDV-MCNC: 7 MG/DL (ref 6–20)
BUN/CREAT BLD: ABNORMAL (ref 9–20)
CALCIUM SERPL-MCNC: 7.8 MG/DL (ref 8.6–10.4)
CHLORIDE BLD-SCNC: 99 MMOL/L (ref 98–107)
CO2: 26 MMOL/L (ref 20–31)
CREAT SERPL-MCNC: 0.55 MG/DL (ref 0.5–0.9)
DIFFERENTIAL TYPE: ABNORMAL
EOSINOPHILS RELATIVE PERCENT: 1 % (ref 1–4)
GFR AFRICAN AMERICAN: >60 ML/MIN
GFR NON-AFRICAN AMERICAN: >60 ML/MIN
GFR SERPL CREATININE-BSD FRML MDRD: ABNORMAL ML/MIN/{1.73_M2}
GFR SERPL CREATININE-BSD FRML MDRD: ABNORMAL ML/MIN/{1.73_M2}
GLUCOSE BLD-MCNC: 117 MG/DL (ref 65–105)
GLUCOSE BLD-MCNC: 119 MG/DL (ref 70–99)
HCT VFR BLD CALC: 40.4 % (ref 36.3–47.1)
HEMOGLOBIN: 12.3 G/DL (ref 11.9–15.1)
IMMATURE GRANULOCYTES: 1 %
LYMPHOCYTES # BLD: 12 % (ref 24–44)
MCH RBC QN AUTO: 28.5 PG (ref 25.2–33.5)
MCHC RBC AUTO-ENTMCNC: 30.4 G/DL (ref 28.4–34.8)
MCV RBC AUTO: 93.5 FL (ref 82.6–102.9)
MONOCYTES # BLD: 8 % (ref 1–7)
MORPHOLOGY: NORMAL
NRBC AUTOMATED: 0 PER 100 WBC
PDW BLD-RTO: 13.4 % (ref 11.8–14.4)
PLATELET # BLD: 486 K/UL (ref 138–453)
PLATELET ESTIMATE: ABNORMAL
PMV BLD AUTO: 9.4 FL (ref 8.1–13.5)
POTASSIUM SERPL-SCNC: 4 MMOL/L (ref 3.7–5.3)
PROCALCITONIN: 0.1 NG/ML
RBC # BLD: 4.32 M/UL (ref 3.95–5.11)
RBC # BLD: ABNORMAL 10*6/UL
SEG NEUTROPHILS: 78 % (ref 36–66)
SEGMENTED NEUTROPHILS ABSOLUTE COUNT: 14.28 K/UL (ref 1.8–7.7)
SODIUM BLD-SCNC: 133 MMOL/L (ref 135–144)
WBC # BLD: 18.3 K/UL (ref 3.5–11.3)
WBC # BLD: ABNORMAL 10*3/UL

## 2021-03-11 PROCEDURE — 97116 GAIT TRAINING THERAPY: CPT

## 2021-03-11 PROCEDURE — 6360000002 HC RX W HCPCS: Performed by: STUDENT IN AN ORGANIZED HEALTH CARE EDUCATION/TRAINING PROGRAM

## 2021-03-11 PROCEDURE — 3700000000 HC ANESTHESIA ATTENDED CARE: Performed by: SURGERY

## 2021-03-11 PROCEDURE — 3600000013 HC SURGERY LEVEL 3 ADDTL 15MIN: Performed by: SURGERY

## 2021-03-11 PROCEDURE — 6360000002 HC RX W HCPCS: Performed by: ANESTHESIOLOGY

## 2021-03-11 PROCEDURE — 36415 COLL VENOUS BLD VENIPUNCTURE: CPT

## 2021-03-11 PROCEDURE — 6370000000 HC RX 637 (ALT 250 FOR IP): Performed by: STUDENT IN AN ORGANIZED HEALTH CARE EDUCATION/TRAINING PROGRAM

## 2021-03-11 PROCEDURE — 6370000000 HC RX 637 (ALT 250 FOR IP): Performed by: EMERGENCY MEDICINE

## 2021-03-11 PROCEDURE — 82947 ASSAY GLUCOSE BLOOD QUANT: CPT

## 2021-03-11 PROCEDURE — 7100000001 HC PACU RECOVERY - ADDTL 15 MIN: Performed by: SURGERY

## 2021-03-11 PROCEDURE — 2709999900 HC NON-CHARGEABLE SUPPLY: Performed by: SURGERY

## 2021-03-11 PROCEDURE — 3E1M38Z IRRIGATION OF PERITONEAL CAVITY USING IRRIGATING SUBSTANCE, PERCUTANEOUS APPROACH: ICD-10-PCS | Performed by: SURGERY

## 2021-03-11 PROCEDURE — 84145 PROCALCITONIN (PCT): CPT

## 2021-03-11 PROCEDURE — 3600000003 HC SURGERY LEVEL 3 BASE: Performed by: SURGERY

## 2021-03-11 PROCEDURE — 2500000003 HC RX 250 WO HCPCS: Performed by: NURSE ANESTHETIST, CERTIFIED REGISTERED

## 2021-03-11 PROCEDURE — 2500000003 HC RX 250 WO HCPCS: Performed by: STUDENT IN AN ORGANIZED HEALTH CARE EDUCATION/TRAINING PROGRAM

## 2021-03-11 PROCEDURE — 0JQ80ZZ REPAIR ABDOMEN SUBCUTANEOUS TISSUE AND FASCIA, OPEN APPROACH: ICD-10-PCS | Performed by: SURGERY

## 2021-03-11 PROCEDURE — 6370000000 HC RX 637 (ALT 250 FOR IP): Performed by: NURSE PRACTITIONER

## 2021-03-11 PROCEDURE — 3700000001 HC ADD 15 MINUTES (ANESTHESIA): Performed by: SURGERY

## 2021-03-11 PROCEDURE — 2580000003 HC RX 258: Performed by: NURSE ANESTHETIST, CERTIFIED REGISTERED

## 2021-03-11 PROCEDURE — 2700000000 HC OXYGEN THERAPY PER DAY

## 2021-03-11 PROCEDURE — 71045 X-RAY EXAM CHEST 1 VIEW: CPT

## 2021-03-11 PROCEDURE — 7100000000 HC PACU RECOVERY - FIRST 15 MIN: Performed by: SURGERY

## 2021-03-11 PROCEDURE — 94761 N-INVAS EAR/PLS OXIMETRY MLT: CPT

## 2021-03-11 PROCEDURE — 6370000000 HC RX 637 (ALT 250 FOR IP): Performed by: NURSE ANESTHETIST, CERTIFIED REGISTERED

## 2021-03-11 PROCEDURE — 6360000002 HC RX W HCPCS: Performed by: NURSE ANESTHETIST, CERTIFIED REGISTERED

## 2021-03-11 PROCEDURE — 94640 AIRWAY INHALATION TREATMENT: CPT

## 2021-03-11 PROCEDURE — 97110 THERAPEUTIC EXERCISES: CPT

## 2021-03-11 PROCEDURE — 2060000000 HC ICU INTERMEDIATE R&B

## 2021-03-11 PROCEDURE — 2580000003 HC RX 258: Performed by: EMERGENCY MEDICINE

## 2021-03-11 PROCEDURE — 80048 BASIC METABOLIC PNL TOTAL CA: CPT

## 2021-03-11 PROCEDURE — 85025 COMPLETE CBC W/AUTO DIFF WBC: CPT

## 2021-03-11 RX ORDER — NEOSTIGMINE METHYLSULFATE 5 MG/5 ML
SYRINGE (ML) INTRAVENOUS PRN
Status: DISCONTINUED | OUTPATIENT
Start: 2021-03-11 | End: 2021-03-11 | Stop reason: SDUPTHER

## 2021-03-11 RX ORDER — PROPOFOL 10 MG/ML
INJECTION, EMULSION INTRAVENOUS PRN
Status: DISCONTINUED | OUTPATIENT
Start: 2021-03-11 | End: 2021-03-11 | Stop reason: SDUPTHER

## 2021-03-11 RX ORDER — LIDOCAINE HYDROCHLORIDE 10 MG/ML
INJECTION, SOLUTION EPIDURAL; INFILTRATION; INTRACAUDAL; PERINEURAL PRN
Status: DISCONTINUED | OUTPATIENT
Start: 2021-03-11 | End: 2021-03-11 | Stop reason: SDUPTHER

## 2021-03-11 RX ORDER — GABAPENTIN 300 MG/1
600 CAPSULE ORAL EVERY 8 HOURS
Status: DISCONTINUED | OUTPATIENT
Start: 2021-03-11 | End: 2021-03-26 | Stop reason: HOSPADM

## 2021-03-11 RX ORDER — SODIUM CHLORIDE, SODIUM LACTATE, POTASSIUM CHLORIDE, CALCIUM CHLORIDE 600; 310; 30; 20 MG/100ML; MG/100ML; MG/100ML; MG/100ML
INJECTION, SOLUTION INTRAVENOUS CONTINUOUS PRN
Status: DISCONTINUED | OUTPATIENT
Start: 2021-03-11 | End: 2021-03-11 | Stop reason: SDUPTHER

## 2021-03-11 RX ORDER — FENTANYL CITRATE 50 UG/ML
25 INJECTION, SOLUTION INTRAMUSCULAR; INTRAVENOUS EVERY 5 MIN PRN
Status: DISCONTINUED | OUTPATIENT
Start: 2021-03-11 | End: 2021-03-11 | Stop reason: HOSPADM

## 2021-03-11 RX ORDER — CLINDAMYCIN PHOSPHATE 900 MG/50ML
900 INJECTION INTRAVENOUS EVERY 8 HOURS
Status: DISCONTINUED | OUTPATIENT
Start: 2021-03-11 | End: 2021-03-12

## 2021-03-11 RX ORDER — SUCCINYLCHOLINE/SOD CL,ISO/PF 100 MG/5ML
SYRINGE (ML) INTRAVENOUS PRN
Status: DISCONTINUED | OUTPATIENT
Start: 2021-03-11 | End: 2021-03-11 | Stop reason: SDUPTHER

## 2021-03-11 RX ORDER — DIPHENHYDRAMINE HYDROCHLORIDE 50 MG/ML
12.5 INJECTION INTRAMUSCULAR; INTRAVENOUS
Status: DISCONTINUED | OUTPATIENT
Start: 2021-03-11 | End: 2021-03-11 | Stop reason: HOSPADM

## 2021-03-11 RX ORDER — 0.9 % SODIUM CHLORIDE 0.9 %
500 INTRAVENOUS SOLUTION INTRAVENOUS
Status: DISCONTINUED | OUTPATIENT
Start: 2021-03-11 | End: 2021-03-11 | Stop reason: HOSPADM

## 2021-03-11 RX ORDER — OXYCODONE HYDROCHLORIDE AND ACETAMINOPHEN 5; 325 MG/1; MG/1
1 TABLET ORAL EVERY 4 HOURS PRN
Status: DISCONTINUED | OUTPATIENT
Start: 2021-03-11 | End: 2021-03-11 | Stop reason: HOSPADM

## 2021-03-11 RX ORDER — FENTANYL CITRATE 50 UG/ML
50 INJECTION, SOLUTION INTRAMUSCULAR; INTRAVENOUS EVERY 5 MIN PRN
Status: DISCONTINUED | OUTPATIENT
Start: 2021-03-11 | End: 2021-03-11 | Stop reason: HOSPADM

## 2021-03-11 RX ORDER — SODIUM CHLORIDE, SODIUM LACTATE, POTASSIUM CHLORIDE, CALCIUM CHLORIDE 600; 310; 30; 20 MG/100ML; MG/100ML; MG/100ML; MG/100ML
INJECTION, SOLUTION INTRAVENOUS CONTINUOUS
Status: DISCONTINUED | OUTPATIENT
Start: 2021-03-11 | End: 2021-03-14

## 2021-03-11 RX ORDER — LANOLIN ALCOHOL/MO/W.PET/CERES
6 CREAM (GRAM) TOPICAL NIGHTLY PRN
Status: DISCONTINUED | OUTPATIENT
Start: 2021-03-11 | End: 2021-03-17

## 2021-03-11 RX ORDER — ROCURONIUM BROMIDE 10 MG/ML
INJECTION, SOLUTION INTRAVENOUS PRN
Status: DISCONTINUED | OUTPATIENT
Start: 2021-03-11 | End: 2021-03-11 | Stop reason: SDUPTHER

## 2021-03-11 RX ORDER — QUETIAPINE FUMARATE 25 MG/1
50 TABLET, FILM COATED ORAL NIGHTLY
Status: DISCONTINUED | OUTPATIENT
Start: 2021-03-11 | End: 2021-03-11

## 2021-03-11 RX ORDER — FENTANYL CITRATE 50 UG/ML
50 INJECTION, SOLUTION INTRAMUSCULAR; INTRAVENOUS EVERY 5 MIN PRN
Status: COMPLETED | OUTPATIENT
Start: 2021-03-11 | End: 2021-03-11

## 2021-03-11 RX ORDER — OXYCODONE HYDROCHLORIDE 5 MG/1
5 TABLET ORAL EVERY 4 HOURS PRN
Status: DISCONTINUED | OUTPATIENT
Start: 2021-03-11 | End: 2021-03-12

## 2021-03-11 RX ORDER — ONDANSETRON 2 MG/ML
4 INJECTION INTRAMUSCULAR; INTRAVENOUS
Status: DISCONTINUED | OUTPATIENT
Start: 2021-03-11 | End: 2021-03-11 | Stop reason: HOSPADM

## 2021-03-11 RX ORDER — ONDANSETRON 2 MG/ML
INJECTION INTRAMUSCULAR; INTRAVENOUS PRN
Status: DISCONTINUED | OUTPATIENT
Start: 2021-03-11 | End: 2021-03-11 | Stop reason: SDUPTHER

## 2021-03-11 RX ORDER — FENTANYL CITRATE 50 UG/ML
50 INJECTION, SOLUTION INTRAMUSCULAR; INTRAVENOUS
Status: DISCONTINUED | OUTPATIENT
Start: 2021-03-11 | End: 2021-03-12

## 2021-03-11 RX ORDER — LABETALOL HYDROCHLORIDE 5 MG/ML
5 INJECTION, SOLUTION INTRAVENOUS EVERY 10 MIN PRN
Status: DISCONTINUED | OUTPATIENT
Start: 2021-03-11 | End: 2021-03-11 | Stop reason: HOSPADM

## 2021-03-11 RX ORDER — METOCLOPRAMIDE 10 MG/1
10 TABLET ORAL
Status: DISCONTINUED | OUTPATIENT
Start: 2021-03-11 | End: 2021-03-12

## 2021-03-11 RX ORDER — OXYCODONE HYDROCHLORIDE 5 MG/1
10 TABLET ORAL EVERY 4 HOURS PRN
Status: DISCONTINUED | OUTPATIENT
Start: 2021-03-11 | End: 2021-03-16

## 2021-03-11 RX ORDER — LIDOCAINE HYDROCHLORIDE 20 MG/ML
JELLY TOPICAL PRN
Status: DISCONTINUED | OUTPATIENT
Start: 2021-03-11 | End: 2021-03-11 | Stop reason: SDUPTHER

## 2021-03-11 RX ORDER — FENTANYL CITRATE 50 UG/ML
100 INJECTION, SOLUTION INTRAMUSCULAR; INTRAVENOUS
Status: DISCONTINUED | OUTPATIENT
Start: 2021-03-11 | End: 2021-03-12

## 2021-03-11 RX ORDER — METHOCARBAMOL 750 MG/1
750 TABLET, FILM COATED ORAL 3 TIMES DAILY PRN
Status: DISCONTINUED | OUTPATIENT
Start: 2021-03-11 | End: 2021-03-12

## 2021-03-11 RX ORDER — LANOLIN ALCOHOL/MO/W.PET/CERES
6 CREAM (GRAM) TOPICAL NIGHTLY PRN
Status: DISCONTINUED | OUTPATIENT
Start: 2021-03-11 | End: 2021-03-11

## 2021-03-11 RX ORDER — HYDRALAZINE HYDROCHLORIDE 20 MG/ML
5 INJECTION INTRAMUSCULAR; INTRAVENOUS EVERY 10 MIN PRN
Status: DISCONTINUED | OUTPATIENT
Start: 2021-03-11 | End: 2021-03-11 | Stop reason: HOSPADM

## 2021-03-11 RX ORDER — PHENYLEPHRINE HYDROCHLORIDE 10 MG/ML
INJECTION INTRAVENOUS PRN
Status: DISCONTINUED | OUTPATIENT
Start: 2021-03-11 | End: 2021-03-11 | Stop reason: SDUPTHER

## 2021-03-11 RX ORDER — FENTANYL CITRATE 50 UG/ML
INJECTION, SOLUTION INTRAMUSCULAR; INTRAVENOUS PRN
Status: DISCONTINUED | OUTPATIENT
Start: 2021-03-11 | End: 2021-03-11 | Stop reason: SDUPTHER

## 2021-03-11 RX ORDER — GLYCOPYRROLATE 1 MG/5 ML
SYRINGE (ML) INTRAVENOUS PRN
Status: DISCONTINUED | OUTPATIENT
Start: 2021-03-11 | End: 2021-03-11 | Stop reason: SDUPTHER

## 2021-03-11 RX ORDER — QUETIAPINE FUMARATE 25 MG/1
50 TABLET, FILM COATED ORAL NIGHTLY
Status: DISPENSED | OUTPATIENT
Start: 2021-03-11 | End: 2021-03-13

## 2021-03-11 RX ADMIN — FENTANYL CITRATE 50 MCG: 50 INJECTION, SOLUTION INTRAMUSCULAR; INTRAVENOUS at 19:46

## 2021-03-11 RX ADMIN — SODIUM CHLORIDE, POTASSIUM CHLORIDE, SODIUM LACTATE AND CALCIUM CHLORIDE: 600; 310; 30; 20 INJECTION, SOLUTION INTRAVENOUS at 23:34

## 2021-03-11 RX ADMIN — GABAPENTIN 600 MG: 300 CAPSULE ORAL at 03:06

## 2021-03-11 RX ADMIN — CLINDAMYCIN IN 5 PERCENT DEXTROSE 900 MG: 18 INJECTION, SOLUTION INTRAVENOUS at 17:27

## 2021-03-11 RX ADMIN — DOCUSATE SODIUM 100 MG: 50 LIQUID ORAL at 09:53

## 2021-03-11 RX ADMIN — FENTANYL CITRATE 50 MCG: 50 INJECTION, SOLUTION INTRAMUSCULAR; INTRAVENOUS at 20:20

## 2021-03-11 RX ADMIN — OXYCODONE HYDROCHLORIDE 10 MG: 5 TABLET ORAL at 22:28

## 2021-03-11 RX ADMIN — SODIUM CHLORIDE, POTASSIUM CHLORIDE, SODIUM LACTATE AND CALCIUM CHLORIDE: 600; 310; 30; 20 INJECTION, SOLUTION INTRAVENOUS at 17:39

## 2021-03-11 RX ADMIN — FENTANYL CITRATE 50 MCG: 50 INJECTION, SOLUTION INTRAMUSCULAR; INTRAVENOUS at 19:22

## 2021-03-11 RX ADMIN — ONDANSETRON 4 MG: 2 INJECTION INTRAMUSCULAR; INTRAVENOUS at 18:07

## 2021-03-11 RX ADMIN — FENTANYL CITRATE 50 MCG: 50 INJECTION, SOLUTION INTRAMUSCULAR; INTRAVENOUS at 18:31

## 2021-03-11 RX ADMIN — FLUOXETINE HYDROCHLORIDE 40 MG: 20 CAPSULE ORAL at 09:55

## 2021-03-11 RX ADMIN — LIDOCAINE HYDROCHLORIDE 2 ML: 20 JELLY TOPICAL at 17:20

## 2021-03-11 RX ADMIN — ACETAMINOPHEN 1000 MG: 500 TABLET ORAL at 22:27

## 2021-03-11 RX ADMIN — METHOCARBAMOL TABLETS 750 MG: 750 TABLET, COATED ORAL at 03:06

## 2021-03-11 RX ADMIN — FENTANYL CITRATE 50 MCG: 50 INJECTION, SOLUTION INTRAMUSCULAR; INTRAVENOUS at 18:32

## 2021-03-11 RX ADMIN — PHENYLEPHRINE HYDROCHLORIDE 200 MCG: 10 INJECTION INTRAVENOUS at 17:26

## 2021-03-11 RX ADMIN — METOCLOPRAMIDE 10 MG: 5 INJECTION, SOLUTION INTRAMUSCULAR; INTRAVENOUS at 03:06

## 2021-03-11 RX ADMIN — FENTANYL CITRATE 50 MCG: 50 INJECTION, SOLUTION INTRAMUSCULAR; INTRAVENOUS at 19:05

## 2021-03-11 RX ADMIN — HYDROMORPHONE HYDROCHLORIDE 0.5 MG: 1 INJECTION, SOLUTION INTRAMUSCULAR; INTRAVENOUS; SUBCUTANEOUS at 18:28

## 2021-03-11 RX ADMIN — FENTANYL CITRATE 50 MCG: 50 INJECTION, SOLUTION INTRAMUSCULAR; INTRAVENOUS at 18:50

## 2021-03-11 RX ADMIN — ENOXAPARIN SODIUM 40 MG: 40 INJECTION SUBCUTANEOUS at 09:53

## 2021-03-11 RX ADMIN — Medication 0.6 MG: at 18:20

## 2021-03-11 RX ADMIN — PANTOPRAZOLE SODIUM 40 MG: 40 TABLET, DELAYED RELEASE ORAL at 16:01

## 2021-03-11 RX ADMIN — ACETAMINOPHEN 1000 MG: 500 TABLET ORAL at 10:00

## 2021-03-11 RX ADMIN — METHOCARBAMOL TABLETS 750 MG: 750 TABLET, COATED ORAL at 16:01

## 2021-03-11 RX ADMIN — IPRATROPIUM BROMIDE AND ALBUTEROL SULFATE 1 AMPULE: .5; 3 SOLUTION RESPIRATORY (INHALATION) at 08:42

## 2021-03-11 RX ADMIN — METHADONE HYDROCHLORIDE 140 MG: 5 SOLUTION ORAL at 09:51

## 2021-03-11 RX ADMIN — ROCURONIUM BROMIDE 40 MG: 10 INJECTION INTRAVENOUS at 17:31

## 2021-03-11 RX ADMIN — FENTANYL CITRATE 50 MCG: 50 INJECTION, SOLUTION INTRAMUSCULAR; INTRAVENOUS at 19:12

## 2021-03-11 RX ADMIN — ACETAMINOPHEN 1000 MG: 500 TABLET ORAL at 14:26

## 2021-03-11 RX ADMIN — HYDRALAZINE HYDROCHLORIDE 5 MG: 20 INJECTION INTRAMUSCULAR; INTRAVENOUS at 19:23

## 2021-03-11 RX ADMIN — METOCLOPRAMIDE 10 MG: 10 TABLET ORAL at 10:00

## 2021-03-11 RX ADMIN — SUCRALFATE 1 G: 1 TABLET ORAL at 06:13

## 2021-03-11 RX ADMIN — PANTOPRAZOLE SODIUM 40 MG: 40 TABLET, DELAYED RELEASE ORAL at 06:13

## 2021-03-11 RX ADMIN — HYDROMORPHONE HYDROCHLORIDE 0.5 MG: 1 INJECTION, SOLUTION INTRAMUSCULAR; INTRAVENOUS; SUBCUTANEOUS at 17:18

## 2021-03-11 RX ADMIN — PROPOFOL 200 MG: 10 INJECTION, EMULSION INTRAVENOUS at 17:18

## 2021-03-11 RX ADMIN — QUETIAPINE FUMARATE 50 MG: 25 TABLET ORAL at 22:28

## 2021-03-11 RX ADMIN — SODIUM CHLORIDE, POTASSIUM CHLORIDE, SODIUM LACTATE AND CALCIUM CHLORIDE: 600; 310; 30; 20 INJECTION, SOLUTION INTRAVENOUS at 17:14

## 2021-03-11 RX ADMIN — LIDOCAINE HYDROCHLORIDE 50 MG: 10 INJECTION, SOLUTION EPIDURAL; INFILTRATION; INTRACAUDAL; PERINEURAL at 17:18

## 2021-03-11 RX ADMIN — PANTOPRAZOLE SODIUM 40 MG: 40 TABLET, DELAYED RELEASE ORAL at 14:26

## 2021-03-11 RX ADMIN — GABAPENTIN 600 MG: 300 CAPSULE ORAL at 10:00

## 2021-03-11 RX ADMIN — Medication 100 MG: at 17:18

## 2021-03-11 RX ADMIN — GABAPENTIN 600 MG: 300 CAPSULE ORAL at 22:28

## 2021-03-11 RX ADMIN — METOCLOPRAMIDE 10 MG: 10 TABLET ORAL at 16:02

## 2021-03-11 RX ADMIN — HYDRALAZINE HYDROCHLORIDE 5 MG: 20 INJECTION INTRAMUSCULAR; INTRAVENOUS at 20:30

## 2021-03-11 RX ADMIN — PHENYLEPHRINE HYDROCHLORIDE 100 MCG: 10 INJECTION INTRAVENOUS at 17:38

## 2021-03-11 RX ADMIN — Medication 6 MG: at 22:28

## 2021-03-11 RX ADMIN — Medication 3 MG: at 18:20

## 2021-03-11 ASSESSMENT — PULMONARY FUNCTION TESTS
PIF_VALUE: 23
PIF_VALUE: 24
PIF_VALUE: 22
PIF_VALUE: 24
PIF_VALUE: 8
PIF_VALUE: 23
PIF_VALUE: 15
PIF_VALUE: 23
PIF_VALUE: 2
PIF_VALUE: 24
PIF_VALUE: 23
PIF_VALUE: 22
PIF_VALUE: 21
PIF_VALUE: 24
PIF_VALUE: 23
PIF_VALUE: 22
PIF_VALUE: 23
PIF_VALUE: 6
PIF_VALUE: 22
PIF_VALUE: 22
PIF_VALUE: 23
PIF_VALUE: 22
PIF_VALUE: 23
PIF_VALUE: 1
PIF_VALUE: 1
PIF_VALUE: 0
PIF_VALUE: 0
PIF_VALUE: 2
PIF_VALUE: 23
PIF_VALUE: 24
PIF_VALUE: 23
PIF_VALUE: 23
PIF_VALUE: 1
PIF_VALUE: 23
PIF_VALUE: 24
PIF_VALUE: 24
PIF_VALUE: 0
PIF_VALUE: 25
PIF_VALUE: 2

## 2021-03-11 ASSESSMENT — PAIN DESCRIPTION - ORIENTATION
ORIENTATION: MID
ORIENTATION: ANTERIOR

## 2021-03-11 ASSESSMENT — PAIN SCALES - GENERAL
PAINLEVEL_OUTOF10: 0
PAINLEVEL_OUTOF10: 10
PAINLEVEL_OUTOF10: 6
PAINLEVEL_OUTOF10: 10
PAINLEVEL_OUTOF10: 7
PAINLEVEL_OUTOF10: 10

## 2021-03-11 ASSESSMENT — PAIN DESCRIPTION - PAIN TYPE: TYPE: SURGICAL PAIN;CHRONIC PAIN

## 2021-03-11 ASSESSMENT — PAIN SCALES - WONG BAKER
WONGBAKER_NUMERICALRESPONSE: 2
WONGBAKER_NUMERICALRESPONSE: 4
WONGBAKER_NUMERICALRESPONSE: 4

## 2021-03-11 ASSESSMENT — PAIN DESCRIPTION - FREQUENCY: FREQUENCY: CONTINUOUS

## 2021-03-11 ASSESSMENT — ENCOUNTER SYMPTOMS: TACHYPNEA: 1

## 2021-03-11 NOTE — CARE COORDINATION
Dispo planning     Met with pt at the bedside. Plan to return home at discharge with her  and children/grandchildren that live at home with her. Pt states she lives in a two bed room apartment with multiple family members including a 1yr grandson. Pt states that she can learn wound care and have  help as needed. Writer updated pt that we plan to order Home care but is still pending medicaid so may not get services at discharge. Writer encouraged pt to learn dressing change/drain&measure MILTON drain and updated her on sign/symtoms of infection. Encourage high protein diet and to get out of bed frequently for ambulation. Pt verbalized understanding. HC ordered. DME ordered RW.

## 2021-03-11 NOTE — BRIEF OP NOTE
Brief Postoperative Note      Patient: Ramy Wilcox  YOB: 1970  MRN: [id number]    Date of Procedure: 3/11/2021    Pre-Op Diagnosis: Evicieration    Post-Op Diagnosis: Same       Procedure(s):  LAPAROTOMY EXPLORATORY, ABDOMINAL [address] OUT, FASCIAL CLOSURE     Surgeon(s):  Belle Hinojosa MD    Assistant:  Resident: Lucero Layton DO    Anesthesia: General    Estimated Blood Loss (mL): Minimal    Complications: None    Specimens:   * No specimens in log *    Implants:  * No implants in log *      Drains:   Closed/Suction Drain Posterior LUQ Bulb 19 Mongolian (Active)   Site Description Unable to view 03/11/21 1513   Dressing Status Clean;Dry; Intact 03/11/21 1513   Drainage Appearance Yellow 03/11/21 1513   Status To bulb suction 03/11/21 1513   Output (ml) 45 ml 03/11/21 0620       [REMOVED] Closed/Suction Drain Left Abdomen Bulb 12 Mongolian (Removed)   Status Open to gravity drainage 03/04/21 0923       [REMOVED] Closed/Suction Drain Left Abdomen  12 Mongolian (Removed)   Dressing Status Clean;Dry; Intact 03/05/21 0800   Drainage Appearance Brown 03/05/21 0800   Status Open to gravity drainage 03/05/21 0800   Output (ml) 125 ml 03/05/21 0650       [REMOVED] Closed/Suction Drain Anterior RUQ 23 Mongolian (Removed)   Site Description Unable to view 03/10/21 0310   Dressing Status Clean;Dry; Intact 03/10/21 0310   Drainage Appearance Serosanguinous 03/10/21 0310   Status To bulb suction 03/10/21 0310   Output (ml) 0 ml 03/09/21 1600       [REMOVED] NG/OG/NJ/NE Tube Nasogastric 18 fr Right nostril (Removed)   Surrounding Skin Dry; Intact 03/08/21 2130   Securement device Yes 03/08/21 2130   Status Other (Comment) 03/08/21 2130   Placement Verified by External Catheter Length 03/08/21 2130   NG/OG/NJ/NE External Measurement (cm) 80 cm 03/07/21 1200   Drainage Appearance Green 03/05/21 1800   Tube Feeding Immune Enhancing 03/08/21 2130   Tube Feeding Status Continuous 03/08/21 2130   Rate/Schedule 45 mL/hr 03/08/21 2130   Tube Feeding Intake (mL) 209 ml 03/08/21 2130   Free Water Flush (mL) 30 mL 03/08/21 0345   Free Water Rate 100 03/08/21 2130   Residual Volume (ml) 0 ml 03/08/21 1536   Output (mL) 0 ml 03/07/21 0400       [REMOVED] Urethral Catheter Double-lumen;Straight-tip 16 fr (Removed)   Catheter Indications Perioperative use in selected surgeries including but not limited to urologic, pelvic or need for intraoperative monitoring of urinary output due to prolonged surgery, large volume infusion or need for diuretic therapy in surgery 03/07/21 0800   Site Assessment No urethral drainage 03/07/21 0800   Urine Color Yellow 03/07/21 0800   Urine Appearance Clear 03/07/21 0800   Output (mL) 225 mL 03/07/21 0955       Findings: Lower midline previous laparotomy incision site with fascial dehiscence, abdominal cavity interrogated and no small bowel injury or acute abnormalities noted aside for the dehiscence, abdominal washout and closure with figure-of-eight technique utilized, Betadine jacques placed in between loose staple skin closure    Thank you,  Electronically signed by Bayron Sun DO on 3/11/2021 at 7:08 PM  Present throughout case. Discussed with .

## 2021-03-11 NOTE — PROGRESS NOTES
Notified by RN that bowel is exposed. Patient had coughed while in the bathroom and felt dressing was saturated. Immediately evaluated patient and small bowel is eviscerated in between staples. Staples in lower abdomen removed. Covered with saline soaked towel. Will take patient to OR. Consent obtained. Updated and discussed with trauma attending.      Walker Swan D.O. PGY-3  Department of Surgery  3/11/2021, 4:52 PM

## 2021-03-11 NOTE — PROGRESS NOTES
Pt called out and stated she felt something wet Writer went to room and look at dressing on abdomen and writer saw bowel sticking out of incision and contacted doctor immediately. Dr Zayra Caputo [place] to room and continued to access and care for patient. Vitals  161/109 18 103 Pt awake and alert x4. Dr Yemi Mo  informed patient that they would have to take her back to surg  Patient called  and informed of above. Patient to surg with doctor and transporter.

## 2021-03-11 NOTE — PROGRESS NOTES
PROGRESS NOTE        PATIENT NAME: [facility] RECORD NO. [id number]  DATE: 3/11/2021    PRIMARY CARE PHYSICIAN: Diana Michael MD    HD: # 7    ASSESSMENT    Patient Active Problem List   Diagnosis    Migraine    Opiate dependence ([facility]    Chronic pain    Obesity    Patient overweight    Lumbar radicular pain    Lumbar disc disease    Closed nondisplaced fracture of fifth metatarsal bone of right foot    Intra-abdominal abscess [facility])       MEDICAL DECISION MAKING AND PLAN  1. Neuro:  1. Pain/sedation: methadone 140mg daily  2. MMPT: tylenol, robaxin, tono 600 Q8h  2. CV-  1. HR 90-100s,  2. Propranolol: 10mg TID  3. Mild persistent HTN  3. Pulm  1. Extubated post op  2. IS: 1800  3.  maintain sats > 88%  4. Duoneb q4h  5. Will order CXR due to leukocytosis, Pt history of multiple PNA, and current complaint of R sided pleuritic discomfort  4. GI/Nutrition  1. LJP: 140/24hr  2. Diet: tolerating FLD  5. Renal/lytes  1. UOP: 1.4 mL/kg/hr  2. Urinating independently   6. Heme  1. 12.3, stable  2. Platelets 351  8. Endocrine        1. Glu well controlled      Musculoskeletal  1. PT/OT  8. Skin  1. Dressing change yesterday. Nursing to change PRN   9. ID/Micro  1. Afebrile  2. WBC 18.3 from 14.7 from 15.4 from 16.3 from 14.5  3. Off ABX 3/9  10. Family/dispo: planned for today but may delay given increasing WBC  11. Lines  1. Keep MILTON drain in place  2. PIV         CHECKLIST     CAM-ICU RASS: Negative  RESTRAINTS: not indiciated  IVF: 0  NUTRITION: CLD advance as tolerated  ANTIBIOTICS: continue flagyl, cipro, diflucan last doses today (day 6)   GI: protonix, carafate  DVT: lovenox  GLYCEMIC CONTROL: HISS  HOB >45: ok  MOBILITY: ok for up as tolerated    SUBJECTIVE    Patient seen and examined this morning, no overnight events. Patient reports her pain is better controlled today, on MMT. Reports discomfort at the base of the right lung with deep inspiration.  Denies SOB, cough, CP, N/V, C/F. Tolerating low fiber diet. Good UOP, urinating independently. Afebrile, T-max 99.5. IS 1800. OBJECTIVE  VITALS: Temp: Temp: 98.8 °F (37.1 °C)Temp  Av.9 °F (37.2 °C)  Min: 98.3 °F (36.8 °C)  Max: 99.5 °F (63.2 °C) BP Systolic (38ARN), UED:019 , Min:135 , NFS:350   Diastolic (60IKW), WZM:84, Min:82, Max:109   Pulse Pulse  Av.3  Min: 96  Max: 101 Resp Resp  Av.6  Min: 15  Max: 19 Pulse ox SpO2  Av.3 %  Min: 94 %  Max: 97 %    CONSTITUTIONAL: no acute distress, alert and oriented  HEENT: atraumatic. PERRL  LUNGS: equal chest rise bilaterally, NC in place  CV: heart regular rate and rhythm  GI: mild diffuse tenderness, midline incision well approximated without surrounding erythema. inferior portion of dressing saturated with iodine. Packed with plain packing between staples. Left MILTON drain in place  MUSCULOSKELETAL: moving all extremities with normal range of motion  NEUROLOGIC: alert and oriented. No focal deficits  SKIN: warm and dry. LAB:  CBC:   Recent Labs     21  0807 03/10/21  0625 21  0714   WBC 15.4* 14.7* 18.3*   HGB 12.8 12.2 12.3   HCT 41.7 40.3 40.4   MCV 90.7 90.8 93.5    387 486*     BMP:   Recent Labs     21  0726 21  0807 03/10/21  0625    133* 135   K 3.9 3.8 3.9   CL 99 99 102   CO2 28 27 27   BUN 14 14 11   CREATININE 0.56 0.53 0.61   GLUCOSE 93 93 102*         [facility], DO  3/11/2021  7:55 AM        Attending Note      I have reviewed the above GCS note(s) and I either performed the key elements of the medical history and physical exam or was present with the surgery resident when the key elements of the medical history and physical exam were performed. I have discussed the findings, established the care plan and recommendations with the surgical team.  WBC with slight increase. CXR clear. Taking po. Drain with serous output. Will check procal and monitor additional day. CT if deteriorates.     Jada Hylton MD 3/11/2021  10:12 AM

## 2021-03-11 NOTE — ANESTHESIA PRE PROCEDURE
Department of Anesthesiology  Preprocedure Note       Name:  Tr Bowers   Age:  48 y.o.  :  1970                                          MRN:  [id number]         Date:  3/11/2021      Surgeon: David Tello):  Rodger Weiner MD    Procedure: Procedure(s):  LAPAROTOMY EXPLORATORY    Medications prior to admission:   Prior to Admission medications    Medication Sig Start Date End Date Taking? Authorizing Provider   METHADONE HCL PO Take 140 mg by mouth    Historical Provider, MD   FLUoxetine (PROZAC) 40 MG capsule Take 40 mg by mouth daily    Historical Provider, MD   gabapentin (NEURONTIN) 600 MG tablet Take 600 mg by mouth 3 times daily. Historical Provider, MD   predniSONE (DELTASONE) 20 MG tablet Take 1 tablet by mouth daily Take 3 tablets day 1-2. 2 tablets day 3-4 and 1 tablet day 5 10/3/17   ABRAHAM Mcclure CNP   diphenhydrAMINE (BENADRYL) 25 MG capsule Take 1 capsule by mouth every 6 hours as needed for Itching 10/3/17   ABRAHAM Mcclure CNP   naloxone [facility]) 2 MG/2ML injection  16   Historical Provider, MD   albuterol sulfate HFA (PROVENTIL HFA) 108 (90 BASE) MCG/ACT inhaler Inhale 1-2 puffs into the lungs every 4 hours as needed for Wheezing 3/29/16   Danis Martinez MD       Current medications:    No current facility-administered medications for this visit. No current outpatient medications on file.      Facility-Administered Medications Ordered in Other Visits   Medication Dose Route Frequency Provider Last Rate Last Admin    metoclopramide (REGLAN) tablet 10 mg  10 mg Oral 4x Daily AC & HS ABRAHAM Mary CNP   10 mg at 21 1602    methocarbamol (ROBAXIN) tablet 750 mg  750 mg Oral TID PRN ABRAHAM Mary CNP   750 mg at 21 1601    QUEtiapine (SEROQUEL) tablet 50 mg  50 mg Oral Nightly ABRAHAM Mary CNP        melatonin tablet 6 mg  6 mg Oral Nightly PRN ABRAHAM Mary CNP        magnesium hydroxide (MILK OF MAGNESIA) 400 MG/5ML suspension 30 mL  30 mL Oral Daily Oswaldo Ayala, DO        docusate (COLACE) 50 MG/5ML liquid 100 mg  100 mg Oral BID Rosalia Ayala, DO   100 mg at 03/11/21 0953    pantoprazole (PROTONIX) tablet 40 mg  40 mg Oral BID AC Nba Oakes APRN - CNP   40 mg at 03/11/21 1601    sucralfate (CARAFATE) tablet 1 g  1 g Oral 4 times per day Cecily Maldonado MD   1 g at 03/11/21 8770    potassium bicarb-citric acid (EFFER-K) effervescent tablet 40 mEq  40 mEq Oral Daily Michelle Bustle, DO   40 mEq at 03/09/21 3162    gabapentin (NEURONTIN) capsule 600 mg  600 mg Oral Q8H Michelle Bustle, DO   600 mg at 03/11/21 1000    acetaminophen (TYLENOL) tablet 1,000 mg  1,000 mg Oral Q8H Michelle Bustle, DO   1,000 mg at 03/11/21 1426    FLUoxetine (PROZAC) capsule 40 mg  40 mg Oral Daily [place] Bustle, DO   40 mg at 03/11/21 0955    methadone 5 MG/5ML solution 140 mg  140 mg Oral Daily Michelle Bustle, DO   140 mg at 03/11/21 0951    enoxaparin (LOVENOX) injection 40 mg  40 mg Subcutaneous Daily Bricenatalie Vernon, DO   40 mg at 03/11/21 2488    potassium chloride 10 mEq/100 mL IVPB (Peripheral Line)  10 mEq Intravenous Once Hever Beach MD        ipratropium-albuterol (DUONEB) nebulizer solution 1 ampule  1 ampule Inhalation BID Brice Vernon, DO   1 ampule at 03/11/21 0842    sodium chloride flush 0.9 % injection 10 mL  10 mL Intravenous PRN Brice Vernon, DO   10 mL at 03/10/21 2049    bisacodyl (DULCOLAX) suppository 10 mg  10 mg Rectal Daily PRN Brice Vernon, DO        ondansetron [facility]) injection 4 mg  4 mg Intravenous Q6H PRN Brice Vernon, DO   4 mg at 03/08/21 1924    sodium chloride flush 0.9 % injection 10 mL  10 mL Intravenous PRN Laura Matos MD   10 mL at 07/27/15 0840       Allergies:     Allergies   Allergen Reactions    Compazine [Prochlorperazine Maleate] Other (See Comments)     Seizure like syndrome, eyes roll to back of head    Imitrex [Sumatriptan] Other (See Comments)     TIA    Thorazine [Chlorpromazine] Other (See Comments)     Seizure like syndrome    Cardizem [Diltiazem] Rash    Pcn [Penicillins] Rash       Problem List:    Patient Active Problem List   Diagnosis Code    Migraine G43.909    Opiate dependence ([facility] [address] F11.20    Chronic pain G89.29    Obesity E66.9    Patient overweight E66.3    Lumbar radicular pain M54.16    Lumbar disc disease M51.9    Closed nondisplaced fracture of fifth metatarsal bone of right foot S92.354A    Intra-abdominal abscess (HCC) K65.1       Past Medical History:        Diagnosis Date    Asthma     Connective tissue disease, undifferentiated (HCC) 1987    Dr. Nasir Ansari ([facility].)    DDD (degenerative disc disease) 2000    Dr. Sandeep Hermosillo Depression     Lumbar radicular pain     Migraine 1988    Dr. Aaron Alegre Mitral valve prolapse 1978    [facility] eLwis Veronica [facility] 01/02/2013    Dr. Orozco Deng / on 10/3/17 pt states she completed tx 2016       Past Surgical History:        Procedure Laterality Date    CT ABSCESS DRAIN SUBCUTANEOUS  03/04/2021    CT ABSCESS DRAIN SUBCUTANEOUS 3/4/2021 [facility] CT SCAN    GASTRECTOMY  03/05/2021    EXPLORATORY LAPAROTOMY, PARTIAL GASTRECTOMY    HERNIA REPAIR  1972    as child    HYSTERECTOMY      2007    LAPAROTOMY N/A 3/5/2021    EXPLORATORY LAPAROTOMY, PARTIAL GASTRECTOMY performed by Carlee Merlin, MD at [facility] 07/27/2015    right lumbar JHONY    NOSE SURGERY  1989    After nose was broken.  Dr. Mae Elliott Left 2007    Dr. Yumiko Parker  2007    Dr. Sofya Saucedo History:    Social History     Tobacco Use    Smoking status: Current Every Day Smoker     Packs/day: 1.00     Types: Cigarettes    Smokeless tobacco: Never Used   Substance Use Topics    Alcohol use: No     Alcohol/week: 0.0 standard drinks                                Ready to quit: Not Answered Counseling given: Not Answered      Vital Signs (Current): There were no vitals filed for this visit.                                            BP Readings from Last 3 Encounters:   03/11/21 (!) 131/90   03/05/21 (!) 166/134   02/20/20 118/83       NPO Status:                                                                                 BMI:   Wt Readings from Last 3 Encounters:   03/06/21 198 lb 6.6 oz (90 kg)   02/20/20 205 lb (93 kg)   08/20/19 215 lb (97.5 kg)     There is no height or weight on file to calculate BMI.    CBC:   Lab Results   Component Value Date    WBC 18.3 03/11/2021    RBC 4.32 03/11/2021    RBC 5.02 09/02/2017    HGB 12.3 03/11/2021    HCT 40.4 03/11/2021    MCV 93.5 03/11/2021    RDW 13.4 03/11/2021     03/11/2021       CMP:   Lab Results   Component Value Date     03/11/2021    K 4.0 03/11/2021    CL 99 03/11/2021    CO2 26 03/11/2021    BUN 7 03/11/2021    CREATININE 0.55 03/11/2021    GFRAA >60 03/11/2021    LABGLOM >60 03/11/2021    GLUCOSE 119 03/11/2021    GLUCOSE 90 09/02/2017    PROT 5.6 03/07/2021    CALCIUM 7.8 03/11/2021    BILITOT 0.34 03/07/2021    ALKPHOS 61 03/07/2021    AST 16 03/07/2021    ALT 10 03/07/2021       POC Tests:   Recent Labs     03/11/21  7711   POCGLU 117*       Coags:   Lab Results   Component Value Date    PROTIME 11.8 03/04/2021    INR 1.1 03/04/2021    APTT 26.2 03/03/2021       HCG (If Applicable): No results found for: PREGTESTUR, PREGSERUM, HCG, HCGQUANT     ABGs: No results found for: PHART, PO2ART, QVZ1EFH, DFE1ODG, BEART, A4GXIZYY     Type & Screen (If Applicable):  No results found for: LABABO, LABRH    Drug/Infectious Status (If Applicable):  Lab Results   Component Value Date    HEPCAB NONREACTIVE 10/27/2016       COVID-19 Screening (If Applicable):   Lab Results   Component Value Date    COVID19 Not Detected 03/03/2021         Anesthesia Evaluation  Patient summary reviewed no history of anesthetic complications:   Airway: Mallampati: II        Dental:          Pulmonary:normal exam    (+) asthma:                            Cardiovascular:Negative CV ROS                      Neuro/Psych:   (+) neuromuscular disease:, headaches: migraine headaches, psychiatric history:            GI/Hepatic/Renal: Neg GI/Hepatic/Renal ROS            Endo/Other:    (+) : arthritis:., .                 Abdominal:           Vascular: negative vascular ROS. Anesthesia Plan      general     ASA 3 - emergent       Induction: intravenous. MIPS: Postoperative opioids intended. Anesthetic plan and risks discussed with patient. Plan discussed with CRNA.                   Mikhail Aguilar MD   3/11/2021

## 2021-03-11 NOTE — CARE COORDINATION
Alfie Khan was evaluated today and a DME order was entered for a wheeled walker because she requires this to successfully complete daily living tasks of ambulating. A wheeled walker is necessary due to the patient's unsteady gait, upper body weakness, and inability to  an ambulation device; and she can ambulate only by pushing a walker instead of a lesser assistive device such as a cane, crutch, or standard walker. The need for this equipment was discussed with the patient and she understands and is in agreement. Alfie Khan will require the following home care treatments or therapies: RN/Wound care/ PT/OT. Home care will be necessary because of the patient has a open wound with drainage. The patient is in agreement to receiving home care.

## 2021-03-11 NOTE — PLAN OF CARE
Problem: Serum Glucose Level - Abnormal:  Goal: Ability to maintain appropriate glucose levels will improve to within specified parameters  Description: Ability to maintain appropriate glucose levels will improve to within specified parameters  Outcome: Met This Shift     Problem: Pain:  Goal: Control of acute pain  Description: Control of acute pain  Outcome: Ongoing     Problem: Bowel Function - Altered:  Goal: Bowel elimination is within specified parameters  Description: Bowel elimination is within specified parameters  Outcome: Ongoing     Problem: Fluid Volume - Imbalance:  Goal: Absence of imbalanced fluid volume signs and symptoms  Description: Absence of imbalanced fluid volume signs and symptoms  Outcome: Ongoing     Problem: Nutrition Deficit:  Goal: Ability to achieve adequate nutritional intake will improve  Description: Ability to achieve adequate nutritional intake will improve  Outcome: Ongoing     Problem: Skin Integrity - Impaired:  Goal: Will show no infection signs and symptoms  Description: Will show no infection signs and symptoms  Outcome: Ongoing     Problem: Sleep Pattern Disturbance:  Goal: Appears well-rested  Description: Appears well-rested  Outcome: Ongoing     Problem: Nutrition  Goal: Optimal nutrition therapy  Description: Nutrition Problem #1: Inadequate oral intake     Outcome: Ongoing  Note: Nutrition Problem #1: Inadequate oral intake  Intervention: Food and/or Nutrient Delivery: Continue Current Diet, Continue Oral Nutrition Supplement  Nutritional Goals: Meet % of estimated nutrition needs with PO intakes.      Problem: Skin Integrity:  Goal: Will show no infection signs and symptoms  Description: Will show no infection signs and symptoms  Outcome: Ongoing

## 2021-03-11 NOTE — PROGRESS NOTES
Physical Therapy  Facility/Department: [facility] 4B STEPDOWN  Daily Treatment Note  NAME: Shazia Justin  : 1970  MRN: [id number]    Date of Service: 3/11/2021    Discharge Recommendations:  Patient would benefit from continued therapy after discharge   PT Equipment Recommendations  Equipment Needed: (CTA, pt currently using RW for ambulation.)    Assessment   Body structures, Functions, Activity limitations: Increased pain;Decreased safe awareness;Decreased endurance;Decreased functional mobility ; Decreased strength;Decreased balance  Assessment: Pt ambulated 250ft with CGA/SBA with use of RW for increased support/safety per pt request. Pt steady with no LOB noted throughout ambulation. Would benefit from continued PT to address deficits and return to prior level of independence. Prognosis: Good  Decision Making: Medium Complexity  PT Education: Goals;Transfer Training;Functional Mobility Training;Plan of Care;General Safety;PT Role;Precautions;Gait Training  REQUIRES PT FOLLOW UP: Yes  Activity Tolerance  Activity Tolerance: Patient limited by pain; Patient Tolerated treatment well;Patient limited by fatigue     Patient Diagnosis(es): The encounter diagnosis was Acute gastric ulcer with perforation ([address] has a past medical history of Asthma, Connective tissue disease, undifferentiated ([address] DDD (degenerative disc disease), Depression, Lumbar radicular pain, Migraine, Mitral valve prolapse, and Opiate addiction ([address] has a past surgical history that includes tomy and bso (cervix removed) (); hernia repair (); Nose surgery (); Tubal ligation (); ovarian cyst removal (Left, ); Nerve Block (Right, 2015); Hysterectomy; CT ABSCESS DRAINAGE (2021); gastrectomy (2021); and laparotomy (N/A, 3/5/2021).     Restrictions  Restrictions/Precautions  Restrictions/Precautions: Fall Risk  Required Braces or Orthoses?: No  Required Braces or Orthoses  Other: Abdominal Binder  Position Activity Restriction  Other position/activity restrictions: Up as tolerated, abdominal incision/binder  Subjective   General  Chart Reviewed: Yes  Response To Previous Treatment: Patient with no complaints from previous session. Family / Caregiver Present: No  Subjective  Subjective: RN and pt agreeable to PT this morning. Pt awake in bed upon arrival with c/o 6/10 pain in abdomen. Very pleasant and cooperative throughout. General Comment  Comments: Pt retired to chair at end of session. Pain Screening  Patient Currently in Pain: Yes  Pain Assessment  Pain Assessment: 0-10  Pain Level: 6  Patient's Stated Pain Goal: No pain  Pain Type: Acute pain  Pain Location: Abdomen; Incision  Pain Orientation: Anterior  Vital Signs  Patient Currently in Pain: Yes       Orientation  Orientation  Overall Orientation Status: Within Functional Limits  Cognition   Cognition  Overall Cognitive Status: WFL  Objective   Bed mobility  Supine to Sit: Stand by assistance  Sit to Supine: (Pt retired to chair at end of session.)  Scooting: Stand by assistance  Comment: Log roll technique eith good return. Increased time/effort, HOB slightly elevated. Transfers  Sit to Stand: Stand by assistance  Stand to sit: Stand by assistance  Ambulation  Ambulation?: Yes  Ambulation 1  Surface: level tile  Device: Rolling Walker(Per pt request.)  Assistance: Contact guard assistance;Stand by assistance  Gait Deviations: Slow [place];Decreased step length;Decreased step height  Distance: 250ft  Comments: Pt requested to use RW for increased support/safety with ambulation, pt independent at baseline. Stairs/Curb  Stairs?: No     Balance  Posture: Good  Sitting - Static: Good  Sitting - Dynamic: Good;-  Standing - Static: +;Fair  Standing - Dynamic: Fair  Comments: standing balance assessed initially without AD. Exercises:  Seated LE exercise program: Long Arc Quads, hip abduction/adduction, heel/toe raises, and marches.  Reps: 10x each    Goals

## 2021-03-11 NOTE — CARE COORDINATION
Transitional Planning     Patient is pending medicaid; patient learning dressing changes at this time; may need HC to monitor wound. Referral sent to [facility]. 1055 spoke with Trey Mauro at [facility]; provided him with Medicaid application #.     [address] at [facility]; patient accepted.

## 2021-03-12 ENCOUNTER — APPOINTMENT (OUTPATIENT)
Dept: GENERAL RADIOLOGY | Age: 51
DRG: 220 | End: 2021-03-12
Payer: MEDICARE

## 2021-03-12 ENCOUNTER — APPOINTMENT (OUTPATIENT)
Dept: CT IMAGING | Age: 51
DRG: 220 | End: 2021-03-12
Payer: MEDICARE

## 2021-03-12 LAB
ABSOLUTE EOS #: 0.24 K/UL (ref 0–0.4)
ABSOLUTE IMMATURE GRANULOCYTE: 0 K/UL (ref 0–0.3)
ABSOLUTE LYMPH #: 1.89 K/UL (ref 1–4.8)
ABSOLUTE MONO #: 1.65 K/UL (ref 0.1–0.8)
ANION GAP SERPL CALCULATED.3IONS-SCNC: 9 MMOL/L (ref 9–17)
BASOPHILS # BLD: 0 % (ref 0–2)
BASOPHILS ABSOLUTE: 0 K/UL (ref 0–0.2)
BUN BLDV-MCNC: 7 MG/DL (ref 6–20)
BUN/CREAT BLD: ABNORMAL (ref 9–20)
CALCIUM SERPL-MCNC: 7.7 MG/DL (ref 8.6–10.4)
CHLORIDE BLD-SCNC: 97 MMOL/L (ref 98–107)
CO2: 29 MMOL/L (ref 20–31)
CREAT SERPL-MCNC: 0.54 MG/DL (ref 0.5–0.9)
DIFFERENTIAL TYPE: ABNORMAL
EOSINOPHILS RELATIVE PERCENT: 1 % (ref 1–4)
GFR AFRICAN AMERICAN: >60 ML/MIN
GFR NON-AFRICAN AMERICAN: >60 ML/MIN
GFR SERPL CREATININE-BSD FRML MDRD: ABNORMAL ML/MIN/{1.73_M2}
GFR SERPL CREATININE-BSD FRML MDRD: ABNORMAL ML/MIN/{1.73_M2}
GLUCOSE BLD-MCNC: 106 MG/DL (ref 70–99)
HCT VFR BLD CALC: 38.3 % (ref 36.3–47.1)
HEMOGLOBIN: 11.8 G/DL (ref 11.9–15.1)
IMMATURE GRANULOCYTES: 0 %
LYMPHOCYTES # BLD: 8 % (ref 24–44)
MCH RBC QN AUTO: 27.6 PG (ref 25.2–33.5)
MCHC RBC AUTO-ENTMCNC: 30.8 G/DL (ref 28.4–34.8)
MCV RBC AUTO: 89.5 FL (ref 82.6–102.9)
MONOCYTES # BLD: 7 % (ref 1–7)
MORPHOLOGY: NORMAL
NRBC AUTOMATED: 0 PER 100 WBC
PDW BLD-RTO: 13.4 % (ref 11.8–14.4)
PLATELET # BLD: 486 K/UL (ref 138–453)
PLATELET ESTIMATE: ABNORMAL
PMV BLD AUTO: 9.5 FL (ref 8.1–13.5)
POTASSIUM SERPL-SCNC: 4 MMOL/L (ref 3.7–5.3)
RBC # BLD: 4.28 M/UL (ref 3.95–5.11)
RBC # BLD: ABNORMAL 10*6/UL
SEG NEUTROPHILS: 84 % (ref 36–66)
SEGMENTED NEUTROPHILS ABSOLUTE COUNT: 19.82 K/UL (ref 1.8–7.7)
SODIUM BLD-SCNC: 135 MMOL/L (ref 135–144)
WBC # BLD: 23.6 K/UL (ref 3.5–11.3)
WBC # BLD: ABNORMAL 10*3/UL

## 2021-03-12 PROCEDURE — 2580000003 HC RX 258: Performed by: NURSE PRACTITIONER

## 2021-03-12 PROCEDURE — 6370000000 HC RX 637 (ALT 250 FOR IP): Performed by: STUDENT IN AN ORGANIZED HEALTH CARE EDUCATION/TRAINING PROGRAM

## 2021-03-12 PROCEDURE — 2500000003 HC RX 250 WO HCPCS: Performed by: STUDENT IN AN ORGANIZED HEALTH CARE EDUCATION/TRAINING PROGRAM

## 2021-03-12 PROCEDURE — 2580000003 HC RX 258: Performed by: EMERGENCY MEDICINE

## 2021-03-12 PROCEDURE — 94640 AIRWAY INHALATION TREATMENT: CPT

## 2021-03-12 PROCEDURE — 2700000000 HC OXYGEN THERAPY PER DAY

## 2021-03-12 PROCEDURE — 93005 ELECTROCARDIOGRAM TRACING: CPT | Performed by: NURSE PRACTITIONER

## 2021-03-12 PROCEDURE — 74018 RADEX ABDOMEN 1 VIEW: CPT

## 2021-03-12 PROCEDURE — 6360000002 HC RX W HCPCS: Performed by: STUDENT IN AN ORGANIZED HEALTH CARE EDUCATION/TRAINING PROGRAM

## 2021-03-12 PROCEDURE — 6360000004 HC RX CONTRAST MEDICATION: Performed by: NURSE PRACTITIONER

## 2021-03-12 PROCEDURE — 85025 COMPLETE CBC W/AUTO DIFF WBC: CPT

## 2021-03-12 PROCEDURE — 6370000000 HC RX 637 (ALT 250 FOR IP): Performed by: EMERGENCY MEDICINE

## 2021-03-12 PROCEDURE — 71045 X-RAY EXAM CHEST 1 VIEW: CPT

## 2021-03-12 PROCEDURE — 2060000000 HC ICU INTERMEDIATE R&B

## 2021-03-12 PROCEDURE — 6360000002 HC RX W HCPCS: Performed by: NURSE PRACTITIONER

## 2021-03-12 PROCEDURE — 74177 CT ABD & PELVIS W/CONTRAST: CPT

## 2021-03-12 PROCEDURE — 97530 THERAPEUTIC ACTIVITIES: CPT

## 2021-03-12 PROCEDURE — 2580000003 HC RX 258: Performed by: STUDENT IN AN ORGANIZED HEALTH CARE EDUCATION/TRAINING PROGRAM

## 2021-03-12 PROCEDURE — 97110 THERAPEUTIC EXERCISES: CPT

## 2021-03-12 PROCEDURE — 6370000000 HC RX 637 (ALT 250 FOR IP): Performed by: NURSE PRACTITIONER

## 2021-03-12 PROCEDURE — 36415 COLL VENOUS BLD VENIPUNCTURE: CPT

## 2021-03-12 PROCEDURE — 80048 BASIC METABOLIC PNL TOTAL CA: CPT

## 2021-03-12 RX ORDER — METHOCARBAMOL 750 MG/1
750 TABLET, FILM COATED ORAL 3 TIMES DAILY
Status: DISCONTINUED | OUTPATIENT
Start: 2021-03-12 | End: 2021-03-17

## 2021-03-12 RX ORDER — LIDOCAINE HYDROCHLORIDE 20 MG/ML
JELLY TOPICAL ONCE
Status: DISCONTINUED | OUTPATIENT
Start: 2021-03-12 | End: 2021-03-20

## 2021-03-12 RX ORDER — FENTANYL CITRATE 50 UG/ML
100 INJECTION, SOLUTION INTRAMUSCULAR; INTRAVENOUS
Status: DISCONTINUED | OUTPATIENT
Start: 2021-03-12 | End: 2021-03-16

## 2021-03-12 RX ORDER — LIDOCAINE 4 G/G
1 PATCH TOPICAL DAILY
Status: DISCONTINUED | OUTPATIENT
Start: 2021-03-12 | End: 2021-03-26 | Stop reason: HOSPADM

## 2021-03-12 RX ORDER — SODIUM CHLORIDE, SODIUM LACTATE, POTASSIUM CHLORIDE, AND CALCIUM CHLORIDE .6; .31; .03; .02 G/100ML; G/100ML; G/100ML; G/100ML
1000 INJECTION, SOLUTION INTRAVENOUS ONCE
Status: COMPLETED | OUTPATIENT
Start: 2021-03-12 | End: 2021-03-12

## 2021-03-12 RX ORDER — OXYMETAZOLINE HYDROCHLORIDE 0.05 G/100ML
2 SPRAY NASAL ONCE
Status: DISPENSED | OUTPATIENT
Start: 2021-03-12 | End: 2021-03-16

## 2021-03-12 RX ORDER — FENTANYL CITRATE 50 UG/ML
100 INJECTION, SOLUTION INTRAMUSCULAR; INTRAVENOUS
Status: DISCONTINUED | OUTPATIENT
Start: 2021-03-12 | End: 2021-03-12

## 2021-03-12 RX ORDER — KETOROLAC TROMETHAMINE 15 MG/ML
15 INJECTION, SOLUTION INTRAMUSCULAR; INTRAVENOUS EVERY 6 HOURS
Status: COMPLETED | OUTPATIENT
Start: 2021-03-12 | End: 2021-03-16

## 2021-03-12 RX ADMIN — ENOXAPARIN SODIUM 40 MG: 40 INJECTION SUBCUTANEOUS at 08:18

## 2021-03-12 RX ADMIN — SUCRALFATE 1 G: 1 TABLET ORAL at 08:18

## 2021-03-12 RX ADMIN — PIPERACILLIN AND TAZOBACTAM 4500 MG: 4; .5 INJECTION, POWDER, LYOPHILIZED, FOR SOLUTION INTRAVENOUS; PARENTERAL at 17:26

## 2021-03-12 RX ADMIN — FENTANYL CITRATE 100 MCG: 50 INJECTION, SOLUTION INTRAMUSCULAR; INTRAVENOUS at 04:38

## 2021-03-12 RX ADMIN — GABAPENTIN 600 MG: 300 CAPSULE ORAL at 15:18

## 2021-03-12 RX ADMIN — METHADONE HYDROCHLORIDE 140 MG: 5 SOLUTION ORAL at 08:18

## 2021-03-12 RX ADMIN — OXYCODONE HYDROCHLORIDE 10 MG: 5 TABLET ORAL at 16:22

## 2021-03-12 RX ADMIN — OXYCODONE HYDROCHLORIDE 10 MG: 5 TABLET ORAL at 12:11

## 2021-03-12 RX ADMIN — SUCRALFATE 1 G: 1 TABLET ORAL at 12:31

## 2021-03-12 RX ADMIN — PANTOPRAZOLE SODIUM 40 MG: 40 TABLET, DELAYED RELEASE ORAL at 06:28

## 2021-03-12 RX ADMIN — ACETAMINOPHEN 1000 MG: 500 TABLET ORAL at 15:19

## 2021-03-12 RX ADMIN — OXYCODONE HYDROCHLORIDE 10 MG: 5 TABLET ORAL at 02:33

## 2021-03-12 RX ADMIN — IPRATROPIUM BROMIDE AND ALBUTEROL SULFATE 1 AMPULE: .5; 3 SOLUTION RESPIRATORY (INHALATION) at 07:28

## 2021-03-12 RX ADMIN — METHOCARBAMOL TABLETS 750 MG: 750 TABLET, COATED ORAL at 15:18

## 2021-03-12 RX ADMIN — KETOROLAC TROMETHAMINE 15 MG: 15 INJECTION, SOLUTION INTRAMUSCULAR; INTRAVENOUS at 17:27

## 2021-03-12 RX ADMIN — SODIUM CHLORIDE, POTASSIUM CHLORIDE, SODIUM LACTATE AND CALCIUM CHLORIDE 1000 ML: 600; 310; 30; 20 INJECTION, SOLUTION INTRAVENOUS at 15:12

## 2021-03-12 RX ADMIN — CLINDAMYCIN IN 5 PERCENT DEXTROSE 900 MG: 18 INJECTION, SOLUTION INTRAVENOUS at 01:04

## 2021-03-12 RX ADMIN — FLUOXETINE HYDROCHLORIDE 40 MG: 20 CAPSULE ORAL at 08:17

## 2021-03-12 RX ADMIN — GABAPENTIN 600 MG: 300 CAPSULE ORAL at 06:28

## 2021-03-12 RX ADMIN — PANTOPRAZOLE SODIUM 40 MG: 40 TABLET, DELAYED RELEASE ORAL at 15:19

## 2021-03-12 RX ADMIN — SODIUM CHLORIDE, PRESERVATIVE FREE 10 ML: 5 INJECTION INTRAVENOUS at 21:57

## 2021-03-12 RX ADMIN — SODIUM CHLORIDE, POTASSIUM CHLORIDE, SODIUM LACTATE AND CALCIUM CHLORIDE: 600; 310; 30; 20 INJECTION, SOLUTION INTRAVENOUS at 08:17

## 2021-03-12 RX ADMIN — OXYCODONE HYDROCHLORIDE 10 MG: 5 TABLET ORAL at 06:28

## 2021-03-12 RX ADMIN — ACETAMINOPHEN 1000 MG: 500 TABLET ORAL at 08:21

## 2021-03-12 RX ADMIN — SUCRALFATE 1 G: 1 TABLET ORAL at 18:30

## 2021-03-12 RX ADMIN — FENTANYL CITRATE 100 MCG: 50 INJECTION, SOLUTION INTRAMUSCULAR; INTRAVENOUS at 00:17

## 2021-03-12 RX ADMIN — KETOROLAC TROMETHAMINE 15 MG: 15 INJECTION, SOLUTION INTRAMUSCULAR; INTRAVENOUS at 21:57

## 2021-03-12 RX ADMIN — CLINDAMYCIN IN 5 PERCENT DEXTROSE 900 MG: 18 INJECTION, SOLUTION INTRAVENOUS at 08:17

## 2021-03-12 RX ADMIN — METOCLOPRAMIDE 10 MG: 10 TABLET ORAL at 08:19

## 2021-03-12 RX ADMIN — METHOCARBAMOL TABLETS 750 MG: 750 TABLET, COATED ORAL at 08:18

## 2021-03-12 RX ADMIN — POTASSIUM BICARBONATE 40 MEQ: 782 TABLET, EFFERVESCENT ORAL at 08:18

## 2021-03-12 RX ADMIN — SUCRALFATE 1 G: 1 TABLET ORAL at 00:18

## 2021-03-12 RX ADMIN — IOPAMIDOL 75 ML: 755 INJECTION, SOLUTION INTRAVENOUS at 21:30

## 2021-03-12 RX ADMIN — FENTANYL CITRATE 100 MCG: 50 INJECTION, SOLUTION INTRAMUSCULAR; INTRAVENOUS at 15:12

## 2021-03-12 RX ADMIN — SODIUM CHLORIDE, POTASSIUM CHLORIDE, SODIUM LACTATE AND CALCIUM CHLORIDE: 600; 310; 30; 20 INJECTION, SOLUTION INTRAVENOUS at 16:22

## 2021-03-12 ASSESSMENT — PAIN SCALES - GENERAL
PAINLEVEL_OUTOF10: 9
PAINLEVEL_OUTOF10: 8
PAINLEVEL_OUTOF10: 9
PAINLEVEL_OUTOF10: 10

## 2021-03-12 ASSESSMENT — PAIN DESCRIPTION - PROGRESSION: CLINICAL_PROGRESSION: GRADUALLY WORSENING

## 2021-03-12 ASSESSMENT — PAIN DESCRIPTION - LOCATION: LOCATION: ABDOMEN

## 2021-03-12 NOTE — OP NOTE
2130   Tube Feeding Intake (mL) 209 ml 03/08/21 2130   Free Water Flush (mL) 30 mL 03/08/21 0345   Free Water Rate 100 03/08/21 2130   Residual Volume (ml) 0 ml 03/08/21 1536   Output (mL) 0 ml 03/07/21 0400       [REMOVED] Urethral Catheter Double-lumen;Straight-tip 16 fr (Removed)   Catheter Indications Perioperative use in selected surgeries including but not limited to urologic, pelvic or need for intraoperative monitoring of urinary output due to prolonged surgery, large volume infusion or need for diuretic therapy in surgery 03/07/21 0800   Site Assessment No urethral drainage 03/07/21 0800   Urine Color Yellow 03/07/21 0800   Urine Appearance Clear 03/07/21 0800   Output (mL) 225 mL 03/07/21 0955         Findings: Lower midline previous laparotomy incision site with fascial dehiscence, abdominal cavity interrogated and no small bowel injury or acute abnormalities noted aside for the dehiscence, abdominal washout and closure with figure-of-eight technique utilized, Betadine jacques placed in between loose staple skin closure.      Detailed Description of Procedure: Indication for procedure: This is 48 y.o. F with a history of connective tissue disorder who previously underwent a exploratory laparotomy and B2 reconstruction for a perforated gastric ulcer. Patient had a episode of bearing down and coughing today while on the toilet and felt a pop of her midline incision. Bowel was noted to be protruding from the incision site. Patient was discussed and recommended for having a exploratory laparotomy. This procedure, including risks, benefits, alternatives and complications have been fully reviewed with the pt and informed consent has been obtained. All questions were answered appropriately. Description of procedure:  Patient was wheeled to the OR suite and was positioned on the OR table in supine position. General anesthesia was started per anesthesia. IV Abx were given pre-operatively.   Time out was called. The was prepped and draped in sterile fashion. The inferior portion of the midline laparotomy site had noted to broken down and previous suture was exposed along with small bowel. The incision site was opened and a portion of the previous place PDS suture was removed. The midline laparotomy closure site From the Superior Portion Had Remained Intact and a Portion of the PDS suture from previously placed was grasped with a hemostat. The small bowel was interrogated and the abdomen was inspected with no gross signs of injury or complications. The abdominal cavity was irrigated copiously with sterile warm normal saline. No signs of bleeding or bowel injury were noted. The small bowel was returned to the peritoneal cavity. The decision was made to close the abdominal fascia with figure-of-eight 1-0 PDS suture. This was initiated at the more superior portion of the midline abdominal fascia where the previous PDS suture was noted to be retained. A figure-of-eight suture was placed and then tied down to the remaining superior abdominal fascial closure PDS suture. From this figure-of-eight 1-0 PDS suture was used in interrupted fashion to close the remaining abdominal fascia inferiorly. Retention sutures were placed at the level of the umbilicus and below, x3.  0 Ethibond suture was used along with red rubber catheters at the surface of the skin. The midline incision site was irrigated with warm normal saline. The skin was loosely approximated with skin staples and Betadine jacques placed in between the staples. All instrument, needle, and sponge counts were correct. Patient tolerated the procedure well without any complications. Patient was extubated and transferred to PACU in stable condition.       Dr. Isamar Yanes was present for the entirety of case.     Thank you,    Electronically signed by Sol Fitzpatrick DO on 3/11/2021 at 7:09 PM  Present throughout case.

## 2021-03-12 NOTE — PROGRESS NOTES
Comprehensive Nutrition Assessment    Type and Reason for Visit:  Reassess    Nutrition Recommendations/Plan: Continue current Clear Liquid diet. Will provide Ensure Clear Liquid oral supplements with meals. Encourage intakes and monitor for diet advancement as able/tolerated. Nutrition Assessment:  Pt with evisceration of her midline lower abdominal fascia yesterday. Taken back to the OR for ex lap, abdominal washout, and fascial closure. Pt NPO this morning but has been restarted on Clear Liquids. Will provide Clear Liquid oral supplements and monitor for diet tolerance/advancement. Discussed with RN. Labs/Meds reviewed. Malnutrition Assessment:  Malnutrition Status: At risk for malnutrition    Context:  Acute Illness       Estimated Daily Nutrient Needs:  Energy (kcal):  MSJ x 1.2-1.3 = 5269-7155 kcals/day; Weight Used for Energy Requirements:  Admission     Protein (g):  1.5 gm/kg = 90 gm pro/day; Weight Used for Protein Requirements:  Ideal          Nutrition Related Findings:  Labs/Meds reviewed. BM 3/10. Wounds:  Surgical Incision(to abdomen)       Current Nutrition Therapies:    DIET CLEAR LIQUID; Anthropometric Measures:  · Height: 5' 7\" (170.2 cm)  · Current Body Weight: 198 lb 6.6 oz (90 kg)   · Admission Body Weight: 205 lb (93 kg)    · Ideal Body Weight: 135 lbs; % Ideal Body Weight 147 %   · BMI: 31.1  · BMI Categories: Obese Class 1 (BMI 30.0-34. 9)       Nutrition Diagnosis:   · Inadequate oral intake related to altered GI function(GI surgeries) as evidenced by NPO or clear liquid status due to medical condition(recent restart of liquid diet, need for ONS)    Nutrition Interventions:   Food and/or Nutrient Delivery:  Continue Current Diet, Start Oral Nutrition Supplement(Provide Ensure Clear Liquid oral supplements with meals.   Monitor for diet advancement.)  Nutrition Education/Counseling:  No recommendation at this time   Coordination of Nutrition Care:  Continue to monitor while inpatient    Goals:  Meet % of estimated nutrition needs with PO intakes.        Nutrition Monitoring and Evaluation:   Food/Nutrient Intake Outcomes:  Diet Advancement/Tolerance, Food and Nutrient Intake, Supplement Intake  Physical Signs/Symptoms Outcomes:  Biochemical Data, GI Status, Hemodynamic Status, Nutrition Focused Physical Findings, Skin, Weight     Electronically signed by Adi Wood RD, GINGER on 3/12/21 at 12:52 PM EST    Contact: [phone]

## 2021-03-12 NOTE — ANESTHESIA POSTPROCEDURE EVALUATION
Department of Anesthesiology  Postprocedure Note    Patient: Dana Castelan  MRN: [id number]  [place]: 1970  Date of evaluation: 3/11/2021  Time:  7:31 PM     Procedure Summary     Date: 03/11/21 Room / Location: [address]    Anesthesia Start: 6591 Anesthesia Stop: 1843    Procedure: LAPAROTOMY EXPLORATORY, ABDOMINAL [address] OUT, FASCIAL CLOSURE  (N/A ) Diagnosis: (Evicieration)    Surgeons: Bulmaro Mallory MD Responsible Provider: Laure Clifton MD    Anesthesia Type: general ASA Status: 3 - Emergent          Anesthesia Type: general    Jamarcus Phase I: Jamarcus Score: 8    Jamarcus Phase II:      Last vitals: Reviewed and per EMR flowsheets.        Anesthesia Post Evaluation    Patient location during evaluation: PACU  Patient participation: complete - patient participated  Level of consciousness: awake  Pain score: 1  Airway patency: patent  Nausea & Vomiting: no nausea and no vomiting  Complications: no  Cardiovascular status: blood pressure returned to baseline and hemodynamically stable  Respiratory status: acceptable  Hydration status: euvolemic

## 2021-03-12 NOTE — PROGRESS NOTES
position/activity restrictions: Up as tolerated, abdominal incision/binder  Subjective   General  Response To Previous Treatment: Patient with no complaints from previous session. Subjective  Subjective: Pt and RN agreed to PT, pt supine in bed upon arrival, agitated d/t not being able to drink water. Abdominal binder donned upon arrival.  Pain Screening  Patient Currently in Pain: Yes  Pain Assessment  Pain Level: 7  Pain Location: Abdomen  Clinical Progression: Gradually worsening  Response to Pain Intervention: Drowsy  Vital Signs  Patient Currently in Pain: Yes       Orientation  Orientation  Overall Orientation Status: Within Functional Limits  Cognition      Objective   Bed mobility  Supine to Sit: Stand by assistance  Sit to Supine: Stand by assistance  Scooting: Stand by assistance  Transfers  Comment: Pt declined d/t pt surgery day before and stating she is in too much pain. Ambulation  Ambulation?: No     Balance  Posture: Good  Sitting - Static: Good  Sitting - Dynamic: Good;-  Comments: Pt sat EOB ~8' SBA. Increased abdomen pain with duration of sitting. Exercises  Comments: Supine Exercises: Ankle Pumps,  Heel Slides, Hip ABD/ADD, SLR. Reps:  x10, limited by pain      Goals  Short term goals  Time Frame for Short term goals: 14 visits  Short term goal 1: Supine to/from sit with SBA  Short term goal 2: Sit to/from stand with SBA  Short term goal 3: Ambulate 76' with walker with CGA. Short term goal 4: Negotiate up and down 4 steps with one railing with CGA. Plan    Plan  Times per week: 5-6x/wk  Current Treatment Recommendations: Strengthening, Gait Training, Patient/Caregiver Education & Training, Stair training, Pain Management, Functional Mobility Training, Endurance Training, Home Exercise Program, Transfer Training, Safety Education & Training  Safety Devices  Type of devices:  All fall risk precautions in place, Call light within reach, Nurse notified, Left in bed  Restraints  Initially in place: No     Therapy Time   Individual Concurrent Group Co-treatment   Time In 1106         Time Out 1129         Minutes 23         Timed Code Treatment Minutes: [address]    Treatment performed by Student PTA under the supervision of co-signing PTA who agrees with all treatment and documentation.    Mignon Cook PTA

## 2021-03-12 NOTE — PROGRESS NOTES
tolerating sips with meds    MILTON drain output not recorded overnight, SS fluid in bulb     OBJECTIVE  VITALS: Temp: Temp: 98.3 °F (36.8 °C)Temp  Av.7 °F (34.8 °C)  Min: 92.8 °F (33.8 °C)  Max: 99.2 °F (26.3 °C) BP Systolic (46UMP), XNX:666 , Min:60 , LSJ:810   Diastolic (86RDZ), JLL:34, Min:42, Max:114   Pulse Pulse  Av.6  Min: 96  Max: 129 Resp Resp  Av.1  Min: 0  Max: 22 Pulse ox SpO2  Av.2 %  Min: 92 %  Max: 100 %  GENERAL: alert, no distress  NEURO: Follows commands, no focal findings  HEENT: Normocephalic, EOMI  LUNGS: Equal chest rise and fall, nonlabored breathing  HEART: Tachycardic  ABDOMEN: soft, appropriately-tender, mild distention, surgical dressing dry and intact, no rebound, no guarding or peritoneal signs present, left-sided MILTON drain with serosanguineous output in bulb  EXTERMITY: no cyanosis, clubbing or edema    I/O last 3 completed shifts: In: 1250 [I.V.:1250]  Out: 3737 [Urine:1290; Blood:25]    Drain/tube output: In: 1250 [I.V.:1250]  Out: 1315 [Urine:1290]    LAB:  CBC:   Recent Labs     03/10/21  0621  0714 21  0613   WBC 14.7* 18.3* 23.6*   HGB 12.2 12.3 11.8*   HCT 40.3 40.4 38.3   MCV 90.8 93.5 89.5    486* 486*     BMP:   Recent Labs     03/10/21  0625 03/11/21  0714 21  0613    133* 135   K 3.9 4.0 4.0    99 97*   CO2 27 26 29   BUN 11 7 7   CREATININE 0.61 0.55 0.54   GLUCOSE 102* 119* 106*     COAGS: No results for input(s): APTT, PROT, INR in the last 72 hours. RADIOLOGY:       Wilfrido Paci, DO  3/12/21, 8:15 AM       Attending Note      I have reviewed the above [facility] note(s) and I either performed the key elements of the medical history and physical exam or was present with the surgical resident when the key elements of the medical history and physical exam were performed. I have discussed the findings, established the care plan and recommendations with the surgical team.  Wound intact, denies nausea.   Monitor po intake. Pain control.     Bulmaro Mallory MD  3/12/2021  6:18 PM

## 2021-03-13 LAB
ABSOLUTE EOS #: 0.45 K/UL (ref 0–0.44)
ABSOLUTE IMMATURE GRANULOCYTE: 0.23 K/UL (ref 0–0.3)
ABSOLUTE LYMPH #: 1.58 K/UL (ref 1.1–3.7)
ABSOLUTE MONO #: 1.58 K/UL (ref 0.1–1.2)
ANION GAP SERPL CALCULATED.3IONS-SCNC: 7 MMOL/L (ref 9–17)
BASOPHILS # BLD: 0 % (ref 0–2)
BASOPHILS ABSOLUTE: 0 K/UL (ref 0–0.2)
BUN BLDV-MCNC: 8 MG/DL (ref 6–20)
BUN/CREAT BLD: ABNORMAL (ref 9–20)
CALCIUM SERPL-MCNC: 7.7 MG/DL (ref 8.6–10.4)
CHLORIDE BLD-SCNC: 98 MMOL/L (ref 98–107)
CO2: 28 MMOL/L (ref 20–31)
CREAT SERPL-MCNC: 0.58 MG/DL (ref 0.5–0.9)
DIFFERENTIAL TYPE: ABNORMAL
EKG ATRIAL RATE: 120 BPM
EKG P AXIS: 36 DEGREES
EKG P-R INTERVAL: 148 MS
EKG Q-T INTERVAL: 330 MS
EKG QRS DURATION: 84 MS
EKG QTC CALCULATION (BAZETT): 466 MS
EKG R AXIS: -13 DEGREES
EKG T AXIS: 53 DEGREES
EKG VENTRICULAR RATE: 120 BPM
EOSINOPHILS RELATIVE PERCENT: 2 % (ref 1–4)
GFR AFRICAN AMERICAN: >60 ML/MIN
GFR NON-AFRICAN AMERICAN: >60 ML/MIN
GFR SERPL CREATININE-BSD FRML MDRD: ABNORMAL ML/MIN/{1.73_M2}
GFR SERPL CREATININE-BSD FRML MDRD: ABNORMAL ML/MIN/{1.73_M2}
GLUCOSE BLD-MCNC: 75 MG/DL (ref 70–99)
HCT VFR BLD CALC: 34.7 % (ref 36.3–47.1)
HEMOGLOBIN: 10.6 G/DL (ref 11.9–15.1)
IMMATURE GRANULOCYTES: 1 %
LACTIC ACID, WHOLE BLOOD: 0.6 MMOL/L (ref 0.7–2.1)
LYMPHOCYTES # BLD: 7 % (ref 24–43)
MCH RBC QN AUTO: 28.2 PG (ref 25.2–33.5)
MCHC RBC AUTO-ENTMCNC: 30.5 G/DL (ref 28.4–34.8)
MCV RBC AUTO: 92.3 FL (ref 82.6–102.9)
MONOCYTES # BLD: 7 % (ref 3–12)
MORPHOLOGY: NORMAL
NRBC AUTOMATED: 0 PER 100 WBC
PDW BLD-RTO: 13.3 % (ref 11.8–14.4)
PLATELET # BLD: 369 K/UL (ref 138–453)
PLATELET ESTIMATE: ABNORMAL
PMV BLD AUTO: 8.9 FL (ref 8.1–13.5)
POTASSIUM SERPL-SCNC: 3.9 MMOL/L (ref 3.7–5.3)
PROCALCITONIN: 0.14 NG/ML
RBC # BLD: 3.76 M/UL (ref 3.95–5.11)
RBC # BLD: ABNORMAL 10*6/UL
SEG NEUTROPHILS: 83 % (ref 36–65)
SEGMENTED NEUTROPHILS ABSOLUTE COUNT: 18.76 K/UL (ref 1.5–8.1)
SODIUM BLD-SCNC: 133 MMOL/L (ref 135–144)
WBC # BLD: 22.6 K/UL (ref 3.5–11.3)
WBC # BLD: ABNORMAL 10*3/UL

## 2021-03-13 PROCEDURE — 36415 COLL VENOUS BLD VENIPUNCTURE: CPT

## 2021-03-13 PROCEDURE — 83605 ASSAY OF LACTIC ACID: CPT

## 2021-03-13 PROCEDURE — 6370000000 HC RX 637 (ALT 250 FOR IP): Performed by: STUDENT IN AN ORGANIZED HEALTH CARE EDUCATION/TRAINING PROGRAM

## 2021-03-13 PROCEDURE — 94761 N-INVAS EAR/PLS OXIMETRY MLT: CPT

## 2021-03-13 PROCEDURE — 85025 COMPLETE CBC W/AUTO DIFF WBC: CPT

## 2021-03-13 PROCEDURE — 97110 THERAPEUTIC EXERCISES: CPT

## 2021-03-13 PROCEDURE — 2580000003 HC RX 258: Performed by: NURSE PRACTITIONER

## 2021-03-13 PROCEDURE — 6370000000 HC RX 637 (ALT 250 FOR IP): Performed by: EMERGENCY MEDICINE

## 2021-03-13 PROCEDURE — 2060000000 HC ICU INTERMEDIATE R&B

## 2021-03-13 PROCEDURE — 6370000000 HC RX 637 (ALT 250 FOR IP): Performed by: NURSE PRACTITIONER

## 2021-03-13 PROCEDURE — 80048 BASIC METABOLIC PNL TOTAL CA: CPT

## 2021-03-13 PROCEDURE — 84145 PROCALCITONIN (PCT): CPT

## 2021-03-13 PROCEDURE — 2700000000 HC OXYGEN THERAPY PER DAY

## 2021-03-13 PROCEDURE — 6360000002 HC RX W HCPCS: Performed by: STUDENT IN AN ORGANIZED HEALTH CARE EDUCATION/TRAINING PROGRAM

## 2021-03-13 PROCEDURE — 6360000002 HC RX W HCPCS: Performed by: NURSE PRACTITIONER

## 2021-03-13 PROCEDURE — 94640 AIRWAY INHALATION TREATMENT: CPT

## 2021-03-13 RX ORDER — BISACODYL 10 MG
10 SUPPOSITORY, RECTAL RECTAL ONCE
Status: COMPLETED | OUTPATIENT
Start: 2021-03-13 | End: 2021-03-13

## 2021-03-13 RX ADMIN — KETOROLAC TROMETHAMINE 15 MG: 15 INJECTION, SOLUTION INTRAMUSCULAR; INTRAVENOUS at 12:12

## 2021-03-13 RX ADMIN — OXYCODONE HYDROCHLORIDE 10 MG: 5 TABLET ORAL at 12:07

## 2021-03-13 RX ADMIN — METHOCARBAMOL TABLETS 750 MG: 750 TABLET, COATED ORAL at 15:30

## 2021-03-13 RX ADMIN — FENTANYL CITRATE 100 MCG: 50 INJECTION, SOLUTION INTRAMUSCULAR; INTRAVENOUS at 15:37

## 2021-03-13 RX ADMIN — FENTANYL CITRATE 100 MCG: 50 INJECTION, SOLUTION INTRAMUSCULAR; INTRAVENOUS at 10:56

## 2021-03-13 RX ADMIN — FENTANYL CITRATE 100 MCG: 50 INJECTION, SOLUTION INTRAMUSCULAR; INTRAVENOUS at 08:41

## 2021-03-13 RX ADMIN — SODIUM CHLORIDE, POTASSIUM CHLORIDE, SODIUM LACTATE AND CALCIUM CHLORIDE: 600; 310; 30; 20 INJECTION, SOLUTION INTRAVENOUS at 16:26

## 2021-03-13 RX ADMIN — ACETAMINOPHEN 1000 MG: 500 TABLET ORAL at 15:30

## 2021-03-13 RX ADMIN — FENTANYL CITRATE 100 MCG: 50 INJECTION, SOLUTION INTRAMUSCULAR; INTRAVENOUS at 04:15

## 2021-03-13 RX ADMIN — GABAPENTIN 600 MG: 300 CAPSULE ORAL at 21:36

## 2021-03-13 RX ADMIN — PIPERACILLIN AND TAZOBACTAM 4500 MG: 4; .5 INJECTION, POWDER, LYOPHILIZED, FOR SOLUTION INTRAVENOUS; PARENTERAL at 01:19

## 2021-03-13 RX ADMIN — FENTANYL CITRATE 100 MCG: 50 INJECTION, SOLUTION INTRAMUSCULAR; INTRAVENOUS at 13:10

## 2021-03-13 RX ADMIN — GABAPENTIN 600 MG: 300 CAPSULE ORAL at 15:31

## 2021-03-13 RX ADMIN — IPRATROPIUM BROMIDE AND ALBUTEROL SULFATE 1 AMPULE: .5; 3 SOLUTION RESPIRATORY (INHALATION) at 09:35

## 2021-03-13 RX ADMIN — KETOROLAC TROMETHAMINE 15 MG: 15 INJECTION, SOLUTION INTRAMUSCULAR; INTRAVENOUS at 04:53

## 2021-03-13 RX ADMIN — METHOCARBAMOL TABLETS 750 MG: 750 TABLET, COATED ORAL at 20:46

## 2021-03-13 RX ADMIN — FENTANYL CITRATE 100 MCG: 50 INJECTION, SOLUTION INTRAMUSCULAR; INTRAVENOUS at 00:48

## 2021-03-13 RX ADMIN — PIPERACILLIN AND TAZOBACTAM 4500 MG: 4; .5 INJECTION, POWDER, LYOPHILIZED, FOR SOLUTION INTRAVENOUS; PARENTERAL at 18:38

## 2021-03-13 RX ADMIN — BISACODYL 10 MG: 10 SUPPOSITORY RECTAL at 13:09

## 2021-03-13 RX ADMIN — OXYCODONE HYDROCHLORIDE 10 MG: 5 TABLET ORAL at 17:41

## 2021-03-13 RX ADMIN — FLUOXETINE HYDROCHLORIDE 40 MG: 20 CAPSULE ORAL at 15:32

## 2021-03-13 RX ADMIN — KETOROLAC TROMETHAMINE 15 MG: 15 INJECTION, SOLUTION INTRAMUSCULAR; INTRAVENOUS at 21:36

## 2021-03-13 RX ADMIN — KETOROLAC TROMETHAMINE 15 MG: 15 INJECTION, SOLUTION INTRAMUSCULAR; INTRAVENOUS at 18:38

## 2021-03-13 RX ADMIN — PIPERACILLIN AND TAZOBACTAM 4500 MG: 4; .5 INJECTION, POWDER, LYOPHILIZED, FOR SOLUTION INTRAVENOUS; PARENTERAL at 11:03

## 2021-03-13 RX ADMIN — OXYCODONE HYDROCHLORIDE 10 MG: 5 TABLET ORAL at 21:36

## 2021-03-13 RX ADMIN — IPRATROPIUM BROMIDE AND ALBUTEROL SULFATE 1 AMPULE: .5; 3 SOLUTION RESPIRATORY (INHALATION) at 20:41

## 2021-03-13 RX ADMIN — ENOXAPARIN SODIUM 40 MG: 40 INJECTION SUBCUTANEOUS at 11:00

## 2021-03-13 ASSESSMENT — PAIN SCALES - GENERAL
PAINLEVEL_OUTOF10: 9
PAINLEVEL_OUTOF10: 8
PAINLEVEL_OUTOF10: 8
PAINLEVEL_OUTOF10: 10
PAINLEVEL_OUTOF10: 9
PAINLEVEL_OUTOF10: 10
PAINLEVEL_OUTOF10: 9
PAINLEVEL_OUTOF10: 8

## 2021-03-13 ASSESSMENT — PAIN DESCRIPTION - ORIENTATION: ORIENTATION: MID

## 2021-03-13 NOTE — PROGRESS NOTES
PROGRESS NOTE    PATIENT NAME: Torrie [facility] RECORD NO. [id number]  DATE: 3/13/2021  SURGEON: Dr. Peterson Revering: Jerome Oliver MD    HD: # 9    ASSESSMENT    Patient Active Problem List   Diagnosis    Migraine    Opiate dependence ([facility] [address]    Chronic pain    Obesity    Patient overweight    Lumbar radicular pain    Lumbar disc disease    Closed nondisplaced fracture of fifth metatarsal bone of right foot    Intra-abdominal abscess ([facility] [address]     S/p 3/5/21 ex lap, abd wash out, antrectomy with BII reconstruction, MILTON drain placement x2  S/p 3/11/21 ex lap, abd washout and midline fascial closure with retention sutures     [facility]    1. Neuro- Analgesia multimodal pain therapy, wean from IV as able   2. CV- continue to monitor BP/HR, heath Hgb  3. Resp- continue to encourage incentive spirometry and deep breathing every hour while awake per RT and RN  4. GI- Diet  NPT, NGT to LIWS,  monitor for bowel function, Wound care dry dressing to midline with plain packing prn, strip MILTON drain and record output, suppository   5. Renal- heath UOP, accurate I/Os, replace electrolytes PRN, IVF maintenance, keep majano   6. Msk- continue to encourage ambulation/activity, PT/OT eval and Tx   7. ID- cont zosyn, heath WBC, heath for fevers  8. Ppx- Lovenox for DVT ppx, GI ppx protonix BID, Endo BG checks       SUBJECTIVE    Aneita Sanju Anton is slightly improved since yesterday. Patient remains somewhat tachycardic, abdominal pain is bothering her but the pain meds help somewhat. Patient denies any current nausea or emesis. Patient denies any fever or chills. Patient does admit to passing some gas and a loose BM this morning with no blood. Patient had CT scan performed last night which revealed no leak or issues with the GJ anastomosis, dilated cecum noted, small fluid collection noted upper abdomen.     MILTON drain 105ml, SS fluid in bulb     OBJECTIVE  VITALS: Temp: Temp: 97.7 °F (36.5 °C)Temp  Av.3 °F (36.8 °C)  Min: 96.9 °F (36.1 °C)  Max: 99.8 °F (69.3 °C) BP Systolic (11LMF), AUI:741 , Min:105 , AXC:841   Diastolic (72GMM), ZUB:73, Min:67, Max:99   Pulse Pulse  Av.2  Min: 110  Max: 124 Resp Resp  Av.2  Min: 15  Max: 19 Pulse ox SpO2  Av.4 %  Min: 92 %  Max: 98 %  GENERAL: alert, no distress  NEURO: Follows commands, no focal findings  HEENT: Normocephalic, EOMI  LUNGS: Equal chest rise and fall, nonlabored breathing  HEART: Tachycardic  ABDOMEN: soft, appropriately-tender, mild distention, surgical dressing dry and intact, no rebound, no guarding or peritoneal signs present, left-sided MILTON drain with serosanguineous output in bulb  EXTERMITY: no cyanosis, clubbing or edema    I/O last 3 completed shifts: In: 1021 [P.O.:550; I.V.:3620; IV Piggyback:100]  Out: 9716 [Urine:1585; Drains:50]    Drain/tube output: In: 2065 [P.O.:550; I.V.:3620]  Out: 1355 [Urine:1175; Drains:180]    LAB:  CBC:   Recent Labs     21  0714 21  0613 21  0518   WBC 18.3* 23.6* 22.6*   HGB 12.3 11.8* 10.6*   HCT 40.4 38.3 34.7*   MCV 93.5 89.5 92.3   * 486* 369     BMP:   Recent Labs     21  0714 21  0613 21  0518   * 135 133*   K 4.0 4.0 3.9   CL 99 97* 98   CO2 26 29 28   BUN 7 7 8   CREATININE 0.55 0.54 0.58   GLUCOSE 119* 106* 75     COAGS: No results for input(s): APTT, PROT, INR in the last 72 hours.     RADIOLOGY:       Zane Mccauley DO  3/12/21, 2:01 PM

## 2021-03-13 NOTE — PROGRESS NOTES
Physical Therapy  Facility/Department: [address] STEPDOWN  Daily Treatment Note  NAME: Uriel Zurita  : 1970  MRN: [id number]    Date of Service: 3/13/2021    Discharge Recommendations:  Patient would benefit from continued therapy after discharge   PT Equipment Recommendations  Equipment Needed: (CTA, pt currently using RW with ambulation.)    Assessment   Body structures, Functions, Activity limitations: Increased pain;Decreased safe awareness;Decreased endurance;Decreased functional mobility ; Decreased strength;Decreased balance  Assessment: Pt in a lot of pain today declining any EOB/OOB activity but agreeable to exercises in bed. Pt performed BLE exercises today, limited by increased pain. Would benefit from continued PT to address deficits and return to prior level of independence. Prognosis: Good  PT Education: Goals;Transfer Training;Functional Mobility Training;Plan of Care;General Safety;PT Role;Precautions;Gait Training  REQUIRES PT FOLLOW UP: Yes  Activity Tolerance  Activity Tolerance: Patient limited by pain; Patient limited by fatigue     Patient Diagnosis(es): The encounter diagnosis was Acute gastric ulcer with perforation ([facility] [address] has a past medical history of Asthma, Connective tissue disease, undifferentiated ([address] DDD (degenerative disc disease), Depression, Lumbar radicular pain, Migraine, Mitral valve prolapse, and Opiate addiction ([address] has a past surgical history that includes tomy and bso (cervix removed) (); hernia repair (); Nose surgery (); Tubal ligation (); ovarian cyst removal (Left, ); Nerve Block (Right, 2015); Hysterectomy; CT ABSCESS DRAINAGE (2021); gastrectomy (2021); laparotomy (N/A, 3/5/2021); Abdomen surgery (2021); and laparotomy (N/A, 3/11/2021).     Restrictions  Restrictions/Precautions  Restrictions/Precautions: Fall Risk  Required Braces or Orthoses?: No  Required Braces or Orthoses  Other: Abdominal Binder  Position Activity Restriction  Other position/activity restrictions: Up as tolerated, abdominal incision/binder  Subjective   General  Chart Reviewed: Yes  Response To Previous Treatment: Patient with no complaints from previous session. Family / Caregiver Present: No  Subjective  Subjective: RN agreeable to PT this morning. Pt resting in bed upon arrival with c/o 10/10 pain. Pt agreeable to try some exercises in bed, but not wanting to perform any further functional mobilty. Pain Screening  Patient Currently in Pain: Yes  Pain Assessment  Pain Assessment: 0-10  Pain Level: 10  Pain Type: Chronic pain;Surgical pain  Pain Location: Abdomen  Pain Orientation: Mid  Vital Signs  Patient Currently in Pain: Yes       Orientation  Orientation  Overall Orientation Status: Within Functional Limits  Cognition   Cognition  Overall Cognitive Status: WFL  Objective   Bed mobility  Supine to Sit: Unable to assess  Sit to Supine: Unable to assess  Comment: ROBERTA today d/t pt's increased pain and wanting to stay in bed. Transfers  Sit to Stand: Unable to assess  Stand to sit: Unable to assess  Comment: Pt declined EOB/OOB activity. Ambulation  Ambulation?: No  Stairs/Curb  Stairs?: No     Balance  Comments: ROBERTA today d/t pt wanting to remain in bed. Exercises:  Supine Exercises: Ankle Pumps, Gluteal sets, Heel Slides, Hip ABD/ADD, Quad Sets, SAQ, SLR. Reps: 15x each    Goals  Short term goals  Time Frame for Short term goals: 14 visits  Short term goal 1: Supine to/from sit with SBA  Short term goal 2: Sit to/from stand with SBA  Short term goal 3: Ambulate 76' with walker with CGA. Short term goal 4: Negotiate up and down 4 steps with one railing with CGA.     Plan    Plan  Times per week: 5-6x/wk  Current Treatment Recommendations: Strengthening, Gait Training, Patient/Caregiver Education & Training, Stair training, Pain Management, Functional Mobility Training, Endurance Training, Home Exercise Program, Transfer Training, Safety Education & Training  Safety Devices  Type of devices:  All fall risk precautions in place, Call light within reach, Nurse notified, Left in bed  Restraints  Initially in place: No     Therapy Time   Individual Concurrent Group Co-treatment   Time In 1045         Time Out 1100         Minutes 15         Timed Code Treatment Minutes: Isaac 46, PTA

## 2021-03-13 NOTE — PLAN OF CARE
Problem: Pain:  Goal: Pain level will decrease  Description: Pain level will decrease  Outcome: Ongoing  Goal: Control of acute pain  Description: Control of acute pain  Outcome: Ongoing  Goal: Control of chronic pain  Description: Control of chronic pain  Outcome: Ongoing     Problem: Anxiety/Stress:  Goal: Level of anxiety will decrease  Description: Level of anxiety will decrease  Outcome: Ongoing     Problem: Fluid Volume - Imbalance:  Goal: Absence of imbalanced fluid volume signs and symptoms  Description: Absence of imbalanced fluid volume signs and symptoms  Outcome: Ongoing     Problem: Nutrition Deficit:  Goal: Ability to achieve adequate nutritional intake will improve  Description: Ability to achieve adequate nutritional intake will improve  Outcome: Ongoing

## 2021-03-14 LAB
ABSOLUTE EOS #: 0.47 K/UL (ref 0–0.44)
ABSOLUTE IMMATURE GRANULOCYTE: 0.1 K/UL (ref 0–0.3)
ABSOLUTE LYMPH #: 1.11 K/UL (ref 1.1–3.7)
ABSOLUTE MONO #: 1.21 K/UL (ref 0.1–1.2)
ANION GAP SERPL CALCULATED.3IONS-SCNC: 11 MMOL/L (ref 9–17)
BASOPHILS # BLD: 0 % (ref 0–2)
BASOPHILS ABSOLUTE: 0.06 K/UL (ref 0–0.2)
BUN BLDV-MCNC: 9 MG/DL (ref 6–20)
BUN/CREAT BLD: ABNORMAL (ref 9–20)
CALCIUM SERPL-MCNC: 7.7 MG/DL (ref 8.6–10.4)
CHLORIDE BLD-SCNC: 98 MMOL/L (ref 98–107)
CO2: 25 MMOL/L (ref 20–31)
CREAT SERPL-MCNC: 0.48 MG/DL (ref 0.5–0.9)
DIFFERENTIAL TYPE: ABNORMAL
EOSINOPHILS RELATIVE PERCENT: 3 % (ref 1–4)
GFR AFRICAN AMERICAN: >60 ML/MIN
GFR NON-AFRICAN AMERICAN: >60 ML/MIN
GFR SERPL CREATININE-BSD FRML MDRD: ABNORMAL ML/MIN/{1.73_M2}
GFR SERPL CREATININE-BSD FRML MDRD: ABNORMAL ML/MIN/{1.73_M2}
GLUCOSE BLD-MCNC: 150 MG/DL (ref 65–105)
GLUCOSE BLD-MCNC: 54 MG/DL (ref 70–99)
GLUCOSE BLD-MCNC: 59 MG/DL (ref 65–105)
HCT VFR BLD CALC: 33.8 % (ref 36.3–47.1)
HEMOGLOBIN: 10.2 G/DL (ref 11.9–15.1)
IMMATURE GRANULOCYTES: 1 %
LYMPHOCYTES # BLD: 7 % (ref 24–43)
MCH RBC QN AUTO: 27.7 PG (ref 25.2–33.5)
MCHC RBC AUTO-ENTMCNC: 30.2 G/DL (ref 28.4–34.8)
MCV RBC AUTO: 91.8 FL (ref 82.6–102.9)
MONOCYTES # BLD: 8 % (ref 3–12)
NRBC AUTOMATED: 0 PER 100 WBC
PDW BLD-RTO: 13.1 % (ref 11.8–14.4)
PLATELET # BLD: 413 K/UL (ref 138–453)
PLATELET ESTIMATE: ABNORMAL
PMV BLD AUTO: 9.3 FL (ref 8.1–13.5)
POTASSIUM SERPL-SCNC: 3.5 MMOL/L (ref 3.7–5.3)
RBC # BLD: 3.68 M/UL (ref 3.95–5.11)
RBC # BLD: ABNORMAL 10*6/UL
SEG NEUTROPHILS: 81 % (ref 36–65)
SEGMENTED NEUTROPHILS ABSOLUTE COUNT: 12.91 K/UL (ref 1.5–8.1)
SODIUM BLD-SCNC: 134 MMOL/L (ref 135–144)
WBC # BLD: 15.9 K/UL (ref 3.5–11.3)
WBC # BLD: ABNORMAL 10*3/UL

## 2021-03-14 PROCEDURE — 2580000003 HC RX 258: Performed by: NURSE PRACTITIONER

## 2021-03-14 PROCEDURE — 94640 AIRWAY INHALATION TREATMENT: CPT

## 2021-03-14 PROCEDURE — 2060000000 HC ICU INTERMEDIATE R&B

## 2021-03-14 PROCEDURE — 85025 COMPLETE CBC W/AUTO DIFF WBC: CPT

## 2021-03-14 PROCEDURE — 6370000000 HC RX 637 (ALT 250 FOR IP): Performed by: STUDENT IN AN ORGANIZED HEALTH CARE EDUCATION/TRAINING PROGRAM

## 2021-03-14 PROCEDURE — 6370000000 HC RX 637 (ALT 250 FOR IP): Performed by: NURSE PRACTITIONER

## 2021-03-14 PROCEDURE — 2580000003 HC RX 258

## 2021-03-14 PROCEDURE — 2580000003 HC RX 258: Performed by: STUDENT IN AN ORGANIZED HEALTH CARE EDUCATION/TRAINING PROGRAM

## 2021-03-14 PROCEDURE — 97116 GAIT TRAINING THERAPY: CPT

## 2021-03-14 PROCEDURE — 6360000002 HC RX W HCPCS: Performed by: NURSE PRACTITIONER

## 2021-03-14 PROCEDURE — 94761 N-INVAS EAR/PLS OXIMETRY MLT: CPT

## 2021-03-14 PROCEDURE — 36415 COLL VENOUS BLD VENIPUNCTURE: CPT

## 2021-03-14 PROCEDURE — 2700000000 HC OXYGEN THERAPY PER DAY

## 2021-03-14 PROCEDURE — 6360000002 HC RX W HCPCS: Performed by: STUDENT IN AN ORGANIZED HEALTH CARE EDUCATION/TRAINING PROGRAM

## 2021-03-14 PROCEDURE — 6370000000 HC RX 637 (ALT 250 FOR IP): Performed by: EMERGENCY MEDICINE

## 2021-03-14 PROCEDURE — 80048 BASIC METABOLIC PNL TOTAL CA: CPT

## 2021-03-14 PROCEDURE — 97110 THERAPEUTIC EXERCISES: CPT

## 2021-03-14 PROCEDURE — 82947 ASSAY GLUCOSE BLOOD QUANT: CPT

## 2021-03-14 RX ORDER — DEXTROSE MONOHYDRATE 25 G/50ML
INJECTION, SOLUTION INTRAVENOUS
Status: COMPLETED
Start: 2021-03-14 | End: 2021-03-14

## 2021-03-14 RX ORDER — DEXTROSE MONOHYDRATE 25 G/50ML
25 INJECTION, SOLUTION INTRAVENOUS ONCE
Status: COMPLETED | OUTPATIENT
Start: 2021-03-14 | End: 2021-03-14

## 2021-03-14 RX ADMIN — METHOCARBAMOL TABLETS 750 MG: 750 TABLET, COATED ORAL at 08:45

## 2021-03-14 RX ADMIN — DEXTROSE MONOHYDRATE: 500 INJECTION PARENTERAL at 07:29

## 2021-03-14 RX ADMIN — ACETAMINOPHEN 1000 MG: 500 TABLET ORAL at 23:38

## 2021-03-14 RX ADMIN — METHOCARBAMOL TABLETS 750 MG: 750 TABLET, COATED ORAL at 12:51

## 2021-03-14 RX ADMIN — SUCRALFATE 1 G: 1 TABLET ORAL at 06:01

## 2021-03-14 RX ADMIN — METHOCARBAMOL TABLETS 750 MG: 750 TABLET, COATED ORAL at 21:36

## 2021-03-14 RX ADMIN — OXYCODONE HYDROCHLORIDE 10 MG: 5 TABLET ORAL at 09:08

## 2021-03-14 RX ADMIN — SUCRALFATE 1 G: 1 TABLET ORAL at 23:38

## 2021-03-14 RX ADMIN — GABAPENTIN 600 MG: 300 CAPSULE ORAL at 21:36

## 2021-03-14 RX ADMIN — KETOROLAC TROMETHAMINE 15 MG: 15 INJECTION, SOLUTION INTRAMUSCULAR; INTRAVENOUS at 09:09

## 2021-03-14 RX ADMIN — KETOROLAC TROMETHAMINE 15 MG: 15 INJECTION, SOLUTION INTRAMUSCULAR; INTRAVENOUS at 15:33

## 2021-03-14 RX ADMIN — SUCRALFATE 1 G: 1 TABLET ORAL at 12:51

## 2021-03-14 RX ADMIN — KETOROLAC TROMETHAMINE 15 MG: 15 INJECTION, SOLUTION INTRAMUSCULAR; INTRAVENOUS at 06:02

## 2021-03-14 RX ADMIN — PIPERACILLIN AND TAZOBACTAM 4500 MG: 4; .5 INJECTION, POWDER, LYOPHILIZED, FOR SOLUTION INTRAVENOUS; PARENTERAL at 17:34

## 2021-03-14 RX ADMIN — PIPERACILLIN AND TAZOBACTAM 4500 MG: 4; .5 INJECTION, POWDER, LYOPHILIZED, FOR SOLUTION INTRAVENOUS; PARENTERAL at 08:45

## 2021-03-14 RX ADMIN — PANTOPRAZOLE SODIUM 40 MG: 40 TABLET, DELAYED RELEASE ORAL at 15:33

## 2021-03-14 RX ADMIN — IPRATROPIUM BROMIDE AND ALBUTEROL SULFATE 1 AMPULE: .5; 3 SOLUTION RESPIRATORY (INHALATION) at 08:23

## 2021-03-14 RX ADMIN — ACETAMINOPHEN 1000 MG: 500 TABLET ORAL at 00:24

## 2021-03-14 RX ADMIN — OXYCODONE HYDROCHLORIDE 10 MG: 5 TABLET ORAL at 17:33

## 2021-03-14 RX ADMIN — OXYCODONE HYDROCHLORIDE 10 MG: 5 TABLET ORAL at 21:36

## 2021-03-14 RX ADMIN — OXYCODONE HYDROCHLORIDE 10 MG: 5 TABLET ORAL at 12:52

## 2021-03-14 RX ADMIN — PANTOPRAZOLE SODIUM 40 MG: 40 TABLET, DELAYED RELEASE ORAL at 06:01

## 2021-03-14 RX ADMIN — PIPERACILLIN AND TAZOBACTAM 4500 MG: 4; .5 INJECTION, POWDER, LYOPHILIZED, FOR SOLUTION INTRAVENOUS; PARENTERAL at 00:24

## 2021-03-14 RX ADMIN — GABAPENTIN 600 MG: 300 CAPSULE ORAL at 06:03

## 2021-03-14 RX ADMIN — FLUOXETINE HYDROCHLORIDE 40 MG: 20 CAPSULE ORAL at 08:47

## 2021-03-14 RX ADMIN — OXYCODONE HYDROCHLORIDE 10 MG: 5 TABLET ORAL at 04:12

## 2021-03-14 RX ADMIN — SUCRALFATE 1 G: 1 TABLET ORAL at 00:24

## 2021-03-14 RX ADMIN — DEXTROSE MONOHYDRATE 25 G: 25 INJECTION, SOLUTION INTRAVENOUS at 07:28

## 2021-03-14 RX ADMIN — IPRATROPIUM BROMIDE AND ALBUTEROL SULFATE 1 AMPULE: .5; 3 SOLUTION RESPIRATORY (INHALATION) at 20:49

## 2021-03-14 RX ADMIN — SUCRALFATE 1 G: 1 TABLET ORAL at 17:33

## 2021-03-14 RX ADMIN — GABAPENTIN 600 MG: 300 CAPSULE ORAL at 12:51

## 2021-03-14 RX ADMIN — ENOXAPARIN SODIUM 40 MG: 40 INJECTION SUBCUTANEOUS at 08:47

## 2021-03-14 RX ADMIN — POTASSIUM BICARBONATE 40 MEQ: 782 TABLET, EFFERVESCENT ORAL at 08:46

## 2021-03-14 RX ADMIN — ACETAMINOPHEN 1000 MG: 500 TABLET ORAL at 15:32

## 2021-03-14 RX ADMIN — KETOROLAC TROMETHAMINE 15 MG: 15 INJECTION, SOLUTION INTRAMUSCULAR; INTRAVENOUS at 21:36

## 2021-03-14 ASSESSMENT — PAIN SCALES - GENERAL
PAINLEVEL_OUTOF10: 8
PAINLEVEL_OUTOF10: 8
PAINLEVEL_OUTOF10: 9
PAINLEVEL_OUTOF10: 9
PAINLEVEL_OUTOF10: 3
PAINLEVEL_OUTOF10: 8
PAINLEVEL_OUTOF10: 9
PAINLEVEL_OUTOF10: 8
PAINLEVEL_OUTOF10: 10

## 2021-03-14 ASSESSMENT — PAIN DESCRIPTION - LOCATION: LOCATION: ABDOMEN

## 2021-03-14 ASSESSMENT — PAIN DESCRIPTION - PAIN TYPE: TYPE: CHRONIC PAIN;SURGICAL PAIN

## 2021-03-14 ASSESSMENT — PAIN DESCRIPTION - DESCRIPTORS: DESCRIPTORS: CRAMPING;ACHING

## 2021-03-14 ASSESSMENT — PAIN - FUNCTIONAL ASSESSMENT: PAIN_FUNCTIONAL_ASSESSMENT: PREVENTS OR INTERFERES SOME ACTIVE ACTIVITIES AND ADLS

## 2021-03-14 ASSESSMENT — PAIN DESCRIPTION - FREQUENCY: FREQUENCY: CONTINUOUS

## 2021-03-14 NOTE — PROGRESS NOTES
PROGRESS NOTE    [id number]  DATE: 3/14/2021  SURGEON: Dr. Rivas Solid: Jaye Kussmaul, MD    HD: # 10    ASSESSMENT    Patient Active Problem List   Diagnosis    Migraine    Opiate dependence ([facility] [address]    Chronic pain    Obesity    Patient overweight    Lumbar radicular pain    Lumbar disc disease    Closed nondisplaced fracture of fifth metatarsal bone of right foot    Intra-abdominal abscess ([facility] [address]     S/p 3/5/21 ex lap, abd wash out, antrectomy with BII reconstruction, MILTON drain placement x2  S/p 3/11/21 ex lap, abd washout and midline fascial closure with retention sutures     [facility]    1. Neuro- Analgesia multimodal pain therapy, wean from IV as able   2. CV- continue to monitor BP/HR, heath Hgb  3. Resp- continue to encourage incentive spirometry and deep breathing every hour while awake per RT and RN  4. GI-begin CLD, Remove NGT  5. Wound care: Remove Betadine packing today, repacked with iodoform, dry dressing to midline, strip MILTON drain and record output, suppository. RN to continue dressing changes  6. Renal-remove Alvarez today, continue to monitor I&Os, urinary retention   7. Msk- continue to encourage ambulation/activity, PT/OT eval and Tx   8. ID- cont zosyn day 3, heath WBC, heath for fevers. Leukocytosis improving, 15.9 from 22.6  9. Ppx- Lovenox for DVT ppx, GI ppx protonix BID, Endo BG checks       SUBJECTIVE    Patient seen and examined at bedside, no overnight events. Patient reports mild to moderate abdominal pain, controlled currently on MMT. Had to [facility] in the last 24 hours. Currently n.p.o. with NGT to suction. Denies CP, S OB, N/V, C/F. Reports she has an appetite. Light yellow urine in Alvarez. Afebrile, T-max 37. 3.    MILTON drain 315ml, SS fluid in bulb     OBJECTIVE  VITALS: Temp: Temp: 98.6 °F (37 °C)Temp  Av °F (36.7 °C)  Min: 96.9 °F (36.1 °C)  Max: 99.2 °F (65.2 °C) BP Systolic (24hrs), Av , Min:124 , SMV:550   Diastolic (22ZWV), XHH:85, Min:80, Max:99   Pulse Pulse  Av.4  Min: 110  Max: 120 Resp Resp  Av.6  Min: 14  Max: 19 Pulse ox SpO2  Av %  Min: 92 %  Max: 98 %  GENERAL: alert, no distress  NEURO: Follows commands, no focal findings  HEENT: Normocephalic, EOMI  LUNGS: Equal chest rise and fall, nonlabored breathing on 2L NC  HEART: tachycardic  ABDOMEN: soft, appropriately-tender, mild distention, surgical dressing dry and intact, no rebound, no guarding or peritoneal signs present, left-sided MILTON drain with serosanguineous output in bulb  EXTERMITY: no cyanosis, clubbing or edema    I/O last 3 completed shifts: In: 3095 [P.O.:175; I.V.:2920]  Out: 8243 [Urine:1200; Drains:215]    Drain/tube output: In: 175 [P.O.:175]  Out: 940 [Urine:625; Drains:315]    LAB:  CBC:   Recent Labs     21  0714 21  0613 21  0518   WBC 18.3* 23.6* 22.6*   HGB 12.3 11.8* 10.6*   HCT 40.4 38.3 34.7*   MCV 93.5 89.5 92.3   * 486* 369     BMP:   Recent Labs     21  0714 21  0613 21  0518   * 135 133*   K 4.0 4.0 3.9   CL 99 97* 98   CO2 26 29 28   BUN 7 7 8   CREATININE 0.55 0.54 0.58   GLUCOSE 119* 106* 75     COAGS: No results for input(s): APTT, PROT, INR in the last 72 hours. RADIOLOGY:   Ct Abdomen Pelvis W Iv Contrast Additional Contrast? Oral    Result Date: 3/12/2021  1. Near complete resolution of the larger fluid collection with only a 1.4 x 1.0 cm fluid collection remaining in the left abdomen. 2. Persistent small volume ascites and pneumoperitoneum. 3. Marked colonic distension without discrete transition point. Oral contrast is noted throughout the colon and rectum. Findings are most suggestive of ileus. 4. Mild distention of the common bile duct without obstructing stone or lesion identified. 5. Findings suggestive of left UPJ obstruction with left renal cortical atrophy, unchanged.      Xr Chest Portable    Result Date: 3/12/2021  Mild left basilar atelectasis     Xr Abdomen For Ng/og/ne Tube Placement    Result Date: 3/13/2021  1. Enteric tube terminates in the stomach. 2. Additional findings as above, similar to same day CT.        Gregg Tamayo DO  3/12/21, 6:43 AM

## 2021-03-14 NOTE — PROGRESS NOTES
Physical Therapy  Facility/Department: [facility] 4B STEPDOWN  Daily Treatment Note  NAME: Ramu Galarza  : 1970  MRN: [id number]    Date of Service: 3/14/2021    Discharge Recommendations:  Patient would benefit from continued therapy after discharge   PT Equipment Recommendations  Mobility Devices: Shayna Avery: Rolling    Assessment   Body structures, Functions, Activity limitations: Increased pain;Decreased safe awareness;Decreased endurance;Decreased functional mobility ; Decreased strength;Decreased balance  Assessment: Pt amb 5ft x2 without AD requiring  CGA, limited by increased pain. Would benefit from continued PT to address deficits and return to prior level of independence. Prognosis: Good  PT Education: Goals;Transfer Training;Functional Mobility Training;Plan of Care;General Safety;PT Role;Precautions;Gait Training  REQUIRES PT FOLLOW UP: Yes  Activity Tolerance  Activity Tolerance: Patient limited by pain; Patient limited by fatigue;Patient limited by endurance     Patient Diagnosis(es): The encounter diagnosis was Acute gastric ulcer with perforation ([facility] [address] has a past medical history of Asthma, Connective tissue disease, undifferentiated ([address] DDD (degenerative disc disease), Depression, Lumbar radicular pain, Migraine, Mitral valve prolapse, and Opiate addiction ([address] has a past surgical history that includes tomy and bso (cervix removed) (); hernia repair (); Nose surgery (); Tubal ligation (); ovarian cyst removal (Left, ); Nerve Block (Right, 2015); Hysterectomy; CT ABSCESS DRAINAGE (2021); gastrectomy (2021); laparotomy (N/A, 3/5/2021); Abdomen surgery (2021); and laparotomy (N/A, 3/11/2021).     Restrictions  Restrictions/Precautions  Restrictions/Precautions: Fall Risk  Required Braces or Orthoses?: No  Required Braces or Orthoses  Other: Abdominal Binder  Position Activity Restriction  Other position/activity restrictions: Up as tolerated, abdominal incision/binder  Subjective   General  Chart Reviewed: Yes  Response To Previous Treatment: Patient with no complaints from previous session. Family / Caregiver Present: No  Subjective  Subjective: RN agreeable to PT this morning. Pt resting in bed upon arrival with c/o 10/10 pain  with mobility. .  Pain Screening  Patient Currently in Pain: Yes  Pain Assessment  Pain Level: 10  Pain Type: Surgical pain  Pain Location: Abdomen  Pain Orientation: Mid  Pain Descriptors: Cramping;Aching  Pain Frequency: Continuous  Functional Pain Assessment: Prevents or interferes some active activities and ADLs  Vital Signs  Patient Currently in Pain: Yes       Orientation  Orientation  Overall Orientation Status: Within Functional Limits  Cognition      Objective   Bed mobility  Supine to Sit: Minimal assistance  Sit to Supine: Minimal assistance  Scooting: Minimal assistance;Contact guard assistance  Comment: increase time and effort to comeplete task. Transfers  Sit to Stand: Moderate Assistance  Stand to sit: Minimal Assistance  Comment: standing without AD  Ambulation  Ambulation?: Yes  Ambulation 1  Surface: level tile  Device: Rolling Walker  Assistance: Contact guard assistance  Quality of Gait: Flexed posture, slow and cautious  Gait Deviations: Slow Rosalva;Decreased step length;Decreased step height  Distance: 5ft x2  Comments: Pt decline further amb d/t pain . Balance  Posture: Good  Sitting - Static: Good  Sitting - Dynamic: Good;-  Standing - Static: +;Fair  Standing - Dynamic: Fair  Comments: standing without AD  Exercises  Comments:Seated LE exercise program: Long Arc Quads, hip abduction/adduction, heel/toe raises, and marches. Reps: x10,   set sets. Goals  Short term goals  Time Frame for Short term goals: 14 visits  Short term goal 1: Supine to/from sit with SBA  Short term goal 2: Sit to/from stand with SBA  Short term goal 3: Ambulate 76' with walker with CGA.   Short term goal 4: Negotiate up and down 4 steps with one railing with CGA. Plan    Plan  Times per week: 5-6x/wk  Current Treatment Recommendations: Strengthening, Gait Training, Patient/Caregiver Education & Training, Stair training, Pain Management, Functional Mobility Training, Endurance Training, Home Exercise Program, Transfer Training, Safety Education & Training  Safety Devices  Type of devices:  All fall risk precautions in place, Call light within reach, Nurse notified, Left in bed, Gait belt  Restraints  Initially in place: No     Therapy Time   Individual Concurrent Group Co-treatment   Time In 1153         Time Out 1217         Minutes 24         Timed Code Treatment Minutes: [address], P[place]

## 2021-03-14 NOTE — PLAN OF CARE
Problem: Pain:  Goal: Pain level will decrease  Description: Pain level will decrease  Outcome: Ongoing     Problem: Pain:  Goal: Control of acute pain  Description: Control of acute pain  Outcome: Ongoing     Problem: Pain:  Goal: Control of chronic pain  Description: Control of chronic pain  Outcome: Ongoing     Problem: Anxiety/Stress:  Goal: Level of anxiety will decrease  Description: Level of anxiety will decrease  Outcome: Ongoing     Problem: Pain:  Goal: Recognizes and communicates pain  Description: Recognizes and communicates pain  Outcome: Ongoing     Problem: Skin Integrity:  Goal: Will show no infection signs and symptoms  Description: Will show no infection signs and symptoms  Outcome: Ongoing     Problem: Skin Integrity:  Goal: Absence of new skin breakdown  Description: Absence of new skin breakdown  Outcome: Ongoing     Problem: Falls - Risk of:  Goal: Will remain free from falls  Description: Will remain free from falls  Outcome: Ongoing     Problem: Falls - Risk of:  Goal: Absence of physical injury  Description: Absence of physical injury  Outcome: Ongoing

## 2021-03-15 LAB
ABSOLUTE EOS #: 0.36 K/UL (ref 0–0.44)
ABSOLUTE IMMATURE GRANULOCYTE: 0.05 K/UL (ref 0–0.3)
ABSOLUTE LYMPH #: 1.05 K/UL (ref 1.1–3.7)
ABSOLUTE MONO #: 0.91 K/UL (ref 0.1–1.2)
ANION GAP SERPL CALCULATED.3IONS-SCNC: 8 MMOL/L (ref 9–17)
BASOPHILS # BLD: 0 % (ref 0–2)
BASOPHILS ABSOLUTE: 0.03 K/UL (ref 0–0.2)
BUN BLDV-MCNC: 6 MG/DL (ref 6–20)
BUN/CREAT BLD: ABNORMAL (ref 9–20)
CALCIUM SERPL-MCNC: 7.7 MG/DL (ref 8.6–10.4)
CHLORIDE BLD-SCNC: 100 MMOL/L (ref 98–107)
CO2: 28 MMOL/L (ref 20–31)
CREAT SERPL-MCNC: 0.48 MG/DL (ref 0.5–0.9)
DIFFERENTIAL TYPE: ABNORMAL
EOSINOPHILS RELATIVE PERCENT: 3 % (ref 1–4)
GFR AFRICAN AMERICAN: >60 ML/MIN
GFR NON-AFRICAN AMERICAN: >60 ML/MIN
GFR SERPL CREATININE-BSD FRML MDRD: ABNORMAL ML/MIN/{1.73_M2}
GFR SERPL CREATININE-BSD FRML MDRD: ABNORMAL ML/MIN/{1.73_M2}
GLUCOSE BLD-MCNC: 85 MG/DL (ref 70–99)
HCT VFR BLD CALC: 34.1 % (ref 36.3–47.1)
HEMOGLOBIN: 10.4 G/DL (ref 11.9–15.1)
IMMATURE GRANULOCYTES: 0 %
LYMPHOCYTES # BLD: 9 % (ref 24–43)
MCH RBC QN AUTO: 27.5 PG (ref 25.2–33.5)
MCHC RBC AUTO-ENTMCNC: 30.5 G/DL (ref 28.4–34.8)
MCV RBC AUTO: 90.2 FL (ref 82.6–102.9)
MONOCYTES # BLD: 8 % (ref 3–12)
NRBC AUTOMATED: 0 PER 100 WBC
PDW BLD-RTO: 13.2 % (ref 11.8–14.4)
PLATELET # BLD: 460 K/UL (ref 138–453)
PLATELET ESTIMATE: ABNORMAL
PMV BLD AUTO: 8.7 FL (ref 8.1–13.5)
POTASSIUM SERPL-SCNC: 3.6 MMOL/L (ref 3.7–5.3)
RBC # BLD: 3.78 M/UL (ref 3.95–5.11)
RBC # BLD: ABNORMAL 10*6/UL
SEG NEUTROPHILS: 80 % (ref 36–65)
SEGMENTED NEUTROPHILS ABSOLUTE COUNT: 8.88 K/UL (ref 1.5–8.1)
SODIUM BLD-SCNC: 136 MMOL/L (ref 135–144)
WBC # BLD: 11.3 K/UL (ref 3.5–11.3)
WBC # BLD: ABNORMAL 10*3/UL

## 2021-03-15 PROCEDURE — 80048 BASIC METABOLIC PNL TOTAL CA: CPT

## 2021-03-15 PROCEDURE — 97116 GAIT TRAINING THERAPY: CPT

## 2021-03-15 PROCEDURE — 36415 COLL VENOUS BLD VENIPUNCTURE: CPT

## 2021-03-15 PROCEDURE — 6370000000 HC RX 637 (ALT 250 FOR IP): Performed by: NURSE PRACTITIONER

## 2021-03-15 PROCEDURE — 6370000000 HC RX 637 (ALT 250 FOR IP): Performed by: STUDENT IN AN ORGANIZED HEALTH CARE EDUCATION/TRAINING PROGRAM

## 2021-03-15 PROCEDURE — 6370000000 HC RX 637 (ALT 250 FOR IP): Performed by: EMERGENCY MEDICINE

## 2021-03-15 PROCEDURE — 2060000000 HC ICU INTERMEDIATE R&B

## 2021-03-15 PROCEDURE — 94640 AIRWAY INHALATION TREATMENT: CPT

## 2021-03-15 PROCEDURE — 85025 COMPLETE CBC W/AUTO DIFF WBC: CPT

## 2021-03-15 PROCEDURE — 2580000003 HC RX 258: Performed by: STUDENT IN AN ORGANIZED HEALTH CARE EDUCATION/TRAINING PROGRAM

## 2021-03-15 PROCEDURE — 6360000002 HC RX W HCPCS: Performed by: NURSE PRACTITIONER

## 2021-03-15 PROCEDURE — 97535 SELF CARE MNGMENT TRAINING: CPT

## 2021-03-15 PROCEDURE — 6360000002 HC RX W HCPCS: Performed by: STUDENT IN AN ORGANIZED HEALTH CARE EDUCATION/TRAINING PROGRAM

## 2021-03-15 PROCEDURE — 94761 N-INVAS EAR/PLS OXIMETRY MLT: CPT

## 2021-03-15 PROCEDURE — 2580000003 HC RX 258: Performed by: NURSE PRACTITIONER

## 2021-03-15 RX ORDER — POTASSIUM CHLORIDE 20 MEQ/1
40 TABLET, EXTENDED RELEASE ORAL 2 TIMES DAILY
Status: DISCONTINUED | OUTPATIENT
Start: 2021-03-15 | End: 2021-03-20

## 2021-03-15 RX ADMIN — GABAPENTIN 600 MG: 300 CAPSULE ORAL at 14:20

## 2021-03-15 RX ADMIN — PANTOPRAZOLE SODIUM 40 MG: 40 TABLET, DELAYED RELEASE ORAL at 16:19

## 2021-03-15 RX ADMIN — OXYCODONE HYDROCHLORIDE 10 MG: 5 TABLET ORAL at 07:53

## 2021-03-15 RX ADMIN — METHOCARBAMOL TABLETS 750 MG: 750 TABLET, COATED ORAL at 14:20

## 2021-03-15 RX ADMIN — GABAPENTIN 600 MG: 300 CAPSULE ORAL at 20:45

## 2021-03-15 RX ADMIN — SODIUM CHLORIDE, PRESERVATIVE FREE 10 ML: 5 INJECTION INTRAVENOUS at 16:20

## 2021-03-15 RX ADMIN — PANTOPRAZOLE SODIUM 40 MG: 40 TABLET, DELAYED RELEASE ORAL at 05:56

## 2021-03-15 RX ADMIN — OXYCODONE HYDROCHLORIDE 10 MG: 5 TABLET ORAL at 12:23

## 2021-03-15 RX ADMIN — OXYCODONE HYDROCHLORIDE 10 MG: 5 TABLET ORAL at 20:45

## 2021-03-15 RX ADMIN — SODIUM CHLORIDE, PRESERVATIVE FREE 10 ML: 5 INJECTION INTRAVENOUS at 16:52

## 2021-03-15 RX ADMIN — SUCRALFATE 1 G: 1 TABLET ORAL at 12:23

## 2021-03-15 RX ADMIN — POTASSIUM CHLORIDE 40 MEQ: 1500 TABLET, EXTENDED RELEASE ORAL at 20:45

## 2021-03-15 RX ADMIN — METHOCARBAMOL TABLETS 750 MG: 750 TABLET, COATED ORAL at 08:39

## 2021-03-15 RX ADMIN — GABAPENTIN 600 MG: 300 CAPSULE ORAL at 05:56

## 2021-03-15 RX ADMIN — OXYCODONE HYDROCHLORIDE 10 MG: 5 TABLET ORAL at 03:07

## 2021-03-15 RX ADMIN — KETOROLAC TROMETHAMINE 15 MG: 15 INJECTION, SOLUTION INTRAMUSCULAR; INTRAVENOUS at 10:41

## 2021-03-15 RX ADMIN — PIPERACILLIN AND TAZOBACTAM 4500 MG: 4; .5 INJECTION, POWDER, LYOPHILIZED, FOR SOLUTION INTRAVENOUS; PARENTERAL at 16:52

## 2021-03-15 RX ADMIN — KETOROLAC TROMETHAMINE 15 MG: 15 INJECTION, SOLUTION INTRAMUSCULAR; INTRAVENOUS at 20:44

## 2021-03-15 RX ADMIN — METHADONE HYDROCHLORIDE 140 MG: 5 SOLUTION ORAL at 08:40

## 2021-03-15 RX ADMIN — SODIUM CHLORIDE, PRESERVATIVE FREE 10 ML: 5 INJECTION INTRAVENOUS at 08:41

## 2021-03-15 RX ADMIN — METHOCARBAMOL TABLETS 750 MG: 750 TABLET, COATED ORAL at 20:45

## 2021-03-15 RX ADMIN — SODIUM CHLORIDE, PRESERVATIVE FREE 10 ML: 5 INJECTION INTRAVENOUS at 08:40

## 2021-03-15 RX ADMIN — ACETAMINOPHEN 1000 MG: 500 TABLET ORAL at 07:53

## 2021-03-15 RX ADMIN — IPRATROPIUM BROMIDE AND ALBUTEROL SULFATE 1 AMPULE: .5; 3 SOLUTION RESPIRATORY (INHALATION) at 09:38

## 2021-03-15 RX ADMIN — ENOXAPARIN SODIUM 40 MG: 40 INJECTION SUBCUTANEOUS at 09:35

## 2021-03-15 RX ADMIN — PIPERACILLIN AND TAZOBACTAM 4500 MG: 4; .5 INJECTION, POWDER, LYOPHILIZED, FOR SOLUTION INTRAVENOUS; PARENTERAL at 08:39

## 2021-03-15 RX ADMIN — KETOROLAC TROMETHAMINE 15 MG: 15 INJECTION, SOLUTION INTRAMUSCULAR; INTRAVENOUS at 04:07

## 2021-03-15 RX ADMIN — ACETAMINOPHEN 1000 MG: 500 TABLET ORAL at 16:19

## 2021-03-15 RX ADMIN — IPRATROPIUM BROMIDE AND ALBUTEROL SULFATE 1 AMPULE: .5; 3 SOLUTION RESPIRATORY (INHALATION) at 20:19

## 2021-03-15 RX ADMIN — KETOROLAC TROMETHAMINE 15 MG: 15 INJECTION, SOLUTION INTRAMUSCULAR; INTRAVENOUS at 16:20

## 2021-03-15 RX ADMIN — FLUOXETINE HYDROCHLORIDE 40 MG: 20 CAPSULE ORAL at 08:39

## 2021-03-15 RX ADMIN — POTASSIUM CHLORIDE 40 MEQ: 1500 TABLET, EXTENDED RELEASE ORAL at 09:35

## 2021-03-15 RX ADMIN — OXYCODONE HYDROCHLORIDE 10 MG: 5 TABLET ORAL at 16:20

## 2021-03-15 RX ADMIN — SUCRALFATE 1 G: 1 TABLET ORAL at 17:54

## 2021-03-15 RX ADMIN — PIPERACILLIN AND TAZOBACTAM 4500 MG: 4; .5 INJECTION, POWDER, LYOPHILIZED, FOR SOLUTION INTRAVENOUS; PARENTERAL at 00:31

## 2021-03-15 RX ADMIN — SUCRALFATE 1 G: 1 TABLET ORAL at 05:56

## 2021-03-15 ASSESSMENT — PAIN SCALES - GENERAL
PAINLEVEL_OUTOF10: 6
PAINLEVEL_OUTOF10: 8
PAINLEVEL_OUTOF10: 6
PAINLEVEL_OUTOF10: 8
PAINLEVEL_OUTOF10: 7
PAINLEVEL_OUTOF10: 8
PAINLEVEL_OUTOF10: 6
PAINLEVEL_OUTOF10: 7
PAINLEVEL_OUTOF10: 9

## 2021-03-15 ASSESSMENT — PAIN DESCRIPTION - LOCATION
LOCATION: ABDOMEN
LOCATION: ABDOMEN

## 2021-03-15 ASSESSMENT — PAIN DESCRIPTION - FREQUENCY
FREQUENCY: CONTINUOUS
FREQUENCY: CONTINUOUS

## 2021-03-15 ASSESSMENT — PAIN DESCRIPTION - ONSET: ONSET: ON-GOING

## 2021-03-15 ASSESSMENT — PAIN DESCRIPTION - PAIN TYPE: TYPE: SURGICAL PAIN

## 2021-03-15 ASSESSMENT — PAIN DESCRIPTION - ORIENTATION: ORIENTATION: MID

## 2021-03-15 ASSESSMENT — PAIN DESCRIPTION - DESCRIPTORS: DESCRIPTORS: ACHING;SORE;DISCOMFORT;TENDER

## 2021-03-15 NOTE — PROGRESS NOTES
Occupational Therapy  Facility/Department: [facility] 4B STEPDOWN  Daily Treatment Note  NAME: Jason Pulliam  : 1970  MRN: [id number]    Date of Service: 3/15/2021  Chief Complaint   Patient presents with    Headache    Emesis    Diarrhea         Discharge Recommendations:  Patient would benefit from continued therapy after discharge, Continue to assess pending progress     OT Equipment Recommendations  Equipment Needed: Yes  ADL Assistive Devices: Long-handled Shoe Horn;Elastic Shoe Laces; Long-handled Sponge;Reacher;Sock-Aid Hard    Assessment   Performance deficits / Impairments: Decreased functional mobility ; Decreased endurance;Decreased balance;Decreased high-level IADLs;Decreased ADL status  Prognosis: Good  OT Education: OT Role;Plan of Care;Transfer Training;ADL Adaptive Strategies  Patient Education: OT role, POC, energy conservation tech, sx soap precautions- Good return  Activity Tolerance  Activity Tolerance: Patient Tolerated treatment well;Patient limited by pain  Safety Devices  Safety Devices in place: Yes  Type of devices: Nurse notified; Left in bed;Call light within reach; Patient at risk for falls; All fall risk precautions in place  Restraints  Initially in place: No         Patient Diagnosis(es): The encounter diagnosis was Acute gastric ulcer with perforation ([address] has a past medical history of Asthma, Connective tissue disease, undifferentiated ([address] DDD (degenerative disc disease), Depression, Lumbar radicular pain, Migraine, Mitral valve prolapse, and Opiate addiction ([address] has a past surgical history that includes tomy and bso (cervix removed) (); hernia repair (); Nose surgery (); Tubal ligation (); ovarian cyst removal (Left, ); Nerve Block (Right, 2015); Hysterectomy; CT ABSCESS DRAINAGE (2021); gastrectomy (2021); laparotomy (N/A, 3/5/2021); Abdomen surgery (2021); and laparotomy (N/A, 3/11/2021).     Restrictions Restrictions/Precautions  Restrictions/Precautions: Up as Tolerated, Fall Risk, General Precautions  Required Braces or Orthoses?: Yes  Required Braces or Orthoses  Other: Abdominal Binder  Position Activity Restriction  Other position/activity restrictions: Up as tolerated, abdominal incision/binder  Subjective   General  Chart Reviewed: Yes  Patient assessed for rehabilitation services?: Yes  Family / Caregiver Present: No  Diagnosis: Gastrectomy, perforated gastric ulcer  General Comment  Comments: RN ok'd for OT treatment. Pt agreeable to session, pleasent/cooperative throughout. Pain Assessment  Pain Assessment: 0-10  Pain Level: 6  Pain Type: Surgical pain  Pain Location: Abdomen  Pain Orientation: Mid  Pain Descriptors: Aching;Sore;Discomfort;Tender  Pain Frequency: Continuous  Non-Pharmaceutical Pain Intervention(s): Ambulation/Increased Activity; Distraction; Emotional support; Therapeutic presence  Response to Pain Intervention: Patient Satisfied  Vital Signs  Patient Currently in Pain: Yes   Orientation  Orientation  Overall Orientation Status: Within Functional Limits  Objective    ADL  Grooming: Modified independent   UE Bathing: Modified independent   LE Bathing: Supervision  UE Dressing: Supervision  LE Dressing: Supervision  Toileting: Modified independent   Additional Comments: Abd binder on entire session, pt sat on toilet for successful urination and jerome-care, sat sinkisde for most of hair/oral/full bathing activity d/t fatigue, sx soap used properly, some Abd pain also limiting pt     Balance  Sitting Balance: Modified independent   Standing Balance: Supervision  Standing Balance  Time: 12 min  Activity: Pt stood bedside, sinkside, at toilet, func mob to/from bathroom  Functional Mobility  Functional - Mobility Device: No device  Activity: To/from bathroom  Assist Level: Stand by assistance  Functional Mobility Comments: Pt did not require RW in room, cautious func mob, CTA  Toilet Transfers Toilet - Technique: Ambulating  Equipment Used: Grab bars  Toilet Transfer: Modified independent  Bed mobility  Supine to Sit: Supervision  Sit to Supine: Supervision  Scooting: Modified independent  Comment: Pt supine in bed upon arrival/exit this date with call light within reach  Transfers  Sit to stand: Supervision  Stand to sit: Supervision         Cognition  Overall Cognitive Status: [address]  Times per week: 2-3x/wk  Current Treatment Recommendations: Safety Education & Training, Patient/Caregiver Education & Training, Self-Care / ADL, Functional Mobility Training, Home Management Training, Endurance Training     Goals  Short term goals  Time Frame for Short term goals: pt will, by discharge  Short term goal 1: dem UB ADL ind/mod I with AE PRN  Short term goal 2: dem LB ADL/toileting with min A and AE PRN  Short term goal 3: complete functional mobility/transfer with CGA and LRD PRN  Short term goal 4: engage in functional activity for 20+ minutes for improved functional endurance  Short term goal 5: engage in functional activity requiring dynamic standing with CGA and LRD PRN for 5+ minutes     Therapy Time   Individual Concurrent Group Co-treatment   Time In 1058         Time Out 1145         Minutes 47         Timed Code Treatment Minutes: 47 Minutes       Chet Joe OTR/L

## 2021-03-15 NOTE — PROGRESS NOTES
Physical Therapy  Facility/Department: [address] STEPDOWN  Daily Treatment Note  NAME: Ileana Colbert  : 1970  MRN: [id number]    Date of Service: 3/15/2021    Discharge Recommendations:  Patient would benefit from continued therapy after discharge   PT Equipment Recommendations  Equipment Needed: Yes  Mobility Devices: Yasmine Popper: Rolling    Assessment   Body structures, Functions, Activity limitations: Increased pain;Decreased safe awareness;Decreased endurance;Decreased functional mobility ; Decreased strength;Decreased balance  Assessment: Pt ambulated 250ft CGA/SBA with use of RW. Pt slow and steady with no LOB noted throughout. Pt prefers to use RW for longer ambulation distances for extra support. Would benefit from continued PT to address deficits and return to prior level of independence. Prognosis: Good  Decision Making: Medium Complexity  PT Education: Goals;Transfer Training;Functional Mobility Training;Plan of Care;General Safety;PT Role;Precautions;Gait Training  REQUIRES PT FOLLOW UP: Yes  Activity Tolerance  Activity Tolerance: Patient limited by pain; Patient limited by fatigue;Patient limited by endurance     Patient Diagnosis(es): The encounter diagnosis was Acute gastric ulcer with perforation ([address] has a past medical history of Asthma, Connective tissue disease, undifferentiated ([address] DDD (degenerative disc disease), Depression, Lumbar radicular pain, Migraine, Mitral valve prolapse, and Opiate addiction ([address] has a past surgical history that includes tomy and bso (cervix removed) (); hernia repair (); Nose surgery (); Tubal ligation (); ovarian cyst removal (Left, ); Nerve Block (Right, 2015); Hysterectomy; CT ABSCESS DRAINAGE (2021); gastrectomy (2021); laparotomy (N/A, 3/5/2021); Abdomen surgery (2021); and laparotomy (N/A, 3/11/2021).     Restrictions  Restrictions/Precautions  Restrictions/Precautions: Fall Risk  Required Braces or Orthoses?: No  Required Braces or Orthoses  Other: Abdominal Binder  Position Activity Restriction  Other position/activity restrictions: Up as tolerated, abdominal incision/binder  Subjective   General  Chart Reviewed: Yes  Response To Previous Treatment: Patient with no complaints from previous session. Family / Caregiver Present: No  Subjective  Subjective: RN agreeable to PT this morning. Pt resting in bed upon arrival with c/o 7/10 pain. Pt very pleasant and cooperative throughout. General Comment  Comments: Pt retired to chair at end of session. Pain Screening  Patient Currently in Pain: Yes  Pain Assessment  Pain Assessment: 0-10  Pain Level: 7  Patient's Stated Pain Goal: No pain  Pain Type: Surgical pain  Pain Location: Abdomen  Pain Orientation: Mid  Vital Signs  Patient Currently in Pain: Yes       Orientation  Orientation  Overall Orientation Status: Within Functional Limits  Cognition   Cognition  Overall Cognitive Status: WFL  Objective   Bed mobility  Supine to Sit: Contact guard assistance  Sit to Supine: (Pt retired to chair at end of session.)  Scooting: Contact guard assistance  Comment: Increased time/effort to complete. HOB elevated, utilized bed rails. Transfers  Sit to Stand: Stand by assistance  Stand to sit: Stand by assistance  Comment: standing without AD  Ambulation  Ambulation?: Yes  Ambulation 1  Surface: level tile  Device: Rolling Walker  Assistance: Contact guard assistance;Stand by assistance  Quality of Gait: Flexed posture, slow and cautious  Gait Deviations: Slow Rosalva;Decreased step length;Decreased step height  Distance: 250ft  Comments: Pt prefers to use RW for longer ambulation distances. Stairs/Curb  Stairs?: No     Balance  Posture: Good  Sitting - Static: Good  Sitting - Dynamic: Good;-  Standing - Static: +;Fair  Standing - Dynamic: Fair  Comments: standing balance assessed with RW.       Exercises:  Seated LE exercise program: Long Arc Quads, hip abduction/adduction, heel/toe raises, and marches. Reps: 10x each    Goals  Short term goals  Time Frame for Short term goals: 14 visits  Short term goal 1: Supine to/from sit with SBA  Short term goal 2: Sit to/from stand with SBA  Short term goal 3: Ambulate 76' with walker with CGA. Short term goal 4: Negotiate up and down 4 steps with one railing with CGA. Plan    Plan  Times per week: 5-6x/wk  Current Treatment Recommendations: Strengthening, Gait Training, Patient/Caregiver Education & Training, Stair training, Pain Management, Functional Mobility Training, Endurance Training, Home Exercise Program, Transfer Training, Safety Education & Training  Safety Devices  Type of devices:  All fall risk precautions in place, Nurse notified, Left in bed, Gait belt, Left in chair  Restraints  Initially in place: No     Therapy Time   Individual Concurrent Group Co-treatment   Time In 1145         Time Out 1205         Minutes 20         Timed Code Treatment Minutes: [facility], [facility]

## 2021-03-15 NOTE — PLAN OF CARE
Problem: Pain:  Goal: Pain level will decrease  Description: Pain level will decrease  3/15/2021 0234 by Clint Rock RN  Outcome: Ongoing  3/14/2021 1654 by Margarita Mahajan RN  Outcome: Ongoing  Goal: Control of acute pain  Description: Control of acute pain  3/15/2021 0234 by Clint Rock RN  Outcome: Ongoing  3/14/2021 1654 by Margarita Mahajan RN  Outcome: Ongoing  Goal: Control of chronic pain  Description: Control of chronic pain  3/15/2021 0234 by Clint Rock RN  Outcome: Ongoing  3/14/2021 1654 by Margarita Mahajan RN  Outcome: Ongoing     Problem: Discharge Planning:  Goal: Participates in care planning  Description: Participates in care planning  Outcome: Ongoing  Goal: Discharged to appropriate level of care  Description: Discharged to appropriate level of care  Outcome: Ongoing     Problem: Anxiety/Stress:  Goal: Level of anxiety will decrease  Description: Level of anxiety will decrease  3/15/2021 0234 by Clint Rock RN  Outcome: Ongoing  3/14/2021 1654 by Margarita Mahajan RN  Outcome: Ongoing     Problem: Aspiration:  Goal: Absence of aspiration  Description: Absence of aspiration  Outcome: Ongoing     Problem:  Bowel Function - Altered:  Goal: Bowel elimination is within specified parameters  Description: Bowel elimination is within specified parameters  Outcome: Ongoing     Problem: Cardiac Output - Decreased:  Goal: Hemodynamic stability will improve  Description: Hemodynamic stability will improve  Outcome: Ongoing     Problem: Fluid Volume - Imbalance:  Goal: Absence of imbalanced fluid volume signs and symptoms  Description: Absence of imbalanced fluid volume signs and symptoms  Outcome: Ongoing     Problem: Nutrition Deficit:  Goal: Ability to achieve adequate nutritional intake will improve  Description: Ability to achieve adequate nutritional intake will improve  Outcome: Ongoing     Problem: Pain:  Goal: Pain level will decrease  Description: Pain level will decrease  3/15/2021 0234 by Kinga Santamaria RN  Outcome: Ongoing  3/14/2021 1654 by Radha Bautista RN  Outcome: Ongoing  Goal: Recognizes and communicates pain  Description: Recognizes and communicates pain  3/15/2021 0234 by Kinga Santamaria RN  Outcome: Ongoing  3/14/2021 1654 by Radha Bautista RN  Outcome: Ongoing  Goal: Control of acute pain  Description: Control of acute pain  3/15/2021 0234 by Kinga Santamaria RN  Outcome: Ongoing  3/14/2021 1654 by Radha Bautista RN  Outcome: Ongoing  Goal: Control of chronic pain  Description: Control of chronic pain  3/15/2021 0234 by Kinga Santamaria RN  Outcome: Ongoing  3/14/2021 1654 by Radha Bautista RN  Outcome: Ongoing     Problem: Serum Glucose Level - Abnormal:  Goal: Ability to maintain appropriate glucose levels will improve to within specified parameters  Description: Ability to maintain appropriate glucose levels will improve to within specified parameters  Outcome: Ongoing     Problem: Skin Integrity - Impaired:  Goal: Will show no infection signs and symptoms  Description: Will show no infection signs and symptoms  Outcome: Ongoing  Goal: Absence of new skin breakdown  Description: Absence of new skin breakdown  Outcome: Ongoing     Problem: Sleep Pattern Disturbance:  Goal: Appears well-rested  Description: Appears well-rested  Outcome: Ongoing     Problem: Tissue Perfusion, Altered:  Goal: Circulatory function within specified parameters  Description: Circulatory function within specified parameters  Outcome: Ongoing     Problem: Nutrition  Goal: Optimal nutrition therapy  Description: Nutrition Problem #1: Inadequate oral intake  Intervention: Food and/or Nutrient Delivery: Continue NPO, Start Tube Feeding  Nutritional Goals: Pt to meet % of est'd needs via EN     Outcome: Ongoing     Problem: Skin Integrity:  Goal: Will show no infection signs and symptoms  Description: Will show no infection signs and symptoms  3/15/2021 0234 by Kinga Santamaria RN  Outcome: Ongoing 3/14/2021 1654 by Ronen Uriostegui RN  Outcome: Ongoing  Goal: Absence of new skin breakdown  Description: Absence of new skin breakdown  3/15/2021 0234 by King Duncan RN  Outcome: Ongoing  3/14/2021 1654 by Ronen Uriostegui RN  Outcome: Ongoing     Problem: Falls - Risk of:  Goal: Will remain free from falls  Description: Will remain free from falls  3/15/2021 0234 by King Duncan RN  Outcome: Ongoing  3/14/2021 1654 by Ronen Uriostegui RN  Outcome: Ongoing  Goal: Absence of physical injury  Description: Absence of physical injury  3/15/2021 0234 by King Duncan RN  Outcome: Ongoing  3/14/2021 1654 by Ronen Uriostegui RN  Outcome: Ongoing     Problem: OXYGENATION/RESPIRATORY FUNCTION  Goal: Patient will achieve/maintain normal respiratory rate/effort  Description: Respiratory rate and effort will be within normal limits for the patient  Outcome: Ongoing

## 2021-03-15 NOTE — PROGRESS NOTES
PROGRESS NOTE    [id number]  DATE: 3/15/2021  SURGEON: Dr. Bisi Carrasco: Peter Burris MD    HD: # 11    ASSESSMENT    Patient Active Problem List   Diagnosis    Migraine    Opiate dependence ([facility] [address]    Chronic pain    Obesity    Patient overweight    Lumbar radicular pain    Lumbar disc disease    Closed nondisplaced fracture of fifth metatarsal bone of right foot    Intra-abdominal abscess ([facility] [address]     S/p 3/5/21 ex lap, abd wash out, antrectomy with BII reconstruction, MILTON drain placement x2  S/p 3/11/21 ex lap, abd washout and midline fascial closure with retention sutures     [facility]    1. Neuro- Analgesia multimodal pain therapy, wean from IV as able   2. CV- continue to monitor BP/HR, heath Hgb  3. Resp- continue to encourage incentive spirometry and deep breathing every hour while awake per RT and RN  4. GI-advance to FLT  5. Wound care: Daily dressing changes, pack with iodoform, dry dressing to midline, strip MILTON drain and record output, suppository. RN to continue dressing changes  6. Renal-voiding independently, unmeasured  7. Msk- continue to encourage ambulation/activity, PT/OT eval and Tx   8. ID- cont zosyn day 4, heath WBC, heath for fevers. Leukocytosis improving, 11.3 from 15.9 from 22.6  9. Ppx- Lovenox for DVT ppx, GI ppx protonix BID, Endo BG checks       SUBJECTIVE    Patient seen and examined at bedside, no overnight events. Patient reports stable mild abdominal pain, controlled on MMT. Last bowel movement 2 days ago. Tolerating CLD, has an appetite, reports volume tolerated has reduced and surgery. Denies CP, S OB, N/V, C/F.  Afebrile, T-max 37.1    MILTON drain 100ml, SS fluid in bulb x24hr    OBJECTIVE  VITALS: Temp: Temp: 97.5 °F (36.4 °C)Temp  Av.2 °F (36.8 °C)  Min: 97.5 °F (36.4 °C)  Max: 98.8 °F (39.0 °C) BP Systolic (90DKL), GJD:368 , Min:140 , NTH:079   Diastolic (42RQD), UYT:69, Min:90, Max:100   Pulse Pulse  Av.2  Min: 98  Max: 111 Resp Resp  Av.1  Min: 14  Max: 24 Pulse ox SpO2  Av.5 %  Min: 92 %  Max: 98 %  GENERAL: alert, no distress  NEURO: Follows commands, no focal findings  HEENT: Normocephalic, EOMI  LUNGS: Equal chest rise and fall, nonlabored breathing on 2L NC  HEART: Mild tachycardia  ABDOMEN: soft, appropriately-tender, mild distention, surgical dressing dry and intact, no rebound, no guarding or peritoneal signs present, left-sided MILTON drain with serosanguineous output in bulb  EXTERMITY: no cyanosis, clubbing or edema    I/O last 3 completed shifts: In: 2963 [P.O.:500; I.V.:3317]  Out: 600 [Urine:500; Drains:100]    Drain/tube output: In: 2245 [P.O.:500; I.V.:3317]  Out: 600 [Urine:500; Drains:100]    LAB:  CBC:   Recent Labs     2118 21  0606 03/15/21  0630   WBC 22.6* 15.9* 11.3   HGB 10.6* 10.2* 10.4*   HCT 34.7* 33.8* 34.1*   MCV 92.3 91.8 90.2    413 460*     BMP:   Recent Labs     21  0606 03/15/21  0630   * 134* 136   K 3.9 3.5* 3.6*   CL 98 98 100   CO2 28 25 28   BUN 8 9 6   CREATININE 0.58 0.48* 0.48*   GLUCOSE 75 54* 85     COAGS: No results for input(s): APTT, PROT, INR in the last 72 hours. RADIOLOGY:   Ct Abdomen Pelvis W Iv Contrast Additional Contrast? Oral    Result Date: 3/12/2021  1. Near complete resolution of the larger fluid collection with only a 1.4 x 1.0 cm fluid collection remaining in the left abdomen. 2. Persistent small volume ascites and pneumoperitoneum. 3. Marked colonic distension without discrete transition point. Oral contrast is noted throughout the colon and rectum. Findings are most suggestive of ileus. 4. Mild distention of the common bile duct without obstructing stone or lesion identified. 5. Findings suggestive of left UPJ obstruction with left renal cortical atrophy, unchanged.      Xr Chest Portable    Result Date: 3/12/2021  Mild left basilar atelectasis     Xr Abdomen For Ng/og/ne Tube Placement    Result Date: 3/13/2021  1. Enteric tube terminates in the stomach. 2. Additional findings as above, similar to same day CT. Monster Velasco DO  3/12/21, 8:14 AM       Attending Note      I have reviewed the above GCS note(s) and I either performed the key elements of the medical history and physical exam or was present with the surgery resident when the key elements of the medical history and physical exam were performed. I have discussed the findings, established the care plan and recommendations with the surgical team.  Stable, advance diet. Wound intact.     Keysha Thompson MD  3/15/2021  11:08 AM

## 2021-03-16 LAB
ABSOLUTE EOS #: 0.53 K/UL (ref 0–0.44)
ABSOLUTE IMMATURE GRANULOCYTE: 0.05 K/UL (ref 0–0.3)
ABSOLUTE LYMPH #: 1.3 K/UL (ref 1.1–3.7)
ABSOLUTE MONO #: 1.13 K/UL (ref 0.1–1.2)
ANION GAP SERPL CALCULATED.3IONS-SCNC: 9 MMOL/L (ref 9–17)
BASOPHILS # BLD: 1 % (ref 0–2)
BASOPHILS ABSOLUTE: 0.06 K/UL (ref 0–0.2)
BUN BLDV-MCNC: 4 MG/DL (ref 6–20)
BUN/CREAT BLD: ABNORMAL (ref 9–20)
CALCIUM SERPL-MCNC: 8.1 MG/DL (ref 8.6–10.4)
CHLORIDE BLD-SCNC: 100 MMOL/L (ref 98–107)
CO2: 26 MMOL/L (ref 20–31)
CREAT SERPL-MCNC: 0.62 MG/DL (ref 0.5–0.9)
DIFFERENTIAL TYPE: ABNORMAL
EOSINOPHILS RELATIVE PERCENT: 4 % (ref 1–4)
GFR AFRICAN AMERICAN: >60 ML/MIN
GFR NON-AFRICAN AMERICAN: >60 ML/MIN
GFR SERPL CREATININE-BSD FRML MDRD: ABNORMAL ML/MIN/{1.73_M2}
GFR SERPL CREATININE-BSD FRML MDRD: ABNORMAL ML/MIN/{1.73_M2}
GLUCOSE BLD-MCNC: 93 MG/DL (ref 70–99)
HCT VFR BLD CALC: 36 % (ref 36.3–47.1)
HEMOGLOBIN: 11.2 G/DL (ref 11.9–15.1)
IMMATURE GRANULOCYTES: 0 %
LYMPHOCYTES # BLD: 11 % (ref 24–43)
MCH RBC QN AUTO: 27.7 PG (ref 25.2–33.5)
MCHC RBC AUTO-ENTMCNC: 31.1 G/DL (ref 28.4–34.8)
MCV RBC AUTO: 88.9 FL (ref 82.6–102.9)
MONOCYTES # BLD: 9 % (ref 3–12)
NRBC AUTOMATED: 0 PER 100 WBC
PDW BLD-RTO: 13.1 % (ref 11.8–14.4)
PLATELET # BLD: 502 K/UL (ref 138–453)
PLATELET ESTIMATE: ABNORMAL
PMV BLD AUTO: 9 FL (ref 8.1–13.5)
POTASSIUM SERPL-SCNC: 4 MMOL/L (ref 3.7–5.3)
RBC # BLD: 4.05 M/UL (ref 3.95–5.11)
RBC # BLD: ABNORMAL 10*6/UL
SEG NEUTROPHILS: 75 % (ref 36–65)
SEGMENTED NEUTROPHILS ABSOLUTE COUNT: 9.14 K/UL (ref 1.5–8.1)
SODIUM BLD-SCNC: 135 MMOL/L (ref 135–144)
WBC # BLD: 12.2 K/UL (ref 3.5–11.3)
WBC # BLD: ABNORMAL 10*3/UL

## 2021-03-16 PROCEDURE — 6370000000 HC RX 637 (ALT 250 FOR IP): Performed by: STUDENT IN AN ORGANIZED HEALTH CARE EDUCATION/TRAINING PROGRAM

## 2021-03-16 PROCEDURE — 94640 AIRWAY INHALATION TREATMENT: CPT

## 2021-03-16 PROCEDURE — 94761 N-INVAS EAR/PLS OXIMETRY MLT: CPT

## 2021-03-16 PROCEDURE — 97110 THERAPEUTIC EXERCISES: CPT

## 2021-03-16 PROCEDURE — 6370000000 HC RX 637 (ALT 250 FOR IP): Performed by: NURSE PRACTITIONER

## 2021-03-16 PROCEDURE — 6360000002 HC RX W HCPCS: Performed by: NURSE PRACTITIONER

## 2021-03-16 PROCEDURE — 6370000000 HC RX 637 (ALT 250 FOR IP): Performed by: EMERGENCY MEDICINE

## 2021-03-16 PROCEDURE — 80048 BASIC METABOLIC PNL TOTAL CA: CPT

## 2021-03-16 PROCEDURE — 97530 THERAPEUTIC ACTIVITIES: CPT

## 2021-03-16 PROCEDURE — 2580000003 HC RX 258: Performed by: STUDENT IN AN ORGANIZED HEALTH CARE EDUCATION/TRAINING PROGRAM

## 2021-03-16 PROCEDURE — 36415 COLL VENOUS BLD VENIPUNCTURE: CPT

## 2021-03-16 PROCEDURE — 97535 SELF CARE MNGMENT TRAINING: CPT

## 2021-03-16 PROCEDURE — 6360000002 HC RX W HCPCS: Performed by: STUDENT IN AN ORGANIZED HEALTH CARE EDUCATION/TRAINING PROGRAM

## 2021-03-16 PROCEDURE — 2060000000 HC ICU INTERMEDIATE R&B

## 2021-03-16 PROCEDURE — 85025 COMPLETE CBC W/AUTO DIFF WBC: CPT

## 2021-03-16 PROCEDURE — 2580000003 HC RX 258: Performed by: NURSE PRACTITIONER

## 2021-03-16 PROCEDURE — 97116 GAIT TRAINING THERAPY: CPT

## 2021-03-16 RX ORDER — OXYCODONE HYDROCHLORIDE 5 MG/1
10 TABLET ORAL EVERY 6 HOURS PRN
Status: DISCONTINUED | OUTPATIENT
Start: 2021-03-16 | End: 2021-03-17

## 2021-03-16 RX ORDER — SENNA AND DOCUSATE SODIUM 50; 8.6 MG/1; MG/1
2 TABLET, FILM COATED ORAL DAILY
Status: DISCONTINUED | OUTPATIENT
Start: 2021-03-16 | End: 2021-03-26 | Stop reason: HOSPADM

## 2021-03-16 RX ORDER — BISACODYL 10 MG
10 SUPPOSITORY, RECTAL RECTAL ONCE
Status: DISCONTINUED | OUTPATIENT
Start: 2021-03-16 | End: 2021-03-16

## 2021-03-16 RX ADMIN — SODIUM CHLORIDE, PRESERVATIVE FREE 10 ML: 5 INJECTION INTRAVENOUS at 20:55

## 2021-03-16 RX ADMIN — Medication 6 MG: at 21:55

## 2021-03-16 RX ADMIN — POTASSIUM CHLORIDE 40 MEQ: 1500 TABLET, EXTENDED RELEASE ORAL at 08:21

## 2021-03-16 RX ADMIN — PANTOPRAZOLE SODIUM 40 MG: 40 TABLET, DELAYED RELEASE ORAL at 16:56

## 2021-03-16 RX ADMIN — OXYCODONE HYDROCHLORIDE 10 MG: 5 TABLET ORAL at 18:51

## 2021-03-16 RX ADMIN — METHADONE HYDROCHLORIDE 140 MG: 5 SOLUTION ORAL at 08:39

## 2021-03-16 RX ADMIN — POTASSIUM CHLORIDE 40 MEQ: 1500 TABLET, EXTENDED RELEASE ORAL at 20:50

## 2021-03-16 RX ADMIN — SUCRALFATE 1 G: 1 TABLET ORAL at 17:01

## 2021-03-16 RX ADMIN — ACETAMINOPHEN 1000 MG: 500 TABLET ORAL at 23:23

## 2021-03-16 RX ADMIN — OXYCODONE HYDROCHLORIDE 10 MG: 5 TABLET ORAL at 13:05

## 2021-03-16 RX ADMIN — SUCRALFATE 1 G: 1 TABLET ORAL at 00:37

## 2021-03-16 RX ADMIN — METHOCARBAMOL TABLETS 750 MG: 750 TABLET, COATED ORAL at 08:21

## 2021-03-16 RX ADMIN — OXYCODONE HYDROCHLORIDE 10 MG: 5 TABLET ORAL at 08:17

## 2021-03-16 RX ADMIN — SODIUM CHLORIDE, PRESERVATIVE FREE 10 ML: 5 INJECTION INTRAVENOUS at 20:51

## 2021-03-16 RX ADMIN — FLUOXETINE HYDROCHLORIDE 40 MG: 20 CAPSULE ORAL at 08:21

## 2021-03-16 RX ADMIN — IPRATROPIUM BROMIDE AND ALBUTEROL SULFATE 1 AMPULE: .5; 3 SOLUTION RESPIRATORY (INHALATION) at 20:22

## 2021-03-16 RX ADMIN — KETOROLAC TROMETHAMINE 15 MG: 15 INJECTION, SOLUTION INTRAMUSCULAR; INTRAVENOUS at 09:38

## 2021-03-16 RX ADMIN — GABAPENTIN 600 MG: 300 CAPSULE ORAL at 14:13

## 2021-03-16 RX ADMIN — PIPERACILLIN AND TAZOBACTAM 4500 MG: 4; .5 INJECTION, POWDER, LYOPHILIZED, FOR SOLUTION INTRAVENOUS; PARENTERAL at 16:56

## 2021-03-16 RX ADMIN — ENOXAPARIN SODIUM 40 MG: 40 INJECTION SUBCUTANEOUS at 08:20

## 2021-03-16 RX ADMIN — ACETAMINOPHEN 1000 MG: 500 TABLET ORAL at 06:01

## 2021-03-16 RX ADMIN — GABAPENTIN 600 MG: 300 CAPSULE ORAL at 21:55

## 2021-03-16 RX ADMIN — SUCRALFATE 1 G: 1 TABLET ORAL at 06:01

## 2021-03-16 RX ADMIN — ACETAMINOPHEN 1000 MG: 500 TABLET ORAL at 15:41

## 2021-03-16 RX ADMIN — GABAPENTIN 600 MG: 300 CAPSULE ORAL at 06:04

## 2021-03-16 RX ADMIN — ONDANSETRON 4 MG: 2 INJECTION INTRAMUSCULAR; INTRAVENOUS at 23:27

## 2021-03-16 RX ADMIN — SUCRALFATE 1 G: 1 TABLET ORAL at 23:23

## 2021-03-16 RX ADMIN — KETOROLAC TROMETHAMINE 15 MG: 15 INJECTION, SOLUTION INTRAMUSCULAR; INTRAVENOUS at 04:12

## 2021-03-16 RX ADMIN — PIPERACILLIN AND TAZOBACTAM 4500 MG: 4; .5 INJECTION, POWDER, LYOPHILIZED, FOR SOLUTION INTRAVENOUS; PARENTERAL at 08:39

## 2021-03-16 RX ADMIN — IPRATROPIUM BROMIDE AND ALBUTEROL SULFATE 1 AMPULE: .5; 3 SOLUTION RESPIRATORY (INHALATION) at 11:46

## 2021-03-16 RX ADMIN — METHOCARBAMOL TABLETS 750 MG: 750 TABLET, COATED ORAL at 20:50

## 2021-03-16 RX ADMIN — PANTOPRAZOLE SODIUM 40 MG: 40 TABLET, DELAYED RELEASE ORAL at 06:01

## 2021-03-16 RX ADMIN — SUCRALFATE 1 G: 1 TABLET ORAL at 11:36

## 2021-03-16 RX ADMIN — OXYCODONE HYDROCHLORIDE 10 MG: 5 TABLET ORAL at 00:37

## 2021-03-16 RX ADMIN — METHOCARBAMOL TABLETS 750 MG: 750 TABLET, COATED ORAL at 14:13

## 2021-03-16 RX ADMIN — PIPERACILLIN AND TAZOBACTAM 4500 MG: 4; .5 INJECTION, POWDER, LYOPHILIZED, FOR SOLUTION INTRAVENOUS; PARENTERAL at 00:37

## 2021-03-16 RX ADMIN — OXYCODONE HYDROCHLORIDE 10 MG: 5 TABLET ORAL at 04:11

## 2021-03-16 RX ADMIN — DOCUSATE SODIUM 50MG AND SENNOSIDES 8.6MG 2 TABLET: 8.6; 5 TABLET, FILM COATED ORAL at 08:39

## 2021-03-16 ASSESSMENT — PAIN DESCRIPTION - DESCRIPTORS
DESCRIPTORS: ACHING;DISCOMFORT
DESCRIPTORS: ACHING;DISCOMFORT;SORE
DESCRIPTORS: ACHING;DISCOMFORT;SORE

## 2021-03-16 ASSESSMENT — PAIN SCALES - GENERAL
PAINLEVEL_OUTOF10: 9
PAINLEVEL_OUTOF10: 8
PAINLEVEL_OUTOF10: 7
PAINLEVEL_OUTOF10: 6
PAINLEVEL_OUTOF10: 6
PAINLEVEL_OUTOF10: 8
PAINLEVEL_OUTOF10: 7
PAINLEVEL_OUTOF10: 9
PAINLEVEL_OUTOF10: 8
PAINLEVEL_OUTOF10: 8
PAINLEVEL_OUTOF10: 9

## 2021-03-16 ASSESSMENT — PAIN DESCRIPTION - PAIN TYPE
TYPE: ACUTE PAIN;SURGICAL PAIN

## 2021-03-16 ASSESSMENT — PAIN DESCRIPTION - FREQUENCY
FREQUENCY: CONTINUOUS

## 2021-03-16 ASSESSMENT — PAIN DESCRIPTION - PROGRESSION
CLINICAL_PROGRESSION: GRADUALLY IMPROVING
CLINICAL_PROGRESSION: GRADUALLY IMPROVING

## 2021-03-16 ASSESSMENT — PAIN DESCRIPTION - ORIENTATION
ORIENTATION: MID;LOWER
ORIENTATION: OTHER (COMMENT)
ORIENTATION: MID;LOWER

## 2021-03-16 ASSESSMENT — PAIN - FUNCTIONAL ASSESSMENT: PAIN_FUNCTIONAL_ASSESSMENT: PREVENTS OR INTERFERES SOME ACTIVE ACTIVITIES AND ADLS

## 2021-03-16 ASSESSMENT — PAIN DESCRIPTION - LOCATION
LOCATION: BACK;ABDOMEN
LOCATION: ABDOMEN
LOCATION: ABDOMEN

## 2021-03-16 ASSESSMENT — PAIN DESCRIPTION - ONSET: ONSET: ON-GOING

## 2021-03-16 NOTE — PROGRESS NOTES
Comprehensive Nutrition Assessment    Type and Reason for Visit:  Reassess    Nutrition Recommendations/Plan: Continue current Low Fiber diet. Will modify ONS order to provide frozen Magic Cups x 2 per day. Encourage/monitor PO intakes as tolerated. Monitor labs, weights, and plan of care. Nutrition Assessment:  Pt advanced to a Low Fiber diet this morning. Pt reports having an appetite and slowly increasing her PO intakes. Observed lunch tray which pt ate chicken pot pie and pudding. Pt consuming about 50% of her meals. Noted untouched Ensure Clear Liquid supplement. Labs/Meds reviewed. Estimated Daily Nutrient Needs:  Energy (kcal):  MSJ x 1.2-1.3 = 9891-4004 kcals/day; Weight Used for Energy Requirements:  Admission     Protein (g):  1.5 gm/kg = 90 gm pro/day; Weight Used for Protein Requirements:  Ideal          Nutrition Related Findings:  Labs/Meds reviewed.  3/13. Wounds:  Surgical Incision(to abdomen)       Current Nutrition Therapies:    Dietary Nutrition Supplements: Clear Liquid Oral Supplement  DIET LOW FIBER; Anthropometric Measures:  · Height: 5' 7\" (170.2 cm)  · Current Body Weight: 198 lb 6.6 oz (90 kg)   · Admission Body Weight: 205 lb (93 kg)    · Ideal Body Weight: 135 lbs; % Ideal Body Weight 147 %   · BMI: 31.1  · BMI Categories: Obese Class 1 (BMI 30.0-34. 9)       Nutrition Diagnosis:   · Inadequate oral intake related to altered GI function, GI surgeries as evidenced by slowly advancing oral diet, improving PO intakes, need for ONS    Nutrition Interventions:   Food and/or Nutrient Delivery:  Continue Current Diet, Modify Oral Nutrition Supplement  Nutrition Education/Counseling:  No recommendation at this time   Coordination of Nutrition Care:  Continue to monitor while inpatient    Goals:  Meet % of estimated nutrition needs with PO intakes.        Nutrition Monitoring and Evaluation:   Food/Nutrient Intake Outcomes:  Food and Nutrient Intake, Supplement Intake  Physical Signs/Symptoms Outcomes:  Biochemical Data, GI Status, Hemodynamic Status, Nutrition Focused Physical Findings, Skin, Weight     Electronically signed by Monte Sacks, RD, GINGER on 3/16/21 at 2:55 PM EDT    Contact: [phone]

## 2021-03-16 NOTE — PLAN OF CARE
Problem: Aspiration:  Goal: Absence of aspiration  Description: Absence of aspiration  3/16/2021 0315 by Greg Clancy RN  Outcome: Met This Shift     Problem: Pain:  Goal: Recognizes and communicates pain  Description: Recognizes and communicates pain  3/16/2021 0315 by Greg Clancy RN  Outcome: Met This Shift     Problem: Falls - Risk of:  Goal: Will remain free from falls  Description: Will remain free from falls  3/16/2021 0315 by Greg Clancy RN  Outcome: Met This Shift     Problem: Falls - Risk of:  Goal: Absence of physical injury  Description: Absence of physical injury  3/16/2021 0315 by Greg Clancy RN  Outcome: Met This Shift     Problem: Pain:  Goal: Pain level will decrease  Description: Pain level will decrease  3/16/2021 0315 by Greg Clancy RN  Outcome: Ongoing     Problem: Pain:  Goal: Control of acute pain  Description: Control of acute pain  3/16/2021 0315 by Greg Clancy RN  Outcome: Ongoing     Problem: Anxiety/Stress:  Goal: Level of anxiety will decrease  Description: Level of anxiety will decrease  3/16/2021 0315 by Greg Clancy RN  Outcome: Ongoing     Problem:  Bowel Function - Altered:  Goal: Bowel elimination is within specified parameters  Description: Bowel elimination is within specified parameters  3/16/2021 0315 by Greg Clancy RN  Outcome: Ongoing     Problem: Pain:  Goal: Pain level will decrease  Description: Pain level will decrease  3/16/2021 0315 by Greg Clancy RN  Outcome: Ongoing     Problem: Pain:  Goal: Control of acute pain  Description: Control of acute pain  3/16/2021 0315 by Greg Clancy RN  Outcome: Ongoing     Problem: Skin Integrity - Impaired:  Goal: Will show no infection signs and symptoms  Description: Will show no infection signs and symptoms  3/16/2021 0315 by Greg Clancy RN  Outcome: Ongoing     Problem: Sleep Pattern Disturbance:  Goal: Appears well-rested  Description: Appears well-rested  3/16/2021 3444 by Asaf Quintero RN  Outcome: Ongoing     Problem: Skin Integrity:  Goal: Will show no infection signs and symptoms  Description: Will show no infection signs and symptoms  3/16/2021 0315 by Asaf Quintero RN  Outcome: Ongoing

## 2021-03-16 NOTE — PROGRESS NOTES
Occupational Therapy  Facility/Department: [facility] 4B STEPDOWN  Daily Treatment Note  NAME: John Umana  : 1970  MRN: [id number]    Date of Service: 3/16/2021    Discharge Recommendations:  Patient would benefit from continued therapy after discharge       Assessment   Performance deficits / Impairments: Decreased functional mobility ; Decreased endurance;Decreased balance;Decreased high-level IADLs;Decreased ADL status  Prognosis: Good  Patient Education: OT POC, transfer safety, importance of participation in therapy - pt verbalized understanding. REQUIRES OT FOLLOW UP: Yes  Activity Tolerance  Activity Tolerance: Patient Tolerated treatment well;Patient limited by fatigue;Patient limited by pain  Safety Devices  Safety Devices in place: Yes  Type of devices: Bed alarm in place;Call light within reach;Gait belt;Patient at risk for falls; Left in bed;Nurse notified         Patient Diagnosis(es): The encounter diagnosis was Acute gastric ulcer with perforation ([facility] [address] has a past medical history of Asthma, Connective tissue disease, undifferentiated ([address] DDD (degenerative disc disease), Depression, Lumbar radicular pain, Migraine, Mitral valve prolapse, and Opiate addiction ([address] has a past surgical history that includes tomy and bso (cervix removed) (); hernia repair (); Nose surgery (); Tubal ligation (); ovarian cyst removal (Left, ); Nerve Block (Right, 2015); Hysterectomy; CT ABSCESS DRAINAGE (2021); gastrectomy (2021); laparotomy (N/A, 3/5/2021); Abdomen surgery (2021); and laparotomy (N/A, 3/11/2021).     Restrictions  Restrictions/Precautions  Restrictions/Precautions: Up as Tolerated, Fall Risk, General Precautions  Required Braces or Orthoses?: Yes  Required Braces or Orthoses  Other: Abdominal Binder  Position Activity Restriction  Other position/activity restrictions: Up as tolerated, abdominal incision/binder  Subjective   General  Chart Reviewed: Yes  Patient assessed for rehabilitation services?: Yes  Family / Caregiver Present: No  Diagnosis: Gastrectomy, perforated gastric ulcer  General Comment  Pain Assessment  Pain Assessment: 0-10  Pain Level: 7  Pain Type: Acute pain;Surgical pain  Pain Location: Back; Abdomen  Pain Orientation: Mid;Lower  Pain Descriptors: Aching;Discomfort; Sore  Pain Frequency: Continuous  Non-Pharmaceutical Pain Intervention(s): Repositioned;Rest;Relaxation techniques; Therapeutic presence  Vital Signs  Patient Currently in Pain: Yes   Orientation  Orientation  Overall Orientation Status: Within Functional Limits  Objective    ADL  Grooming: Setup; Independent(Oral care seated EOB.)  UE Bathing: Setup;Modified independent (Max A for back seated EOB.)  LE Bathing: Setup;Supervision(Hips to knees seated EOB.)  UE Dressing: (To manage gown.)  LE Dressing: Setup;Contact guard assistance  Toileting: Setup;Supervision(Pericare/bottom care standing EOB.)  Additional Comments: Pt completed ADL care seated EOB, surgical soap used appropriately. Pt required min A for part of bottom care d/t inability/discomfort with reaching behind. Balance  Sitting Balance: Modified independent   Standing Balance: Supervision  Standing Balance  Time: 2 minutes  Activity: Dynamic standing EOB w/o device. Functional Mobility  Functional Mobility Comments: Pt declined functional mobility d/t fatigue from not sleeping and discomfort in abdomen.   Bed mobility  Supine to Sit: Supervision  Sit to Supine: Supervision  Scooting: Modified independent  Transfers  Sit to stand: Supervision  Stand to sit: Supervision  Cognition  Overall Cognitive Status: [facility]  Plan   Plan  Times per week: 2-3x/wk  Current Treatment Recommendations: Safety Education & Training, Patient/Caregiver Education & Training, Self-Care / ADL, Functional Mobility Training, Home Management Training, Endurance Training  Goals  Short term goals  Time Frame for Short term goals: pt will, by discharge Short term goal 1: dem UB ADL ind/mod I with AE PRN  Short term goal 2: dem LB ADL/toileting with min A and AE PRN  Short term goal 3: complete functional mobility/transfer with CGA and LRD PRN  Short term goal 4: engage in functional activity for 20+ minutes for improved functional endurance  Short term goal 5: engage in functional activity requiring dynamic standing with CGA and LRD PRN for 5+ minutes       Therapy Time   Individual Concurrent Group Co-treatment   Time In 0955         Time Out 1040         Minutes 45         Timed Code Treatment Minutes: 45 Minutes     Pt supine in bed upon therapist arrival. Pleasant and agreeable to therapy. See above for LOF for all tasks. Pt retired to supine in bed at end of session with bed alarm activated and call light within reach.     MARYSOL Trejo/VIVIAN

## 2021-03-16 NOTE — PROGRESS NOTES
Physical Therapy  Daily Treatment Note  NAME: Caryle Barns  : 1970  MRN: [id number]    Date of Service: 3/16/2021    Discharge Recommendations:  Patient would benefit from continued therapy after discharge   PT Equipment Recommendations  Equipment Needed: Yes  Mobility Devices: Kyleigh Laming: Rolling    Assessment   Body structures, Functions, Activity limitations: Increased pain;Decreased safe awareness;Decreased endurance;Decreased functional mobility ; Decreased strength;Decreased balance  Assessment: Pt Asc/Dsc 12 steps with CGA, no handrail, no AD. Pt ambulated 250ft with CGA and no AD, then another 250ft with a RW for comfort. Pt slow and steady with no LOB noted throughout. Pt prefers to use RW for longer ambulation distances for extra support. Would benefit from continued PT to address deficits and return to prior level of independence. Prognosis: Good  PT Education: Goals;Plan of Care;General Safety;PT Role;Precautions;Gait Training;Home Exercise Program;Pressure Relief; Injury Prevention  Patient Education: importance of mobility  REQUIRES PT FOLLOW UP: Yes  Activity Tolerance  Activity Tolerance: Patient limited by pain; Patient limited by fatigue;Patient limited by endurance     Patient Diagnosis(es): The encounter diagnosis was Acute gastric ulcer with perforation ([facility] [address] has a past medical history of Asthma, Connective tissue disease, undifferentiated ([address] DDD (degenerative disc disease), Depression, Lumbar radicular pain, Migraine, Mitral valve prolapse, and Opiate addiction ([address] has a past surgical history that includes tomy and bso (cervix removed) (); hernia repair (); Nose surgery (); Tubal ligation (); ovarian cyst removal (Left, ); Nerve Block (Right, 2015); Hysterectomy; CT ABSCESS DRAINAGE (2021); gastrectomy (2021); laparotomy (N/A, 3/5/2021); Abdomen surgery (2021); and laparotomy (N/A, 3/11/2021).     Restrictions Restrictions/Precautions  Restrictions/Precautions: Up as Tolerated, Fall Risk, General Precautions  Required Braces or Orthoses?: Yes  Required Braces or Orthoses  Other: Abdominal Binder  Position Activity Restriction  Other position/activity restrictions: Up as tolerated, abdominal incision/binder     Subjective   General  Chart Reviewed: Yes  Response To Previous Treatment: Patient with no complaints from previous session. Family / Caregiver Present: No  Subjective  Subjective: RN and pt agreeable to PT today. Pt resting in bed upon arrival with c/o 8/10 pain. Pt very pleasant and cooperative throughout. Pain Screening  Patient Currently in Pain: Yes  Pain Assessment  Pain Assessment: 0-10  Pain Level: 8  Pain Type: Acute pain;Surgical pain  Pain Location: Abdomen;Back  Pain Orientation: Other (Comment)(360 degrees circling \"low back and abdomen\")  Non-Pharmaceutical Pain Intervention(s): Repositioned;Rest;Ambulation/Increased Activity; Distraction  Response to Pain Intervention: Patient Satisfied  Vital Signs  Patient Currently in Pain: Yes       Orientation  Orientation  Overall Orientation Status: Within Normal Limits    Objective   Bed mobility  Supine to Sit: Supervision  Sit to Supine: Supervision  Scooting: Modified independent  Transfers  Sit to Stand: Stand by assistance  Stand to sit: Stand by assistance  Comment: standing without AD  Ambulation  Ambulation?: Yes  Ambulation 1  Surface: level tile  Device: No Device  Assistance: Contact guard assistance  Quality of Gait: slow and cautious  Gait Deviations: Slow [place];Decreased step length;Decreased step height  Distance: 250ft, then another 250ft with a RW for comfort  Comments: Pt prefers to use RW for longer ambulation distances.   Stairs/Curb  Stairs?: Yes  Stairs  # Steps : 12  Stairs Height: 8\"  Device: No Device  Assistance: Contact guard assistance  Comment: no LOB or concerns from the pt     Balance  Posture: Good  Sitting - Static: Good Sitting - Dynamic: Good;-  Standing - Static: +;Fair  Standing - Dynamic: Fair;+  Exercises  Hip Flexion: BLE x10  Hip Abduction: BLE x10  Knee Long Arc Quad: BLE x10  Ankle Pumps: BLE x10       Goals  Short term goals  Time Frame for Short term goals: 14 visits  Short term goal 1: Supine to/from sit with SBA  Short term goal 2: Sit to/from stand with SBA  Short term goal 3: Ambulate 76' with walker with CGA. Short term goal 4: Negotiate up and down 4 steps with one railing with CGA. Plan    Plan  Times per week: 5-6x/wk  Current Treatment Recommendations: Strengthening, Gait Training, Patient/Caregiver Education & Training, Stair training, Pain Management, Functional Mobility Training, Endurance Training, Home Exercise Program, Transfer Training, Safety Education & Training  Safety Devices  Type of devices:  All fall risk precautions in place, Nurse notified, Left in bed, Gait belt, Call light within reach  Restraints  Initially in place: No     Therapy Time   Individual Concurrent Group Co-treatment   Time In 1502         Time Out 1526         Minutes 24         Timed Code Treatment Minutes: [address], PTA

## 2021-03-16 NOTE — CARE COORDINATION
Transitional planning. Ashleigh calls from [facility] and states she is following pt and they did accept for home care. Writer states most likely will go home tomorrow.

## 2021-03-17 ENCOUNTER — APPOINTMENT (OUTPATIENT)
Dept: GENERAL RADIOLOGY | Age: 51
DRG: 220 | End: 2021-03-17
Payer: MEDICARE

## 2021-03-17 ENCOUNTER — ANESTHESIA (OUTPATIENT)
Dept: ENDOSCOPY | Age: 51
DRG: 220 | End: 2021-03-17
Payer: MEDICARE

## 2021-03-17 ENCOUNTER — APPOINTMENT (OUTPATIENT)
Dept: CT IMAGING | Age: 51
DRG: 220 | End: 2021-03-17
Payer: MEDICARE

## 2021-03-17 ENCOUNTER — ANESTHESIA EVENT (OUTPATIENT)
Dept: ENDOSCOPY | Age: 51
DRG: 220 | End: 2021-03-17
Payer: MEDICARE

## 2021-03-17 VITALS — DIASTOLIC BLOOD PRESSURE: 86 MMHG | OXYGEN SATURATION: 99 % | SYSTOLIC BLOOD PRESSURE: 128 MMHG

## 2021-03-17 LAB
ABSOLUTE EOS #: 0.31 K/UL (ref 0–0.44)
ABSOLUTE IMMATURE GRANULOCYTE: 0 K/UL (ref 0–0.3)
ABSOLUTE LYMPH #: 1.55 K/UL (ref 1.1–3.7)
ABSOLUTE MONO #: 1.55 K/UL (ref 0.1–1.2)
BASOPHILS # BLD: 1 % (ref 0–2)
BASOPHILS ABSOLUTE: 0.16 K/UL (ref 0–0.2)
DIFFERENTIAL TYPE: ABNORMAL
EOSINOPHILS RELATIVE PERCENT: 2 % (ref 1–4)
HCT VFR BLD CALC: 34.5 % (ref 36.3–47.1)
HEMOGLOBIN: 10.8 G/DL (ref 11.9–15.1)
IMMATURE GRANULOCYTES: 0 %
LYMPHOCYTES # BLD: 10 % (ref 24–43)
MCH RBC QN AUTO: 28.3 PG (ref 25.2–33.5)
MCHC RBC AUTO-ENTMCNC: 31.3 G/DL (ref 28.4–34.8)
MCV RBC AUTO: 90.6 FL (ref 82.6–102.9)
MONOCYTES # BLD: 10 % (ref 3–12)
MORPHOLOGY: NORMAL
NRBC AUTOMATED: 0 PER 100 WBC
PDW BLD-RTO: 13.2 % (ref 11.8–14.4)
PLATELET # BLD: 485 K/UL (ref 138–453)
PLATELET ESTIMATE: ABNORMAL
PMV BLD AUTO: 8.8 FL (ref 8.1–13.5)
PROCALCITONIN: 0.08 NG/ML
RBC # BLD: 3.81 M/UL (ref 3.95–5.11)
RBC # BLD: ABNORMAL 10*6/UL
SEG NEUTROPHILS: 77 % (ref 36–65)
SEGMENTED NEUTROPHILS ABSOLUTE COUNT: 11.93 K/UL (ref 1.5–8.1)
WBC # BLD: 15.5 K/UL (ref 3.5–11.3)
WBC # BLD: ABNORMAL 10*3/UL

## 2021-03-17 PROCEDURE — 71045 X-RAY EXAM CHEST 1 VIEW: CPT

## 2021-03-17 PROCEDURE — 6370000000 HC RX 637 (ALT 250 FOR IP): Performed by: EMERGENCY MEDICINE

## 2021-03-17 PROCEDURE — 94640 AIRWAY INHALATION TREATMENT: CPT

## 2021-03-17 PROCEDURE — 6370000000 HC RX 637 (ALT 250 FOR IP): Performed by: STUDENT IN AN ORGANIZED HEALTH CARE EDUCATION/TRAINING PROGRAM

## 2021-03-17 PROCEDURE — 6370000000 HC RX 637 (ALT 250 FOR IP): Performed by: INTERNAL MEDICINE

## 2021-03-17 PROCEDURE — 45393 COLONOSCOPY W/DECOMPRESSION: CPT | Performed by: INTERNAL MEDICINE

## 2021-03-17 PROCEDURE — 6360000004 HC RX CONTRAST MEDICATION: Performed by: SURGERY

## 2021-03-17 PROCEDURE — 3700000001 HC ADD 15 MINUTES (ANESTHESIA): Performed by: INTERNAL MEDICINE

## 2021-03-17 PROCEDURE — 0D9E8ZZ DRAINAGE OF LARGE INTESTINE, VIA NATURAL OR ARTIFICIAL OPENING ENDOSCOPIC: ICD-10-PCS | Performed by: INTERNAL MEDICINE

## 2021-03-17 PROCEDURE — 94761 N-INVAS EAR/PLS OXIMETRY MLT: CPT

## 2021-03-17 PROCEDURE — 3700000000 HC ANESTHESIA ATTENDED CARE: Performed by: INTERNAL MEDICINE

## 2021-03-17 PROCEDURE — 6360000002 HC RX W HCPCS: Performed by: STUDENT IN AN ORGANIZED HEALTH CARE EDUCATION/TRAINING PROGRAM

## 2021-03-17 PROCEDURE — 7100000000 HC PACU RECOVERY - FIRST 15 MIN: Performed by: INTERNAL MEDICINE

## 2021-03-17 PROCEDURE — 2500000003 HC RX 250 WO HCPCS: Performed by: STUDENT IN AN ORGANIZED HEALTH CARE EDUCATION/TRAINING PROGRAM

## 2021-03-17 PROCEDURE — 6370000000 HC RX 637 (ALT 250 FOR IP): Performed by: NURSE PRACTITIONER

## 2021-03-17 PROCEDURE — 6360000002 HC RX W HCPCS: Performed by: NURSE ANESTHETIST, CERTIFIED REGISTERED

## 2021-03-17 PROCEDURE — 99254 IP/OBS CNSLTJ NEW/EST MOD 60: CPT | Performed by: NURSE PRACTITIONER

## 2021-03-17 PROCEDURE — 74177 CT ABD & PELVIS W/CONTRAST: CPT

## 2021-03-17 PROCEDURE — 74018 RADEX ABDOMEN 1 VIEW: CPT

## 2021-03-17 PROCEDURE — 2060000000 HC ICU INTERMEDIATE R&B

## 2021-03-17 PROCEDURE — 3609155600 HC COLONOSCOPY WITH DECOMPRESSION: Performed by: INTERNAL MEDICINE

## 2021-03-17 PROCEDURE — 85025 COMPLETE CBC W/AUTO DIFF WBC: CPT

## 2021-03-17 PROCEDURE — 36415 COLL VENOUS BLD VENIPUNCTURE: CPT

## 2021-03-17 PROCEDURE — 84145 PROCALCITONIN (PCT): CPT

## 2021-03-17 PROCEDURE — 7100000001 HC PACU RECOVERY - ADDTL 15 MIN: Performed by: INTERNAL MEDICINE

## 2021-03-17 PROCEDURE — 74176 CT ABD & PELVIS W/O CONTRAST: CPT

## 2021-03-17 RX ORDER — METHOCARBAMOL 500 MG/1
1000 TABLET, FILM COATED ORAL 3 TIMES DAILY
Status: DISCONTINUED | OUTPATIENT
Start: 2021-03-17 | End: 2021-03-26 | Stop reason: HOSPADM

## 2021-03-17 RX ORDER — DEXAMETHASONE SODIUM PHOSPHATE 4 MG/ML
4 INJECTION, SOLUTION INTRA-ARTICULAR; INTRALESIONAL; INTRAMUSCULAR; INTRAVENOUS; SOFT TISSUE
Status: DISCONTINUED | OUTPATIENT
Start: 2021-03-17 | End: 2021-03-17

## 2021-03-17 RX ORDER — FENTANYL CITRATE 50 UG/ML
25 INJECTION, SOLUTION INTRAMUSCULAR; INTRAVENOUS EVERY 5 MIN PRN
Status: DISCONTINUED | OUTPATIENT
Start: 2021-03-17 | End: 2021-03-17

## 2021-03-17 RX ORDER — OXYCODONE HYDROCHLORIDE 5 MG/1
5 TABLET ORAL EVERY 6 HOURS PRN
Status: DISCONTINUED | OUTPATIENT
Start: 2021-03-17 | End: 2021-03-19

## 2021-03-17 RX ORDER — ONDANSETRON 2 MG/ML
4 INJECTION INTRAMUSCULAR; INTRAVENOUS
Status: DISCONTINUED | OUTPATIENT
Start: 2021-03-17 | End: 2021-03-17

## 2021-03-17 RX ORDER — DIPHENHYDRAMINE HYDROCHLORIDE 50 MG/ML
12.5 INJECTION INTRAMUSCULAR; INTRAVENOUS
Status: DISCONTINUED | OUTPATIENT
Start: 2021-03-17 | End: 2021-03-17

## 2021-03-17 RX ORDER — LABETALOL HYDROCHLORIDE 5 MG/ML
5 INJECTION, SOLUTION INTRAVENOUS EVERY 10 MIN PRN
Status: DISCONTINUED | OUTPATIENT
Start: 2021-03-17 | End: 2021-03-17

## 2021-03-17 RX ORDER — DEXTROSE, SODIUM CHLORIDE, AND POTASSIUM CHLORIDE 5; .45; .15 G/100ML; G/100ML; G/100ML
INJECTION INTRAVENOUS CONTINUOUS
Status: DISCONTINUED | OUTPATIENT
Start: 2021-03-17 | End: 2021-03-18

## 2021-03-17 RX ORDER — 0.9 % SODIUM CHLORIDE 0.9 %
500 INTRAVENOUS SOLUTION INTRAVENOUS
Status: DISCONTINUED | OUTPATIENT
Start: 2021-03-17 | End: 2021-03-17

## 2021-03-17 RX ORDER — PROPOFOL 10 MG/ML
INJECTION, EMULSION INTRAVENOUS PRN
Status: DISCONTINUED | OUTPATIENT
Start: 2021-03-17 | End: 2021-03-17 | Stop reason: SDUPTHER

## 2021-03-17 RX ADMIN — POTASSIUM CHLORIDE 40 MEQ: 1500 TABLET, EXTENDED RELEASE ORAL at 20:32

## 2021-03-17 RX ADMIN — ONDANSETRON 4 MG: 2 INJECTION INTRAMUSCULAR; INTRAVENOUS at 07:43

## 2021-03-17 RX ADMIN — GABAPENTIN 600 MG: 300 CAPSULE ORAL at 14:06

## 2021-03-17 RX ADMIN — ACETAMINOPHEN 1000 MG: 500 TABLET ORAL at 18:24

## 2021-03-17 RX ADMIN — ENOXAPARIN SODIUM 40 MG: 40 INJECTION SUBCUTANEOUS at 09:01

## 2021-03-17 RX ADMIN — PROPOFOL 50 MG: 10 INJECTION, EMULSION INTRAVENOUS at 16:28

## 2021-03-17 RX ADMIN — SUCRALFATE 1 G: 1 TABLET ORAL at 12:08

## 2021-03-17 RX ADMIN — IOHEXOL 50 ML: 240 INJECTION, SOLUTION INTRATHECAL; INTRAVASCULAR; INTRAVENOUS; ORAL at 07:36

## 2021-03-17 RX ADMIN — METHOCARBAMOL TABLETS 1000 MG: 500 TABLET, COATED ORAL at 14:06

## 2021-03-17 RX ADMIN — IPRATROPIUM BROMIDE AND ALBUTEROL SULFATE 1 AMPULE: .5; 3 SOLUTION RESPIRATORY (INHALATION) at 20:41

## 2021-03-17 RX ADMIN — Medication 5 MG: at 22:51

## 2021-03-17 RX ADMIN — PROPOFOL 50 MG: 10 INJECTION, EMULSION INTRAVENOUS at 16:45

## 2021-03-17 RX ADMIN — PANTOPRAZOLE SODIUM 40 MG: 40 TABLET, DELAYED RELEASE ORAL at 05:54

## 2021-03-17 RX ADMIN — PANTOPRAZOLE SODIUM 40 MG: 40 TABLET, DELAYED RELEASE ORAL at 18:24

## 2021-03-17 RX ADMIN — DEXTROSE MONOHYDRATE, SODIUM CHLORIDE, AND POTASSIUM CHLORIDE: 50; 4.5; 1.49 INJECTION, SOLUTION INTRAVENOUS at 11:33

## 2021-03-17 RX ADMIN — POTASSIUM CHLORIDE 40 MEQ: 1500 TABLET, EXTENDED RELEASE ORAL at 09:00

## 2021-03-17 RX ADMIN — SUCRALFATE 1 G: 1 TABLET ORAL at 05:54

## 2021-03-17 RX ADMIN — IOHEXOL 50 ML: 240 INJECTION, SOLUTION INTRATHECAL; INTRAVASCULAR; INTRAVENOUS; ORAL at 13:42

## 2021-03-17 RX ADMIN — SUCRALFATE 1 G: 1 TABLET ORAL at 22:50

## 2021-03-17 RX ADMIN — OXYCODONE HYDROCHLORIDE 5 MG: 5 TABLET ORAL at 14:06

## 2021-03-17 RX ADMIN — OXYCODONE HYDROCHLORIDE 10 MG: 5 TABLET ORAL at 01:27

## 2021-03-17 RX ADMIN — PROPOFOL 50 MG: 10 INJECTION, EMULSION INTRAVENOUS at 16:35

## 2021-03-17 RX ADMIN — PROPOFOL 50 MG: 10 INJECTION, EMULSION INTRAVENOUS at 16:52

## 2021-03-17 RX ADMIN — IOPAMIDOL 75 ML: 755 INJECTION, SOLUTION INTRAVENOUS at 09:20

## 2021-03-17 RX ADMIN — ACETAMINOPHEN 1000 MG: 500 TABLET ORAL at 07:28

## 2021-03-17 RX ADMIN — SUCRALFATE 1 G: 1 TABLET ORAL at 18:33

## 2021-03-17 RX ADMIN — METHOCARBAMOL TABLETS 1000 MG: 500 TABLET, COATED ORAL at 20:32

## 2021-03-17 RX ADMIN — PROPOFOL 50 MG: 10 INJECTION, EMULSION INTRAVENOUS at 16:40

## 2021-03-17 RX ADMIN — GABAPENTIN 600 MG: 300 CAPSULE ORAL at 22:51

## 2021-03-17 RX ADMIN — PROPOFOL 50 MG: 10 INJECTION, EMULSION INTRAVENOUS at 16:31

## 2021-03-17 RX ADMIN — PROPOFOL 50 MG: 10 INJECTION, EMULSION INTRAVENOUS at 17:03

## 2021-03-17 RX ADMIN — ACETAMINOPHEN 1000 MG: 500 TABLET ORAL at 22:51

## 2021-03-17 RX ADMIN — OXYCODONE HYDROCHLORIDE 5 MG: 5 TABLET ORAL at 20:36

## 2021-03-17 RX ADMIN — GABAPENTIN 600 MG: 300 CAPSULE ORAL at 05:54

## 2021-03-17 RX ADMIN — METHOCARBAMOL TABLETS 1000 MG: 500 TABLET, COATED ORAL at 09:57

## 2021-03-17 RX ADMIN — FLUOXETINE HYDROCHLORIDE 40 MG: 20 CAPSULE ORAL at 09:00

## 2021-03-17 RX ADMIN — DOCUSATE SODIUM 50MG AND SENNOSIDES 8.6MG 2 TABLET: 8.6; 5 TABLET, FILM COATED ORAL at 09:00

## 2021-03-17 RX ADMIN — PROPOFOL 100 MG: 10 INJECTION, EMULSION INTRAVENOUS at 16:25

## 2021-03-17 RX ADMIN — OXYCODONE HYDROCHLORIDE 10 MG: 5 TABLET ORAL at 07:27

## 2021-03-17 RX ADMIN — IPRATROPIUM BROMIDE AND ALBUTEROL SULFATE 1 AMPULE: .5; 3 SOLUTION RESPIRATORY (INHALATION) at 08:25

## 2021-03-17 RX ADMIN — METHADONE HYDROCHLORIDE 140 MG: 5 SOLUTION ORAL at 09:01

## 2021-03-17 ASSESSMENT — PAIN DESCRIPTION - LOCATION
LOCATION: ABDOMEN
LOCATION: ABDOMEN;BACK

## 2021-03-17 ASSESSMENT — PULMONARY FUNCTION TESTS
PIF_VALUE: 1

## 2021-03-17 ASSESSMENT — PAIN SCALES - GENERAL
PAINLEVEL_OUTOF10: 5
PAINLEVEL_OUTOF10: 4
PAINLEVEL_OUTOF10: 7
PAINLEVEL_OUTOF10: 4
PAINLEVEL_OUTOF10: 3
PAINLEVEL_OUTOF10: 7
PAINLEVEL_OUTOF10: 6
PAINLEVEL_OUTOF10: 5
PAINLEVEL_OUTOF10: 8
PAINLEVEL_OUTOF10: 9
PAINLEVEL_OUTOF10: 3

## 2021-03-17 ASSESSMENT — PAIN - FUNCTIONAL ASSESSMENT
PAIN_FUNCTIONAL_ASSESSMENT: PREVENTS OR INTERFERES WITH ALL ACTIVE AND SOME PASSIVE ACTIVITIES
PAIN_FUNCTIONAL_ASSESSMENT: 0-10

## 2021-03-17 ASSESSMENT — PAIN DESCRIPTION - ONSET
ONSET: ON-GOING
ONSET: ON-GOING

## 2021-03-17 ASSESSMENT — PAIN DESCRIPTION - ORIENTATION
ORIENTATION: MID;OTHER (COMMENT)
ORIENTATION: MID
ORIENTATION: MID

## 2021-03-17 ASSESSMENT — PAIN DESCRIPTION - DESCRIPTORS
DESCRIPTORS: ACHING;CRAMPING;SORE
DESCRIPTORS: ACHING

## 2021-03-17 ASSESSMENT — PAIN DESCRIPTION - PROGRESSION
CLINICAL_PROGRESSION: NOT CHANGED
CLINICAL_PROGRESSION: NOT CHANGED

## 2021-03-17 ASSESSMENT — PAIN DESCRIPTION - PAIN TYPE
TYPE: ACUTE PAIN;SURGICAL PAIN
TYPE: ACUTE PAIN;SURGICAL PAIN
TYPE: ACUTE PAIN

## 2021-03-17 NOTE — PROGRESS NOTES
POST OP NOTE    SUBJECTIVE  Pt s/p Colonoscopy Decompression     OBJECTIVE  VITALS:  /86   Pulse 112   Temp 97.6 °F (36.4 °C) (Temporal)   Resp 18   Ht 5' 7\" (1.702 m)   Wt 198 lb (89.8 kg)   SpO2 96%   BMI 31.01 kg/m²         GENERAL:  awake and alert. no apparent distress, No acute distress  CARDIOVASCULAR:  regular rate and rhythm   LUNGS:  clear to auscultation, CTA Bilaterally  ABDOMEN:   Abdomen soft, non-tender. BS normal. No masses,  No organomegaly, Abdomen soft, non-tender, non-distended    ASSESSMENT  1. POD# 0 s/p Colonoscopy Decompression  PLAN  1. Pain management-MMPT  2. DVT proph-Lovenox 40mg qd  3. GI proph-dulcolax, carafate  4.  Diet: Clears, advance tomorrow      [facility]  Tra[facility]/Surgery Service  3/17/2021 at 7:06 PM

## 2021-03-17 NOTE — ANESTHESIA PRE PROCEDURE
Department of Anesthesiology  Preprocedure Note       Name:  John Umana   Age:  46 y.o.  :  1970                                          MRN:  [id number]         Date:  3/17/2021      Surgeon: Harshad Melvin):  Asad Godoy MD    Procedure: Procedure(s):  **CASE IN O.R. WITH G.I. STAFF**  COLONOSCOPY DECOMPRESSION    Medications prior to admission:   Prior to Admission medications    Medication Sig Start Date End Date Taking? Authorizing Provider   METHADONE HCL PO Take 140 mg by mouth    Historical Provider, MD   FLUoxetine (PROZAC) 40 MG capsule Take 40 mg by mouth daily    Historical Provider, MD   gabapentin (NEURONTIN) 600 MG tablet Take 600 mg by mouth 3 times daily.     Historical Provider, MD   predniSONE (DELTASONE) 20 MG tablet Take 1 tablet by mouth daily Take 3 tablets day 1-2. 2 tablets day 3-4 and 1 tablet day 5 10/3/17   ABRAHAM Coppola - CNP   diphenhydrAMINE (BENADRYL) 25 MG capsule Take 1 capsule by mouth every 6 hours as needed for Itching 10/3/17   ABRAHAM Coppola - KAREN   naloxone Sarah Litter) 2 MG/2ML injection  16   Historical Provider, MD   albuterol sulfate HFA (PROVENTIL HFA) 108 (90 BASE) MCG/ACT inhaler Inhale 1-2 puffs into the lungs every 4 hours as needed for Wheezing 3/29/16   Diana Michael MD       Current medications:    Current Facility-Administered Medications   Medication Dose Route Frequency Provider Last Rate Last Admin    methocarbamol (ROBAXIN) tablet 1,000 mg  1,000 mg Oral TID Daily Simons DO   1,000 mg at 21 1406    melatonin tablet 5 mg  5 mg Oral Nightly Adi Villegas, APRN - CNP        oxyCODONE (ROXICODONE) immediate release tablet 5 mg  5 mg Oral Q6H PRN  DO   5 mg at 21 1406    dextrose 5 % and 0.45 % NaCl with KCl 20 mEq infusion   Intravenous Continuous   mL/hr at 21 1133 New Bag at 21 1133    sennosides-docusate sodium (SENOKOT-S) 8.6-50 MG tablet 2 tablet  2 tablet like syndrome, eyes roll to back of head    Imitrex [Sumatriptan] Other (See Comments)     TIA    Thorazine [Chlorpromazine] Other (See Comments)     Seizure like syndrome    Cardizem [Diltiazem] Rash    Pcn [Penicillins] Rash       Problem List:    Patient Active Problem List   Diagnosis Code    Migraine G43.909    Opiate dependence ([facility] F11.20    Chronic pain G89.29    Obesity E66.9    Patient overweight E66.3    Lumbar radicular pain M54.16    Lumbar disc disease M51.9    Closed nondisplaced fracture of fifth metatarsal bone of right foot S92.354A    Intra-abdominal abscess (HCC) K65.1       Past Medical History:        Diagnosis Date    Asthma     Connective tissue disease, undifferentiated (HCC) 1987    Dr. Juan Daniel Rivera ([facility]he[facility]    DDD (degenerative disc disease) 2000    Dr. Ceja Said Depression     Lumbar radicular pain     Migraine 1988    Dr. Melisa Rosa Mitral valve prolapse 1978    Dr. Iliana Cooper addiction ([facility] [address] [address] 01/02/2013    Dr. Kirsty George / on 10/3/17 pt states she completed tx 2016       Past Surgical History:        Procedure Laterality Date    ABDOMEN SURGERY  03/11/2021    laparotomy explor.abd. washout,fascial closure    CT ABSCESS DRAIN SUBCUTANEOUS  03/04/2021    CT ABSCESS DRAIN SUBCUTANEOUS 3/4/2021 [facility] CT SCAN    GASTRECTOMY  03/05/2021    EXPLORATORY LAPAROTOMY, PARTIAL GASTRECTOMY    HERNIA REPAIR  1972    as child    HYSTERECTOMY      2007    LAPAROTOMY N/A 3/5/2021    EXPLORATORY LAPAROTOMY, PARTIAL GASTRECTOMY performed by Cleo Whitman MD at [facility] 3/11/2021    LAPAROTOMY EXPLORATORY, ABDOMINAL [facility] OUT, FASCIAL CLOSURE  performed by Priyank Ricketts MD at [address] Right 07/27/2015    right lumbar JHONY    NOSE SURGERY  1989    After nose was broken.  Dr. Mari Young Left 2007    Dr. Chase Jones  2007    Dr. aNtividad Davis       Social History:    Social History     Tobacco Use    Smoking status: Current Every Day Smoker     Packs/day: 1.00     Types: Cigarettes    Smokeless tobacco: Never Used   Substance Use Topics    Alcohol use: No     Alcohol/week: 0.0 standard drinks                                Ready to quit: Not Answered  Counseling given: Not Answered      Vital Signs (Current):   Vitals:    03/17/21 0826 03/17/21 1145 03/17/21 1453 03/17/21 1518   BP:  (!) 133/103 (!) 138/90    Pulse:  119 122    Resp: 20 18 20    Temp:  98.7 °F (37.1 °C) 99.9 °F (37.7 °C) 99.5 °F (37.5 °C)   TempSrc:  Oral Temporal Temporal   SpO2: 99% 97% 97%    Weight:   198 lb (89.8 kg)    Height:   5' 7\" (1.702 m)                                               BP Readings from Last 3 Encounters:   03/17/21 (!) 138/90   03/11/21 (!) 156/114   03/05/21 (!) 166/134       NPO Status: Time of last liquid consumption: 2300                        Time of last solid consumption: 2300                        Date of last liquid consumption: 03/16/21                        Date of last solid food consumption: 03/16/21    BMI:   Wt Readings from Last 3 Encounters:   03/17/21 198 lb (89.8 kg)   02/20/20 205 lb (93 kg)   08/20/19 215 lb (97.5 kg)     Body mass index is 31.01 kg/m².     CBC:   Lab Results   Component Value Date    WBC 15.5 03/17/2021    RBC 3.81 03/17/2021    RBC 5.02 09/02/2017    HGB 10.8 03/17/2021    HCT 34.5 03/17/2021    MCV 90.6 03/17/2021    RDW 13.2 03/17/2021     03/17/2021       CMP:   Lab Results   Component Value Date     03/16/2021    K 4.0 03/16/2021     03/16/2021    CO2 26 03/16/2021    BUN 4 03/16/2021    CREATININE 0.62 03/16/2021    GFRAA >60 03/16/2021    LABGLOM >60 03/16/2021    GLUCOSE 93 03/16/2021    GLUCOSE 90 09/02/2017    PROT 5.6 03/07/2021    CALCIUM 8.1 03/16/2021    BILITOT 0.34 03/07/2021    ALKPHOS 61 03/07/2021    AST 16 03/07/2021    ALT 10 03/07/2021       POC Tests: No results for input(s): POCGLU, POCNA, POCK, POCCL, Ryley Adhikari, POCHCT in the last 72 hours. Coags:   Lab Results   Component Value Date    PROTIME 11.8 03/04/2021    INR 1.1 03/04/2021    APTT 26.2 03/03/2021       HCG (If Applicable): No results found for: PREGTESTUR, PREGSERUM, HCG, HCGQUANT     ABGs: No results found for: PHART, PO2ART, BXJ5FBF, WAL1HZA, BEART, J5BXZAQH     Type & Screen (If Applicable):  No results found for: LABABO, LABRH    Drug/Infectious Status (If Applicable):  Lab Results   Component Value Date    HEPCAB NONREACTIVE 10/27/2016       COVID-19 Screening (If Applicable):   Lab Results   Component Value Date    COVID19 Not Detected 03/03/2021           Anesthesia Evaluation  Patient summary reviewed and Nursing notes reviewed no history of anesthetic complications:   Airway: Mallampati: I       Mouth opening: > = 3 FB Dental:      Comment: Missing many teeth    Pulmonary: breath sounds clear to auscultation  (+) asthma:                            Cardiovascular:  Exercise tolerance: good (>4 METS),           Rhythm: regular  Rate: normal                   PE comment: tachycardic   Neuro/Psych:   (+) headaches:, psychiatric history: stable with treatment             ROS comment: Recovering opioid dependency, on methadone. GI/Hepatic/Renal:             Endo/Other:    (+) : arthritis:., .                 Abdominal:       Abdomen: tender. Vascular:                                        Anesthesia Plan      MAC and general     ASA 3     (Plan A is a MAC  Plan B is GA with LMA  Plan C is GETA)  Induction: intravenous. MIPS: Postoperative ventilation. Anesthetic plan and risks discussed with patient. Use of blood products discussed with patient whom consented to blood products. Plan discussed with CRNA.                   Martinez Villegas MD   3/17/2021

## 2021-03-17 NOTE — OP NOTE
Operative Note      Patient: Thom Mora  YOB: 1970  MRN: [id number]    Date of Procedure: 3/17/2021    Pre-Op Diagnosis: COLON DISTENTION    Post-Op Diagnosis: Same   · Severely dilated: Liudmila Profit Successful decompression performed. · Ischemia in the cecum and ascending colon. Procedure(s):  **CASE IN O.R. WITH G.I. STAFF**  COLONOSCOPY DECOMPRESSION    Surgeon(s):  Pilo Melendez MD    Assistant:   First Assistant: Porsha Law RN    Anesthesia: Monitor Anesthesia Care    Estimated Blood Loss (mL): none    Complications: None    Specimens:   * No specimens in log *    Implants:  * No implants in log *      Drains:   Closed/Suction Drain Posterior LUQ Bulb 19 Moldovan (Active)   Site Description Healing 03/17/21 1212   Dressing Status Clean;Dry; Intact 03/17/21 1212   Drainage Appearance Serosanguinous 03/17/21 1212   Status To bulb suction 03/17/21 1212   Output (ml) 75 ml 03/17/21 1212       [REMOVED] Closed/Suction Drain Left Abdomen Bulb 12 Moldovan (Removed)   Status Open to gravity drainage 03/04/21 0923       [REMOVED] Closed/Suction Drain Left Abdomen  12 Moldovan (Removed)   Dressing Status Clean;Dry; Intact 03/05/21 0800   Drainage Appearance Brown 03/05/21 0800   Status Open to gravity drainage 03/05/21 0800   Output (ml) 125 ml 03/05/21 0650       [REMOVED] Closed/Suction Drain Anterior RUQ 23 Moldovan (Removed)   Site Description Unable to view 03/10/21 0310   Dressing Status Clean;Dry; Intact 03/10/21 0310   Drainage Appearance Serosanguinous 03/10/21 0310   Status To bulb suction 03/10/21 0310   Output (ml) 0 ml 03/09/21 1600       [REMOVED] NG/OG/NJ/NE Tube Nasogastric 18 fr Right nostril (Removed)   Surrounding Skin Dry; Intact 03/08/21 2130   Securement device Yes 03/08/21 2130   Status Other (Comment) 03/08/21 2130   Placement Verified by External Catheter Length 03/08/21 2130   NG/OG/NJ/NE External Measurement (cm) 80 cm 03/07/21 1200   Drainage Appearance Green 03/05/21 1800   Tube Feeding Immune Enhancing 03/08/21 2130   Tube Feeding Status Continuous 03/08/21 2130   Rate/Schedule 45 mL/hr 03/08/21 2130   Tube Feeding Intake (mL) 209 ml 03/08/21 2130   Free Water Flush (mL) 30 mL 03/08/21 0345   Free Water Rate 100 03/08/21 2130   Residual Volume (ml) 0 ml 03/08/21 1536   Output (mL) 0 ml 03/07/21 0400       [REMOVED] NG/OG/NJ/NE Tube Nasogastric Right nostril (Removed)   Surrounding Skin Dry; Intact 03/14/21 0700   Securement device Yes 03/14/21 0700   Status Suction-low intermittent 03/14/21 0700   Placement Verified by Gastric Contents 03/14/21 0700   Drainage Appearance Green 03/14/21 0700   Output (mL) 0 ml 03/14/21 0700       [REMOVED] Urethral Catheter Double-lumen;Straight-tip 16 fr (Removed)   Catheter Indications Perioperative use in selected surgeries including but not limited to urologic, pelvic or need for intraoperative monitoring of urinary output due to prolonged surgery, large volume infusion or need for diuretic therapy in surgery 03/07/21 0800   Site Assessment No urethral drainage 03/07/21 0800   Urine Color Yellow 03/07/21 0800   Urine Appearance Clear 03/07/21 0800   Output (mL) 225 mL 03/07/21 0955       [REMOVED] Urethral Catheter 16 fr (Removed)   Catheter Indications Perioperative use in selected surgeries including but not limited to urologic, pelvic or need for intraoperative monitoring of urinary output due to prolonged surgery, large volume infusion or need for diuretic therapy in surgery 03/12/21 1213   Site Assessment Pink; No urethral drainage 03/12/21 1213   Urine Color Yellow 03/12/21 1213   Urine Appearance Clear 03/12/21 1213   Output (mL) 410 mL 03/12/21 1209       [REMOVED] Urethral Catheter Latex (Removed)   Catheter Indications Perioperative use in selected surgeries including but not limited to urologic, pelvic or need for intraoperative monitoring of urinary output due to prolonged surgery, large volume infusion or need for diuretic therapy in surgery 03/14/21 0800   Site Assessment [place] 03/14/21 0800   Urine Color Yellow 03/14/21 0800   Urine Appearance Clear 03/14/21 0800   Output (mL) 100 mL 03/14/21 0611       Findings:   1. The entire examined colon was significantly dilated with large amount of semisolid and liquid stool present. 2. Copious amounts of sterile water irrigation alongwith scope suctioning was performed resulting in partial evacuation of stool contents and fair visualization of the mucosa. 3. The mucosa in the ascending colon and cecum was characterized by exudate and loss of vascularity suspicious for ischemic changes. Remainder of the visualized colonic mucosa appeared normal.  4. The sigmoid colon was tortuous however there was no stricture noted. This could have been obscured due to poor prep. 5. Successful colonic decompression was performed. Recommendations:  1. We will obtain a stat KUB and postop. 2. Monitor clinical course. 3. Patient will need a dedicated colonoscopy with a full prep as an outpatient once cleared by general surgery. 4. No further recommendations from GI standpoint. 5. GI will sign off. Please call for any questions or concerns. Detailed Description of Procedure:   Informed consent was obtained from the patient after explanation of the procedure including indications, description of the procedure,  benefits and possible risks and complications of the procedure, and alternatives. Questions were answered. The patient's history was reviewed and a directed physical examination was performed prior to the procedure. Patient was monitored throughout the procedure with pulse oximetry and periodic assessment of vital signs. Patient was sedated as noted above. With the patient initially in the left lateral decubitus position, a digital rectal examination was performed and revealed negative without mass, lesions or tenderness.   The Olympus video colonoscope was placed in the patient's rectum and advanced with difficulty  to the cecum, which was identified by the ileocecal valve. The prep was poor. Examination of the mucosa was performed during both introduction and withdrawal of the colonoscope. Retroflexed view of the rectum was performed. The patient  was taken to the recovery area in good condition.       Electronically signed by Ami Castro MD on 3/17/2021 at 5:15 PM
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
## 2021-03-17 NOTE — PLAN OF CARE
Problem: Pain:  Goal: Pain level will decrease  Description: Pain level will decrease  Outcome: Ongoing  Goal: Control of acute pain  Description: Control of acute pain  Outcome: Ongoing  Goal: Control of chronic pain  Description: Control of chronic pain  Outcome: Ongoing     Problem: Discharge Planning:  Goal: Participates in care planning  Description: Participates in care planning  Outcome: Ongoing  Goal: Discharged to appropriate level of care  Description: Discharged to appropriate level of care  Outcome: Ongoing     Problem: Anxiety/Stress:  Goal: Level of anxiety will decrease  Description: Level of anxiety will decrease  Outcome: Ongoing     Problem: Aspiration:  Goal: Absence of aspiration  Description: Absence of aspiration  Outcome: Ongoing     Problem:  Bowel Function - Altered:  Goal: Bowel elimination is within specified parameters  Description: Bowel elimination is within specified parameters  Outcome: Ongoing     Problem: Cardiac Output - Decreased:  Goal: Hemodynamic stability will improve  Description: Hemodynamic stability will improve  Outcome: Ongoing     Problem: Fluid Volume - Imbalance:  Goal: Absence of imbalanced fluid volume signs and symptoms  Description: Absence of imbalanced fluid volume signs and symptoms  Outcome: Ongoing     Problem: Nutrition Deficit:  Goal: Ability to achieve adequate nutritional intake will improve  Description: Ability to achieve adequate nutritional intake will improve  Outcome: Ongoing     Problem: Pain:  Goal: Pain level will decrease  Description: Pain level will decrease  Outcome: Ongoing  Goal: Recognizes and communicates pain  Description: Recognizes and communicates pain  Outcome: Ongoing  Goal: Control of acute pain  Description: Control of acute pain  Outcome: Ongoing  Goal: Control of chronic pain  Description: Control of chronic pain  Outcome: Ongoing     Problem: Serum Glucose Level - Abnormal:  Goal: Ability to maintain appropriate glucose levels will improve to within specified parameters  Description: Ability to maintain appropriate glucose levels will improve to within specified parameters  Outcome: Ongoing     Problem: Skin Integrity - Impaired:  Goal: Will show no infection signs and symptoms  Description: Will show no infection signs and symptoms  Outcome: Ongoing  Goal: Absence of new skin breakdown  Description: Absence of new skin breakdown  Outcome: Ongoing     Problem: Sleep Pattern Disturbance:  Goal: Appears well-rested  Description: Appears well-rested  Outcome: Ongoing     Problem: Tissue Perfusion, Altered:  Goal: Circulatory function within specified parameters  Description: Circulatory function within specified parameters  Outcome: Ongoing     Problem: Nutrition  Goal: Optimal nutrition therapy  Description: Nutrition Problem #1: Inadequate oral intake  Intervention: Food and/or Nutrient Delivery: Continue NPO, Start Tube Feeding  Nutritional Goals: Pt to meet % of est'd needs via EN     Outcome: Ongoing     Problem: Skin Integrity:  Goal: Will show no infection signs and symptoms  Description: Will show no infection signs and symptoms  Outcome: Ongoing  Goal: Absence of new skin breakdown  Description: Absence of new skin breakdown  Outcome: Ongoing     Problem: Falls - Risk of:  Goal: Will remain free from falls  Description: Will remain free from falls  Outcome: Ongoing  Goal: Absence of physical injury  Description: Absence of physical injury  Outcome: Ongoing     Problem: OXYGENATION/RESPIRATORY FUNCTION  Goal: Patient will achieve/maintain normal respiratory rate/effort  Description: Respiratory rate and effort will be within normal limits for the patient  Outcome: Ongoing

## 2021-03-17 NOTE — PLAN OF CARE
Problem: Pain:  Goal: Control of acute pain  Description: Control of acute pain  Outcome: Ongoing   Pt's pain assessed frequently with hourly rounding; assessed all pain characteristics including level, type, location, frequency, and onset. Pt medicated by RN per PRN orders. Non-pharmacologic interventions offered to pt as well. Pt states pain is tolerable at this time. Will continue to monitor. Problem: Anxiety/Stress:  Goal: Level of anxiety will decrease  Description: Level of anxiety will decrease  Outcome: Ongoing   Pt. States anxiety has improved slightly. Problem: Falls - Risk of:  Goal: Will remain free from falls  Description: Will remain free from falls  Outcome: Ongoing   Pt remains free from falls at this time. Floor free from obstacles, and bed is locked and in lowest position. Adequate lighting provided. Call light within reach; pt encouraged to call before getting OOB for any need. Will continue to monitor needs during hourly rounding.     Electronically signed by Roc Diaz RN on 3/17/2021 at 2:43 AM

## 2021-03-17 NOTE — PROGRESS NOTES
PROGRESS NOTE    [facility] [id number]  DATE: 3/17/2021  SURGEON: Dr. Zayra Cole: May Wallace MD    HD: # 13    ASSESSMENT    Patient Active Problem List   Diagnosis    Migraine    Opiate dependence ([facility] [address]    Chronic pain    Obesity    Patient overweight    Lumbar radicular pain    Lumbar disc disease    Closed nondisplaced fracture of fifth metatarsal bone of right foot    Intra-abdominal abscess ([facility] [address]     S/p 3/5/21 ex lap, abd wash out, antrectomy with BII reconstruction, MILTON drain placement x2  S/p 3/11/21 ex lap, abd washout and midline fascial closure with retention sutures     [facility]    1. Neuro- Analgesia multimodal pain therapy, wean from IV as able   2. CV- continue to monitor BP/HR, heath Hgb  3. Resp- continue to encourage incentive spirometry and deep breathing every hour while awake per RT and RN  4. GI-consult to GI for possible c-scope, STAT CT with rectal contrast   5. Wound care: Daily dressing changes, pack with plane packing, dry dressing to midline, strip MILTON drain and record output  6. Renal-voiding independently, heath strict I/Os  7. Msk- continue to encourage ambulation/activity, PT/OT eval and Tx   8. ID- will discuss restarting zosyn, completed day  on 3/16, heath WBC, heath for fevers, f/u procal   9. Ppx- Lovenox for DVT ppx, GI ppx protonix BID, Endo BG checks     SUBJECTIVE  Patient seen and examined at bedside, no overnight events. Patient reports stable mild abdominal pain, controlled on MMT. +flatus and BMs. Tolerating gen diet, has an appetite. Denies CP, S OB, N/V, C/F.  Afebrile, T-max 37.3    MILTON drain 305ml, SS fluid in bulb x24hr    OBJECTIVE  VITALS: Temp: Temp: 98.1 °F (36.7 °C)Temp  Av.3 °F (36.8 °C)  Min: 98 °F (36.7 °C)  Max: 98.6 °F (37 °C) BP Systolic (97CRO), QQI:278 , Min:142 , BOC:706   Diastolic (52DQM), YWZ:11, Min:90, Max:108   Pulse Pulse  Av  Min: 104 Max: 119 Resp Resp  Av.7  Min: 14  Max: 21 Pulse ox SpO2  Av.7 %  Min: 91 %  Max: 95 %  GENERAL: alert, no distress  NEURO: Follows commands, no focal findings  HEENT: Normocephalic, EOMI  LUNGS: Equal chest rise and fall, nonlabored breathing on 2L NC  HEART: Mild tachycardia  ABDOMEN: soft, appropriately-tender, distention, surgical dressing dry and intact, no rebound, no guarding or peritoneal signs present, left-sided MILTON drain with serosanguineous output in bulb  EXTERMITY: no cyanosis, clubbing or edema    I/O last 3 completed shifts: In: 10 [I.V.:10]  Out: 305 [Drains:305]    Drain/tube output: In: 10 [I.V.:10]  Out: 205 [Drains:205]    LAB:  CBC:   Recent Labs     03/15/21  0630 21  0638 21  0603   WBC 11.3 12.2* 15.5*   HGB 10.4* 11.2* 10.8*   HCT 34.1* 36.0* 34.5*   MCV 90.2 88.9 90.6   * 502* 485*     BMP:   Recent Labs     03/15/21  0630 21  0638    135   K 3.6* 4.0    100   CO2 28 26   BUN 6 4*   CREATININE 0.48* 0.62   GLUCOSE 85 93     COAGS: No results for input(s): APTT, PROT, INR in the last 72 hours.     RADIOLOGY:    Evan Meyers DO  3/12/21, 7:24 AM

## 2021-03-17 NOTE — PROGRESS NOTES
Occupational Therapy    Occupational Therapy Not Seen Note    DATE: 3/17/2021  Name: Izabella Noriega  : 1970  MRN: [id number]    Patient not available for Occupational Therapy due to:    Surgery/Procedure: colonoscopy.         Electronically signed by LINH Morse on 3/17/2021 at 2:50 PM

## 2021-03-17 NOTE — PROGRESS NOTES
Discussed with GI service and anesthesia team.  Planning to coordinate colonoscopy today at 4 PM.  Appreciate assistance. Please keep pt NPO until after procedure.      Thank you,    Electronically signed by Navdeep Duckworth DO on 3/17/2021 at 12:16 PM

## 2021-03-18 LAB
ABSOLUTE EOS #: 0.56 K/UL (ref 0–0.44)
ABSOLUTE IMMATURE GRANULOCYTE: 0.04 K/UL (ref 0–0.3)
ABSOLUTE LYMPH #: 1.69 K/UL (ref 1.1–3.7)
ABSOLUTE MONO #: 0.98 K/UL (ref 0.1–1.2)
ANION GAP SERPL CALCULATED.3IONS-SCNC: 7 MMOL/L (ref 9–17)
BASOPHILS # BLD: 1 % (ref 0–2)
BASOPHILS ABSOLUTE: 0.05 K/UL (ref 0–0.2)
BUN BLDV-MCNC: 2 MG/DL (ref 6–20)
BUN/CREAT BLD: ABNORMAL (ref 9–20)
CALCIUM SERPL-MCNC: 7.7 MG/DL (ref 8.6–10.4)
CHLORIDE BLD-SCNC: 106 MMOL/L (ref 98–107)
CO2: 26 MMOL/L (ref 20–31)
CREAT SERPL-MCNC: 0.54 MG/DL (ref 0.5–0.9)
DIFFERENTIAL TYPE: ABNORMAL
EOSINOPHILS RELATIVE PERCENT: 5 % (ref 1–4)
GFR AFRICAN AMERICAN: >60 ML/MIN
GFR NON-AFRICAN AMERICAN: >60 ML/MIN
GFR SERPL CREATININE-BSD FRML MDRD: ABNORMAL ML/MIN/{1.73_M2}
GFR SERPL CREATININE-BSD FRML MDRD: ABNORMAL ML/MIN/{1.73_M2}
GLUCOSE BLD-MCNC: 95 MG/DL (ref 70–99)
HCT VFR BLD CALC: 33.6 % (ref 36.3–47.1)
HEMOGLOBIN: 10 G/DL (ref 11.9–15.1)
IMMATURE GRANULOCYTES: 0 %
LACTIC ACID, WHOLE BLOOD: 1 MMOL/L (ref 0.7–2.1)
LACTIC ACID: NORMAL MMOL/L
LYMPHOCYTES # BLD: 15 % (ref 24–43)
MAGNESIUM: 2.1 MG/DL (ref 1.6–2.6)
MCH RBC QN AUTO: 27.2 PG (ref 25.2–33.5)
MCHC RBC AUTO-ENTMCNC: 29.8 G/DL (ref 28.4–34.8)
MCV RBC AUTO: 91.3 FL (ref 82.6–102.9)
MONOCYTES # BLD: 9 % (ref 3–12)
NRBC AUTOMATED: 0 PER 100 WBC
PDW BLD-RTO: 13.3 % (ref 11.8–14.4)
PHOSPHORUS: 3.4 MG/DL (ref 2.6–4.5)
PLATELET # BLD: 467 K/UL (ref 138–453)
PLATELET ESTIMATE: ABNORMAL
PMV BLD AUTO: 9 FL (ref 8.1–13.5)
POTASSIUM SERPL-SCNC: 4.4 MMOL/L (ref 3.7–5.3)
PROCALCITONIN: 0.08 NG/ML
RBC # BLD: 3.68 M/UL (ref 3.95–5.11)
RBC # BLD: ABNORMAL 10*6/UL
SEG NEUTROPHILS: 70 % (ref 36–65)
SEGMENTED NEUTROPHILS ABSOLUTE COUNT: 7.77 K/UL (ref 1.5–8.1)
SODIUM BLD-SCNC: 139 MMOL/L (ref 135–144)
WBC # BLD: 11.1 K/UL (ref 3.5–11.3)
WBC # BLD: ABNORMAL 10*3/UL

## 2021-03-18 PROCEDURE — 84100 ASSAY OF PHOSPHORUS: CPT

## 2021-03-18 PROCEDURE — 6370000000 HC RX 637 (ALT 250 FOR IP): Performed by: INTERNAL MEDICINE

## 2021-03-18 PROCEDURE — 97535 SELF CARE MNGMENT TRAINING: CPT

## 2021-03-18 PROCEDURE — 83605 ASSAY OF LACTIC ACID: CPT

## 2021-03-18 PROCEDURE — 94761 N-INVAS EAR/PLS OXIMETRY MLT: CPT

## 2021-03-18 PROCEDURE — 80048 BASIC METABOLIC PNL TOTAL CA: CPT

## 2021-03-18 PROCEDURE — 6360000002 HC RX W HCPCS: Performed by: INTERNAL MEDICINE

## 2021-03-18 PROCEDURE — 2580000003 HC RX 258: Performed by: INTERNAL MEDICINE

## 2021-03-18 PROCEDURE — 83735 ASSAY OF MAGNESIUM: CPT

## 2021-03-18 PROCEDURE — 36415 COLL VENOUS BLD VENIPUNCTURE: CPT

## 2021-03-18 PROCEDURE — 2500000003 HC RX 250 WO HCPCS: Performed by: INTERNAL MEDICINE

## 2021-03-18 PROCEDURE — 94640 AIRWAY INHALATION TREATMENT: CPT

## 2021-03-18 PROCEDURE — 85025 COMPLETE CBC W/AUTO DIFF WBC: CPT

## 2021-03-18 PROCEDURE — 84145 PROCALCITONIN (PCT): CPT

## 2021-03-18 PROCEDURE — 2060000000 HC ICU INTERMEDIATE R&B

## 2021-03-18 RX ADMIN — IPRATROPIUM BROMIDE AND ALBUTEROL SULFATE 1 AMPULE: .5; 3 SOLUTION RESPIRATORY (INHALATION) at 07:49

## 2021-03-18 RX ADMIN — METHOCARBAMOL TABLETS 1000 MG: 500 TABLET, COATED ORAL at 15:38

## 2021-03-18 RX ADMIN — ACETAMINOPHEN 1000 MG: 500 TABLET ORAL at 23:19

## 2021-03-18 RX ADMIN — GABAPENTIN 600 MG: 300 CAPSULE ORAL at 21:54

## 2021-03-18 RX ADMIN — FLUOXETINE HYDROCHLORIDE 40 MG: 20 CAPSULE ORAL at 08:16

## 2021-03-18 RX ADMIN — POTASSIUM CHLORIDE 40 MEQ: 1500 TABLET, EXTENDED RELEASE ORAL at 21:54

## 2021-03-18 RX ADMIN — GABAPENTIN 600 MG: 300 CAPSULE ORAL at 15:38

## 2021-03-18 RX ADMIN — SODIUM CHLORIDE, PRESERVATIVE FREE 10 ML: 5 INJECTION INTRAVENOUS at 21:54

## 2021-03-18 RX ADMIN — POTASSIUM CHLORIDE 40 MEQ: 1500 TABLET, EXTENDED RELEASE ORAL at 08:14

## 2021-03-18 RX ADMIN — DOCUSATE SODIUM 50MG AND SENNOSIDES 8.6MG 2 TABLET: 8.6; 5 TABLET, FILM COATED ORAL at 08:14

## 2021-03-18 RX ADMIN — IPRATROPIUM BROMIDE AND ALBUTEROL SULFATE 1 AMPULE: .5; 3 SOLUTION RESPIRATORY (INHALATION) at 21:30

## 2021-03-18 RX ADMIN — SUCRALFATE 1 G: 1 TABLET ORAL at 17:52

## 2021-03-18 RX ADMIN — PANTOPRAZOLE SODIUM 40 MG: 40 TABLET, DELAYED RELEASE ORAL at 05:50

## 2021-03-18 RX ADMIN — OXYCODONE HYDROCHLORIDE 5 MG: 5 TABLET ORAL at 21:59

## 2021-03-18 RX ADMIN — SUCRALFATE 1 G: 1 TABLET ORAL at 12:00

## 2021-03-18 RX ADMIN — METHOCARBAMOL TABLETS 1000 MG: 500 TABLET, COATED ORAL at 08:14

## 2021-03-18 RX ADMIN — ACETAMINOPHEN 1000 MG: 500 TABLET ORAL at 15:37

## 2021-03-18 RX ADMIN — SUCRALFATE 1 G: 1 TABLET ORAL at 05:50

## 2021-03-18 RX ADMIN — PANTOPRAZOLE SODIUM 40 MG: 40 TABLET, DELAYED RELEASE ORAL at 15:39

## 2021-03-18 RX ADMIN — METHADONE HYDROCHLORIDE 140 MG: 5 SOLUTION ORAL at 08:11

## 2021-03-18 RX ADMIN — ACETAMINOPHEN 1000 MG: 500 TABLET ORAL at 08:24

## 2021-03-18 RX ADMIN — GABAPENTIN 600 MG: 300 CAPSULE ORAL at 05:50

## 2021-03-18 RX ADMIN — DEXTROSE MONOHYDRATE, SODIUM CHLORIDE, AND POTASSIUM CHLORIDE: 50; 4.5; 1.49 INJECTION, SOLUTION INTRAVENOUS at 10:30

## 2021-03-18 RX ADMIN — DEXTROSE MONOHYDRATE, SODIUM CHLORIDE, AND POTASSIUM CHLORIDE: 50; 4.5; 1.49 INJECTION, SOLUTION INTRAVENOUS at 01:24

## 2021-03-18 RX ADMIN — METHOCARBAMOL TABLETS 1000 MG: 500 TABLET, COATED ORAL at 21:54

## 2021-03-18 RX ADMIN — Medication 5 MG: at 23:18

## 2021-03-18 RX ADMIN — SUCRALFATE 1 G: 1 TABLET ORAL at 23:18

## 2021-03-18 RX ADMIN — OXYCODONE HYDROCHLORIDE 5 MG: 5 TABLET ORAL at 02:36

## 2021-03-18 RX ADMIN — ENOXAPARIN SODIUM 40 MG: 40 INJECTION SUBCUTANEOUS at 08:16

## 2021-03-18 ASSESSMENT — PAIN DESCRIPTION - LOCATION: LOCATION: ABDOMEN

## 2021-03-18 ASSESSMENT — PAIN DESCRIPTION - PAIN TYPE
TYPE: ACUTE PAIN;SURGICAL PAIN
TYPE: ACUTE PAIN;SURGICAL PAIN

## 2021-03-18 ASSESSMENT — PAIN SCALES - GENERAL
PAINLEVEL_OUTOF10: 7
PAINLEVEL_OUTOF10: 4
PAINLEVEL_OUTOF10: 6
PAINLEVEL_OUTOF10: 7
PAINLEVEL_OUTOF10: 2
PAINLEVEL_OUTOF10: 3

## 2021-03-18 ASSESSMENT — PAIN DESCRIPTION - DESCRIPTORS: DESCRIPTORS: ACHING

## 2021-03-18 NOTE — PROGRESS NOTES
Previous Treatment: Patient with no complaints from previous session. Family / Caregiver Present: No  Subjective  Subjective: RN and pt agreeable to PT today. Pt resting in bed upon arrival denies pain . Pt very pleasant and cooperative throughout. Pain Screening  Patient Currently in Pain: No  Vital Signs  Patient Currently in Pain: No       Orientation  Orientation  Overall Orientation Status: Within Normal Limits  Cognition      Objective   Bed mobility  Supine to Sit: Supervision  Sit to Supine: Supervision  Scooting: Modified independent  Transfers  Sit to Stand: Stand by assistance  Stand to sit: Stand by assistance  Comment: standing without AD  Ambulation  Ambulation?: Yes  Ambulation 1  Surface: level tile  Device: Rolling Walker  Assistance: Stand by assistance  Quality of Gait: slow and steady . Gait Deviations: Slow [place];Decreased step length;Decreased step height; Increased PETEY  Distance: 250ft  Comments: Tlerated gait well. Stairs/Curb  Stairs?: No     Balance  Posture: Good  Sitting - Static: Good  Sitting - Dynamic: Good;-  Standing - Static: Good;-  Standing - Dynamic: Fair;+  Comments: standing balance assessed with RW. Exercises  Comments: Marching x10, AB/AD in standing x10 reps . pt c/o fatigue limitine the.ex. Goals  Short term goals  Time Frame for Short term goals: 14 visits  Short term goal 1: Supine to/from sit with SBA  Short term goal 2: Sit to/from stand with SBA  Short term goal 3: Ambulate 76' with walker with CGA. Short term goal 4: Negotiate up and down 4 steps with one railing with CGA. Plan    Plan  Times per week: 5-6x/wk  Current Treatment Recommendations: Strengthening, Gait Training, Patient/Caregiver Education & Training, Stair training, Pain Management, Functional Mobility Training, Endurance Training, Home Exercise Program, Transfer Training, Safety Education & Training  Safety Devices  Type of devices:  All fall risk precautions in place, Nurse notified, Left

## 2021-03-18 NOTE — PLAN OF CARE
Problem: Pain:  Goal: Pain level will decrease  Description: Pain level will decrease  3/18/2021 0254 by Aga Weaver RN  Outcome: Ongoing  3/17/2021 1832 by Dori Barba RN  Outcome: Ongoing   Pt's pain assessed frequently with hourly rounding; assessed all pain characteristics including level, type, location, frequency, and onset. Pt medicated by RN per PRN orders. Non-pharmacologic interventions offered to pt as well. Pt states pain is tolerable at this time. Will continue to monitor. Problem: Skin Integrity - Impaired:  Goal: Absence of new skin breakdown  Description: Absence of new skin breakdown  3/18/2021 0254 by Aga Weaver RN  Outcome: Ongoing  3/17/2021 1832 by Dori Barba RN  Outcome: Ongoing   Full skin assessment completed this shift. No new skin breakdown at this time. Pt repositioned q2h and prn. Pt kept clean and dry. Gel overlay mattress in place on bed, Will continue to monitor.   Electronically signed by Aga Weaver RN on 3/18/2021 at 2:54 AM

## 2021-03-18 NOTE — PROGRESS NOTES
Occupational Therapy  Facility/Department: [address] STE[address]OWN  Daily Treatment Note  NAME: Judy Donvoan  : 1970  MRN: [id number]    Date of Service: 3/18/2021    Discharge Recommendations:  Patient would benefit from continued therapy after discharge to increase pt func mobility, endurance, and high-level ADLs. Assessment   Performance deficits / Impairments: Decreased functional mobility ; Decreased endurance;Decreased balance;Decreased high-level IADLs;Decreased ADL status  Prognosis: Good  OT Education: OT Role;Plan of Care;Transfer Training;ADL Adaptive Strategies  Patient Education: OT POC, transfer safety, importance of participation in therapy - pt verbalized understanding. REQUIRES OT FOLLOW UP: Yes  Activity Tolerance  Activity Tolerance: Patient Tolerated treatment well  Safety Devices  Safety Devices in place: Yes  Type of devices: Call light within reach;Gait belt;Patient at risk for falls;Nurse notified; Left in chair;Chair alarm in place  Restraints  Initially in place: No     Patient Diagnosis(es): The encounter diagnosis was Acute gastric ulcer with perforation ([facility] [address] has a past medical history of Asthma, Connective tissue disease, undifferentiated ([address] DDD (degenerative disc disease), Depression, Lumbar radicular pain, Migraine, Mitral valve prolapse, and Opiate addiction ([address] has a past surgical history that includes tomy and bso (cervix removed) (); hernia repair (); Nose surgery (); Tubal ligation (); ovarian cyst removal (Left, ); Nerve Block (Right, 2015); Hysterectomy; CT ABSCESS DRAINAGE (2021); laparotomy (N/A, 2021); laparotomy (N/A, 2021); and Colonoscopy (2021).   Restrictions  Restrictions/Precautions  Restrictions/Precautions: Up as Tolerated, Fall Risk, General Precautions  Required Braces or Orthoses?: Yes  Required Braces or Orthoses  Other: Abdominal Binder  Position Activity Restriction  Other position/activity restrictions: Up as tolerated, abdominal incision/binder  Subjective   General  Chart Reviewed: Yes  Patient assessed for rehabilitation services?: Yes  Family / Caregiver Present: No  Diagnosis: Gastrectomy, perforated gastric ulcer  General Comment  Comments: RN ok'd for OT treatment. Pt agreeable to session, pleasent/cooperative throughout. Pain Assessment  Pain Assessment: 0-10  Pain Level: 6  Pain Type: Acute pain;Surgical pain  Pain Location: Abdomen  Pain Descriptors: Aching  Non-Pharmaceutical Pain Intervention(s): Ambulation/Increased Activity; Distraction; Therapeutic presence  Vital Signs  Patient Currently in Pain: Yes   Orientation  Orientation  Overall Orientation Status: Within Functional Limits  Objective    ADL  Grooming: Setup;Supervision(Pt completed teeth brushing, hair brushing, face washing standing at sink, displaying good standing balance)  UE Bathing: Setup;Modified independent (Pt applied lotion to BUE standing at bedside)  LE Dressing: Setup;Supervision(Don socks sitting EOB, displaying good sitting balance)  Toileting: Setup;Supervision;Minimal assistance(Completed urination seated on toilet. Pt SUP for pericare. Pt had BM this date.  Req Min A for bottom care d/t c/o pain when reaching behind)  Additional Comments: No LOB noted during grooming tasks standing at sink  Balance  Sitting Balance: Modified independent   Standing Balance: Supervision  Standing Balance  Time: Approx 10 min  Activity: Func mobility To/From bathroom, grooming tasks standing at sink  Comment: steady, no LOB  Functional Mobility  Functional - Mobility Device: No device  Activity: To/from bathroom  Assist Level: Supervision  Toilet Transfers  Toilet - Technique: Ambulating  Equipment Used: Grab bars  Toilet Transfer: Modified independent  Bed mobility  Rolling to Left: Supervision  Supine to Sit: Supervision  Sit to Supine: (Pt in chair at [facility] departure)  Scooting: Modified independent  Transfers  Sit to stand: Supervision  Stand to sit: Supervision  Transfer Comments: No AD  Cognition  Overall Cognitive Status: WFL  Plan   Plan  Times per week: 2-3x/wk  Current Treatment Recommendations: Safety Education & Training, Patient/Caregiver Education & Training, Self-Care / ADL, Functional Mobility Training, Home Management Training, Endurance Training  Goals  Short term goals  Time Frame for Short term goals: pt will, by discharge  Short term goal 1: dem UB ADL ind/mod I with AE PRN  Short term goal 2: dem LB ADL/toileting with min A and AE PRN  Short term goal 3: complete functional mobility/transfer with CGA and LRD PRN  Short term goal 4: engage in functional activity for 20+ minutes for improved functional endurance  Short term goal 5: engage in functional activity requiring dynamic standing with CGA and LRD PRN for 5+ minutes  Therapy Time   Individual Concurrent Group Co-treatment   Time In 1502         Time Out 1534         Minutes 32         Timed Code Treatment Minutes: 32 Minutes   Pt in bed upon arrival, pleasant and agreeable to tx this date. Pt retired to chair at end of session, call light within reach, RN notified.      LINH Linares

## 2021-03-18 NOTE — PROGRESS NOTES
Lupe Elaine was evaluated today and a DME order was entered for a shower/bath seat with a back because the patient requires this to successfully complete daily living tasks of bathing, grooming and hygiene. A shower/bath seat with a back is necessary due to the patient's unsteady gait, inability to stand unassisted in the shower/bath. The need for this equipment was discussed with the patient. They understand and are in agreement.           Electronically signed by Jennie Harry DO on 3/19/2021 at 6:50 AM

## 2021-03-18 NOTE — PROGRESS NOTES
PROGRESS NOTE    [facility] [id number]  DATE: 3/18/2021  SURGEON: Dr. Jada Griffin: Rebecca Gillespie MD    HD: # 14    ASSESSMENT    Patient Active Problem List   Diagnosis    Migraine    Opiate dependence ([facility] [facility]    Chronic pain    Obesity    Patient overweight    Lumbar radicular pain    Lumbar disc disease    Closed nondisplaced fracture of fifth metatarsal bone of right foot    Intra-abdominal abscess ([address]    Toxic dilatation of colon ([address]    Colonic ischemia ([address]     S/p 3/5/21 ex lap, abd wash out, antrectomy with BII reconstruction, MILTON drain placement x2  S/p 3/11/21 ex lap, abd washout and midline fascial closure with retention sutures     [facility]    1. Neuro- Analgesia multimodal pain therapy, wean from IV as able   2. CV- continue to monitor BP/HR, heath Hgb  3. Resp- continue to encourage incentive spirometry and deep breathing every hour while awake per RT and RN  4. GI-underwent decompressive colonoscopy yesterday, 3/17, for colonic distention on CT. Tolerated CLD, advance to general diet today. 5. Wound care: Daily dressing changes, pack with plane packing, dry dressing to midline, strip MILTON drain and record output. Remove retention sutures today. 6. Renal-voiding independently, heath strict I/Os  7. Msk- continue to encourage ambulation/activity, PT/OT eval and Tx   8. ID-leukocytosis improved s/p decompressive colonoscopy. WBC 11.1 from 15.5  1. zosyn, completed day 4/4 on 3/16, heath WBC, heath for fevers, f/u procal   9. Ppx- Lovenox  for DVT ppx, GI ppx protonix BID, Endo BG checks   10. Dispo planning    SUBJECTIVE  Patient seen and examined at bedside, no overnight events. Patient reports improved pain, less bloating, since decompressive colonoscopy yesterday. Patient tolerating clear liquid diet. Patient is requesting advancement of diet and has an appetite.   Urinating independently without difficulty. Denies CP, S OB, F/C, C/S. Afebrile, T-max    MILTON drain 210ml, SS fluid in bulb x24hr    OBJECTIVE  VITALS: Temp: Temp: 98.7 °F (37.1 °C)(temp. retaken)Temp  Av.7 °F (37.1 °C)  Min: 97.6 °F (36.4 °C)  Max: 99.9 °F (64.7 °C) BP Systolic (81BHR), KDO:736 , Min:110 , ODZ:424   Diastolic (64FKR), NYF:97, Min:77, Max:103   Pulse Pulse  Av  Min: 104  Max: 127 Resp Resp  Avg: 15.9  Min: 0  Max: 23 Pulse ox SpO2  Av.7 %  Min: 81 %  Max: 100 %  GENERAL: alert, no distress  NEURO: Follows commands, no focal findings  HEENT: Normocephalic, EOMI  LUNGS: Equal chest rise and fall, nonlabored breathing on 2L NC  HEART: Mild tachycardia  ABDOMEN: soft, appropriately-tender, distention present but improved from yesterday, surgical dressing dry and intact, no rebound, no guarding or peritoneal signs present, left-sided MILTON drain with serosanguineous output in bulb  EXTERMITY: no cyanosis, clubbing or edema    I/O last 3 completed shifts: In: 1708 [P.O.:1600; I.V.:1151]  Out: 210 [Drains:210]    Drain/tube output: In: 5224 [P.O.:600; I.V.:1151]  Out: 85 [Drains:85]    LAB:  CBC:   Recent Labs     21  0638 21  0603 21  0458   WBC 12.2* 15.5* 11.1   HGB 11.2* 10.8* 10.0*   HCT 36.0* 34.5* 33.6*   MCV 88.9 90.6 91.3   * 485* 467*     BMP:   Recent Labs     21  0638 21  0458    139   K 4.0 4.4    106   CO2 26 26   BUN 4* 2*   CREATININE 0.62 0.54   GLUCOSE 93 95     COAGS: No results for input(s): APTT, PROT, INR in the last 72 hours. RADIOLOGY:    Ct Abdomen Pelvis Wo Contrast Additional Contrast? Rectal    Result Date: 3/17/2021  Persistent colonic distention s/p recent repeat laparotomy, and prior partial gastrectomy with postsurgical changes and a left-sided drain in place; 2.5 cm long stricture again identified mid-proximal sigmoid, better demonstrated on the current CT.   Differential includes focal ischemia/inflammation or, less likely, unsuspected subtle infiltrating neoplasm. Cecal distension stable at 10.5 cm. Few more intraperitoneal gas bubbles noted anteriorly on the right, without a substantial increase in postoperative pneumoperitoneum. Additional stable findings, as above. Findings were discussed with Dai LEA at 2:55 pm on 3/17/2021. Xr Abdomen (kub) (single Ap View)    Result Date: 3/17/2021  Drainage catheter in the left side of the abdomen surgical clips in the midline. Decreasing colonic size compared to prior study     Ct Abdomen Pelvis W Iv Contrast Additional Contrast? Oral    Result Date: 3/17/2021  Colonic distension containing air and contrast.  There is a focal area of colonic wall thickening in the sigmoid region of uncertain significance and correlation with colonoscopy is recommended for further evaluation. Few scattered foci of intraperitoneal air that is likely postoperative. There is a soft tissue drain in the upper mid abdomen. Xr Chest Portable    Result Date: 3/17/2021  No pulmonary consolidation or sizable pleural effusion. Gas-filled structure right abdomen, corresponding to CT demonstration colonic distension. No large amount of free air. Tiffanie Hooks DO  3/1/21, 8:06 AM               Trauma Attending Priya Emmanuel      I have reviewed the above GCS note(s) and confirmed the key elements of the medical history and physical exam. I have seen and examined the pt.   I have discussed the findings, established the care plan and recommendations with Resident, GCS RN, bedside nurse.  + flatus awaiting  PT/OT       Eva Tang DO  3/20/2021  12:18 PM

## 2021-03-18 NOTE — PLAN OF CARE
Problem: OXYGENATION/RESPIRATORY FUNCTION  Goal: Patient will achieve/maintain normal respiratory rate/effort  Description: Respiratory rate and effort will be within normal limits for the patient  3/18/2021 0948 by Lauren An RCP  Outcome: Ongoing     BRONCHOSPASM/BRONCHOCONSTRICTION     [x]         IMPROVE AERATION/BREATH SOUNDS  [x]   ADMINISTER BRONCHODILATOR THERAPY AS APPROPRIATE  [x]   ASSESS BREATH SOUNDS  [x]   IMPLEMENT AEROSOL/MDI PROTOCOL    Incentive Spirometry education and demonstration given by Respiratory Therapy. Minimum Predicted Vital Capacity is 2500 mL. Pt achieving 3000 mL at time of instruction.       Lauren An  9:49 AM    PATIENT EDUCATION AS NEEDED

## 2021-03-19 ENCOUNTER — APPOINTMENT (OUTPATIENT)
Dept: GENERAL RADIOLOGY | Age: 51
DRG: 220 | End: 2021-03-19
Payer: MEDICARE

## 2021-03-19 LAB
ABSOLUTE EOS #: 0.7 K/UL (ref 0–0.44)
ABSOLUTE IMMATURE GRANULOCYTE: 0.05 K/UL (ref 0–0.3)
ABSOLUTE LYMPH #: 1.8 K/UL (ref 1.1–3.7)
ABSOLUTE MONO #: 0.94 K/UL (ref 0.1–1.2)
ANION GAP SERPL CALCULATED.3IONS-SCNC: 10 MMOL/L (ref 9–17)
BASOPHILS # BLD: 1 % (ref 0–2)
BASOPHILS ABSOLUTE: 0.07 K/UL (ref 0–0.2)
BUN BLDV-MCNC: 3 MG/DL (ref 6–20)
BUN/CREAT BLD: ABNORMAL (ref 9–20)
CALCIUM SERPL-MCNC: 8.2 MG/DL (ref 8.6–10.4)
CHLORIDE BLD-SCNC: 102 MMOL/L (ref 98–107)
CO2: 25 MMOL/L (ref 20–31)
CREAT SERPL-MCNC: 0.57 MG/DL (ref 0.5–0.9)
DIFFERENTIAL TYPE: ABNORMAL
EOSINOPHILS RELATIVE PERCENT: 7 % (ref 1–4)
GFR AFRICAN AMERICAN: >60 ML/MIN
GFR NON-AFRICAN AMERICAN: >60 ML/MIN
GFR SERPL CREATININE-BSD FRML MDRD: ABNORMAL ML/MIN/{1.73_M2}
GFR SERPL CREATININE-BSD FRML MDRD: ABNORMAL ML/MIN/{1.73_M2}
GLUCOSE BLD-MCNC: 114 MG/DL (ref 70–99)
HCT VFR BLD CALC: 37.6 % (ref 36.3–47.1)
HEMOGLOBIN: 11.1 G/DL (ref 11.9–15.1)
IMMATURE GRANULOCYTES: 1 %
LYMPHOCYTES # BLD: 18 % (ref 24–43)
MCH RBC QN AUTO: 27.8 PG (ref 25.2–33.5)
MCHC RBC AUTO-ENTMCNC: 29.5 G/DL (ref 28.4–34.8)
MCV RBC AUTO: 94 FL (ref 82.6–102.9)
MONOCYTES # BLD: 10 % (ref 3–12)
NRBC AUTOMATED: 0 PER 100 WBC
PDW BLD-RTO: 13.4 % (ref 11.8–14.4)
PLATELET # BLD: 494 K/UL (ref 138–453)
PLATELET ESTIMATE: ABNORMAL
PMV BLD AUTO: 8.7 FL (ref 8.1–13.5)
POTASSIUM SERPL-SCNC: 4.3 MMOL/L (ref 3.7–5.3)
PROCALCITONIN: 0.05 NG/ML
RBC # BLD: 4 M/UL (ref 3.95–5.11)
RBC # BLD: ABNORMAL 10*6/UL
SEG NEUTROPHILS: 63 % (ref 36–65)
SEGMENTED NEUTROPHILS ABSOLUTE COUNT: 6.32 K/UL (ref 1.5–8.1)
SODIUM BLD-SCNC: 137 MMOL/L (ref 135–144)
WBC # BLD: 9.9 K/UL (ref 3.5–11.3)
WBC # BLD: ABNORMAL 10*3/UL

## 2021-03-19 PROCEDURE — 6370000000 HC RX 637 (ALT 250 FOR IP): Performed by: INTERNAL MEDICINE

## 2021-03-19 PROCEDURE — 36415 COLL VENOUS BLD VENIPUNCTURE: CPT

## 2021-03-19 PROCEDURE — 6370000000 HC RX 637 (ALT 250 FOR IP): Performed by: STUDENT IN AN ORGANIZED HEALTH CARE EDUCATION/TRAINING PROGRAM

## 2021-03-19 PROCEDURE — 2580000003 HC RX 258: Performed by: INTERNAL MEDICINE

## 2021-03-19 PROCEDURE — 97116 GAIT TRAINING THERAPY: CPT

## 2021-03-19 PROCEDURE — 80048 BASIC METABOLIC PNL TOTAL CA: CPT

## 2021-03-19 PROCEDURE — 94761 N-INVAS EAR/PLS OXIMETRY MLT: CPT

## 2021-03-19 PROCEDURE — 2060000000 HC ICU INTERMEDIATE R&B

## 2021-03-19 PROCEDURE — 94640 AIRWAY INHALATION TREATMENT: CPT

## 2021-03-19 PROCEDURE — 6360000002 HC RX W HCPCS: Performed by: INTERNAL MEDICINE

## 2021-03-19 PROCEDURE — 84145 PROCALCITONIN (PCT): CPT

## 2021-03-19 PROCEDURE — 85025 COMPLETE CBC W/AUTO DIFF WBC: CPT

## 2021-03-19 PROCEDURE — 74018 RADEX ABDOMEN 1 VIEW: CPT

## 2021-03-19 RX ORDER — OXYCODONE HYDROCHLORIDE 5 MG/1
5 TABLET ORAL 2 TIMES DAILY PRN
Status: DISCONTINUED | OUTPATIENT
Start: 2021-03-19 | End: 2021-03-25

## 2021-03-19 RX ADMIN — DOCUSATE SODIUM 50MG AND SENNOSIDES 8.6MG 2 TABLET: 8.6; 5 TABLET, FILM COATED ORAL at 08:30

## 2021-03-19 RX ADMIN — SUCRALFATE 1 G: 1 TABLET ORAL at 11:37

## 2021-03-19 RX ADMIN — SUCRALFATE 1 G: 1 TABLET ORAL at 17:05

## 2021-03-19 RX ADMIN — IPRATROPIUM BROMIDE AND ALBUTEROL SULFATE 1 AMPULE: .5; 3 SOLUTION RESPIRATORY (INHALATION) at 20:13

## 2021-03-19 RX ADMIN — Medication 5 MG: at 21:50

## 2021-03-19 RX ADMIN — POTASSIUM CHLORIDE 40 MEQ: 1500 TABLET, EXTENDED RELEASE ORAL at 08:30

## 2021-03-19 RX ADMIN — SODIUM CHLORIDE, PRESERVATIVE FREE 10 ML: 5 INJECTION INTRAVENOUS at 08:40

## 2021-03-19 RX ADMIN — FLUOXETINE HYDROCHLORIDE 40 MG: 20 CAPSULE ORAL at 08:30

## 2021-03-19 RX ADMIN — METHOCARBAMOL TABLETS 1000 MG: 500 TABLET, COATED ORAL at 21:50

## 2021-03-19 RX ADMIN — METHOCARBAMOL TABLETS 1000 MG: 500 TABLET, COATED ORAL at 14:04

## 2021-03-19 RX ADMIN — OXYCODONE HYDROCHLORIDE 5 MG: 5 TABLET ORAL at 17:05

## 2021-03-19 RX ADMIN — IPRATROPIUM BROMIDE AND ALBUTEROL SULFATE 1 AMPULE: .5; 3 SOLUTION RESPIRATORY (INHALATION) at 08:01

## 2021-03-19 RX ADMIN — POTASSIUM CHLORIDE 40 MEQ: 1500 TABLET, EXTENDED RELEASE ORAL at 21:53

## 2021-03-19 RX ADMIN — GABAPENTIN 600 MG: 300 CAPSULE ORAL at 21:50

## 2021-03-19 RX ADMIN — SUCRALFATE 1 G: 1 TABLET ORAL at 06:01

## 2021-03-19 RX ADMIN — GABAPENTIN 600 MG: 300 CAPSULE ORAL at 14:04

## 2021-03-19 RX ADMIN — OXYCODONE HYDROCHLORIDE 5 MG: 5 TABLET ORAL at 04:11

## 2021-03-19 RX ADMIN — ACETAMINOPHEN 1000 MG: 500 TABLET ORAL at 15:18

## 2021-03-19 RX ADMIN — ACETAMINOPHEN 1000 MG: 500 TABLET ORAL at 23:28

## 2021-03-19 RX ADMIN — ENOXAPARIN SODIUM 40 MG: 40 INJECTION SUBCUTANEOUS at 08:29

## 2021-03-19 RX ADMIN — PANTOPRAZOLE SODIUM 40 MG: 40 TABLET, DELAYED RELEASE ORAL at 06:01

## 2021-03-19 RX ADMIN — METHOCARBAMOL TABLETS 1000 MG: 500 TABLET, COATED ORAL at 08:30

## 2021-03-19 RX ADMIN — ACETAMINOPHEN 1000 MG: 500 TABLET ORAL at 08:29

## 2021-03-19 RX ADMIN — SUCRALFATE 1 G: 1 TABLET ORAL at 23:28

## 2021-03-19 RX ADMIN — PANTOPRAZOLE SODIUM 40 MG: 40 TABLET, DELAYED RELEASE ORAL at 15:17

## 2021-03-19 RX ADMIN — METHADONE HYDROCHLORIDE 140 MG: 5 SOLUTION ORAL at 08:30

## 2021-03-19 RX ADMIN — GABAPENTIN 600 MG: 300 CAPSULE ORAL at 06:01

## 2021-03-19 ASSESSMENT — PAIN SCALES - GENERAL
PAINLEVEL_OUTOF10: 7
PAINLEVEL_OUTOF10: 5
PAINLEVEL_OUTOF10: 4

## 2021-03-19 ASSESSMENT — PAIN DESCRIPTION - DESCRIPTORS: DESCRIPTORS: ACHING;CRAMPING

## 2021-03-19 NOTE — CARE COORDINATION
TRANSITIONAL CARE PLANNING/ 2 Rehab Zachary Day: 15    Reason for Admission: Acute perforated gastric ulcer with hemorrhage ([facility] [address] [K25.2]     Treatment Plan of Care: daily labs, MILTON x1 in place, Rolling walker in room for discharge    Tests/Procedures still needed: as above    Barriers to Discharge: medical clearance, antx complete    Readmission Risk              Risk of Unplanned Readmission:        23          Preferences/Transitional Plan: Goal is home with homecare, has walker in room.

## 2021-03-19 NOTE — PLAN OF CARE
Problem: Anxiety/Stress:  Goal: Level of anxiety will decrease  Description: Level of anxiety will decrease  Outcome: Met This Shift     Problem: Aspiration:  Goal: Absence of aspiration  Description: Absence of aspiration  Outcome: Met This Shift     Problem: Cardiac Output - Decreased:  Goal: Hemodynamic stability will improve  Description: Hemodynamic stability will improve  Outcome: Met This Shift     Problem: Falls - Risk of:  Goal: Will remain free from falls  Description: Will remain free from falls  Outcome: Met This Shift     Problem: Falls - Risk of:  Goal: Absence of physical injury  Description: Absence of physical injury  Outcome: Met This Shift     Problem: Pain:  Goal: Pain level will decrease  Description: Pain level will decrease  Outcome: Ongoing     Problem: Nutrition Deficit:  Goal: Ability to achieve adequate nutritional intake will improve  Description: Ability to achieve adequate nutritional intake will improve  Outcome: Ongoing     Problem: Pain:  Goal: Pain level will decrease  Description: Pain level will decrease  Outcome: Ongoing

## 2021-03-19 NOTE — PROGRESS NOTES
Physical Therapy  Facility/Department: [facility] 4B STEPDOWN  Daily Treatment Note  NAME: Caryle Barns  : 1970  MRN: [id number]    Date of Service: 3/19/2021    Discharge Recommendations:  No further therapy required at discharge. PT Equipment Recommendations  Equipment Needed: No    Assessment   Body structures, Functions, Activity limitations: Increased pain;Decreased safe awareness;Decreased endurance;Decreased functional mobility ; Decreased strength;Decreased balance  Assessment: pt able to amb 250ft w/o AD SBA, slow but steady. HR increased up to 121bpm with mobility, Would benefit from continued PT this admission to address deficits and return to prior level of independence  Prognosis: Good  PT Education: Goals;Plan of Care;General Safety;PT Role;Precautions;Gait Training;Home Exercise Program  Activity Tolerance  Activity Tolerance: Patient Tolerated treatment well     Patient Diagnosis(es): The encounter diagnosis was Acute gastric ulcer with perforation ([facility] [address] has a past medical history of Asthma, Connective tissue disease, undifferentiated ([address] DDD (degenerative disc disease), Depression, Lumbar radicular pain, Migraine, Mitral valve prolapse, and Opiate addiction ([address] has a past surgical history that includes tomy and bso (cervix removed) (); hernia repair (); Nose surgery (); Tubal ligation (); ovarian cyst removal (Left, ); Nerve Block (Right, 2015); Hysterectomy; CT ABSCESS DRAINAGE (2021); laparotomy (N/A, 2021); laparotomy (N/A, 2021); Colonoscopy (2021); and Colonoscopy (N/A, 3/17/2021).     Restrictions  Restrictions/Precautions  Restrictions/Precautions: Up as Tolerated, Fall Risk, General Precautions  Required Braces or Orthoses?: Yes  Required Braces or Orthoses  Other: Abdominal Binder  Position Activity Restriction  Other position/activity restrictions: Up as tolerated, abdominal incision/binder  Subjective   General  Response To reach, Left in chair  Restraints  Initially in place: No     Therapy Time   Individual Concurrent Group Co-treatment   Time In 1142         Time Out 1159         Minutes 17         Timed Code Treatment Minutes: [address] [facility], [facility]

## 2021-03-19 NOTE — PLAN OF CARE
Problem: Skin Integrity - Impaired:  Goal: Absence of new skin breakdown  Description: Absence of new skin breakdown  Outcome: Ongoing   Full skin assessment completed this shift. No new skin breakdown at this time. Pt repositioned q2h and prn. Pt kept clean and dry. Gel overlay mattress in place on bed, heels floated. Will continue to monitor. Problem: Sleep Pattern Disturbance:  Goal: Appears well-rested  Description: Appears well-rested  Outcome: Ongoing   Pt. Reports  sleep and ablity to relax and rest has improved, pain control improved.     Electronically signed by Raine Ch RN on 3/19/2021 at 2:17 AM

## 2021-03-19 NOTE — PROGRESS NOTES
BRONCHOSPASM/BRONCHOCONSTRICTION   [x]  IMPROVE AERATION/BREATH SOUNDS  [x]  ADMINISTER BRONCHODILATOR THERAPY AS APPROPRIATE  [x]  ASSESS BREATH SOUNDS  []  IMPLEMENT AEROSOL/MDI PROTOCOL  [x]  PATIENT EDUCATION AS NEEDED    PROVIDE ADEQUATE OXYGENATION WITH ACCEPTABLE SP02/ABG'S  [x]  IDENTIFY APPROPRIATE OXYGEN THERAPY  [x]  MONITOR SP02/ABG'S AS NEEDED   [x]  PATIENT EDUCATION AS NEEDED

## 2021-03-19 NOTE — ANESTHESIA POSTPROCEDURE EVALUATION
Department of Anesthesiology  Postprocedure Note    Patient: Luana Cruz  MRN: [id number]  YOB: 1970  Date of evaluation: 3/19/2021  Time:  1:52 PM     Procedure Summary     Date: 03/17/21 Room / Location: [address] [place] [address]    Anesthesia Start: [facility] Anesthesia Stop: 9455    Procedure: COLONOSCOPY FLEXIBLE W/ DECOMPRESSION (N/A ) Diagnosis: (COLON DISTENTION)    Surgeons: Carola Dorsey MD Responsible Provider: Zafar Ash MD    Anesthesia Type: MAC, general ASA Status: 3          Anesthesia Type: MAC, general    Jamarcus Phase I: Jamarcus Score: 8    Jamarcus Phase II:      Last vitals: Reviewed and per EMR flowsheets.        Anesthesia Post Evaluation    Patient location during evaluation: PACU  Patient participation: complete - patient participated  Level of consciousness: awake and alert  Airway patency: patent  Complications: no  Cardiovascular status: blood pressure returned to baseline  Respiratory status: acceptable

## 2021-03-20 ENCOUNTER — APPOINTMENT (OUTPATIENT)
Dept: GENERAL RADIOLOGY | Age: 51
DRG: 220 | End: 2021-03-20
Payer: MEDICARE

## 2021-03-20 PROCEDURE — 94761 N-INVAS EAR/PLS OXIMETRY MLT: CPT

## 2021-03-20 PROCEDURE — 6370000000 HC RX 637 (ALT 250 FOR IP): Performed by: INTERNAL MEDICINE

## 2021-03-20 PROCEDURE — APPSS15 APP SPLIT SHARED TIME 0-15 MINUTES: Performed by: INTERNAL MEDICINE

## 2021-03-20 PROCEDURE — 6370000000 HC RX 637 (ALT 250 FOR IP): Performed by: NURSE PRACTITIONER

## 2021-03-20 PROCEDURE — 6370000000 HC RX 637 (ALT 250 FOR IP): Performed by: STUDENT IN AN ORGANIZED HEALTH CARE EDUCATION/TRAINING PROGRAM

## 2021-03-20 PROCEDURE — 2060000000 HC ICU INTERMEDIATE R&B

## 2021-03-20 PROCEDURE — 6360000002 HC RX W HCPCS: Performed by: INTERNAL MEDICINE

## 2021-03-20 PROCEDURE — 74018 RADEX ABDOMEN 1 VIEW: CPT

## 2021-03-20 PROCEDURE — 2580000003 HC RX 258: Performed by: INTERNAL MEDICINE

## 2021-03-20 PROCEDURE — 94640 AIRWAY INHALATION TREATMENT: CPT

## 2021-03-20 RX ORDER — NEOSTIGMINE METHYLSULFATE 1 MG/ML
2 INJECTION, SOLUTION INTRAVENOUS ONCE
Status: DISCONTINUED | OUTPATIENT
Start: 2021-03-20 | End: 2021-03-20 | Stop reason: SDUPTHER

## 2021-03-20 RX ORDER — ATROPINE SULFATE 0.1 MG/ML
1 INJECTION INTRAVENOUS ONCE
Status: DISCONTINUED | OUTPATIENT
Start: 2021-03-20 | End: 2021-03-21

## 2021-03-20 RX ORDER — NEOSTIGMINE METHYLSULFATE 5 MG/5 ML
2 SYRINGE (ML) INTRAVENOUS ONCE
Status: COMPLETED | OUTPATIENT
Start: 2021-03-20 | End: 2021-03-20

## 2021-03-20 RX ADMIN — IPRATROPIUM BROMIDE AND ALBUTEROL SULFATE 1 AMPULE: .5; 3 SOLUTION RESPIRATORY (INHALATION) at 19:40

## 2021-03-20 RX ADMIN — Medication 2 MG: at 14:05

## 2021-03-20 RX ADMIN — ACETAMINOPHEN 1000 MG: 500 TABLET ORAL at 08:35

## 2021-03-20 RX ADMIN — ONDANSETRON 4 MG: 2 INJECTION INTRAMUSCULAR; INTRAVENOUS at 12:54

## 2021-03-20 RX ADMIN — Medication 5 MG: at 21:16

## 2021-03-20 RX ADMIN — SUCRALFATE 1 G: 1 TABLET ORAL at 05:48

## 2021-03-20 RX ADMIN — OXYCODONE HYDROCHLORIDE 5 MG: 5 TABLET ORAL at 18:12

## 2021-03-20 RX ADMIN — SODIUM CHLORIDE, PRESERVATIVE FREE 10 ML: 5 INJECTION INTRAVENOUS at 21:16

## 2021-03-20 RX ADMIN — OXYCODONE HYDROCHLORIDE 5 MG: 5 TABLET ORAL at 05:48

## 2021-03-20 RX ADMIN — METHADONE HYDROCHLORIDE 140 MG: 5 SOLUTION ORAL at 08:49

## 2021-03-20 RX ADMIN — METOPROLOL TARTRATE 25 MG: 25 TABLET ORAL at 21:16

## 2021-03-20 RX ADMIN — ENOXAPARIN SODIUM 40 MG: 40 INJECTION SUBCUTANEOUS at 08:36

## 2021-03-20 RX ADMIN — POTASSIUM CHLORIDE 40 MEQ: 1500 TABLET, EXTENDED RELEASE ORAL at 08:36

## 2021-03-20 RX ADMIN — IPRATROPIUM BROMIDE AND ALBUTEROL SULFATE 1 AMPULE: .5; 3 SOLUTION RESPIRATORY (INHALATION) at 07:59

## 2021-03-20 RX ADMIN — METHOCARBAMOL TABLETS 1000 MG: 500 TABLET, COATED ORAL at 21:16

## 2021-03-20 RX ADMIN — ONDANSETRON 4 MG: 2 INJECTION INTRAMUSCULAR; INTRAVENOUS at 21:34

## 2021-03-20 RX ADMIN — METHOCARBAMOL TABLETS 1000 MG: 500 TABLET, COATED ORAL at 08:35

## 2021-03-20 RX ADMIN — GABAPENTIN 600 MG: 300 CAPSULE ORAL at 05:51

## 2021-03-20 RX ADMIN — FLUOXETINE HYDROCHLORIDE 40 MG: 20 CAPSULE ORAL at 08:36

## 2021-03-20 RX ADMIN — GABAPENTIN 600 MG: 300 CAPSULE ORAL at 21:15

## 2021-03-20 RX ADMIN — PANTOPRAZOLE SODIUM 40 MG: 40 TABLET, DELAYED RELEASE ORAL at 05:48

## 2021-03-20 ASSESSMENT — PAIN SCALES - GENERAL
PAINLEVEL_OUTOF10: 8
PAINLEVEL_OUTOF10: 8

## 2021-03-20 NOTE — PLAN OF CARE
Problem: Pain:  Goal: Control of acute pain  Description: Control of acute pain  Outcome: Ongoing   Pt. Resting in bed, denies acute pain at this time. Pt. States PO meds effective for pain.

## 2021-03-20 NOTE — PLAN OF CARE
Patient received Neostigmine 2 mg with return of small amount of flatus and 10 to 15 mL liquid stool. Abdomen remains very distended. Patient was on cardiac monitor. Heart rate slowly declined to 65 BPM, then returned to tachycardia with 's. Patient had episode of nausea and vomiting with undigested food. RN and writer remained at bedside with patient. We will order Relistor 12 mg subcu x1. Plan for colonic decompression tomorrow. Recommend sips of clear liquid diet only, no carbonated beverages. NPO after midnight    . Umesh Montgomery, APRN - CNP

## 2021-03-20 NOTE — PROGRESS NOTES
PROGRESS NOTE    PATIENT NAME: Torrie [facility] RECORD NO. [id number]  DATE: 3/20/2021  SURGEON: Dr Agarwal Basket: Jerome Oliver MD    HD: # [id number]    ASSESSMENT    Patient Active Problem List   Diagnosis    Migraine    O[facility]ate dependence ([facility] [address]    Chronic pain    Obesity    Patient overweight    Lumbar radicular pain    Lumbar disc disease    Closed nondisplaced fracture of fifth metatarsal bone of right foot    Intra-abdominal abscess ([facility] [address]    Toxic dilatation of colon ([facility] [address]    Colonic ischemia [facility])       MEDICAL DECISION MAKING AND PLAN    1. Neuro- Analgesia multimodal pain therapy  2. CV- + today, will give metoprolol, continue to monitor BP/HR, Hgb stable. 3. Resp- continue to encourage incentive spirometry and deep breathing every hour while awake per RT and RN  4. GI-underwent decompressive colonoscopy 3/17, for colonic distention on CT. currently tolerating general diet. Repeat KUB today showing colon distension with cecum dilation 10.5cm. Having BM. 5. Wound care: Daily dressing changes, pack with plane packing, dry dressing to midline, strip MILTON drain and record output. Remove MILTON drain day of discharge - output 50cc   6. Renal-voiding independently, heath strict I/Os  7. Msk- continue to encourage ambulation/activity, PT/OT eval and Tx   1. ID-leukocytosis resolved s/p decompressive colonoscopy. 2. ABX completed 3/16: zosyn, completed day 4/ on 3/16  8. Ppx- Lovenox  for DVT ppx, GI ppx protonix BID, Endo BG checks       SUBJECTIVE    [place] scribes improvement in pain and overall symptoms. She patient does report that she however has not been able to pass a bowel movement since 2 days ago despite laxatives. Patient was concerned regarding not being able to pass a bowel movement, and did not want to be discharged prior to doing so.        OBJECTIVE  VITALS: Temp: Temp: 97.7 °F (36.5 °C)Temp  Av.1 °F (36.7 °C)  Min: 97.5 °F (36.4 °C)  Max: 98.7 °F (51.5 °C) BP Systolic (65CRF), ZCZ:872 , Min:137 , WXB:667   Diastolic (29MDK), AIK:811, Min:95, Max:104   Pulse Pulse  Av.2  Min: 101  Max: 115 Resp Resp  Avg: 15.6  Min: 11  Max: 21 Pulse ox SpO2  Av.7 %  Min: 94 %  Max: 96 %  GENERAL: alert, no distress  NEURO: GCS 15, AOx4, no FND, conversing appropriately  LUNGS: clear to ausculation, without wheezes, rales or rhonci  HEART: normal rate and regular rhythm  ABDOMEN: soft, appropriately tender, distention present, wound dressing dry, no peritoneal signs present, L sided MILTON drain in   EXTREMITY: no cyanosis, clubbing or edema    I/O last 3 completed shifts:  In: -   Out: 50 [Drains:50]    Drain/tube output: In: -   Out: 50 [Drains:50]    LAB:  CBC:   Recent Labs     21  0458 21  0826   WBC 11.1 9.9   HGB 10.0* 11.1*   HCT 33.6* 37.6   MCV 91.3 94.0   * 494*     BMP:   Recent Labs     21  0458 21  0826    137   K 4.4 4.3    102   CO2 26 25   BUN 2* 3*   CREATININE 0.54 0.57   GLUCOSE 95 114*     COAGS: No results for input(s): APTT, PROT, INR in the last 72 hours. RADIOLOGY:  CXR: See radiology report      Misty Wallace MD  3/20/21, 10:55 AM         Attending Note      I have reviewed the above GCS note(s) and I either performed the key elements of the medical history and physical exam or was present with the trauma resident when the key elements of the medical history and physical exam were performed. I have discussed the findings, established the care plan and recommendations with the trauma team.  C/o bloating. States is ambulating. Will check abdominal films. Taking po.     Ignacio Barnett MD  3/20/2021  11:00 AM

## 2021-03-21 ENCOUNTER — ANESTHESIA (OUTPATIENT)
Dept: OPERATING ROOM | Age: 51
DRG: 220 | End: 2021-03-21
Payer: MEDICARE

## 2021-03-21 ENCOUNTER — ANESTHESIA EVENT (OUTPATIENT)
Dept: OPERATING ROOM | Age: 51
DRG: 220 | End: 2021-03-21
Payer: MEDICARE

## 2021-03-21 ENCOUNTER — APPOINTMENT (OUTPATIENT)
Dept: GENERAL RADIOLOGY | Age: 51
DRG: 220 | End: 2021-03-21
Payer: MEDICARE

## 2021-03-21 VITALS — SYSTOLIC BLOOD PRESSURE: 99 MMHG | OXYGEN SATURATION: 100 % | DIASTOLIC BLOOD PRESSURE: 53 MMHG

## 2021-03-21 LAB
ABSOLUTE EOS #: 0.7 K/UL (ref 0–0.44)
ABSOLUTE IMMATURE GRANULOCYTE: 0.04 K/UL (ref 0–0.3)
ABSOLUTE LYMPH #: 1.92 K/UL (ref 1.1–3.7)
ABSOLUTE MONO #: 1 K/UL (ref 0.1–1.2)
ANION GAP SERPL CALCULATED.3IONS-SCNC: 6 MMOL/L (ref 9–17)
BASOPHILS # BLD: 1 % (ref 0–2)
BASOPHILS ABSOLUTE: 0.07 K/UL (ref 0–0.2)
BUN BLDV-MCNC: 7 MG/DL (ref 6–20)
BUN/CREAT BLD: ABNORMAL (ref 9–20)
CALCIUM SERPL-MCNC: 8.5 MG/DL (ref 8.6–10.4)
CHLORIDE BLD-SCNC: 97 MMOL/L (ref 98–107)
CO2: 28 MMOL/L (ref 20–31)
CREAT SERPL-MCNC: 0.71 MG/DL (ref 0.5–0.9)
DIFFERENTIAL TYPE: ABNORMAL
EOSINOPHILS RELATIVE PERCENT: 9 % (ref 1–4)
GFR AFRICAN AMERICAN: >60 ML/MIN
GFR NON-AFRICAN AMERICAN: >60 ML/MIN
GFR SERPL CREATININE-BSD FRML MDRD: ABNORMAL ML/MIN/{1.73_M2}
GFR SERPL CREATININE-BSD FRML MDRD: ABNORMAL ML/MIN/{1.73_M2}
GLUCOSE BLD-MCNC: 97 MG/DL (ref 70–99)
HCT VFR BLD CALC: 38.5 % (ref 36.3–47.1)
HEMOGLOBIN: 11.7 G/DL (ref 11.9–15.1)
IMMATURE GRANULOCYTES: 1 %
LACTIC ACID, WHOLE BLOOD: 0.6 MMOL/L (ref 0.7–2.1)
LYMPHOCYTES # BLD: 23 % (ref 24–43)
MAGNESIUM: 2.1 MG/DL (ref 1.6–2.6)
MCH RBC QN AUTO: 28 PG (ref 25.2–33.5)
MCHC RBC AUTO-ENTMCNC: 30.4 G/DL (ref 28.4–34.8)
MCV RBC AUTO: 92.1 FL (ref 82.6–102.9)
MONOCYTES # BLD: 12 % (ref 3–12)
NRBC AUTOMATED: 0 PER 100 WBC
PDW BLD-RTO: 13.2 % (ref 11.8–14.4)
PLATELET # BLD: 488 K/UL (ref 138–453)
PLATELET ESTIMATE: ABNORMAL
PMV BLD AUTO: 8.7 FL (ref 8.1–13.5)
POTASSIUM SERPL-SCNC: 4.5 MMOL/L (ref 3.7–5.3)
RBC # BLD: 4.18 M/UL (ref 3.95–5.11)
RBC # BLD: ABNORMAL 10*6/UL
SEG NEUTROPHILS: 54 % (ref 36–65)
SEGMENTED NEUTROPHILS ABSOLUTE COUNT: 4.47 K/UL (ref 1.5–8.1)
SODIUM BLD-SCNC: 131 MMOL/L (ref 135–144)
WBC # BLD: 8.2 K/UL (ref 3.5–11.3)
WBC # BLD: ABNORMAL 10*3/UL

## 2021-03-21 PROCEDURE — 83735 ASSAY OF MAGNESIUM: CPT

## 2021-03-21 PROCEDURE — 6370000000 HC RX 637 (ALT 250 FOR IP): Performed by: INTERNAL MEDICINE

## 2021-03-21 PROCEDURE — 7100000001 HC PACU RECOVERY - ADDTL 15 MIN: Performed by: INTERNAL MEDICINE

## 2021-03-21 PROCEDURE — 2500000003 HC RX 250 WO HCPCS: Performed by: INTERNAL MEDICINE

## 2021-03-21 PROCEDURE — 45378 DIAGNOSTIC COLONOSCOPY: CPT | Performed by: INTERNAL MEDICINE

## 2021-03-21 PROCEDURE — 85025 COMPLETE CBC W/AUTO DIFF WBC: CPT

## 2021-03-21 PROCEDURE — 7100000000 HC PACU RECOVERY - FIRST 15 MIN: Performed by: INTERNAL MEDICINE

## 2021-03-21 PROCEDURE — 6360000002 HC RX W HCPCS: Performed by: INTERNAL MEDICINE

## 2021-03-21 PROCEDURE — 83605 ASSAY OF LACTIC ACID: CPT

## 2021-03-21 PROCEDURE — 3700000000 HC ANESTHESIA ATTENDED CARE: Performed by: INTERNAL MEDICINE

## 2021-03-21 PROCEDURE — 2580000003 HC RX 258: Performed by: NURSE ANESTHETIST, CERTIFIED REGISTERED

## 2021-03-21 PROCEDURE — 74018 RADEX ABDOMEN 1 VIEW: CPT

## 2021-03-21 PROCEDURE — 2500000003 HC RX 250 WO HCPCS: Performed by: NURSE ANESTHETIST, CERTIFIED REGISTERED

## 2021-03-21 PROCEDURE — 0D9K8ZZ DRAINAGE OF ASCENDING COLON, VIA NATURAL OR ARTIFICIAL OPENING ENDOSCOPIC: ICD-10-PCS | Performed by: INTERNAL MEDICINE

## 2021-03-21 PROCEDURE — 2060000000 HC ICU INTERMEDIATE R&B

## 2021-03-21 PROCEDURE — 3700000001 HC ADD 15 MINUTES (ANESTHESIA): Performed by: INTERNAL MEDICINE

## 2021-03-21 PROCEDURE — 6360000002 HC RX W HCPCS: Performed by: NURSE ANESTHETIST, CERTIFIED REGISTERED

## 2021-03-21 PROCEDURE — 6360000002 HC RX W HCPCS: Performed by: ANESTHESIOLOGY

## 2021-03-21 PROCEDURE — 3609027000 HC COLONOSCOPY: Performed by: INTERNAL MEDICINE

## 2021-03-21 PROCEDURE — 2580000003 HC RX 258: Performed by: ANESTHESIOLOGY

## 2021-03-21 PROCEDURE — 6370000000 HC RX 637 (ALT 250 FOR IP): Performed by: STUDENT IN AN ORGANIZED HEALTH CARE EDUCATION/TRAINING PROGRAM

## 2021-03-21 PROCEDURE — 94640 AIRWAY INHALATION TREATMENT: CPT

## 2021-03-21 PROCEDURE — 80048 BASIC METABOLIC PNL TOTAL CA: CPT

## 2021-03-21 RX ORDER — POLYETHYLENE GLYCOL 3350 17 G/17G
17 POWDER, FOR SOLUTION ORAL 2 TIMES DAILY
Status: DISCONTINUED | OUTPATIENT
Start: 2021-03-21 | End: 2021-03-25

## 2021-03-21 RX ORDER — FENTANYL CITRATE 50 UG/ML
25 INJECTION, SOLUTION INTRAMUSCULAR; INTRAVENOUS EVERY 5 MIN PRN
Status: DISCONTINUED | OUTPATIENT
Start: 2021-03-21 | End: 2021-03-21 | Stop reason: HOSPADM

## 2021-03-21 RX ORDER — LIDOCAINE HYDROCHLORIDE 10 MG/ML
INJECTION, SOLUTION EPIDURAL; INFILTRATION; INTRACAUDAL; PERINEURAL PRN
Status: DISCONTINUED | OUTPATIENT
Start: 2021-03-21 | End: 2021-03-21 | Stop reason: SDUPTHER

## 2021-03-21 RX ORDER — DIPHENHYDRAMINE HYDROCHLORIDE 50 MG/ML
12.5 INJECTION INTRAMUSCULAR; INTRAVENOUS
Status: DISCONTINUED | OUTPATIENT
Start: 2021-03-21 | End: 2021-03-21 | Stop reason: HOSPADM

## 2021-03-21 RX ORDER — SODIUM CHLORIDE, SODIUM LACTATE, POTASSIUM CHLORIDE, CALCIUM CHLORIDE 600; 310; 30; 20 MG/100ML; MG/100ML; MG/100ML; MG/100ML
INJECTION, SOLUTION INTRAVENOUS CONTINUOUS
Status: DISCONTINUED | OUTPATIENT
Start: 2021-03-21 | End: 2021-03-22

## 2021-03-21 RX ORDER — PROPOFOL 10 MG/ML
INJECTION, EMULSION INTRAVENOUS CONTINUOUS PRN
Status: DISCONTINUED | OUTPATIENT
Start: 2021-03-21 | End: 2021-03-21 | Stop reason: SDUPTHER

## 2021-03-21 RX ORDER — LABETALOL HYDROCHLORIDE 5 MG/ML
5 INJECTION, SOLUTION INTRAVENOUS EVERY 10 MIN PRN
Status: DISCONTINUED | OUTPATIENT
Start: 2021-03-21 | End: 2021-03-21 | Stop reason: HOSPADM

## 2021-03-21 RX ORDER — 0.9 % SODIUM CHLORIDE 0.9 %
500 INTRAVENOUS SOLUTION INTRAVENOUS
Status: DISCONTINUED | OUTPATIENT
Start: 2021-03-21 | End: 2021-03-21 | Stop reason: HOSPADM

## 2021-03-21 RX ORDER — SODIUM CHLORIDE, SODIUM LACTATE, POTASSIUM CHLORIDE, CALCIUM CHLORIDE 600; 310; 30; 20 MG/100ML; MG/100ML; MG/100ML; MG/100ML
INJECTION, SOLUTION INTRAVENOUS CONTINUOUS PRN
Status: DISCONTINUED | OUTPATIENT
Start: 2021-03-21 | End: 2021-03-21 | Stop reason: SDUPTHER

## 2021-03-21 RX ORDER — ONDANSETRON 2 MG/ML
4 INJECTION INTRAMUSCULAR; INTRAVENOUS
Status: DISCONTINUED | OUTPATIENT
Start: 2021-03-21 | End: 2021-03-21 | Stop reason: HOSPADM

## 2021-03-21 RX ORDER — DEXTROSE, SODIUM CHLORIDE, AND POTASSIUM CHLORIDE 5; .45; .15 G/100ML; G/100ML; G/100ML
INJECTION INTRAVENOUS CONTINUOUS
Status: DISCONTINUED | OUTPATIENT
Start: 2021-03-21 | End: 2021-03-22

## 2021-03-21 RX ORDER — FENTANYL CITRATE 50 UG/ML
50 INJECTION, SOLUTION INTRAMUSCULAR; INTRAVENOUS EVERY 5 MIN PRN
Status: DISCONTINUED | OUTPATIENT
Start: 2021-03-21 | End: 2021-03-21 | Stop reason: HOSPADM

## 2021-03-21 RX ORDER — PROPOFOL 10 MG/ML
INJECTION, EMULSION INTRAVENOUS PRN
Status: DISCONTINUED | OUTPATIENT
Start: 2021-03-21 | End: 2021-03-21 | Stop reason: SDUPTHER

## 2021-03-21 RX ORDER — HYDRALAZINE HYDROCHLORIDE 20 MG/ML
5 INJECTION INTRAMUSCULAR; INTRAVENOUS EVERY 10 MIN PRN
Status: DISCONTINUED | OUTPATIENT
Start: 2021-03-21 | End: 2021-03-21 | Stop reason: HOSPADM

## 2021-03-21 RX ORDER — OXYCODONE HYDROCHLORIDE AND ACETAMINOPHEN 5; 325 MG/1; MG/1
1 TABLET ORAL EVERY 4 HOURS PRN
Status: DISCONTINUED | OUTPATIENT
Start: 2021-03-21 | End: 2021-03-21 | Stop reason: HOSPADM

## 2021-03-21 RX ADMIN — METOPROLOL TARTRATE 25 MG: 25 TABLET ORAL at 20:50

## 2021-03-21 RX ADMIN — OXYCODONE HYDROCHLORIDE 5 MG: 5 TABLET ORAL at 12:30

## 2021-03-21 RX ADMIN — METOPROLOL TARTRATE 25 MG: 25 TABLET ORAL at 10:27

## 2021-03-21 RX ADMIN — METHADONE HYDROCHLORIDE 140 MG: 5 SOLUTION ORAL at 10:27

## 2021-03-21 RX ADMIN — PROPOFOL 50 MG: 10 INJECTION, EMULSION INTRAVENOUS at 07:58

## 2021-03-21 RX ADMIN — SUCRALFATE 1 G: 1 TABLET ORAL at 12:30

## 2021-03-21 RX ADMIN — PANTOPRAZOLE SODIUM 40 MG: 40 TABLET, DELAYED RELEASE ORAL at 17:00

## 2021-03-21 RX ADMIN — ENOXAPARIN SODIUM 40 MG: 40 INJECTION SUBCUTANEOUS at 10:28

## 2021-03-21 RX ADMIN — SUCRALFATE 1 G: 1 TABLET ORAL at 23:14

## 2021-03-21 RX ADMIN — ACETAMINOPHEN 1000 MG: 500 TABLET ORAL at 12:29

## 2021-03-21 RX ADMIN — SODIUM CHLORIDE, POTASSIUM CHLORIDE, SODIUM LACTATE AND CALCIUM CHLORIDE: 600; 310; 30; 20 INJECTION, SOLUTION INTRAVENOUS at 09:18

## 2021-03-21 RX ADMIN — Medication 5 MG: at 20:50

## 2021-03-21 RX ADMIN — ACETAMINOPHEN 1000 MG: 500 TABLET ORAL at 23:14

## 2021-03-21 RX ADMIN — FLUOXETINE HYDROCHLORIDE 40 MG: 20 CAPSULE ORAL at 10:27

## 2021-03-21 RX ADMIN — POLYETHYLENE GLYCOL 3350 17 G: 17 POWDER, FOR SOLUTION ORAL at 20:58

## 2021-03-21 RX ADMIN — LIDOCAINE HYDROCHLORIDE 5 ML: 10 INJECTION, SOLUTION EPIDURAL; INFILTRATION; INTRACAUDAL; PERINEURAL at 07:58

## 2021-03-21 RX ADMIN — GABAPENTIN 600 MG: 300 CAPSULE ORAL at 21:44

## 2021-03-21 RX ADMIN — DOCUSATE SODIUM 50MG AND SENNOSIDES 8.6MG 2 TABLET: 8.6; 5 TABLET, FILM COATED ORAL at 10:25

## 2021-03-21 RX ADMIN — SODIUM CHLORIDE, POTASSIUM CHLORIDE, SODIUM LACTATE AND CALCIUM CHLORIDE: 600; 310; 30; 20 INJECTION, SOLUTION INTRAVENOUS at 07:54

## 2021-03-21 RX ADMIN — SUCRALFATE 1 G: 1 TABLET ORAL at 17:00

## 2021-03-21 RX ADMIN — PROPOFOL 100 MCG/KG/MIN: 10 INJECTION, EMULSION INTRAVENOUS at 08:00

## 2021-03-21 RX ADMIN — POTASSIUM CHLORIDE, DEXTROSE MONOHYDRATE AND SODIUM CHLORIDE: 150; 5; 450 INJECTION, SOLUTION INTRAVENOUS at 10:28

## 2021-03-21 RX ADMIN — METHOCARBAMOL TABLETS 1000 MG: 500 TABLET, COATED ORAL at 20:50

## 2021-03-21 RX ADMIN — FENTANYL CITRATE 50 MCG: 50 INJECTION, SOLUTION INTRAMUSCULAR; INTRAVENOUS at 09:15

## 2021-03-21 RX ADMIN — FENTANYL CITRATE 50 MCG: 50 INJECTION, SOLUTION INTRAMUSCULAR; INTRAVENOUS at 09:21

## 2021-03-21 RX ADMIN — SUCRALFATE 1 G: 1 TABLET ORAL at 00:58

## 2021-03-21 RX ADMIN — GABAPENTIN 600 MG: 300 CAPSULE ORAL at 13:31

## 2021-03-21 RX ADMIN — ACETAMINOPHEN 1000 MG: 500 TABLET ORAL at 00:58

## 2021-03-21 RX ADMIN — METHOCARBAMOL TABLETS 1000 MG: 500 TABLET, COATED ORAL at 13:32

## 2021-03-21 RX ADMIN — IPRATROPIUM BROMIDE AND ALBUTEROL SULFATE 1 AMPULE: .5; 3 SOLUTION RESPIRATORY (INHALATION) at 19:32

## 2021-03-21 ASSESSMENT — PULMONARY FUNCTION TESTS
PIF_VALUE: 0
PIF_VALUE: 1
PIF_VALUE: 0
PIF_VALUE: 1
PIF_VALUE: 0

## 2021-03-21 ASSESSMENT — PAIN SCALES - GENERAL
PAINLEVEL_OUTOF10: 6
PAINLEVEL_OUTOF10: 7
PAINLEVEL_OUTOF10: 7
PAINLEVEL_OUTOF10: 6
PAINLEVEL_OUTOF10: 6
PAINLEVEL_OUTOF10: 7
PAINLEVEL_OUTOF10: 5

## 2021-03-21 ASSESSMENT — PAIN DESCRIPTION - FREQUENCY
FREQUENCY: CONTINUOUS
FREQUENCY: CONTINUOUS

## 2021-03-21 ASSESSMENT — PAIN DESCRIPTION - LOCATION
LOCATION: ABDOMEN
LOCATION: ABDOMEN

## 2021-03-21 ASSESSMENT — PAIN DESCRIPTION - ORIENTATION: ORIENTATION: MID

## 2021-03-21 ASSESSMENT — PAIN DESCRIPTION - ONSET: ONSET: ON-GOING

## 2021-03-21 ASSESSMENT — PAIN DESCRIPTION - PROGRESSION: CLINICAL_PROGRESSION: NOT CHANGED

## 2021-03-21 ASSESSMENT — PAIN DESCRIPTION - DESCRIPTORS: DESCRIPTORS: ACHING;CRAMPING

## 2021-03-21 ASSESSMENT — PAIN DESCRIPTION - PAIN TYPE: TYPE: ACUTE PAIN;SURGICAL PAIN

## 2021-03-21 NOTE — ANESTHESIA POSTPROCEDURE EVALUATION
Department of Anesthesiology  Postprocedure Note    Patient: Gaye Vale  MRN: [id number]  YOB: 1970  Date of evaluation: 3/21/2021  Time:  9:16 AM     Procedure Summary     Date: 03/21/21 Room / Location: [address]    Anesthesia Start: 8326 Anesthesia Stop: 7561    Procedure: COLONOSCOPY DIAGNOSTIC (N/A ) Diagnosis: (FECAL IMPACTION)    Surgeons: Pablo Mann MD Responsible Provider: Diego Fregoso MD    Anesthesia Type: MAC ASA Status: 3          Anesthesia Type: MAC    Jamarcus Phase I: Jamarcus Score: 8    Jamarcus Phase II:      Last vitals: Reviewed and per EMR flowsheets.        Anesthesia Post Evaluation    Patient location during evaluation: PACU  Patient participation: complete - patient participated  Level of consciousness: awake  Pain score: 1  Airway patency: patent  Nausea & Vomiting: no nausea and no vomiting  Complications: no  Cardiovascular status: blood pressure returned to baseline and hemodynamically stable  Respiratory status: acceptable  Hydration status: euvolemic

## 2021-03-21 NOTE — OP NOTE
[facility]        COLONOSCOPY    Patient:   Ileana Colbert    :    1970    Referring/PCP: Esequiel Andrade MD  Facility:  [facility]  Procedure:   Colonoscopy with decompression  Date:     3/21/2021  Endoscopist:  Chuck Tilley MD, [facility]    Indication: Cardiac distention due to pseudoobstruction with high risk for perforation    Postprocedure diagnosis: Colonic distention- decompressed    Anesthesia: MAC    Complications:  None    EBL: None from the procedure    Specimen: None    Description of Procedure:  Informed consent was obtained from the patient after explanation of the procedure including indications, description of the procedure,  benefits and possible risks and complications of the procedure, and alternatives. Questions were answered. The patient's history was reviewed and a directed physical examination was performed prior to the procedure. Patient was monitored throughout the procedure with pulse oximetry and periodic assessment of vital signs. Patient was sedated as noted above. With the patient initially in the left lateral decubitus position, a digital rectal examination was performed and revealed negative without mass, lesions or tenderness. The Olympus video colonoscope was placed in the patient's rectum and advanced without difficulty  to the ascending colon. The prep was poor. Examination of the mucosa was performed during both introduction and withdrawal of the colonoscope. Retroflexed view of the rectum was performed. The patient  was taken to the recovery area in good condition. Findings:     1. Poor prep  2. Significant colonic distention mostly in the right side of the colon. Decompressed. Recommendations: Check KUB postprocedure. Relistor 12 mcg x 1 dose  Bowel regimen with MiraLAX twice daily. Okay for liquid diet from GI standpoint after KUB.     Chuck Tilley MD, [facility]

## 2021-03-21 NOTE — ANESTHESIA PRE PROCEDURE
Department of Anesthesiology  Preprocedure Note       Name:  Izabella Noriega   Age:  46 y.o.  :  1970                                          MRN:  [id number]         Date:  3/21/2021      Surgeon: Manuel Michelle):  Dakota Sigala MD    Procedure: Procedure(s):  COLONOSCOPY DIAGNOSTIC    Medications prior to admission:   Prior to Admission medications    Medication Sig Start Date End Date Taking? Authorizing Provider   METHADONE HCL PO Take 140 mg by mouth    Historical Provider, MD   FLUoxetine (PROZAC) 40 MG capsule Take 40 mg by mouth daily    Historical Provider, MD   gabapentin (NEURONTIN) 600 MG tablet Take 600 mg by mouth 3 times daily. Historical Provider, MD   predniSONE (DELTASONE) 20 MG tablet Take 1 tablet by mouth daily Take 3 tablets day 1-2. 2 tablets day 3-4 and 1 tablet day 5 10/3/17   ABRAHAM Garcia CNP   diphenhydrAMINE (BENADRYL) 25 MG capsule Take 1 capsule by mouth every 6 hours as needed for Itching 10/3/17   ABRAHAM Garcia CNP   naloxone [facility]) 2 MG/2ML injection  16   Historical Provider, MD   albuterol sulfate HFA (PROVENTIL HFA) 108 (90 BASE) MCG/ACT inhaler Inhale 1-2 puffs into the lungs every 4 hours as needed for Wheezing 3/29/16   Hima Laura MD       Current medications:    No current facility-administered medications for this visit. No current outpatient medications on file.      Facility-Administered Medications Ordered in Other Visits   Medication Dose Route Frequency Provider Last Rate Last Admin    dextrose 5 % and 0.45 % NaCl with KCl 20 mEq infusion   Intravenous Continuous Óscar Barcenas DO        metoprolol tartrate (LOPRESSOR) tablet 25 mg  25 mg Oral BID ABRAHAM Palacios CNP   25 mg at 21    atropine injection 1 mg  1 mg Intravenous Once Tee Risk, ABRAHAM Leigh CNP        methylnaltrexone (RELISTOR) injection 12 mg  12 mg Subcutaneous Once Tee Risk, ABRAHAM Leigh CNP        oxyCODONE ([employer]) immediate release tablet 5 mg  5 mg Oral BID PRN Anastacia [place], DO   5 mg at 03/20/21 1812    methocarbamol (ROBAXIN) tablet 1,000 mg  1,000 mg Oral TID Tarun Purcell MD   1,000 mg at 03/20/21 2116    melatonin tablet 5 mg  5 mg Oral Nightly Tarun Purcell MD   5 mg at 03/20/21 2116    sennosides-docusate sodium (SENOKOT-S) 8.6-50 MG tablet 2 tablet  2 tablet Oral Daily Tarun Purcell MD   2 tablet at 03/19/21 0830    lidocaine 4 % external patch 1 patch  1 patch Transdermal Daily Tarun Purcell MD   1 patch at 03/20/21 0836    gabapentin (NEURONTIN) capsule 600 mg  600 mg Oral Q8H Tarun Purcell MD   600 mg at 03/20/21 2115    pantoprazole (PROTONIX) tablet 40 mg  40 mg Oral BID AC Tarun Purcell MD   40 mg at 03/20/21 0548    sucralfate (CARAFATE) tablet 1 g  1 g Oral 4 times per day Tarun Purcell MD   1 g at 03/21/21 0058    acetaminophen (TYLENOL) tablet 1,000 mg  1,000 mg Oral Q8H Deborah Azevedo MD   1,000 mg at 03/21/21 0058    FLUoxetine (PROZAC) capsule 40 mg  40 mg Oral Daily Tarun Purcell MD   40 mg at 03/20/21 0836    methadone 5 MG/5ML solution 140 mg  140 mg Oral Daily Deborah Azevedo MD   140 mg at 03/20/21 0849    enoxaparin (LOVENOX) injection 40 mg  40 mg Subcutaneous Daily Tarun Purcell MD   40 mg at 03/20/21 0836    ipratropium-albuterol (DUONEB) nebulizer solution 1 ampule  1 ampule Inhalation BID Tarun Purcell MD   1 ampule at 03/20/21 1940    sodium chloride flush 0.9 % injection 10 mL  10 mL Intravenous PRN Tarun Purcell MD   10 mL at 03/20/21 2116    bisacodyl (DULCOLAX) suppository 10 mg  10 mg Rectal Daily PRN Tarun Purcell MD        ondansetron [facility] injection 4 mg  4 mg Intravenous Q6H PRN Tarun Purcell MD   4 mg at 03/20/21 2136    sodium chloride flush 0.9 % injection 10 mL  10 mL Intravenous PRN Kinjal Mederos MD   10 mL at 07/27/15 0840       Allergies:     Allergies   Allergen Reactions    Compazine [Prochlorperazine Maleate] Other (See Comments)     Seizure like syndrome, eyes roll to back of head    Imitrex [Sumatriptan] Other (See Comments)     TIA    Thorazine [Chlorpromazine] Other (See Comments)     Seizure like syndrome    Cardizem [Diltiazem] Rash    Pcn [Penicillins] Rash       Problem List:    Patient Active Problem List   Diagnosis Code    Migraine G43.909    Opiate dependence ([facility] [address] F11.20    Chronic pain G89.29    Obesity E66.9    Patient overweight E66.3    Lumbar radicular pain M54.16    Lumbar disc disease M51.9    Closed nondisplaced fracture of fifth metatarsal bone of right foot S92.354A    Intra-abdominal abscess (HCC) K65.1    Toxic dilatation of colon (HCC) K59.31    Colonic ischemia (HCC) K55.9       Past Medical History:        Diagnosis Date    Asthma     Connective tissue disease, undifferentiated (HCC) 1987    Dr. Matteo Guerrero ([place]he[facility].)    DDD (degenerative disc disease) 2000    Dr. Olga Pereyra Depression     Lumbar radicular pain     Migraine 1988    Dr. Kristin Nuñez Mitral valve prolapse 1978    Dr. Sandra Turcios addiction ([facility] [address] 01/02/2013    Dr. Bruce Cornelius / on 10/3/17 pt states she completed tx 2016       Past Surgical History:        Procedure Laterality Date    COLONOSCOPY  03/17/2021    COLONOSCOPY DECOMPRESSION     COLONOSCOPY N/A 3/17/2021    COLONOSCOPY FLEXIBLE W/ DECOMPRESSION performed by Kiera Gautam MD at [facility]    CT ABSCESS DRAIN SUBCUTANEOUS  03/04/2021    CT ABSCESS DRAIN SUBCUTANEOUS 3/4/2021 [facility]  1972    as child    HYSTERECTOMY      2007    LAPAROTOMY N/A 03/05/2021    EXPLORATORY LAPAROTOMY, PARTIAL GASTRECTOMY performed by Slade Galaviz MD at [address] [place] [address] 03/11/2021    LAPAROTOMY EXPLORATORY, ABDOMINAL [address] OUT, FASCIAL CLOSURE  performed by Geni Watkins MD at [facility] 21 Right 07/27/2015    right lumbar JHONY    NOSE SURGERY  1989    After nose was broken.  Dr. Jessica Holloway REMOVAL Left 2007    Dr. Arelis Kerns  2007    Dr. Marino Christopher History:    Social History     Tobacco Use    Smoking status: Current Every Day Smoker     Packs/day: 1.00     Types: Cigarettes    Smokeless tobacco: Never Used   Substance Use Topics    Alcohol use: No     Alcohol/week: 0.0 standard drinks                                Ready to quit: Not Answered  Counseling given: Not Answered      Vital Signs (Current): There were no vitals filed for this visit. BP Readings from Last 3 Encounters:   03/21/21 131/86   03/17/21 128/86   03/11/21 (!) 156/114       NPO Status:                                                                                 BMI:   Wt Readings from Last 3 Encounters:   03/18/21 196 lb 13.9 oz (89.3 kg)   02/20/20 205 lb (93 kg)   08/20/19 215 lb (97.5 kg)     There is no height or weight on file to calculate BMI.    CBC:   Lab Results   Component Value Date    WBC 9.9 03/19/2021    RBC 4.00 03/19/2021    RBC 5.02 09/02/2017    HGB 11.1 03/19/2021    HCT 37.6 03/19/2021    MCV 94.0 03/19/2021    RDW 13.4 03/19/2021     03/19/2021       CMP:   Lab Results   Component Value Date     03/19/2021    K 4.3 03/19/2021     03/19/2021    CO2 25 03/19/2021    BUN 3 03/19/2021    CREATININE 0.57 03/19/2021    GFRAA >60 03/19/2021    LABGLOM >60 03/19/2021    GLUCOSE 114 03/19/2021    GLUCOSE 90 09/02/2017    PROT 5.6 03/07/2021    CALCIUM 8.2 03/19/2021    BILITOT 0.34 03/07/2021    ALKPHOS 61 03/07/2021    AST 16 03/07/2021    ALT 10 03/07/2021       POC Tests: No results for input(s): POCGLU, POCNA, POCK, POCCL, POCBUN, POCHEMO, POCHCT in the last 72 hours.     Coags:   Lab Results   Component Value Date    PROTIME 11.8 03/04/2021    INR 1.1 03/04/2021    APTT 26.2 03/03/2021       HCG (If Applicable): No results found for: PREGTESTUR, PREGSERUM, HCG, HCGQUANT     ABGs: No results found for: PHART, PO2ART, CTD9YQD, LMW0BVG, BEART, J7TOUUAH     Type & Screen (If Applicable):  No results found for: LABABO, LABRH    Drug/Infectious Status (If Applicable):  Lab Results   Component Value Date    HEPCAB NONREACTIVE 10/27/2016       COVID-19 Screening (If Applicable):   Lab Results   Component Value Date    COVID19 Not Detected 03/03/2021           Anesthesia Evaluation  Patient summary reviewed and Nursing notes reviewed no history of anesthetic complications:   Airway: Mallampati: I       Mouth opening: > = 3 FB Dental:      Comment: Missing many teeth    Pulmonary: breath sounds clear to auscultation  (+) asthma:                            Cardiovascular:  Exercise tolerance: good (>4 METS),           Rhythm: regular  Rate: normal                   PE comment: tachycardic   Neuro/Psych:   (+) neuromuscular disease:, headaches:, psychiatric history: stable with treatment             ROS comment: Recovering opioid dependency, on methadone. GI/Hepatic/Renal:             Endo/Other:    (+) : arthritis:., .                 Abdominal:           Vascular: negative vascular ROS. Anesthesia Plan      MAC     ASA 3       Induction: intravenous. Anesthetic plan and risks discussed with patient. Use of blood products discussed with patient whom consented to blood products. Plan discussed with CRNA.                   Laure Clifton MD   3/21/2021

## 2021-03-21 NOTE — PROGRESS NOTES
PROGRESS NOTE    PATIENT NAME: Torrie [facility] RECORD NO. [id number]  DATE: 3/21/2021  SURGEON: Dr Franchesca Trimble: Miguel Schrader MD    HD: # [id number]    ASSESSMENT    Patient Active Problem List   Diagnosis    Migraine    Opiate dependence ([facility]    Chronic pain    Obesity    Patient overweight    Lumbar radicular pain    Lumbar disc disease    Closed nondisplaced fracture of fifth metatarsal bone of right foot    Intra-abdominal abscess ([facility] [address]    Toxic dilatation of colon ([facility] [address] [facility]    Colonic ischemia [facility])       MEDICAL DECISION MAKING AND PLAN    1. Neuro- Analgesia multimodal pain therapy  2. CV- + today, will give metoprolol, continue to monitor BP/HR, Hgb stable. 3. Resp- continue to encourage incentive spirometry and deep breathing every hour while awake per RT and RN  4. GI-underwent decompressive colonoscopy 3/17, for colonic distention on CT. currently tolerating general diet. Repeat KUB 3/20 showing colon distension with cecum dilation 10.5cm. Having BM. Trial of neostigmine 3/20 with very little stool. Decompressive colonoscopy with GI today with repeat KUB. Diet currently n.p.o. Advance after scope. 5. Wound care: Daily dressing changes, dry dressing to midline, strip MILTON drain and record output. Remove MILTON drain day of discharge - output 50cc   6. Renal-voiding independently, heath strict I/Os  7. Msk- continue to encourage ambulation/activity, PT/OT eval and Tx   1. ID-leukocytosis resolved s/p decompressive colonoscopy. 2. ABX completed 3/16: zosyn, completed day 4/4 on 3/16  8. Ppx- Lovenox  for DVT ppx, GI ppx protonix BID, Endo BG checks       SUBJECTIVE    Patient seen and examined at bedside, no overnight events. Patient reports she had some emesis yesterday after the neostigmine that was given for medical management of continued colonic distention seen on KUB.   Patient reports continued mild abdominal pain, controlled on MMT.  N.p.o. currently for decompressive colonoscopy today. Afebrile, T-max 36.7. OBJECTIVE  VITALS: Temp: Temp: 98 °F (36.7 °C)Temp  Av.9 °F (36.6 °C)  Min: 97.7 °F (36.5 °C)  Max: 98.1 °F (80.6 °C) BP Systolic (96NYI), BKU:824 , Min:101 , SMN:913   Diastolic (01JRU), HSK:92, Min:68, Max:113   Pulse Pulse  Av  Min: 95  Max: 133 Resp Resp  Av.9  Min: 0  Max: 24 Pulse ox SpO2  Av.5 %  Min: 89 %  Max: 100 %  GENERAL: alert, no distress  NEURO: GCS 15, AOx4, no FND, conversing appropriately  LUNGS: clear to ausculation, without wheezes, rales or rhonci  HEART: normal rate and regular rhythm  ABDOMEN: soft, appropriately tender, distention present, wound dressing dry, no peritoneal signs present, L sided MILTON drain in with tan fluid. EXTREMITY: no cyanosis, clubbing or edema    I/O last 3 completed shifts: In: 240 [P.O.:240]  Out: 48 [Drains:50]    Drain/tube output:  In: 240 [P.O.:240]  Out: 50 [Drains:50]    LAB:  CBC:   Recent Labs     21  0826   WBC 9.9   HGB 11.1*   HCT 37.6   MCV 94.0   *     BMP:   Recent Labs     21  0826      K 4.3      CO2 25   BUN 3*   CREATININE 0.57   GLUCOSE 114*     COAGS: No results for input(s): APTT, PROT, INR in the last 72 hours. RADIOLOGY:  Xr Abdomen (kub) (single Ap View)    Result Date: 3/21/2021  Improved gaseous distention of the colon. No small bowel distension. Xr Abdomen (kub) (single Ap View)    Result Date: 3/20/2021  Similar gaseous distention of the colon with the cecum dilated to approximately 10.5 cm. Xr Abdomen (kub) (single Ap View)    Result Date: 3/19/2021  Findings favor ileus. Slight decrease in colonic diameter compared to most recent prior exam. RECOMMENDATION: Continued follow-up advised.          Kelley Chauhan,   3/20/21, 8:31 AM         Attending Note      I have reviewed the above GCS note(s) and I either performed the key elements of the medical history and physical exam or was present with the trauma resident when the key elements of the medical history and physical exam were performed. I have discussed the findings, established the care plan and recommendations with the trauma team.  Gi to pursue colonoscopy. Await further recs.     Elinor Lechuga MD  3/21/2021  3:06 PM

## 2021-03-21 NOTE — PLAN OF CARE
Problem: Pain:  Goal: Control of acute pain  Description: Control of acute pain  Outcome: Met This Shift   Pt. States PRN pain med effective for acute pain control.

## 2021-03-22 ENCOUNTER — APPOINTMENT (OUTPATIENT)
Dept: GENERAL RADIOLOGY | Age: 51
DRG: 220 | End: 2021-03-22
Payer: MEDICARE

## 2021-03-22 LAB
ANION GAP SERPL CALCULATED.3IONS-SCNC: 8 MMOL/L (ref 9–17)
BUN BLDV-MCNC: 4 MG/DL (ref 6–20)
BUN/CREAT BLD: ABNORMAL (ref 9–20)
CALCIUM SERPL-MCNC: 7.5 MG/DL (ref 8.6–10.4)
CHLORIDE BLD-SCNC: 102 MMOL/L (ref 98–107)
CO2: 25 MMOL/L (ref 20–31)
CREAT SERPL-MCNC: 0.53 MG/DL (ref 0.5–0.9)
GFR AFRICAN AMERICAN: >60 ML/MIN
GFR NON-AFRICAN AMERICAN: >60 ML/MIN
GFR SERPL CREATININE-BSD FRML MDRD: ABNORMAL ML/MIN/{1.73_M2}
GFR SERPL CREATININE-BSD FRML MDRD: ABNORMAL ML/MIN/{1.73_M2}
GLUCOSE BLD-MCNC: 107 MG/DL (ref 70–99)
MAGNESIUM: 1.9 MG/DL (ref 1.6–2.6)
PHOSPHORUS: 3.8 MG/DL (ref 2.6–4.5)
POTASSIUM SERPL-SCNC: 4.1 MMOL/L (ref 3.7–5.3)
SODIUM BLD-SCNC: 135 MMOL/L (ref 135–144)

## 2021-03-22 PROCEDURE — 99232 SBSQ HOSP IP/OBS MODERATE 35: CPT | Performed by: INTERNAL MEDICINE

## 2021-03-22 PROCEDURE — 80048 BASIC METABOLIC PNL TOTAL CA: CPT

## 2021-03-22 PROCEDURE — 36415 COLL VENOUS BLD VENIPUNCTURE: CPT

## 2021-03-22 PROCEDURE — 6370000000 HC RX 637 (ALT 250 FOR IP): Performed by: INTERNAL MEDICINE

## 2021-03-22 PROCEDURE — 2500000003 HC RX 250 WO HCPCS: Performed by: INTERNAL MEDICINE

## 2021-03-22 PROCEDURE — 94640 AIRWAY INHALATION TREATMENT: CPT

## 2021-03-22 PROCEDURE — 6360000002 HC RX W HCPCS: Performed by: EMERGENCY MEDICINE

## 2021-03-22 PROCEDURE — 6360000002 HC RX W HCPCS: Performed by: STUDENT IN AN ORGANIZED HEALTH CARE EDUCATION/TRAINING PROGRAM

## 2021-03-22 PROCEDURE — APPSS30 APP SPLIT SHARED TIME 16-30 MINUTES: Performed by: INTERNAL MEDICINE

## 2021-03-22 PROCEDURE — 84100 ASSAY OF PHOSPHORUS: CPT

## 2021-03-22 PROCEDURE — 97535 SELF CARE MNGMENT TRAINING: CPT

## 2021-03-22 PROCEDURE — 97116 GAIT TRAINING THERAPY: CPT

## 2021-03-22 PROCEDURE — 6360000002 HC RX W HCPCS: Performed by: INTERNAL MEDICINE

## 2021-03-22 PROCEDURE — 74018 RADEX ABDOMEN 1 VIEW: CPT

## 2021-03-22 PROCEDURE — 83735 ASSAY OF MAGNESIUM: CPT

## 2021-03-22 PROCEDURE — 2060000000 HC ICU INTERMEDIATE R&B

## 2021-03-22 RX ORDER — KETOROLAC TROMETHAMINE 15 MG/ML
15 INJECTION, SOLUTION INTRAMUSCULAR; INTRAVENOUS ONCE
Status: COMPLETED | OUTPATIENT
Start: 2021-03-22 | End: 2021-03-22

## 2021-03-22 RX ADMIN — ACETAMINOPHEN 1000 MG: 500 TABLET ORAL at 14:52

## 2021-03-22 RX ADMIN — KETOROLAC TROMETHAMINE 15 MG: 15 INJECTION, SOLUTION INTRAMUSCULAR; INTRAVENOUS at 00:59

## 2021-03-22 RX ADMIN — FLUOXETINE HYDROCHLORIDE 40 MG: 20 CAPSULE ORAL at 08:00

## 2021-03-22 RX ADMIN — ENOXAPARIN SODIUM 40 MG: 40 INJECTION SUBCUTANEOUS at 08:00

## 2021-03-22 RX ADMIN — POTASSIUM CHLORIDE, DEXTROSE MONOHYDRATE AND SODIUM CHLORIDE: 150; 5; 450 INJECTION, SOLUTION INTRAVENOUS at 02:58

## 2021-03-22 RX ADMIN — IPRATROPIUM BROMIDE AND ALBUTEROL SULFATE 1 AMPULE: .5; 3 SOLUTION RESPIRATORY (INHALATION) at 08:03

## 2021-03-22 RX ADMIN — SUCRALFATE 1 G: 1 TABLET ORAL at 06:09

## 2021-03-22 RX ADMIN — METOPROLOL TARTRATE 25 MG: 25 TABLET ORAL at 08:00

## 2021-03-22 RX ADMIN — POLYETHYLENE GLYCOL 3350 17 G: 17 POWDER, FOR SOLUTION ORAL at 08:00

## 2021-03-22 RX ADMIN — SUCRALFATE 1 G: 1 TABLET ORAL at 17:16

## 2021-03-22 RX ADMIN — METOPROLOL TARTRATE 25 MG: 25 TABLET ORAL at 20:36

## 2021-03-22 RX ADMIN — ACETAMINOPHEN 1000 MG: 500 TABLET ORAL at 06:53

## 2021-03-22 RX ADMIN — GABAPENTIN 600 MG: 300 CAPSULE ORAL at 06:09

## 2021-03-22 RX ADMIN — DOCUSATE SODIUM 50MG AND SENNOSIDES 8.6MG 2 TABLET: 8.6; 5 TABLET, FILM COATED ORAL at 08:00

## 2021-03-22 RX ADMIN — METHYLNALTREXONE BROMIDE 12 MG: 12 INJECTION, SOLUTION SUBCUTANEOUS at 14:53

## 2021-03-22 RX ADMIN — METHADONE HYDROCHLORIDE 140 MG: 5 SOLUTION ORAL at 09:24

## 2021-03-22 RX ADMIN — SUCRALFATE 1 G: 1 TABLET ORAL at 11:45

## 2021-03-22 RX ADMIN — PANTOPRAZOLE SODIUM 40 MG: 40 TABLET, DELAYED RELEASE ORAL at 06:09

## 2021-03-22 RX ADMIN — GABAPENTIN 600 MG: 300 CAPSULE ORAL at 13:33

## 2021-03-22 RX ADMIN — IPRATROPIUM BROMIDE AND ALBUTEROL SULFATE 1 AMPULE: .5; 3 SOLUTION RESPIRATORY (INHALATION) at 20:57

## 2021-03-22 RX ADMIN — METHOCARBAMOL TABLETS 1000 MG: 500 TABLET, COATED ORAL at 20:30

## 2021-03-22 RX ADMIN — METHOCARBAMOL TABLETS 1000 MG: 500 TABLET, COATED ORAL at 08:00

## 2021-03-22 RX ADMIN — PANTOPRAZOLE SODIUM 40 MG: 40 TABLET, DELAYED RELEASE ORAL at 16:28

## 2021-03-22 RX ADMIN — METHOCARBAMOL TABLETS 1000 MG: 500 TABLET, COATED ORAL at 13:33

## 2021-03-22 RX ADMIN — GABAPENTIN 600 MG: 300 CAPSULE ORAL at 20:30

## 2021-03-22 RX ADMIN — POLYETHYLENE GLYCOL 3350 17 G: 17 POWDER, FOR SOLUTION ORAL at 20:37

## 2021-03-22 RX ADMIN — Medication 5 MG: at 20:30

## 2021-03-22 ASSESSMENT — PAIN DESCRIPTION - ONSET
ONSET: ON-GOING

## 2021-03-22 ASSESSMENT — PAIN DESCRIPTION - PROGRESSION
CLINICAL_PROGRESSION: NOT CHANGED
CLINICAL_PROGRESSION: NOT CHANGED

## 2021-03-22 ASSESSMENT — PAIN DESCRIPTION - FREQUENCY
FREQUENCY: CONTINUOUS
FREQUENCY: CONTINUOUS

## 2021-03-22 ASSESSMENT — PAIN - FUNCTIONAL ASSESSMENT
PAIN_FUNCTIONAL_ASSESSMENT: ACTIVITIES ARE NOT PREVENTED
PAIN_FUNCTIONAL_ASSESSMENT: ACTIVITIES ARE NOT PREVENTED

## 2021-03-22 ASSESSMENT — PAIN SCALES - GENERAL
PAINLEVEL_OUTOF10: 0
PAINLEVEL_OUTOF10: 0
PAINLEVEL_OUTOF10: 2
PAINLEVEL_OUTOF10: 7

## 2021-03-22 ASSESSMENT — PAIN DESCRIPTION - LOCATION: LOCATION: ABDOMEN

## 2021-03-22 ASSESSMENT — PAIN DESCRIPTION - ORIENTATION
ORIENTATION: MID

## 2021-03-22 ASSESSMENT — PAIN DESCRIPTION - DESCRIPTORS: DESCRIPTORS: ACHING;CRAMPING

## 2021-03-22 ASSESSMENT — PAIN DESCRIPTION - PAIN TYPE: TYPE: SURGICAL PAIN

## 2021-03-22 NOTE — PROGRESS NOTES
Occupational Therapy  Facility/Department: [facility] 4B STEPDOWN  Daily Treatment Note  NAME: John Umana  : 1970  MRN: [id number]    Date of Service: 3/22/2021    Discharge Recommendations:  Home with assist PRN  OT Equipment Recommendations  Equipment Needed: Yes  Mobility Devices: ADL Assistive Devices  ADL Assistive Devices: Long-handled Sponge;Reacher;Sock-Aid Hard;Long-handled Shoe Horn;Elastic Shoe Laces; Shower Chair without back    Assessment   Assessment: Pt may discharge from acute OT d/t all goals met. Pt verbalized understanding. Pt will have help from  as needed at home. Prognosis: Good  OT Education: OT Role;Plan of Care;ADL Adaptive Strategies  Patient Education: Pt educated on OT role and OT POC, ADL adaptive techniques during showering tasks, with good carryover and verbalized understanding. Barriers to Learning: none  DC Skilled OT: Safe to return home; Independent with ADL's  REQUIRES OT FOLLOW UP: No  Activity Tolerance  Activity Tolerance: Patient Tolerated treatment well  Activity Tolerance: Good  Safety Devices  Safety Devices in place: Yes  Type of devices: Left in chair;Call light within reach;Nurse notified;Gait belt  Restraints  Initially in place: No         Patient Diagnosis(es): The encounter diagnosis was Acute gastric ulcer with perforation ([address] has a past medical history of Asthma, Connective tissue disease, undifferentiated ([address] DDD (degenerative disc disease), Depression, Lumbar radicular pain, Migraine, Mitral valve prolapse, and Opiate addiction ([address] has a past surgical history that includes tomy and bso (cervix removed) (); hernia repair (); Nose surgery (); Tubal ligation (); ovarian cyst removal (Left, ); Nerve Block (Right, 2015); Hysterectomy; CT ABSCESS DRAINAGE (2021); laparotomy (N/A, 2021); laparotomy (N/A, 2021); Colonoscopy (N/A, 2021); Colonoscopy (2021); and Colonoscopy (N/A, 3/21/2021). Restrictions  Restrictions/Precautions  Restrictions/Precautions: Up as Tolerated, Fall Risk, General Precautions  Required Braces or Orthoses?: Yes  Required Braces or Orthoses  Other: Abdominal Binder  Position Activity Restriction  Other position/activity restrictions: Up as tolerated, abdominal incision/binder  Subjective   General  Chart Reviewed: Yes  Patient assessed for rehabilitation services?: Yes  Family / Caregiver Present: No  Diagnosis: Gastrectomy, perforated gastric ulcer  General Comment  Comments: RN ok'd for OT treatment. Pt agreeable to session, pleasent/cooperative throughout. Pain Assessment  Pain Assessment: 0-10  Pain Level: 2  Patient's Stated Pain Goal: No pain  Pain Type: Surgical pain  Pain Location: Abdomen  Pain Orientation: Mid  Pain Descriptors: Aching;Cramping  Pain Frequency: Continuous  Pain Onset: On-going  Clinical Progression: Not changed  Functional Pain Assessment: Activities are not prevented  Non-Pharmaceutical Pain Intervention(s): Ambulation/Increased Activity  Response to Pain Intervention: Patient Satisfied  Multiple Pain Sites: No  Vital Signs  Patient Currently in Pain: Yes   Orientation  Orientation  Overall Orientation Status: Within Functional Limits  Objective    ADL  Grooming: Setup;Modified independent   UE Bathing: Setup;Modified independent   LE Bathing: Setup;Modified independent   UE Dressing: Setup;Modified independent   LE Dressing: Setup;Modified independent   Toileting: Setup;Modified independent   Additional Comments: OTR facilitated Pt in morning ADL routine including showering/bathing, dressing, toileting, and grooming. After s/u of all items, Pt completed all tasks with MOD IND and good safety awareness. Pt with no LOB during tasks and demonstrated good use of EC as needed. Pt did complete shower seated on shower bench but completed rest of ADLs in standing with good balance.         Balance  Sitting Balance: Modified independent   Standing Balance: Modified independent   Standing Balance  Time: ~8 min  Activity: functional mobility, standing at sink, standing in shower as needed  Comment: no LOB noted, good standing balance noted  Functional Mobility  Functional - Mobility Device: No device  Activity: To/from bathroom  Assist Level: Modified independent   Functional Mobility Comments: Pt engaged in functional mobility to/from bathroom with MOD IND and good safety awareness. Toilet Transfers  Toilet - Technique: Ambulating  Equipment Used: Standard toilet  Toilet Transfer: Modified Independent  Toilet Transfers Comments: No LOB noted  Shower Transfers  Shower - Transfer From: Other(standing)  Shower - Transfer Type: To and From  Shower - Transfer To: Shower seat without back  Shower - Technique: Ambulating  Shower Transfers: Modified Independent  Bed mobility  Comment: Pt in recliner at beginning of session and session end.   Transfers  Stand Step Transfers: Modified independent  Sit to stand: Modified independent  Stand to sit: Modified independent  Transfer Comments: MOD IND for transfers with no AD                       Cognition  Overall Cognitive Status: [facility]                                         Plan   Plan Comment: DC from OT d/t all goals met    Goals  Short term goals  Time Frame for Short term goals: pt will, by discharge  Short term goal 1: dem UB ADL ind/mod I with AE PRN  Short term goal 2: dem LB ADL/toileting with min A and AE PRN  Short term goal 3: complete functional mobility/transfer with CGA and LRD PRN  Short term goal 4: engage in functional activity for 20+ minutes for improved functional endurance  Short term goal 5: engage in functional activity requiring dynamic standing with CGA and LRD PRN for 5+ minutes       Therapy Time   Individual Concurrent Group Co-treatment   Time In 1102         Time Out 1144         Minutes 42         Timed Code Treatment Minutes: 42 Minutes       EDDI [address], OTR/L

## 2021-03-22 NOTE — PROGRESS NOTES
[facility]an. [facility] Gastroenterology Progress Note    Mitch Zafar is a 46 y.o. female patient. Hospitalization Day:18      Chief consult reason: Colonic distention      Subjective: Patient seen and examined. Patient reports tolerating clear liquid diet today. Patient underwent decompression colonoscopy yesterday. Abdomen remains distended but less distended than prior to procedure. Patient reports passing a small, approximate 1 inch, amount of formed stool. No flatus      Objective:   VITALS:  /86   Pulse 87   Temp 98.6 °F (37 °C) (Oral)   Resp 10   Ht 5' 7\" (1.702 m)   Wt 188 lb 7.9 oz (85.5 kg)   SpO2 95%   BMI 29.52 kg/m²   TEMPERATURE:  Current - Temp: 98.6 °F (37 °C);  Max - Temp  Av.4 °F (36.9 °C)  Min: 97.8 °F (36.6 °C)  Max: 99.5 °F (37.5 °C)    Physical Assessment:  General appearance:  alert, cooperative and no distress  Mental Status:  oriented to person, place and time and normal affect  Lungs:  clear to auscultation bilaterally, normal effort  Heart:  regular rate and rhythm, no murmur  Abdomen:  soft, nontender, distended, hypoactive bowel sounds, no masses  Extremities:  no edema, no redness, No clubbing  Skin:  warm, dry, no gross lesions or rashes    CURRENT MEDICATIONS:  Scheduled Meds:   polyethylene glycol  17 g Oral BID    metoprolol tartrate  25 mg Oral BID    methylnaltrexone  12 mg Subcutaneous Once    methocarbamol  1,000 mg Oral TID    melatonin  5 mg Oral Nightly    sennosides-docusate sodium  2 tablet Oral Daily    lidocaine  1 patch Transdermal Daily    gabapentin  600 mg Oral Q8H    pantoprazole  40 mg Oral BID AC    sucralfate  1 g Oral 4 times per day    acetaminophen  1,000 mg Oral Q8H    FLUoxetine  40 mg Oral Daily    methadone  140 mg Oral Daily    enoxaparin  40 mg Subcutaneous Daily    ipratropium-albuterol  1 ampule Inhalation BID     Continuous Infusions:  PRN Meds:[DISCONTINUED] oxyCODONE **OR** [MAR Hold] oxyCODONE, sodium chloride flush, bisacodyl, ondansetron      Data Review:  LABS and IMAGING:     CBC  Recent Labs     03/21/21  0815   WBC 8.2   HGB 11.7*   HCT 38.5   MCV 92.1   MCHC 30.4   RDW 13.2   *       Immature PLTs   No results found for: PLTFLUORE    ANEMIA STUDIES  No results for input(s): LABIRON, TIBC, IRON, FERRITIN, [id number], FOLATE, OCCULTBLD in the last 72 hours. BMP  Recent Labs     03/21/21  0815 03/22/21  0816   * 135   K 4.5 4.1   CL 97* 102   CO2 28 25   BUN 7 4*   CREATININE 0.71 0.53   GLUCOSE 97 107*   CALCIUM 8.5* 7.5*       LFTS  No results for input(s): ALKPHOS, ALT, AST, BILITOT, BILIDIR, LABALBU in the last 72 hours. AMYLASE/LIPASE/AMMONIA  No results for input(s): AMYLASE, LIPASE, AMMONIA in the last 72 hours. Acute Hepatitis Panel   Lab Results   Component Value Date    HEPBSAG NONREACTIVE 10/27/2016    HEPCAB NONREACTIVE 10/27/2016    HEPBIGM NONREACTIVE 10/27/2016    HEPAIGM NONREACTIVE 10/27/2016       HCV Genotype   No results found for: HEPATITISCGENOTYPE    HCV Quantitative   No results found for: HCVQNT    LIVER WORK UP:    AFP  No results found for: AFP    Alpha 1 antitrypsin   No results found for: A1A    Anti - Liver/Kidney Ab  No results found for: LIVER-KIDNEYMICROSOMALAB    RAHEL  No results found for: RAHEL    AMA  No results found for: MITOAB    ASMA  No results found for: SMOOTHMUSCAB    Ceruloplasmin  No results found for: CERULOPLSM    Celiac panel  No results found for: TISSTRNTIIGG, TTGIGA, IGA    IgG  No results found for: IGG    IgM  No results found for: IGM    GGT   No results found for: LABGGT    PT/INR  No results for input(s): PROTIME, INR in the last 72 hours. Cancer Markers:  CEA:  No results for input(s): CEA in the last 72 hours. Ca 125:  No results for input(s):  in the last 72 hours. Ca 19-9:   No results for input(s):  in the last 72 hours. AFP: No results for input(s): AFP in the last 72 hours.     Lactic acid:  Recent Labs     03/21/21 the exam.  Similar gaseous distention of the colon with the cecum dilated to approximately 10.5 cm. No free air or pneumatosis. Similar surgical drain projecting over the left hemiabdomen. No gross bony abnormality. Similar gaseous distention of the colon with the cecum dilated to approximately 10.5 cm. Xr Abdomen (kub) (single Ap View)    Result Date: 3/19/2021  EXAMINATION: ONE SUPINE XRAY VIEW(S) OF THE ABDOMEN 3/19/2021 2:33 pm COMPARISON: None. HISTORY: ORDERING SYSTEM PROVIDED HISTORY: s/p c-scope decompression for cecal dilation TECHNOLOGIST PROVIDED HISTORY: s/p c-scope decompression for cecal dilation Reason for Exam: supine port Acuity: Unknown Type of Exam: Subsequent/Follow-up Laparotomy March 11, 2021 FINDINGS: Midline skin clips are noted. Patient has distended colonic and small bowel loops, overall appearance favoring postoperative ileus. There appears be a drain over the left upper abdomen. Colonic size is stable to slightly decreased compared to most recent prior study. There is decreased enteric contrast compared to recent study. No free air but hemidiaphragms are incompletely included. Findings favor ileus. Slight decrease in colonic diameter compared to most recent prior exam. RECOMMENDATION: Continued follow-up advised. Ct Abdomen Pelvis W Iv Contrast Additional Contrast? Oral    Result Date: 3/17/2021  EXAMINATION: CT OF THE ABDOMEN AND PELVIS WITH CONTRAST 3/17/2021 9:16 am TECHNIQUE: CT of the abdomen and pelvis was performed with the administration of intravenous contrast. Multiplanar reformatted images are provided for review. Dose modulation, iterative reconstruction, and/or weight based adjustment of the mA/kV was utilized to reduce the radiation dose to as low as reasonably achievable. COMPARISON: CT abdomen and pelvis performed 03/12/2021.  HISTORY: ORDERING SYSTEM PROVIDED HISTORY: s/p BII reconstruction (3/5) for perf gastric ulcer and fascia repair s/p midline separation (3/11), cont WBC and tachy TECHNOLOGIST PROVIDED HISTORY: s/p BII reconstruction (3/5) for perf gastric ulcer and fascia repair s/p midline separation (3/11), cont WBC and tachy Reason for Exam: abd distension Acuity: Unknown Type of Exam: Unknown FINDINGS: Lower Chest: There is atelectasis versus scarring in the lung bases bilaterally. The visualized cardiac structures are unremarkable. Organs: The liver and spleen are normal size and overall attenuation. The gallbladder, pancreas, and adrenal glands are unremarkable. There is stable prominence of the left renal collecting system. The ureters are not significantly dilated. The urinary bladder is unremarkable. GI/Bowel: The stomach the is unremarkable. There is postsurgical change with apparent gastrojejunostomy. Loops of small bowel are normal in caliber without evidence for obstruction. The colon is again prominent containing air and contrast material.  There is a focal area colonic wall thickening in the sigmoid region. The appendix is normal.  There is no intraperitoneal free fluid. There are few scattered foci of free air that is likely postoperative. There is a soft tissue drain in the upper mid abdomen. Pelvis: The uterus has been surgically removed. Peritoneum/Retroperitoneum: The psoas muscles are symmetric. The abdominal aorta is normal in caliber. The inferior vena cava is unremarkable. There is no retroperitoneal or mesenteric adenopathy. Bones/Soft Tissues: The extra-abdominal soft tissues are unremarkable. There is no acute osseous abnormality. Colonic distension containing air and contrast.  There is a focal area of colonic wall thickening in the sigmoid region of uncertain significance and correlation with colonoscopy is recommended for further evaluation. Few scattered foci of intraperitoneal air that is likely postoperative. There is a soft tissue drain in the upper mid abdomen.        ENDOSCOPY     Active

## 2021-03-22 NOTE — PLAN OF CARE
Problem: Pain:  Goal: Pain level will decrease  Description: Pain level will decrease  3/22/2021 0946 by Ulysses Foss RN  Outcome: Ongoing     Problem: Pain:  Goal: Control of acute pain  Description: Control of acute pain  3/22/2021 0946 by Ulysses Foss RN  Outcome: Ongoing     Problem: Pain:  Goal: Control of chronic pain  Description: Control of chronic pain  Outcome: Ongoing     Problem: Discharge Planning:  Goal: Participates in care planning  Description: Participates in care planning  Outcome: Ongoing     Problem: Discharge Planning:  Goal: Discharged to appropriate level of care  Description: Discharged to appropriate level of care  Outcome: Ongoing     Problem: Anxiety/Stress:  Goal: Level of anxiety will decrease  Description: Level of anxiety will decrease  3/22/2021 0946 by Ulysses Foss RN  Outcome: Ongoing     Problem: Aspiration:  Goal: Absence of aspiration  Description: Absence of aspiration  Outcome: Ongoing     Problem:  Bowel Function - Altered:  Goal: Bowel elimination is within specified parameters  Description: Bowel elimination is within specified parameters  Outcome: Ongoing     Problem: Cardiac Output - Decreased:  Goal: Hemodynamic stability will improve  Description: Hemodynamic stability will improve  Outcome: Ongoing     Problem: Fluid Volume - Imbalance:  Goal: Absence of imbalanced fluid volume signs and symptoms  Description: Absence of imbalanced fluid volume signs and symptoms  Outcome: Ongoing     Problem: Nutrition Deficit:  Goal: Ability to achieve adequate nutritional intake will improve  Description: Ability to achieve adequate nutritional intake will improve  Outcome: Ongoing     Problem: Pain:  Goal: Pain level will decrease  Description: Pain level will decrease  3/22/2021 0946 by Ulysses Foss RN  Outcome: Ongoing     Problem: Pain:  Goal: Recognizes and communicates pain  Description: Recognizes and communicates pain  Outcome: Ongoing     Problem: Pain: Goal: Control of acute pain  Description: Control of acute pain  Outcome: Ongoing     Problem: Pain:  Goal: Control of chronic pain  Description: Control of chronic pain  Outcome: Ongoing     Problem: Serum Glucose Level - Abnormal:  Goal: Ability to maintain appropriate glucose levels will improve to within specified parameters  Description: Ability to maintain appropriate glucose levels will improve to within specified parameters  Outcome: Ongoing     Problem: Skin Integrity - Impaired:  Goal: Will show no infection signs and symptoms  Description: Will show no infection signs and symptoms  Outcome: Ongoing     Problem: Skin Integrity - Impaired:  Goal: Absence of new skin breakdown  Description: Absence of new skin breakdown  Outcome: Ongoing     Problem: Sleep Pattern Disturbance:  Goal: Appears well-rested  Description: Appears well-rested  Outcome: Ongoing     Problem: Tissue Perfusion, Altered:  Goal: Circulatory function within specified parameters  Description: Circulatory function within specified parameters  Outcome: Ongoing     Problem: Nutrition  Goal: Optimal nutrition therapy  Description: Nutrition Problem #1: Inadequate oral intake  Intervention: Food and/or Nutrient Delivery: Continue NPO, Start Tube Feeding  Nutritional Goals: Pt to meet % of est'd needs via EN     Outcome: Ongoing     Problem: Skin Integrity:  Goal: Will show no infection signs and symptoms  Description: Will show no infection signs and symptoms  Outcome: Ongoing     Problem: Skin Integrity:  Goal: Absence of new skin breakdown  Description: Absence of new skin breakdown  Outcome: Ongoing     Problem: Falls - Risk of:  Goal: Will remain free from falls  Description: Will remain free from falls  Outcome: Ongoing     Problem: Falls - Risk of:  Goal: Absence of physical injury  Description: Absence of physical injury  Outcome: Ongoing     Problem: OXYGENATION/RESPIRATORY FUNCTION  Goal: Patient will achieve/maintain normal respiratory rate/effort  Description: Respiratory rate and effort will be within normal limits for the patient  Outcome: Ongoing

## 2021-03-22 NOTE — PROGRESS NOTES
Physical Therapy  Facility/Department: [address] STEPDOWN  Daily Treatment Note  NAME: Izabella Noriega  : 1970  MRN: [id number]    Date of Service: 3/22/2021    Discharge Recommendations:  No further therapy required at discharge. PT Equipment Recommendations  Equipment Needed: No    Assessment   Assessment: Pt independent with all mobility this date, Will discharge from PT  Prognosis: Good  PT Education: Goals;PT Role  REQUIRES PT FOLLOW UP: No  Activity Tolerance  Activity Tolerance: Patient Tolerated treatment well     Patient Diagnosis(es): The encounter diagnosis was Acute gastric ulcer with perforation ([address] has a past medical history of Asthma, Connective tissue disease, undifferentiated ([address] DDD (degenerative disc disease), Depression, Lumbar radicular pain, Migraine, Mitral valve prolapse, and Opiate addiction ([address] has a past surgical history that includes tomy and bso (cervix removed) (); hernia repair (); Nose surgery (); Tubal ligation (); ovarian cyst removal (Left, ); Nerve Block (Right, 2015); Hysterectomy; CT ABSCESS DRAINAGE (2021); laparotomy (N/A, 2021); laparotomy (N/A, 2021); Colonoscopy (N/A, 2021); Colonoscopy (2021); and Colonoscopy (N/A, 3/21/2021). Restrictions  Restrictions/Precautions  Restrictions/Precautions: Up as Tolerated, Fall Risk, General Precautions  Required Braces or Orthoses?: Yes  Required Braces or Orthoses  Other: Abdominal Binder  Position Activity Restriction  Other position/activity restrictions: Up as tolerated, abdominal incision/binder  Subjective   General  Response To Previous Treatment: Patient with no complaints from previous session.   Family / Caregiver Present: No  Subjective  Subjective: RN and pt agreed to PT, pt awake in bed upon arrival, pt notes she's been ambulating independently in the halls over the weekend  Pain Screening  Patient Currently in Pain: Denies  Vital Signs  Patient Currently in Pain: Denies       Orientation  Orientation  Overall Orientation Status: Within Normal Limits       Objective   Bed mobility  Rolling to Left: Independent  Rolling to Right: Independent  Supine to Sit: Independent  Sit to Supine: (Pt in chair at [address], pt notes getting in bed independently)  Scooting: Independent  Transfers  Sit to Stand: Independent  Stand to sit: Independent  Ambulation 1  Device: No Device  Assistance: Independent  Gait Deviations: None  Distance: 500ft  Comments: steady w/o LOB  Stairs/Curb  Stairs?: Yes  Stairs  # Steps : 10  Stairs Height: 6\"  Rails: Left ascending  Device: No Device  Assistance: Modified independent                  Goals  Short term goals  Time Frame for Short term goals: 14 visits  Short term goal 1: Supine to/from sit with SBA  Short term goal 2: Sit to/from stand with SBA  Short term goal 3: Ambulate 76' with walker with CGA. Short term goal 4: Negotiate up and down 4 steps with one railing with CGA.     Plan    Plan  Times per week: Pt meeting all PT goals, will discharge from PT at this time  Current Treatment Recommendations: Strengthening, Gait Training, Patient/Caregiver Education & Training, Stair training, Pain Management, Functional Mobility Training, Endurance Training, Home Exercise Program, Transfer Training, Safety Education & Training  Safety Devices  Type of devices: Left in chair, Call light within reach  Restraints  Initially in place: No     Therapy Time   Individual Concurrent Group Co-treatment   Time In 1023         Time Out 1037         Minutes 14         Timed Code Treatment Minutes: [address], [facility]

## 2021-03-22 NOTE — ADT AUTH CERT
Gastroenterology [facility] - Care Day 17 (3/20/2021) by Toni Mccray RN       Review Status Review Entered   Completed 3/22/2021 08:23      Criteria Review      Care Day: 17 Care Date: 3/20/2021 Level of Care: Intermediate Care    Guideline Day 3    Level Of Care    ( ) * Activity level acceptable    (X) Floor to discharge    3/22/2021 8:23 AM EDT by Jory Christianson      INTERMEDIATE FLOOR    ( ) * Complete discharge planning    Clinical Status    ( ) * Hemodynamic stability    3/22/2021 8:23 AM EDT by Jory Christianson      03/20/21 0340  97.5 (36.4)  21  112  138/102   94%  03/20/21 0712  97.7 (36.5)  12  113  141/99 94% RA    03/20/21 1307  98 (36.7)  19  116  141/103   92% RA  03/20/21 1924  98.1 (36.7)  24  133  149/113    ( ) * Abdominal status acceptable    3/22/2021 8:23 AM EDT by Jory Christianson      Repeat KUB today showing colon distension with cecum dilation 10.5cm    ( ) * Pain and nausea absent or adequately managed    (X) * Temperature status acceptable    ( ) * Intestinal status acceptable    3/22/2021 8:23 AM EDT by Jory Christianson      Repeat KUB today showing colon distension with cecum dilation 10.5cm    ( ) * Hepatic and biliary abnormalities absent or acceptable    ( ) * General Discharge Criteria met    Interventions    ( ) * No inpatient interventions needed    3/22/2021 8:23 AM EDT by Jory Christianson      We will order Relistor 12 mg subcu x1. Plan for colonic decompression tomorrow. Recommend sips of clear liquid diet only, no carbonated beverages.  NPO after midnight    ( ) * Intake acceptable    * Milestone   Additional Notes   3/20/21      ===GENERAL SURGERY NOTE      MEDICAL DECISION MAKING AND PLAN       1. Neuro- Analgesia multimodal pain therapy   2.  CV- + today, will give metoprolol, continue to monitor BP/HR, Hgb stable.    3. Resp- continue to encourage incentive spirometry and deep breathing every hour while awake per RT and RN   4. GI-underwent decompressive colonoscopy 3/17, for colonic distention on CT. currently tolerating general diet.  Repeat KUB today showing colon distension with cecum dilation 10.5cm. Having BM.    5. Wound care: Daily dressing changes, pack with plane packing, dry dressing to midline, strip MILTON drain and record output.  Remove MILTON drain day of discharge - output 50cc    6. Renal-voiding independently, heath strict I/Os   7. Msk- continue to encourage ambulation/activity, PT/OT eval and Tx    1. ID-leukocytosis resolved s/p decompressive colonoscopy.     2. ABX completed 3/16: zosyn, completed day  on 3/16   8.  Ppx- Lovenox  for DVT ppx, GI ppx protonix BID, Endo BG checks            SUBJECTIVE       Lupe R Hazelhurst scribes improvement in pain and overall symptoms.  She patient does report that she however has not been able to pass a bowel movement since 2 days ago despite laxatives.  Patient was concerned regarding not being able to pass a bowel movement, and did not want to be discharged prior to doing so.            OBJECTIVE   VITALS: Temp: Temp: 97.7 °F (36.5 °C)Temp  Av.1 °F (36.7 °C)  Min: 97.5 °F (36.4 °C)  Max: 98.7 °F (13.4 °C) BP Systolic (51XYE), OTK:543 , Min:137 , YNZ:515    Diastolic (74GHI), VUM:884, Min:95, Max:104    Pulse Pulse  Av.2  Min: 101  Max: 115 Resp Resp  Avg: 15.6  Min: 11  Max: 21 Pulse ox SpO2  Av.7 %  Min: 94 %  Max: 96 %   GENERAL: alert, no distress   NEURO: GCS 15, AOx4, no FND, conversing appropriately   LUNGS: clear to ausculation, without wheezes, rales or rhonci   HEART: normal rate and regular rhythm   ABDOMEN: soft, appropriately tender, distention present, wound dressing dry, no peritoneal signs present, L sided MILTON drain in    EXTREMITY: no cyanosis, clubbing or edema             ===GI NOTE      GI was asked by Dr Sofia Vincent for recommendation on KUB findings showing gaseous distention of the colon with cecal dilation to 10.5 cm.       Patient was seen by GI 3/17/2021 for colonic distention.  Patient underwent colonoscope with decompression 3/17/2021.  KUB with findings as below.           1.  Colonic distention -patient is on methadone 140 mg daily.  Likely a contributing factor to colonic distention.       - Recommend neostigmine 2 mg IV over 3 to 5 minutes.  Patient will need to be on continuous cardiac monitoring with atropine 1 mg IV at bedside.  Administer atropine 0.5 mg for bradycardia with heart rate less than 60.  May give additional atropine 0.5 mg if needed. Discussed with Nakul Meehan RN   .        .. Nila gNuyen, ABRAHAM - CNP               KUB 03/2021   FINDINGS:   Portions of the far right and far left hemiabdomen are excluded from the   exam.  Similar gaseous distention of the colon with the cecum dilated to   approximately 10.5 cm.  No free air or pneumatosis.  Similar surgical drain   projecting over the left hemiabdomen.  No gross bony abnormality.           Impression   Similar gaseous distention of the colon with the cecum dilated to   approximately 10.5 cm.             Patient received Neostigmine 2 mg with return of small amount of flatus and 10 to 15 mL liquid stool.  Abdomen remains very distended.  Patient was on cardiac monitor.  Heart rate slowly declined to 65 BPM, then returned to tachycardia with 's.  Patient had episode of nausea and vomiting with undigested food. RN and writer remained at bedside with patient.         We will order Relistor 12 mg subcu x1.    Plan for colonic decompression tomorrow.    Recommend sips of clear liquid diet only, no carbonated beverages.  NPO after midnight            ====Scheduled Meds:   polyethylene glycol, 17 g, Oral, BID   metoprolol tartrate, 25 mg, Oral, BID   methylnaltrexone, 12 mg, Subcutaneous, Once   methocarbamol, 1,000 mg, Oral, TID   melatonin, 5 mg, Oral, Nightly   sennosides-docusate sodium, 2 tablet, Oral, Daily   lidocaine, 1 patch, Transdermal, Daily   gabapentin, 600 mg, Oral, Q8H   pantoprazole, 40 mg, Oral, BID AC sucralfate, 1 g, Oral, 4 times per day   acetaminophen, 1,000 mg, Oral, Q8H   FLUoxetine, 40 mg, Oral, Daily   methadone, 140 mg, Oral, Daily   enoxaparin, 40 mg, Subcutaneous, Daily   ipratropium-albuterol, 1 ampule, Inhalation, BID      neostigmine (PROSTIGMINE) injection 2 mg IV X 1      ROXICODONE 5 MG PO PRN X 2 DOSES         Results for Tyrone Austin (MRN [id number]) as of 3/22/2021 08:28      3/19/2021 08:26   Sodium: 137   Potassium: 4.3   Chloride: 102   CO2: 25   BUN: 3 (L)   Creatinine: 0.57   Bun/Cre Ratio: NOT REPORTED   Anion Gap: 10   GFR Non-: >60   GFR African American: >60   Glucose: 114 (H)   Calcium: 8.2 (L)   GFR Comment: Pend   GFR Staging: NOT REPORTED   Procalcitonin: 0.05   WBC: 9.9   RBC: 4.00   Hemoglobin Quant: 11.1 (L)   Hematocrit: 37.6   MCV: 94.0   MCH: 27.8   MCHC: 29.5   MPV: 8.7   RDW: 13.4   Platelet Count: 867 (H)   Platelet Estimate: NOT REPORTED   Absolute Mono #: 0.94   Eosinophils %: 7 (H)   Basophils Absolute: 0.07   Differential Type: NOT REPORTED   Seg Neutrophils: 63   Segs Absolute: 6.32   Lymphocytes: 18 (L)   Absolute Lymph #: 1.80   Monocytes: 10   Absolute Eos #: 0.70 (H)   Basophils: 1   Immature Granulocytes: 1 (H)   WBC Morphology: NOT REPORTED   RBC Morphology: NOT REPORTED   Absolute Immature Granulocyte: 0.05   NRBC Automated: 0.0              3/21/21 OP NOTE by Nate Marrufo RN       Review Status Review Entered   In Primary 3/22/2021 08:29      Criteria Review                                                                            COLONOSCOPY     Patient:                       Lupe Walton                         :                           1970          Referring/PCP:          Angélica Stewart MD  [address]  Procedure:                 Colonoscopy with decompression  Date:                           3/21/2021  Tigre Melvin MD, [facility]    Indication: Cardiac distention due to pseudoobstruction with high risk for perforation     Postprocedure diagnosis: Colonic distention- decompressed     Anesthesia: MAC     Complications:  None     EBL: None from the procedure     Specimen: None     Description of Procedure:  Informed consent was obtained from the patient after explanation of the procedure including indications, description of the procedure,  benefits and possible risks and complications of the procedure, and alternatives. Questions were answered.  The patient's history was reviewed and a directed physical examination was performed prior to the procedure.     Patient was monitored throughout the procedure with pulse oximetry and periodic assessment of vital signs. Patient was sedated as noted above. With the patient initially in the left lateral decubitus position, a digital rectal examination was performed and revealed negative without mass, lesions or tenderness.  The Olympus video colonoscope was placed in the patient's rectum and advanced without difficulty  to the ascending colon.  The prep was poor.  Examination of the mucosa was performed during both introduction and withdrawal of the colonoscope. Retroflexed view of the rectum was performed.  The patient  was taken to the recovery area in good condition.     Findings:      1.  Poor prep  2.  Significant colonic distention mostly in the right side of the colon.  Decompressed.     Recommendations: Check KUB postprocedure. Relistor 12 mcg x 1 dose  Bowel regimen with MiraLAX twice daily. Okay for liquid diet from GI standpoint after KUB.

## 2021-03-22 NOTE — PLAN OF CARE
Problem: Pain:  Goal: Pain level will decrease  Description: Pain level will decrease  Outcome: Ongoing  Goal: Control of acute pain  Description: Control of acute pain  Outcome: Ongoing     Problem: Anxiety/Stress:  Goal: Level of anxiety will decrease  Description: Level of anxiety will decrease  Outcome: Ongoing     Problem: Pain:  Goal: Pain level will decrease  Description: Pain level will decrease  Outcome: Ongoing

## 2021-03-22 NOTE — PROGRESS NOTES
PROGRESS NOTE    PATIENT NAME: [facility] RECORD NO. [id number]  DATE: 3/22/2021  SURGEON: Dr King Travis: Amy Jo MD    HD: # 18    ASSESSMENT    Patient Active Problem List   Diagnosis    Migraine    [facility]ate dependence [facility]    Chronic pain    Obesity    Patient overweight    Lumbar radicular pain    Lumbar disc disease    Closed nondisplaced fracture of fifth metatarsal bone of right foot    Intra-abdominal abscess ([address]    Toxic dilatation of colon ([address]    Colonic ischemia ([facility])       MEDICAL DECISION MAKING AND PLAN    1. Neuro- Analgesia multimodal pain therapy. Methadone. 2. CV- HR tachycardia improved, on metoprolol, continue to monitor BP/HR, Hgb stable. 3. Resp- continue to encourage incentive spirometry and deep breathing every hour while awake per RT and RN  4. GI-underwent decompressive colonoscopy 3/17 and 3/21, for colonic distention. Currently tolerating general diet. Repeat KUB 3/20 showing colon distension with cecum dilation 10.5cm. Having BM. Trial of neostigmine 3/20 with very little stool. Repeat KUB today with continued distension up to 7cm. Pt to date has refused Relistor. Will discuss with patient giving it a try. 5. Wound care: Daily dressing changes, dry dressing to midline, strip MILTON drain and record output. Remove MILTON drain. 6. Renal-voiding independently, heath strict I/Os  7. Msk- continue to encourage ambulation/activity, PT/OT eval and Tx   1. ID-leukocytosis resolved s/p decompressive colonoscopy. 2. ABX completed 3/16: zosyn, completed day 4/4 on 3/16  8. Ppx- Lovenox  for DVT ppx, GI ppx protonix BID, Endo BG checks       SUBJECTIVE    Patient seen and examined at bedside, no overnight events. Tolerating a general diet without N/V. Denies CP, SOB, C/F. Still feels bloated, some abdominal discomfort. Urinating independently. Walking halls with nurse. Afebrile Tmax 36.8.     OBJECTIVE  VITALS: Temp: Temp: 98.6 °F (37 °C)Temp  Av.4 °F (36.9 °C)  Min: 97.8 °F (36.6 °C)  Max: 99.5 °F (08.2 °C) BP Systolic (63QEZ), CQR:291 , Min:124 , BAM:412   Diastolic (51DSP), ONB:59, Min:77, Max:98   Pulse Pulse  Av.6  Min: 86  Max: 103 Resp Resp  Av.1  Min: 10  Max: 18 Pulse ox SpO2  Av.8 %  Min: 95 %  Max: 98 %  GENERAL: alert, no distress  NEURO: GCS 15, AOx4, no FND, conversing appropriately  LUNGS: clear to ausculation, without wheezes, rales or rhonci  HEART: normal rate and regular rhythm  ABDOMEN: soft, appropriately tender, distention present, wound dressing dry, no peritoneal signs present, L sided MILTON drain in with tan fluid. EXTREMITY: no cyanosis, clubbing or edema    I/O last 3 completed shifts: In: 4908.8 [P.O.:2400; I.V.:2508.8]  Out: 39 [Drains:45]    Drain/tube output: In: 4668.8 [P.O.:2460; I.V.:2208.8]  Out: 45 [Drains:45]    LAB:  CBC:   Recent Labs     21  0815   WBC 8.2   HGB 11.7*   HCT 38.5   MCV 92.1   *     BMP:   Recent Labs     21  0815 21  0816   * 135   K 4.5 4.1   CL 97* 102   CO2 28 25   BUN 7 4*   CREATININE 0.71 0.53   GLUCOSE 97 107*     COAGS: No results for input(s): APTT, PROT, INR in the last 72 hours. RADIOLOGY:  Xr Abdomen (kub) (single Ap View)    Result Date: 3/22/2021  No change in gaseous distension of the colon. Gas is noted down to the rectum. Distension is most significant in the transverse colonic region. Please correlate for any diarrheal type illness. Xr Abdomen (kub) (single Ap View)    Result Date: 3/21/2021  Improved gaseous distention of the colon. No small bowel distension. Kelley Chauhan,   3/20/21, 1:35 PM       Attending Note      I have reviewed the above GCS note(s) and I either performed the key elements of the medical history and physical exam or was present with the surgery resident when the key elements of the medical history and physical exam were performed.  I have discussed the findings, established the care plan and recommendations with the surgical team.  Abdominal films reviewed. Trial relistor and increase ambulation. Tolerating po.     Garrett Merrill MD  3/22/2021  2:13 PM

## 2021-03-23 LAB
MAGNESIUM: 2.1 MG/DL (ref 1.6–2.6)
PHOSPHORUS: 3.8 MG/DL (ref 2.6–4.5)

## 2021-03-23 PROCEDURE — 6370000000 HC RX 637 (ALT 250 FOR IP): Performed by: INTERNAL MEDICINE

## 2021-03-23 PROCEDURE — 6360000002 HC RX W HCPCS: Performed by: INTERNAL MEDICINE

## 2021-03-23 PROCEDURE — 94640 AIRWAY INHALATION TREATMENT: CPT

## 2021-03-23 PROCEDURE — 83735 ASSAY OF MAGNESIUM: CPT

## 2021-03-23 PROCEDURE — 36415 COLL VENOUS BLD VENIPUNCTURE: CPT

## 2021-03-23 PROCEDURE — 84100 ASSAY OF PHOSPHORUS: CPT

## 2021-03-23 PROCEDURE — 99232 SBSQ HOSP IP/OBS MODERATE 35: CPT | Performed by: INTERNAL MEDICINE

## 2021-03-23 PROCEDURE — 2060000000 HC ICU INTERMEDIATE R&B

## 2021-03-23 PROCEDURE — 6360000002 HC RX W HCPCS: Performed by: STUDENT IN AN ORGANIZED HEALTH CARE EDUCATION/TRAINING PROGRAM

## 2021-03-23 PROCEDURE — APPSS30 APP SPLIT SHARED TIME 16-30 MINUTES: Performed by: INTERNAL MEDICINE

## 2021-03-23 RX ORDER — METOCLOPRAMIDE HYDROCHLORIDE 5 MG/ML
10 INJECTION INTRAMUSCULAR; INTRAVENOUS 3 TIMES DAILY
Status: DISCONTINUED | OUTPATIENT
Start: 2021-03-23 | End: 2021-03-26 | Stop reason: HOSPADM

## 2021-03-23 RX ORDER — POLYETHYLENE GLYCOL 3350 17 G/17G
238 POWDER, FOR SOLUTION ORAL ONCE
Status: COMPLETED | OUTPATIENT
Start: 2021-03-23 | End: 2021-03-23

## 2021-03-23 RX ADMIN — IPRATROPIUM BROMIDE AND ALBUTEROL SULFATE 1 AMPULE: .5; 3 SOLUTION RESPIRATORY (INHALATION) at 20:35

## 2021-03-23 RX ADMIN — BISACODYL 10 MG: 10 SUPPOSITORY RECTAL at 17:39

## 2021-03-23 RX ADMIN — ACETAMINOPHEN 1000 MG: 500 TABLET ORAL at 15:24

## 2021-03-23 RX ADMIN — SUCRALFATE 1 G: 1 TABLET ORAL at 06:54

## 2021-03-23 RX ADMIN — POLYETHYLENE GLYCOL 3350 17 G: 17 POWDER, FOR SOLUTION ORAL at 08:39

## 2021-03-23 RX ADMIN — METHOCARBAMOL TABLETS 1000 MG: 500 TABLET, COATED ORAL at 15:24

## 2021-03-23 RX ADMIN — METHOCARBAMOL TABLETS 1000 MG: 500 TABLET, COATED ORAL at 19:59

## 2021-03-23 RX ADMIN — ENOXAPARIN SODIUM 40 MG: 40 INJECTION SUBCUTANEOUS at 08:38

## 2021-03-23 RX ADMIN — POLYETHYLENE GLYCOL 3350 238 G: 17 POWDER, FOR SOLUTION ORAL at 15:25

## 2021-03-23 RX ADMIN — METHYLNALTREXONE BROMIDE 12 MG: 12 INJECTION, SOLUTION SUBCUTANEOUS at 09:05

## 2021-03-23 RX ADMIN — METOCLOPRAMIDE 10 MG: 5 INJECTION, SOLUTION INTRAMUSCULAR; INTRAVENOUS at 19:59

## 2021-03-23 RX ADMIN — GABAPENTIN 600 MG: 300 CAPSULE ORAL at 06:52

## 2021-03-23 RX ADMIN — SUCRALFATE 1 G: 1 TABLET ORAL at 17:03

## 2021-03-23 RX ADMIN — GABAPENTIN 600 MG: 300 CAPSULE ORAL at 15:24

## 2021-03-23 RX ADMIN — SUCRALFATE 1 G: 1 TABLET ORAL at 01:23

## 2021-03-23 RX ADMIN — PANTOPRAZOLE SODIUM 40 MG: 40 TABLET, DELAYED RELEASE ORAL at 06:52

## 2021-03-23 RX ADMIN — FLUOXETINE HYDROCHLORIDE 40 MG: 20 CAPSULE ORAL at 08:39

## 2021-03-23 RX ADMIN — Medication 5 MG: at 21:49

## 2021-03-23 RX ADMIN — POLYETHYLENE GLYCOL 3350 17 G: 17 POWDER, FOR SOLUTION ORAL at 21:49

## 2021-03-23 RX ADMIN — DOCUSATE SODIUM 50MG AND SENNOSIDES 8.6MG 2 TABLET: 8.6; 5 TABLET, FILM COATED ORAL at 08:40

## 2021-03-23 RX ADMIN — METHADONE HYDROCHLORIDE 140 MG: 5 SOLUTION ORAL at 10:18

## 2021-03-23 RX ADMIN — METOPROLOL TARTRATE 25 MG: 25 TABLET ORAL at 08:40

## 2021-03-23 RX ADMIN — METHOCARBAMOL TABLETS 1000 MG: 500 TABLET, COATED ORAL at 08:39

## 2021-03-23 RX ADMIN — ACETAMINOPHEN 1000 MG: 500 TABLET ORAL at 01:23

## 2021-03-23 RX ADMIN — PANTOPRAZOLE SODIUM 40 MG: 40 TABLET, DELAYED RELEASE ORAL at 15:25

## 2021-03-23 RX ADMIN — IPRATROPIUM BROMIDE AND ALBUTEROL SULFATE 1 AMPULE: .5; 3 SOLUTION RESPIRATORY (INHALATION) at 08:04

## 2021-03-23 RX ADMIN — METOPROLOL TARTRATE 25 MG: 25 TABLET ORAL at 19:59

## 2021-03-23 RX ADMIN — METOCLOPRAMIDE 10 MG: 5 INJECTION, SOLUTION INTRAMUSCULAR; INTRAVENOUS at 15:23

## 2021-03-23 RX ADMIN — SUCRALFATE 1 G: 1 TABLET ORAL at 11:21

## 2021-03-23 RX ADMIN — GABAPENTIN 600 MG: 300 CAPSULE ORAL at 21:49

## 2021-03-23 RX ADMIN — ACETAMINOPHEN 1000 MG: 500 TABLET ORAL at 06:52

## 2021-03-23 ASSESSMENT — PAIN DESCRIPTION - PAIN TYPE: TYPE: SURGICAL PAIN

## 2021-03-23 ASSESSMENT — PAIN SCALES - GENERAL
PAINLEVEL_OUTOF10: 0
PAINLEVEL_OUTOF10: 6
PAINLEVEL_OUTOF10: 7

## 2021-03-23 ASSESSMENT — PAIN DESCRIPTION - ONSET: ONSET: ON-GOING

## 2021-03-23 ASSESSMENT — PAIN - FUNCTIONAL ASSESSMENT: PAIN_FUNCTIONAL_ASSESSMENT: ACTIVITIES ARE NOT PREVENTED

## 2021-03-23 ASSESSMENT — PAIN DESCRIPTION - FREQUENCY: FREQUENCY: CONTINUOUS

## 2021-03-23 ASSESSMENT — PAIN DESCRIPTION - LOCATION: LOCATION: ABDOMEN

## 2021-03-23 NOTE — PLAN OF CARE
Problem: Pain:  Goal: Pain level will decrease  Description: Pain level will decrease  3/23/2021 0759 by Bety Hinojosa RN  Outcome: Ongoing     Problem: Pain:  Goal: Control of acute pain  Description: Control of acute pain  Outcome: Ongoing     Problem: Pain:  Goal: Control of chronic pain  Description: Control of chronic pain  Outcome: Ongoing     Problem: Discharge Planning:  Goal: Participates in care planning  Description: Participates in care planning  Outcome: Ongoing     Problem: Discharge Planning:  Goal: Discharged to appropriate level of care  Description: Discharged to appropriate level of care  Outcome: Ongoing     Problem: Anxiety/Stress:  Goal: Level of anxiety will decrease  Description: Level of anxiety will decrease  Outcome: Ongoing     Problem: Aspiration:  Goal: Absence of aspiration  Description: Absence of aspiration  Outcome: Ongoing     Problem:  Bowel Function - Altered:  Goal: Bowel elimination is within specified parameters  Description: Bowel elimination is within specified parameters  Outcome: Ongoing     Problem: Cardiac Output - Decreased:  Goal: Hemodynamic stability will improve  Description: Hemodynamic stability will improve  Outcome: Ongoing     Problem: Fluid Volume - Imbalance:  Goal: Absence of imbalanced fluid volume signs and symptoms  Description: Absence of imbalanced fluid volume signs and symptoms  Outcome: Ongoing     Problem: Nutrition Deficit:  Goal: Ability to achieve adequate nutritional intake will improve  Description: Ability to achieve adequate nutritional intake will improve  Outcome: Ongoing     Problem: Pain:  Goal: Pain level will decrease  Description: Pain level will decrease  3/23/2021 0759 by Bety Hinojosa RN  Outcome: Ongoing     Problem: Pain:  Goal: Recognizes and communicates pain  Description: Recognizes and communicates pain  Outcome: Ongoing     Problem: Pain:  Goal: Control of acute pain  Description: Control of acute pain  Outcome: Ongoing     Problem: Pain:  Goal: Control of chronic pain  Description: Control of chronic pain  Outcome: Ongoing     Problem: Serum Glucose Level - Abnormal:  Goal: Ability to maintain appropriate glucose levels will improve to within specified parameters  Description: Ability to maintain appropriate glucose levels will improve to within specified parameters  Outcome: Ongoing     Problem: Skin Integrity - Impaired:  Goal: Will show no infection signs and symptoms  Description: Will show no infection signs and symptoms  Outcome: Ongoing     Problem: Skin Integrity - Impaired:  Goal: Absence of new skin breakdown  Description: Absence of new skin breakdown  Outcome: Ongoing     Problem: Sleep Pattern Disturbance:  Goal: Appears well-rested  Description: Appears well-rested  Outcome: Ongoing     Problem: Tissue Perfusion, Altered:  Goal: Circulatory function within specified parameters  Description: Circulatory function within specified parameters  Outcome: Ongoing     Problem: Nutrition  Goal: Optimal nutrition therapy  Description: Nutrition Problem #1: Inadequate oral intake  Intervention: Food and/or Nutrient Delivery: Continue NPO, Start Tube Feeding  Nutritional Goals: Pt to meet % of est'd needs via EN     Outcome: Ongoing     Problem: Skin Integrity:  Goal: Will show no infection signs and symptoms  Description: Will show no infection signs and symptoms  Outcome: Ongoing     Problem: Skin Integrity:  Goal: Absence of new skin breakdown  Description: Absence of new skin breakdown  Outcome: Ongoing     Problem: Falls - Risk of:  Goal: Will remain free from falls  Description: Will remain free from falls  Outcome: Ongoing     Problem: Falls - Risk of:  Goal: Absence of physical injury  Description: Absence of physical injury  Outcome: Ongoing     Problem: OXYGENATION/RESPIRATORY FUNCTION  Goal: Patient will achieve/maintain normal respiratory rate/effort  Description: Respiratory rate and effort will be within normal limits for the patient  Outcome: Ongoing

## 2021-03-23 NOTE — PROGRESS NOTES
Comprehensive Nutrition Assessment    Type and Reason for Visit:  Reassess    Nutrition Recommendations/Plan: Continue current General diet with frozen Magic Cups x 2 per day.  Encourage/monitor PO intakes as tolerated.  Monitor labs, weights, and plan of care. Nutrition Assessment:  Pt tolerating a General diet with improving PO intakes. Ate more than 50% of her breakfast tray this morning. Encouraged intakes of frozen magic cups. Decompressive colonoscopy completed again on 3/21. Repeat KUB yesterday shows continued colonic distention. Labs/Meds reviewed. Malnutrition Assessment:  Malnutrition Status: Moderate malnutrition    Context:  Acute Illness     Findings of the 6 clinical characteristics of malnutrition:  Energy Intake:  1 - 75% or less of estimated energy requirements for 7 or more days  Weight Loss:  1 - 5% over 1 month since admission - weight fluctuations noted     Body Fat Loss:  1 - Mild body fat loss Orbital   Muscle Mass Loss:  No significant muscle mass loss  Fluid Accumulation:  No significant fluid accumulation   Strength:  Not Performed    Estimated Daily Nutrient Needs:  Energy (kcal):  MSJ x 1.2-1.3 = 6506-0198 kcals/day; Weight Used for Energy Requirements:  Admission     Protein (g):  1.5 gm/kg = 90 gm pro/day; Weight Used for Protein Requirements:  Ideal          Nutrition Related Findings:  Distended abdomen. C/o constipation. BM 3/22. Labs reviewed. Meds: Methadone, Glucolax, Senokot. Wounds:  Surgical Incision(to abdomen)       Current Nutrition Therapies:    DIET GENERAL;  Dietary Nutrition Supplements: Frozen Oral Supplement    Anthropometric Measures:  · Height: 5' 7\" (170.2 cm)  · Current Body Weight: 188 lb 7.9 oz (85.5 kg)   · Admission Body Weight: 198 lb 4.8 oz (89.9 kg)    · Ideal Body Weight: 135 lbs; % Ideal Body Weight 139.6 %   · BMI: 29.5  · BMI Categories: Overweight (BMI 25.0-29. 9)       Nutrition Diagnosis:   · Inadequate oral intake related to altered GI function; GI surgeries, abdominal bloating/distention as evidenced by intake 26-50%, intake 51-75%; variable PO intakes, need for OSN    Nutrition Interventions:   Food and/or Nutrient Delivery:  Continue Current Diet, Continue Oral Nutrition Supplement  Nutrition Education/Counseling:  No recommendation at this time   Coordination of Nutrition Care:  Continue to monitor while inpatient    Goals:  Meet % of estimated nutrition needs with PO intakes.        Nutrition Monitoring and Evaluation:   Food/Nutrient Intake Outcomes:  Food and Nutrient Intake, Supplement Intake  Physical Signs/Symptoms Outcomes:  Biochemical Data, GI Status, Nausea or Vomiting, Nutrition Focused Physical Findings, Skin, Weight, Hemodynamic Status     Electronically signed by Jenaro Murillo RD, LD on 3/23/21 at 1:29 PM EDT    Contact: [phone]

## 2021-03-23 NOTE — CARE COORDINATION
Transitional Planning     Plan is home with [facility]. MILTON drain removed yesterday. Shower chair order faxed to [facility].

## 2021-03-23 NOTE — PROGRESS NOTES
PROGRESS NOTE    PATIENT NAME: Torrie [facility] RECORD NO. [id number]  DATE: 3/23/2021  SURGEON: Dr Stephani Clark: Rodger Skiff, MD    HD: # [id number]    ASSESSMENT    Patient Active Problem List   Diagnosis    Migraine    Opiate dependence ([facility] [address]    Chronic pain    Obesity    Patient overweight    Lumbar radicular pain    Lumbar disc disease    Closed nondisplaced fracture of fifth metatarsal bone of right foot    Intra-abdominal abscess ([address]    Toxic dilatation of colon ([address]    Colonic ischemia (HCC)       MEDICAL DECISION MAKING AND PLAN    1. Neuro- Analgesia multimodal pain therapy. Methadone. 2. CV- HR tachycardia improved, on metoprolol, continue to monitor BP/HR, Hgb stable. 3. Resp- continue to encourage incentive spirometry and deep breathing every hour while awake per RT and RN  4. GI-underwent decompressive colonoscopy 3/17 and 3/21, for colonic distention. Currently tolerating general diet. Repeat KUB 3/20 showing colon distension with cecum dilation 10.5cm. Having BM. Trial of neostigmine 3/20 with very little stool. Repeat KUB today with continued distension up to 7cm. Tried Relistor yesterday with only a small amount of subsequent flatus. Still distended this morning. Will try Relistor again today. 5. Wound care: Daily dressing changes, dry dressing to midline. 6. Renal-voiding independently, heath strict I/Os  7. Msk- continue to encourage ambulation/activity, PT/OT eval and Tx   1. ID-leukocytosis resolved s/p decompressive colonoscopy. 2. ABX completed 3/16: zosyn, completed day 4/4 on 3/16  8. Ppx- Lovenox  for DVT ppx, GI ppx protonix BID, Endo BG checks       SUBJECTIVE    Patient seen and examined at bedside, no overnight events. Pt tried Relistor yesterday with only a small amount of subsequent flatus. No BM since very small one yesterday morning. Tolerating a general diet. Urinating independently without difficulty.   Walking the halls with RN and PT. Denies CP, SOB, F/C, N/V. Afebrile, T-max 37.5    OBJECTIVE  VITALS: Temp: Temp: 98.2 °F (36.8 °C)Temp  Av.2 °F (36.8 °C)  Min: 97.2 °F (36.2 °C)  Max: 99.5 °F (90.6 °C) BP Systolic (00DSM), VZP:807 , Min:114 , PRR:865   Diastolic (75KUO), ZFQ:50, Min:84, Max:100   Pulse Pulse  Av.3  Min: 87  Max: 102 Resp Resp  Av.7  Min: 10  Max: 32 Pulse ox SpO2  Av.7 %  Min: 92 %  Max: 96 %  GENERAL: alert, no distress  NEURO: GCS 15, AOx4, no FND, conversing appropriately  LUNGS: clear to ausculation, without wheezes, rales or rhonci  HEART: normal rate and regular rhythm  ABDOMEN: soft, appropriately tender, distention present, wound dressing dry, no peritoneal signs present  EXTREMITY: no cyanosis, clubbing or edema    I/O last 3 completed shifts: In: 1900 [P.O.:1900]  Out: -     Drain/tube output: In: 1300 [P.O.:1300]  Out: -     LAB:  CBC:   Recent Labs     21  0815   WBC 8.2   HGB 11.7*   HCT 38.5   MCV 92.1   *     BMP:   Recent Labs     21  0815 21  0816   * 135   K 4.5 4.1   CL 97* 102   CO2 28 25   BUN 7 4*   CREATININE 0.71 0.53   GLUCOSE 97 107*     COAGS: No results for input(s): APTT, PROT, INR in the last 72 hours. RADIOLOGY:  Xr Abdomen (kub) (single Ap View)    Result Date: 3/22/2021  No change in gaseous distension of the colon. Gas is noted down to the rectum. Distension is most significant in the transverse colonic region. Please correlate for any diarrheal type illness. Xr Abdomen (kub) (single Ap View)    Result Date: 3/21/2021  Improved gaseous distention of the colon. No small bowel distension. Lawrence Pock, DO  3/23/2021  7:53 AM      Attending Note      I have reviewed the above [facility] note(s) and I either performed the key elements of the medical history and physical exam or was present with the surgery resident when the key elements of the medical history and physical exam were performed.  I have discussed the

## 2021-03-23 NOTE — PLAN OF CARE
BRONCHOSPASM/BRONCHOCONSTRICTION     [x]         IMPROVE AERATION/BREATH SOUNDS  [x]   ADMINISTER BRONCHODILATOR THERAPY AS APPROPRIATE  [x]   ASSESS BREATH SOUNDS  []   IMPLEMENT AEROSOL/MDI PROTOCOL  [x]   PATIENT EDUCATION AS NEEDED 0 = independent

## 2021-03-23 NOTE — PLAN OF CARE
Problem: Pain:  Description: Pain management should include both nonpharmacologic and pharmacologic interventions. Goal: Pain level will decrease  Description: Pain level will decrease  Outcome: Ongoing     Problem: Pain:  Description: Pain management should include both nonpharmacologic and pharmacologic interventions.   Goal: Pain level will decrease  Description: Pain level will decrease  Outcome: Ongoing   Electronically signed by Pasha Arellano RN on 3/23/2021 at 4:52 AM

## 2021-03-23 NOTE — PROGRESS NOTES
[facility]. [facility] Progress Note    Ramy Wilcox is a 46 y.o. female patient. Hospitalization Day:19      Chief consult reason: Colonic distention      Subjective: Patient seen and examined. Patient sitting up in chair at bedside. Patient reports she has been up throughout the night ambulating in the hallway as well as sitting on the toilet to aid in passage of flatus. Patient reports she has only passed a scant amount of flatus and 2 hard pieces of formed stool. Abdomen remains distended. Tolerating clear liquid diet      Objective:   VITALS:  /88   Pulse 90   Temp 98 °F (36.7 °C) (Oral)   Resp 20   Ht 5' 7\" (1.702 m)   Wt 188 lb 7.9 oz (85.5 kg)   SpO2 94%   BMI 29.52 kg/m²   TEMPERATURE:  Current - Temp: 98 °F (36.7 °C);  Max - Temp  Av.8 °F (36.6 °C)  Min: 97.2 °F (36.2 °C)  Max: 98.2 °F (36.8 °C)    Physical Assessment:  General appearance:  alert, cooperative and no distress  Mental Status:  oriented to person, place and time and normal affect  Lungs:  clear to auscultation bilaterally, normal effort  Heart:  regular rate and rhythm, no murmur  Abdomen:  Sl firm, mildly tender, distended, hypoactive bowel sounds  Extremities:  no edema, no redness, No clubbing  Skin:  warm, dry, no gross lesions or rashes    CURRENT MEDICATIONS:  Scheduled Meds:   metoclopramide  10 mg Intravenous TID    polyethylene glycol  238 g Oral Once    polyethylene glycol  17 g Oral BID    metoprolol tartrate  25 mg Oral BID    methocarbamol  1,000 mg Oral TID    melatonin  5 mg Oral Nightly    sennosides-docusate sodium  2 tablet Oral Daily    lidocaine  1 patch Transdermal Daily    gabapentin  600 mg Oral Q8H    pantoprazole  40 mg Oral BID AC    sucralfate  1 g Oral 4 times per day    acetaminophen  1,000 mg Oral Q8H    FLUoxetine  40 mg Oral Daily    methadone  140 mg Oral Daily    enoxaparin  40 mg Subcutaneous Daily    ipratropium-albuterol  1 ampule Inhalation BID Continuous Infusions:  PRN Meds:[DISCONTINUED] oxyCODONE **OR** [MAR Hold] oxyCODONE, sodium chloride flush, bisacodyl, ondansetron      Data Review:  LABS and IMAGING:     CBC  Recent Labs     03/21/21  0815   WBC 8.2   HGB 11.7*   HCT 38.5   MCV 92.1   MCHC 30.4   RDW 13.2   *       Immature PLTs   No results found for: PLTFLUORE    ANEMIA STUDIES  No results for input(s): LABIRON, TIBC, IRON, FERRITIN, [id number], FOLATE, OCCULTBLD in the last 72 hours. BMP  Recent Labs     03/21/21  0815 03/22/21  0816   * 135   K 4.5 4.1   CL 97* 102   CO2 28 25   BUN 7 4*   CREATININE 0.71 0.53   GLUCOSE 97 107*   CALCIUM 8.5* 7.5*       LFTS  No results for input(s): ALKPHOS, ALT, AST, BILITOT, BILIDIR, LABALBU in the last 72 hours. AMYLASE/LIPASE/AMMONIA  No results for input(s): AMYLASE, LIPASE, AMMONIA in the last 72 hours. Acute Hepatitis Panel   Lab Results   Component Value Date    HEPBSAG NONREACTIVE 10/27/2016    HEPCAB NONREACTIVE 10/27/2016    HEPBIGM NONREACTIVE 10/27/2016    HEPAIGM NONREACTIVE 10/27/2016       HCV Genotype   No results found for: HEPATITISCGENOTYPE    HCV Quantitative   No results found for: HCVQNT    LIVER WORK UP:    AFP  No results found for: AFP    Alpha 1 antitrypsin   No results found for: A1A    Anti - Liver/Kidney Ab  No results found for: LIVER-KIDNEYMICROSOMALAB    RAHEL  No results found for: RAHEL    AMA  No results found for: MITOAB    ASMA  No results found for: SMOOTHMUSCAB    Ceruloplasmin  No results found for: CERULOPLSM    Celiac panel  No results found for: TISSTRNTIIGG, TTGIGA, IGA    IgG  No results found for: IGG    IgM  No results found for: IGM    GGT   No results found for: LABGGT    PT/INR  No results for input(s): PROTIME, INR in the last 72 hours. Cancer Markers:  CEA:  No results for input(s): CEA in the last 72 hours. Ca 125:  No results for input(s):  in the last 72 hours. Ca 19-9:   No results for input(s):  in the last 72 hours. AFP: No results for input(s): AFP in the last 72 hours. Lactic acid:  Recent Labs     03/21/21  0815   LACTACIDWB 0.6*         Radiology Review:    Xr Abdomen (kub) (single Ap View)    Result Date: 3/22/2021  EXAMINATION: ONE SUPINE XRAY VIEW(S) OF THE ABDOMEN 3/22/2021 9:24 am COMPARISON: 21 March 2021 HISTORY: ORDERING SYSTEM PROVIDED HISTORY: colonic distension TECHNOLOGIST PROVIDED HISTORY: colonic distension FINDINGS: AP portable view of the chest time stamped at 908 hours demonstrates overlying cardiac monitoring electrodes. Left upper abdominal drain is noted. Midline skin clips are present. Gas is noted throughout the intestinal tract down to the rectum but there is distension of the transverse colon without pneumatosis. No small bowel distension is evident. Maximum small bowel diameter remain 7 cm. No change in gaseous distension of the colon. Gas is noted down to the rectum. Distension is most significant in the transverse colonic region. Please correlate for any diarrheal type illness. Xr Abdomen (kub) (single Ap View)    Result Date: 3/21/2021  EXAMINATION: ONE SUPINE XRAY VIEW(S) OF THE ABDOMEN 3/21/2021 9:25 am COMPARISON: 03/20/2021, 03/19/2021 HISTORY: ORDERING SYSTEM PROVIDED HISTORY: s/p colonic decompression TECHNOLOGIST PROVIDED HISTORY: s/p colonic decompression FINDINGS: Midline abdominal skin staples are present. Drain projects over the left mid abdomen. Paucity of bowel gas. Residual contrast is present within mildly enlarged colon in the left abdomen measuring up to 7 cm. Overall this appears improved. The lung bases reveal subsegmental atelectasis. Improved gaseous distention of the colon. No small bowel distension.      Xr Abdomen (kub) (single Ap View)    Result Date: 3/20/2021  EXAMINATION: ONE SUPINE XRAY VIEW(S) OF THE ABDOMEN 3/20/2021 7:11 am COMPARISON: 03/19/2021 HISTORY: ORDERING SYSTEM PROVIDED HISTORY: f/u cecal dilation TECHNOLOGIST PROVIDED HISTORY: f/u cecal dilation FINDINGS: Portions of the far right and far left hemiabdomen are excluded from the exam.  Similar gaseous distention of the colon with the cecum dilated to approximately 10.5 cm. No free air or pneumatosis. Similar surgical drain projecting over the left hemiabdomen. No gross bony abnormality. Similar gaseous distention of the colon with the cecum dilated to approximately 10.5 cm. Xr Abdomen (kub) (single Ap View)    Result Date: 3/19/2021  EXAMINATION: ONE SUPINE XRAY VIEW(S) OF THE ABDOMEN 3/19/2021 2:33 pm COMPARISON: None. HISTORY: ORDERING SYSTEM PROVIDED HISTORY: s/p c-scope decompression for cecal dilation TECHNOLOGIST PROVIDED HISTORY: s/p c-scope decompression for cecal dilation Reason for Exam: supine port Acuity: Unknown Type of Exam: Subsequent/Follow-up Laparotomy March 11, 2021 FINDINGS: Midline skin clips are noted. Patient has distended colonic and small bowel loops, overall appearance favoring postoperative ileus. There appears be a drain over the left upper abdomen. Colonic size is stable to slightly decreased compared to most recent prior study. There is decreased enteric contrast compared to recent study. No free air but hemidiaphragms are incompletely included. Findings favor ileus. Slight decrease in colonic diameter compared to most recent prior exam. RECOMMENDATION: Continued follow-up advised. Ct Abdomen Pelvis W Iv Contrast Additional Contrast? Oral    Result Date: 3/17/2021  EXAMINATION: CT OF THE ABDOMEN AND PELVIS WITH CONTRAST 3/17/2021 9:16 am TECHNIQUE: CT of the abdomen and pelvis was performed with the administration of intravenous contrast. Multiplanar reformatted images are provided for review. Dose modulation, iterative reconstruction, and/or weight based adjustment of the mA/kV was utilized to reduce the radiation dose to as low as reasonably achievable. COMPARISON: CT abdomen and pelvis performed 03/12/2021.  HISTORY: ORDERING SYSTEM PROVIDED HISTORY: s/p BII reconstruction (3/5) for perf gastric ulcer and fascia repair s/p midline separation (3/11), cont WBC and tachy TECHNOLOGIST PROVIDED HISTORY: s/p BII reconstruction (3/5) for perf gastric ulcer and fascia repair s/p midline separation (3/11), cont WBC and tachy Reason for Exam: abd distension Acuity: Unknown Type of Exam: Unknown FINDINGS: Lower Chest: There is atelectasis versus scarring in the lung bases bilaterally. The visualized cardiac structures are unremarkable. Organs: The liver and spleen are normal size and overall attenuation. The gallbladder, pancreas, and adrenal glands are unremarkable. There is stable prominence of the left renal collecting system. The ureters are not significantly dilated. The urinary bladder is unremarkable. GI/Bowel: The stomach the is unremarkable. There is postsurgical change with apparent gastrojejunostomy. Loops of small bowel are normal in caliber without evidence for obstruction. The colon is again prominent containing air and contrast material.  There is a focal area colonic wall thickening in the sigmoid region. The appendix is normal.  There is no intraperitoneal free fluid. There are few scattered foci of free air that is likely postoperative. There is a soft tissue drain in the upper mid abdomen. Pelvis: The uterus has been surgically removed. Peritoneum/Retroperitoneum: The psoas muscles are symmetric. The abdominal aorta is normal in caliber. The inferior vena cava is unremarkable. There is no retroperitoneal or mesenteric adenopathy. Bones/Soft Tissues: The extra-abdominal soft tissues are unremarkable. There is no acute osseous abnormality. Colonic distension containing air and contrast.  There is a focal area of colonic wall thickening in the sigmoid region of uncertain significance and correlation with colonoscopy is recommended for further evaluation.  Few scattered foci of intraperitoneal air that is likely postoperative. There is a soft tissue drain in the upper mid abdomen. ENDOSCOPY     Active Problems:    Intra-abdominal abscess (HCC)    Toxic dilatation of colon ([address]    Colonic ischemia (HCC)  Resolved Problems:    * No resolved hospital problems. *       GI Assessment:  1. Colonic distention - 03/17/2021 and 03/21/2021-colonic decompression  Post procedural x-ray shows improved gaseous distention. KUB 03/22/2021 shows gaseous distention unchanged.  -Patient received a trial of neostigmine 3/20/2021 with very little stool.  -Patient received Relistor yesterday and again today. - Again with passage of very few pieces of stool and very small amount of flatus    Plan of care:  1. Start Reglan 10 mg IV - three times a day  2. 32 ounces of MiraLAX bowel prep - as ordered  3. Instructed patient to avoid all carbonated beverages  4. Recommend avoiding narcotics  5. Encourage ambulation in hallway 2-3 times daily      Please feel free to contact me with any questions or concerns. Thank you for allowing me to participate in the care of your patient. ABRAHAM Hwang - CNP on 3/23/2021 at 2:03 PM   [facility] [place] [facility]    Please note that this note was generated using a voice recognition dictation software. Although every effort was made to ensure the accuracy of this automated transcription, some errors in transcription may have occurred.

## 2021-03-24 ENCOUNTER — APPOINTMENT (OUTPATIENT)
Dept: GENERAL RADIOLOGY | Age: 51
DRG: 220 | End: 2021-03-24
Payer: MEDICARE

## 2021-03-24 LAB
MAGNESIUM: 2.1 MG/DL (ref 1.6–2.6)
PHOSPHORUS: 3.6 MG/DL (ref 2.6–4.5)

## 2021-03-24 PROCEDURE — 6360000002 HC RX W HCPCS: Performed by: INTERNAL MEDICINE

## 2021-03-24 PROCEDURE — 94640 AIRWAY INHALATION TREATMENT: CPT

## 2021-03-24 PROCEDURE — 83735 ASSAY OF MAGNESIUM: CPT

## 2021-03-24 PROCEDURE — 6370000000 HC RX 637 (ALT 250 FOR IP): Performed by: INTERNAL MEDICINE

## 2021-03-24 PROCEDURE — APPSS45 APP SPLIT SHARED TIME 31-45 MINUTES: Performed by: INTERNAL MEDICINE

## 2021-03-24 PROCEDURE — 2580000003 HC RX 258: Performed by: INTERNAL MEDICINE

## 2021-03-24 PROCEDURE — 36415 COLL VENOUS BLD VENIPUNCTURE: CPT

## 2021-03-24 PROCEDURE — 99232 SBSQ HOSP IP/OBS MODERATE 35: CPT | Performed by: INTERNAL MEDICINE

## 2021-03-24 PROCEDURE — 2060000000 HC ICU INTERMEDIATE R&B

## 2021-03-24 PROCEDURE — 84100 ASSAY OF PHOSPHORUS: CPT

## 2021-03-24 PROCEDURE — 6360000002 HC RX W HCPCS: Performed by: STUDENT IN AN ORGANIZED HEALTH CARE EDUCATION/TRAINING PROGRAM

## 2021-03-24 PROCEDURE — 74018 RADEX ABDOMEN 1 VIEW: CPT

## 2021-03-24 RX ORDER — METHADONE HYDROCHLORIDE 5 MG/5ML
100 SOLUTION ORAL DAILY
Status: DISCONTINUED | OUTPATIENT
Start: 2021-03-25 | End: 2021-03-26

## 2021-03-24 RX ORDER — POLYETHYLENE GLYCOL 3350 17 G/17G
238 POWDER, FOR SOLUTION ORAL ONCE
Status: COMPLETED | OUTPATIENT
Start: 2021-03-24 | End: 2021-03-24

## 2021-03-24 RX ADMIN — SUCRALFATE 1 G: 1 TABLET ORAL at 12:02

## 2021-03-24 RX ADMIN — ACETAMINOPHEN 1000 MG: 500 TABLET ORAL at 23:27

## 2021-03-24 RX ADMIN — IPRATROPIUM BROMIDE AND ALBUTEROL SULFATE 1 AMPULE: .5; 3 SOLUTION RESPIRATORY (INHALATION) at 07:38

## 2021-03-24 RX ADMIN — SUCRALFATE 1 G: 1 TABLET ORAL at 23:28

## 2021-03-24 RX ADMIN — SODIUM CHLORIDE, PRESERVATIVE FREE 10 ML: 5 INJECTION INTRAVENOUS at 09:08

## 2021-03-24 RX ADMIN — ACETAMINOPHEN 1000 MG: 500 TABLET ORAL at 06:02

## 2021-03-24 RX ADMIN — METHOCARBAMOL TABLETS 1000 MG: 500 TABLET, COATED ORAL at 09:07

## 2021-03-24 RX ADMIN — SUCRALFATE 1 G: 1 TABLET ORAL at 00:14

## 2021-03-24 RX ADMIN — METOPROLOL TARTRATE 25 MG: 25 TABLET ORAL at 21:03

## 2021-03-24 RX ADMIN — GABAPENTIN 600 MG: 300 CAPSULE ORAL at 06:02

## 2021-03-24 RX ADMIN — SUCRALFATE 1 G: 1 TABLET ORAL at 17:36

## 2021-03-24 RX ADMIN — PANTOPRAZOLE SODIUM 40 MG: 40 TABLET, DELAYED RELEASE ORAL at 15:31

## 2021-03-24 RX ADMIN — METOPROLOL TARTRATE 25 MG: 25 TABLET ORAL at 09:07

## 2021-03-24 RX ADMIN — FLUOXETINE HYDROCHLORIDE 40 MG: 20 CAPSULE ORAL at 09:07

## 2021-03-24 RX ADMIN — METHOCARBAMOL TABLETS 1000 MG: 500 TABLET, COATED ORAL at 13:32

## 2021-03-24 RX ADMIN — POLYETHYLENE GLYCOL 3350 17 G: 17 POWDER, FOR SOLUTION ORAL at 21:03

## 2021-03-24 RX ADMIN — GABAPENTIN 600 MG: 300 CAPSULE ORAL at 13:32

## 2021-03-24 RX ADMIN — IPRATROPIUM BROMIDE AND ALBUTEROL SULFATE 1 AMPULE: .5; 3 SOLUTION RESPIRATORY (INHALATION) at 20:44

## 2021-03-24 RX ADMIN — METOCLOPRAMIDE 10 MG: 5 INJECTION, SOLUTION INTRAMUSCULAR; INTRAVENOUS at 09:07

## 2021-03-24 RX ADMIN — METHYLNALTREXONE BROMIDE 12 MG: 12 INJECTION, SOLUTION SUBCUTANEOUS at 06:02

## 2021-03-24 RX ADMIN — ENOXAPARIN SODIUM 40 MG: 40 INJECTION SUBCUTANEOUS at 09:07

## 2021-03-24 RX ADMIN — DOCUSATE SODIUM 50MG AND SENNOSIDES 8.6MG 2 TABLET: 8.6; 5 TABLET, FILM COATED ORAL at 09:07

## 2021-03-24 RX ADMIN — METOCLOPRAMIDE 10 MG: 5 INJECTION, SOLUTION INTRAMUSCULAR; INTRAVENOUS at 13:32

## 2021-03-24 RX ADMIN — METHOCARBAMOL TABLETS 1000 MG: 500 TABLET, COATED ORAL at 21:03

## 2021-03-24 RX ADMIN — PANTOPRAZOLE SODIUM 40 MG: 40 TABLET, DELAYED RELEASE ORAL at 06:02

## 2021-03-24 RX ADMIN — ACETAMINOPHEN 1000 MG: 500 TABLET ORAL at 15:31

## 2021-03-24 RX ADMIN — METHADONE HYDROCHLORIDE 140 MG: 5 SOLUTION ORAL at 09:31

## 2021-03-24 RX ADMIN — Medication 5 MG: at 21:03

## 2021-03-24 RX ADMIN — ACETAMINOPHEN 1000 MG: 500 TABLET ORAL at 00:14

## 2021-03-24 RX ADMIN — SUCRALFATE 1 G: 1 TABLET ORAL at 06:01

## 2021-03-24 RX ADMIN — METOCLOPRAMIDE 10 MG: 5 INJECTION, SOLUTION INTRAMUSCULAR; INTRAVENOUS at 21:03

## 2021-03-24 RX ADMIN — GABAPENTIN 600 MG: 300 CAPSULE ORAL at 21:03

## 2021-03-24 RX ADMIN — POLYETHYLENE GLYCOL 3350 238 G: 17 POWDER, FOR SOLUTION ORAL at 11:42

## 2021-03-24 ASSESSMENT — PAIN DESCRIPTION - PROGRESSION
CLINICAL_PROGRESSION: NOT CHANGED

## 2021-03-24 ASSESSMENT — PAIN SCALES - GENERAL
PAINLEVEL_OUTOF10: 0
PAINLEVEL_OUTOF10: 6
PAINLEVEL_OUTOF10: 6
PAINLEVEL_OUTOF10: 4
PAINLEVEL_OUTOF10: 5
PAINLEVEL_OUTOF10: 8
PAINLEVEL_OUTOF10: 8

## 2021-03-24 ASSESSMENT — PAIN DESCRIPTION - LOCATION: LOCATION: ABDOMEN

## 2021-03-24 ASSESSMENT — PAIN DESCRIPTION - ONSET: ONSET: ON-GOING

## 2021-03-24 ASSESSMENT — PAIN DESCRIPTION - FREQUENCY
FREQUENCY: CONTINUOUS
FREQUENCY: CONTINUOUS

## 2021-03-24 ASSESSMENT — PAIN - FUNCTIONAL ASSESSMENT: PAIN_FUNCTIONAL_ASSESSMENT: ACTIVITIES ARE NOT PREVENTED

## 2021-03-24 ASSESSMENT — PAIN DESCRIPTION - DESCRIPTORS: DESCRIPTORS: ACHING;CRAMPING;PRESSURE

## 2021-03-24 ASSESSMENT — PAIN DESCRIPTION - PAIN TYPE: TYPE: ACUTE PAIN;SURGICAL PAIN

## 2021-03-24 NOTE — PROGRESS NOTES
[facility]. [facility] Gastroenterology Progress Note    Negro Reeves is a 46 y.o. female patient. Hospitalization Day:20      Chief consult reason: Colonic distention      Subjective: Patient seen and examined. Patient reports passing small amount of gas and small amount of hard stool. Abdomen slightly less distended. No nausea or vomiting  Patient did receive Relistor again today      Objective:   VITALS:  /88   Pulse 88   Temp 99.5 °F (37.5 °C) (Oral)   Resp 17   Ht 5' 7\" (1.702 m)   Wt 188 lb 7.9 oz (85.5 kg)   SpO2 94%   BMI 29.52 kg/m²   TEMPERATURE:  Current - Temp: 99.5 °F (37.5 °C);  Max - Temp  Av.5 °F (36.9 °C)  Min: 98 °F (36.7 °C)  Max: 99.5 °F (37.5 °C)    Physical Assessment:  General appearance:  alert, cooperative and no distress  Mental Status:  oriented to person, place and time and normal affect  Lungs:  clear to auscultation bilaterally, normal effort  Heart:  regular rate and rhythm, no murmur  Abdomen:  soft, mildly tender, distended, hypoactive bowel sounds  Extremities:  no edema, no redness, No clubbing  Skin:  warm, dry, no gross lesions or rashes    CURRENT MEDICATIONS:  Scheduled Meds:   [START ON 3/25/2021] methadone  100 mg Oral Daily    metoclopramide  10 mg Intravenous TID    polyethylene glycol  17 g Oral BID    metoprolol tartrate  25 mg Oral BID    methocarbamol  1,000 mg Oral TID    melatonin  5 mg Oral Nightly    sennosides-docusate sodium  2 tablet Oral Daily    lidocaine  1 patch Transdermal Daily    gabapentin  600 mg Oral Q8H    pantoprazole  40 mg Oral BID AC    sucralfate  1 g Oral 4 times per day    acetaminophen  1,000 mg Oral Q8H    FLUoxetine  40 mg Oral Daily    enoxaparin  40 mg Subcutaneous Daily    ipratropium-albuterol  1 ampule Inhalation BID     Continuous Infusions:  PRN Meds:[DISCONTINUED] oxyCODONE **OR** [MAR Hold] oxyCODONE, sodium chloride flush, bisacodyl, ondansetron      Data Review:  LABS and IMAGING:     CBC  No THE ABDOMEN 3/22/2021 9:24 am COMPARISON: 21 March 2021 HISTORY: ORDERING SYSTEM PROVIDED HISTORY: colonic distension TECHNOLOGIST PROVIDED HISTORY: colonic distension FINDINGS: AP portable view of the chest time stamped at 908 hours demonstrates overlying cardiac monitoring electrodes. Left upper abdominal drain is noted. Midline skin clips are present. Gas is noted throughout the intestinal tract down to the rectum but there is distension of the transverse colon without pneumatosis. No small bowel distension is evident. Maximum small bowel diameter remain 7 cm. No change in gaseous distension of the colon. Gas is noted down to the rectum. Distension is most significant in the transverse colonic region. Please correlate for any diarrheal type illness. Xr Abdomen (kub) (single Ap View)    Result Date: 3/21/2021  EXAMINATION: ONE SUPINE XRAY VIEW(S) OF THE ABDOMEN 3/21/2021 9:25 am COMPARISON: 03/20/2021, 03/19/2021 HISTORY: ORDERING SYSTEM PROVIDED HISTORY: s/p colonic decompression TECHNOLOGIST PROVIDED HISTORY: s/p colonic decompression FINDINGS: Midline abdominal skin staples are present. Drain projects over the left mid abdomen. Paucity of bowel gas. Residual contrast is present within mildly enlarged colon in the left abdomen measuring up to 7 cm. Overall this appears improved. The lung bases reveal subsegmental atelectasis. Improved gaseous distention of the colon. No small bowel distension. Xr Abdomen (kub) (single Ap View)    Result Date: 3/20/2021  EXAMINATION: ONE SUPINE XRAY VIEW(S) OF THE ABDOMEN 3/20/2021 7:11 am COMPARISON: 03/19/2021 HISTORY: ORDERING SYSTEM PROVIDED HISTORY: f/u cecal dilation TECHNOLOGIST PROVIDED HISTORY: f/u cecal dilation FINDINGS: Portions of the far right and far left hemiabdomen are excluded from the exam.  Similar gaseous distention of the colon with the cecum dilated to approximately 10.5 cm. No free air or pneumatosis.   Similar surgical drain projecting over the left hemiabdomen. No gross bony abnormality. Similar gaseous distention of the colon with the cecum dilated to approximately 10.5 cm. Xr Abdomen (kub) (single Ap View)    Result Date: 3/19/2021  EXAMINATION: ONE SUPINE XRAY VIEW(S) OF THE ABDOMEN 3/19/2021 2:33 pm COMPARISON: None. HISTORY: ORDERING SYSTEM PROVIDED HISTORY: s/p c-scope decompression for cecal dilation TECHNOLOGIST PROVIDED HISTORY: s/p c-scope decompression for cecal dilation Reason for Exam: supine port Acuity: Unknown Type of Exam: Subsequent/Follow-up Laparotomy March 11, 2021 FINDINGS: Midline skin clips are noted. Patient has distended colonic and small bowel loops, overall appearance favoring postoperative ileus. There appears be a drain over the left upper abdomen. Colonic size is stable to slightly decreased compared to most recent prior study. There is decreased enteric contrast compared to recent study. No free air but hemidiaphragms are incompletely included. Findings favor ileus. Slight decrease in colonic diameter compared to most recent prior exam. RECOMMENDATION: Continued follow-up advised. Ct Abdomen Pelvis W Iv Contrast Additional Contrast? Oral    Result Date: 3/17/2021  EXAMINATION: CT OF THE ABDOMEN AND PELVIS WITH CONTRAST 3/17/2021 9:16 am TECHNIQUE: CT of the abdomen and pelvis was performed with the administration of intravenous contrast. Multiplanar reformatted images are provided for review. Dose modulation, iterative reconstruction, and/or weight based adjustment of the mA/kV was utilized to reduce the radiation dose to as low as reasonably achievable. COMPARISON: CT abdomen and pelvis performed 03/12/2021.  HISTORY: ORDERING SYSTEM PROVIDED HISTORY: s/p BII reconstruction (3/5) for perf gastric ulcer and fascia repair s/p midline separation (3/11), cont WBC and tachy TECHNOLOGIST PROVIDED HISTORY: s/p BII reconstruction (3/5) for perf gastric ulcer and fascia repair s/p

## 2021-03-24 NOTE — PROGRESS NOTES
PROGRESS NOTE    PATIENT NAME: [facility] RECORD NO. [id number]  DATE: 3/24/2021  SURGEON: Dr Mitch Mims: Beverly Ornelas MD    HD: # 20    ASSESSMENT    Patient Active Problem List   Diagnosis    Migraine    Opiate dependence ([facility] [address]    Chronic pain    Obesity    Patient overweight    Lumbar radicular pain    Lumbar disc disease    Closed nondisplaced fracture of fifth metatarsal bone of right foot    Intra-abdominal abscess ([facility] [address]    Toxic dilatation of colon ([facility] [address]    Colonic ischemia [facility])       MEDICAL DECISION MAKING AND PLAN    1. Neuro- Analgesia multimodal pain therapy. Methadone. 2. CV- HR tachycardia improved, on metoprolol, continue to monitor BP/HR, Hgb stable. 3. Resp- continue to encourage incentive spirometry and deep breathing every hour while awake per RT and RN  4. GI-underwent decompressive colonoscopy 3/17 and 3/21, for colonic distention. Currently tolerating general diet. Repeat KUB 3/20 showing colon distension with cecum dilation 10.5cm. Having BM. Trial of neostigmine 3/20 with very little stool. Repeat KUB today with continued distension up to 7cm. Tried Relistor yesterday with only a small amount of subsequent flatus. Still distended this morning. Relistor, Reglan, suppositories QD. Will try titrating down Methadone. Pt amenable while in hospital.   5. Wound care: Daily dressing changes, dry dressing to midline. 6. Renal-voiding independently, heath strict I/Os  7. Msk- continue to encourage ambulation/activity, PT/OT eval and Tx   1. ID-leukocytosis resolved s/p decompressive colonoscopy. 2. ABX completed 3/16: zosyn, completed day 4/4 on 3/16  8. Ppx- Lovenox  for DVT ppx, GI ppx protonix BID, Endo BG checks   9. Discharge plan is home one Pt has return of bowel function      SUBJECTIVE    Patient seen and examined at bedside, no overnight events.  Pt tried Relistor again yesterday with only a small amount of subsequent flatus and two very small BM. Tolerating a general diet. Urinating independently without difficulty. Walking the halls with RN and PT. Denies CP, SOB, F/C, N/V. Afebrile, T-max 36.9. Pt amenable to titrating down off Methadone while in hospital to help with return of bowel function. OBJECTIVE  VITALS: Temp: Temp: 98.1 °F (36.7 °C)Temp  Av.2 °F (36.8 °C)  Min: 98 °F (36.7 °C)  Max: 98.5 °F (74.7 °C) BP Systolic (43LTB), QYT:463 , Min:124 , BOM:612   Diastolic (85BXU), RLX:12, Min:85, Max:88   Pulse Pulse  Av.9  Min: 85  Max: 100 Resp Resp  Av.4  Min: 9  Max: 27 Pulse ox SpO2  Av.8 %  Min: 90 %  Max: 94 %  GENERAL: alert, no distress  NEURO: GCS 15, AOx4, no FND, conversing appropriately  LUNGS: clear to ausculation, without wheezes, rales or rhonci  HEART: normal rate and regular rhythm  ABDOMEN: soft, appropriately tender, distention present, wound dressing dry, no peritoneal signs present  EXTREMITY: no cyanosis, clubbing or edema    I/O last 3 completed shifts: In: 1000 [P.O.:1000]  Out: -     Drain/tube output: In: 400 [P.O.:400]  Out: -     LAB:  CBC:   Recent Labs     21  0815   WBC 8.2   HGB 11.7*   HCT 38.5   MCV 92.1   *     BMP:   Recent Labs     21  0815 21  0816   * 135   K 4.5 4.1   CL 97* 102   CO2 28 25   BUN 7 4*   CREATININE 0.71 0.53   GLUCOSE 97 107*     COAGS: No results for input(s): APTT, PROT, INR in the last 72 hours. RADIOLOGY:  Xr Abdomen (kub) (single Ap View)    Result Date: 3/24/2021  Colonic gaseous distension again noted, now more prominently involving the distal colon. No significant small bowel distension identified.        Adolph Costa DO  3/24/2021  7:39 AM

## 2021-03-25 ENCOUNTER — APPOINTMENT (OUTPATIENT)
Dept: GENERAL RADIOLOGY | Age: 51
DRG: 220 | End: 2021-03-25
Payer: MEDICARE

## 2021-03-25 LAB
ANION GAP SERPL CALCULATED.3IONS-SCNC: 8 MMOL/L (ref 9–17)
BUN BLDV-MCNC: 9 MG/DL (ref 6–20)
BUN/CREAT BLD: ABNORMAL (ref 9–20)
CALCIUM SERPL-MCNC: 7.9 MG/DL (ref 8.6–10.4)
CHLORIDE BLD-SCNC: 103 MMOL/L (ref 98–107)
CO2: 25 MMOL/L (ref 20–31)
CREAT SERPL-MCNC: 0.71 MG/DL (ref 0.5–0.9)
GFR AFRICAN AMERICAN: >60 ML/MIN
GFR NON-AFRICAN AMERICAN: >60 ML/MIN
GFR SERPL CREATININE-BSD FRML MDRD: ABNORMAL ML/MIN/{1.73_M2}
GFR SERPL CREATININE-BSD FRML MDRD: ABNORMAL ML/MIN/{1.73_M2}
GLUCOSE BLD-MCNC: 85 MG/DL (ref 70–99)
MAGNESIUM: 2 MG/DL (ref 1.6–2.6)
PHOSPHORUS: 3.5 MG/DL (ref 2.6–4.5)
POTASSIUM SERPL-SCNC: 3.6 MMOL/L (ref 3.7–5.3)
SODIUM BLD-SCNC: 136 MMOL/L (ref 135–144)

## 2021-03-25 PROCEDURE — 36415 COLL VENOUS BLD VENIPUNCTURE: CPT

## 2021-03-25 PROCEDURE — 83735 ASSAY OF MAGNESIUM: CPT

## 2021-03-25 PROCEDURE — 6370000000 HC RX 637 (ALT 250 FOR IP): Performed by: STUDENT IN AN ORGANIZED HEALTH CARE EDUCATION/TRAINING PROGRAM

## 2021-03-25 PROCEDURE — 6370000000 HC RX 637 (ALT 250 FOR IP): Performed by: INTERNAL MEDICINE

## 2021-03-25 PROCEDURE — 2060000000 HC ICU INTERMEDIATE R&B

## 2021-03-25 PROCEDURE — 6360000002 HC RX W HCPCS: Performed by: STUDENT IN AN ORGANIZED HEALTH CARE EDUCATION/TRAINING PROGRAM

## 2021-03-25 PROCEDURE — 6360000002 HC RX W HCPCS: Performed by: INTERNAL MEDICINE

## 2021-03-25 PROCEDURE — 74018 RADEX ABDOMEN 1 VIEW: CPT

## 2021-03-25 PROCEDURE — APPSS45 APP SPLIT SHARED TIME 31-45 MINUTES: Performed by: INTERNAL MEDICINE

## 2021-03-25 PROCEDURE — 80048 BASIC METABOLIC PNL TOTAL CA: CPT

## 2021-03-25 PROCEDURE — 84100 ASSAY OF PHOSPHORUS: CPT

## 2021-03-25 PROCEDURE — 99232 SBSQ HOSP IP/OBS MODERATE 35: CPT | Performed by: INTERNAL MEDICINE

## 2021-03-25 PROCEDURE — 94640 AIRWAY INHALATION TREATMENT: CPT

## 2021-03-25 RX ORDER — POTASSIUM CHLORIDE 20 MEQ/1
40 TABLET, EXTENDED RELEASE ORAL 2 TIMES DAILY
Status: DISCONTINUED | OUTPATIENT
Start: 2021-03-25 | End: 2021-03-26 | Stop reason: HOSPADM

## 2021-03-25 RX ORDER — POLYETHYLENE GLYCOL 3350 17 G/17G
17 POWDER, FOR SOLUTION ORAL 3 TIMES DAILY
Status: DISCONTINUED | OUTPATIENT
Start: 2021-03-25 | End: 2021-03-26 | Stop reason: HOSPADM

## 2021-03-25 RX ADMIN — SUCRALFATE 1 G: 1 TABLET ORAL at 12:03

## 2021-03-25 RX ADMIN — POTASSIUM CHLORIDE 40 MEQ: 1500 TABLET, EXTENDED RELEASE ORAL at 21:07

## 2021-03-25 RX ADMIN — IPRATROPIUM BROMIDE AND ALBUTEROL SULFATE 1 AMPULE: .5; 3 SOLUTION RESPIRATORY (INHALATION) at 20:32

## 2021-03-25 RX ADMIN — POLYETHYLENE GLYCOL 3350 17 G: 17 POWDER, FOR SOLUTION ORAL at 09:15

## 2021-03-25 RX ADMIN — DOCUSATE SODIUM 50MG AND SENNOSIDES 8.6MG 2 TABLET: 8.6; 5 TABLET, FILM COATED ORAL at 09:13

## 2021-03-25 RX ADMIN — PANTOPRAZOLE SODIUM 40 MG: 40 TABLET, DELAYED RELEASE ORAL at 16:05

## 2021-03-25 RX ADMIN — ACETAMINOPHEN 1000 MG: 500 TABLET ORAL at 06:06

## 2021-03-25 RX ADMIN — METHOCARBAMOL TABLETS 1000 MG: 500 TABLET, COATED ORAL at 09:13

## 2021-03-25 RX ADMIN — POLYETHYLENE GLYCOL 3350 17 G: 17 POWDER, FOR SOLUTION ORAL at 16:05

## 2021-03-25 RX ADMIN — METHADONE HYDROCHLORIDE 100 MG: 5 SOLUTION ORAL at 10:38

## 2021-03-25 RX ADMIN — METOCLOPRAMIDE 10 MG: 5 INJECTION, SOLUTION INTRAMUSCULAR; INTRAVENOUS at 21:08

## 2021-03-25 RX ADMIN — GABAPENTIN 600 MG: 300 CAPSULE ORAL at 16:04

## 2021-03-25 RX ADMIN — METOCLOPRAMIDE 10 MG: 5 INJECTION, SOLUTION INTRAMUSCULAR; INTRAVENOUS at 09:15

## 2021-03-25 RX ADMIN — METHYLNALTREXONE BROMIDE 12 MG: 12 INJECTION, SOLUTION SUBCUTANEOUS at 09:16

## 2021-03-25 RX ADMIN — METOPROLOL TARTRATE 25 MG: 25 TABLET ORAL at 21:10

## 2021-03-25 RX ADMIN — METHOCARBAMOL TABLETS 1000 MG: 500 TABLET, COATED ORAL at 21:07

## 2021-03-25 RX ADMIN — ENOXAPARIN SODIUM 40 MG: 40 INJECTION SUBCUTANEOUS at 09:15

## 2021-03-25 RX ADMIN — ACETAMINOPHEN 1000 MG: 500 TABLET ORAL at 16:03

## 2021-03-25 RX ADMIN — IPRATROPIUM BROMIDE AND ALBUTEROL SULFATE 1 AMPULE: .5; 3 SOLUTION RESPIRATORY (INHALATION) at 07:31

## 2021-03-25 RX ADMIN — FLUOXETINE HYDROCHLORIDE 40 MG: 20 CAPSULE ORAL at 09:14

## 2021-03-25 RX ADMIN — METHOCARBAMOL TABLETS 1000 MG: 500 TABLET, COATED ORAL at 16:04

## 2021-03-25 RX ADMIN — GABAPENTIN 600 MG: 300 CAPSULE ORAL at 21:08

## 2021-03-25 RX ADMIN — Medication 5 MG: at 21:08

## 2021-03-25 RX ADMIN — POTASSIUM CHLORIDE 40 MEQ: 1500 TABLET, EXTENDED RELEASE ORAL at 09:14

## 2021-03-25 RX ADMIN — SUCRALFATE 1 G: 1 TABLET ORAL at 06:06

## 2021-03-25 RX ADMIN — METOCLOPRAMIDE 10 MG: 5 INJECTION, SOLUTION INTRAMUSCULAR; INTRAVENOUS at 14:00

## 2021-03-25 RX ADMIN — METOPROLOL TARTRATE 25 MG: 25 TABLET ORAL at 09:14

## 2021-03-25 RX ADMIN — PANTOPRAZOLE SODIUM 40 MG: 40 TABLET, DELAYED RELEASE ORAL at 06:06

## 2021-03-25 RX ADMIN — POLYETHYLENE GLYCOL 3350 17 G: 17 POWDER, FOR SOLUTION ORAL at 21:09

## 2021-03-25 RX ADMIN — GABAPENTIN 600 MG: 300 CAPSULE ORAL at 06:06

## 2021-03-25 ASSESSMENT — PAIN SCALES - GENERAL
PAINLEVEL_OUTOF10: 6
PAINLEVEL_OUTOF10: 0
PAINLEVEL_OUTOF10: 0
PAINLEVEL_OUTOF10: 5

## 2021-03-25 NOTE — PROGRESS NOTES
[facility] Gastroenterology Progress Note    Ita Turcios is a 46 y.o. female patient. Hospitalization Day:21      Chief consult reason: Colonic distention      Subjective: Patient seen and examined. Patient reports having 4-5 moderate sized liquid stools overnight and is passing flatus after taking Miralax yesterday. Despite this, KUB this morning shows increased gaseous colonic distention. Objective:   VITALS:  /86   Pulse 78   Temp 99.6 °F (37.6 °C) (Oral)   Resp 16   Ht 5' 7\" (1.702 m)   Wt 188 lb 7.9 oz (85.5 kg)   SpO2 95%   BMI 29.52 kg/m²   TEMPERATURE:  Current - Temp: 99.6 °F (37.6 °C);  Max - Temp  Av.8 °F (37.1 °C)  Min: 98.3 °F (36.8 °C)  Max: 99.6 °F (37.6 °C)    Physical Assessment:  General appearance:  alert, cooperative and no distress  Mental Status:  oriented to person, place and time and normal affect  Lungs:  clear to auscultation bilaterally, normal effort  Heart:  regular rate and rhythm, no murmur  Abdomen:  soft, mildly tender, distended, hypoactive bowel sounds  Extremities:  no edema, no redness, No clubbing  Skin:  warm, dry, no gross lesions or rashes    CURRENT MEDICATIONS:  Scheduled Meds:   methylnaltrexone  12 mg Subcutaneous Daily    potassium chloride  40 mEq Oral BID    methadone  100 mg Oral Daily    metoclopramide  10 mg Intravenous TID    polyethylene glycol  17 g Oral BID    metoprolol tartrate  25 mg Oral BID    methocarbamol  1,000 mg Oral TID    melatonin  5 mg Oral Nightly    sennosides-docusate sodium  2 tablet Oral Daily    lidocaine  1 patch Transdermal Daily    gabapentin  600 mg Oral Q8H    pantoprazole  40 mg Oral BID AC    sucralfate  1 g Oral 4 times per day    acetaminophen  1,000 mg Oral Q8H    FLUoxetine  40 mg Oral Daily    enoxaparin  40 mg Subcutaneous Daily    ipratropium-albuterol  1 ampule Inhalation BID     Continuous Infusions:  PRN Meds:sodium chloride flush, bisacodyl, ondansetron      Data Review: [facility])    Toxic dilatation of colon ([facility] [address]    Colonic ischemia ([facility] [address]  Resolved Problems:    * No resolved hospital problems. *       GI Assessment:  1. Colonic distention - 03/17/2021 and 03/21/2021-colonic decompression  KUB 03/25/2021 -interval increase in colonic distention without evidence for small bowel distention.  -Patient received a trial of neostigmine 3/20/2021 with very little stool.  -Patient received Relistor x 3 days. - 03/23/2021-Started on Miralax -32 ounces of MiraLAX bowel prep, but not administered as ordered. Will reorder 03/24/2021      Plan of care:  1. Continue Reglan 10 mg IV - three times a day  2. Increase Miralax to TID  3. Recommend avoiding narcotics  4. Encourage ambulation in hallway every 2-3 hours while awake       Please feel free to contact me with any questions or concerns. Thank you for allowing me to participate in the care of your patient. ABRAHAM Noriega - CNP on 3/25/2021 at 10:37 AM   [facility]astroenter[facility]    Please note that this note was generated using a voice recognition dictation software. Although every effort was made to ensure the accuracy of this automated transcription, some errors in transcription may have occurred.

## 2021-03-25 NOTE — PROGRESS NOTES
PROGRESS NOTE    PATIENT NAME: Torrie Banner RECORD NO. [id number]  DATE: 3/25/2021  SURGEON: Dr Stephani Clark: Rodger Skiff, MD    HD: # 21    ASSESSMENT    Patient Active Problem List   Diagnosis    Migraine    Opiate dependence ([facility] [address]    Chronic pain    Obesity    Patient overweight    Lumbar radicular pain    Lumbar disc disease    Closed nondisplaced fracture of fifth metatarsal bone of right foot    Intra-abdominal abscess ([facility] [address]    Toxic dilatation of colon ([facility] [address]    Colonic ischemia [facility])       MEDICAL DECISION MAKING AND PLAN    1. Neuro- Analgesia multimodal pain therapy. Methadone, weaning  2. CV- HR tachycardia improved, on metoprolol, continue to monitor BP/HR, Hgb stable. 3. Resp- continue to encourage incentive spirometry and deep breathing every hour while awake per RT and RN  4. GI-underwent decompressive colonoscopy 3/17 and 3/21, for colonic distention. Currently tolerating general diet. Repeat KUB 3/20 showing colon distension with cecum dilation 10.5cm. Having BM. Trial of neostigmine 3/20 with very little stool. Repeat KUB today with continued distension up to 7cm. 1.  Relistor 12 mg daily   2. Reglan, suppositories QD.   3. Will try titrating down Methadone. Pt amenable while in hospital.   5. Wound care: Daily dressing changes, dry dressing to midline. 6. Renal-voiding independently, heath strict I/Os  7. Msk- continue to encourage ambulation/activity, PT/OT eval and Tx   1. ID-leukocytosis resolved s/p decompressive colonoscopy. 2. ABX completed 3/16: zosyn, completed day 4/4 on 3/16  8. Ppx- Lovenox  for DVT ppx, GI ppx protonix BID, Endo BG checks   9. Discharge plan is home one Pt has return of bowel function      SUBJECTIVE    Patient seen and examined at bedside, no overnight events. Pt reports she had a substantial bowel movement this AM with passage of flatus. Pt still voiding independently. Loz[employer] Soup halls with RN.  Tolerating general diet. Afebrile, Tmax 37.1    OBJECTIVE  VITALS: Temp: Temp: 98.7 °F (37.1 °C)Temp  Av.7 °F (37.1 °C)  Min: 98.3 °F (36.8 °C)  Max: 99.5 °F (79.7 °C) BP Systolic (71QHH), GYK:125 , Min:127 , FPP:905   Diastolic (59EZY), BBQ:32, Min:86, Max:95   Pulse Pulse  Av  Min: 78  Max: 94 Resp Resp  Av.4  Min: 12  Max: 17 Pulse ox SpO2  Av.5 %  Min: 95 %  Max: 96 %  GENERAL: alert, no distress  NEURO: GCS 15, AOx4, no FND, conversing appropriately  LUNGS: clear to ausculation, without wheezes, rales or rhonci  HEART: normal rate and regular rhythm  ABDOMEN: soft, appropriately tender, distention present, wound dressing dry, no peritoneal signs present  EXTREMITY: no cyanosis, clubbing or edema    I/O last 3 completed shifts: In:  [P.O.:; I.V.:20]  Out: -     Drain/tube output:  In:  [P.O.:; I.V.:20]  Out: -     LAB:  CBC:   No results for input(s): WBC, HGB, HCT, MCV, PLT in the last 72 hours. BMP:   Recent Labs     21  0816      K 4.1      CO2 25   BUN 4*   CREATININE 0.53   GLUCOSE 107*     COAGS: No results for input(s): APTT, PROT, INR in the last 72 hours. RADIOLOGY:  Xr Abdomen (kub) (single Ap View)    Result Date: 3/24/2021  Colonic gaseous distension again noted, now more prominently involving the distal colon. No significant small bowel distension identified. Ke Jackson DO  3/25/2021  7:38 AM         Attending Note    As GI medicine feel the adynamic ileus of colon is due, at least in part, to the side effects of the methadone, will continue tapering down dose until gut function normalizes  I have reviewed the above TECSS note(s) and I either performed the key elements of the medical history and physical exam or was present with the resident when the key elements of the medical history and physical exam were performed. I have discussed the findings, established the care plan and recommendations with Resident, CHACHA RN, bedside nurse.     Ramya Davidson

## 2021-03-26 ENCOUNTER — APPOINTMENT (OUTPATIENT)
Dept: GENERAL RADIOLOGY | Age: 51
DRG: 220 | End: 2021-03-26
Payer: MEDICARE

## 2021-03-26 VITALS
OXYGEN SATURATION: 95 % | BODY MASS INDEX: 29.58 KG/M2 | WEIGHT: 188.49 LBS | HEART RATE: 91 BPM | SYSTOLIC BLOOD PRESSURE: 118 MMHG | RESPIRATION RATE: 18 BRPM | TEMPERATURE: 98.8 F | HEIGHT: 67 IN | DIASTOLIC BLOOD PRESSURE: 100 MMHG

## 2021-03-26 LAB
ABSOLUTE EOS #: 0.52 K/UL (ref 0–0.44)
ABSOLUTE IMMATURE GRANULOCYTE: <0.03 K/UL (ref 0–0.3)
ABSOLUTE LYMPH #: 2.65 K/UL (ref 1.1–3.7)
ABSOLUTE MONO #: 1.01 K/UL (ref 0.1–1.2)
ANION GAP SERPL CALCULATED.3IONS-SCNC: 8 MMOL/L (ref 9–17)
BASOPHILS # BLD: 1 % (ref 0–2)
BASOPHILS ABSOLUTE: 0.05 K/UL (ref 0–0.2)
BUN BLDV-MCNC: 6 MG/DL (ref 6–20)
BUN/CREAT BLD: ABNORMAL (ref 9–20)
CALCIUM SERPL-MCNC: 8.3 MG/DL (ref 8.6–10.4)
CHLORIDE BLD-SCNC: 103 MMOL/L (ref 98–107)
CO2: 25 MMOL/L (ref 20–31)
CREAT SERPL-MCNC: 0.65 MG/DL (ref 0.5–0.9)
DIFFERENTIAL TYPE: ABNORMAL
EOSINOPHILS RELATIVE PERCENT: 7 % (ref 1–4)
GFR AFRICAN AMERICAN: >60 ML/MIN
GFR NON-AFRICAN AMERICAN: >60 ML/MIN
GFR SERPL CREATININE-BSD FRML MDRD: ABNORMAL ML/MIN/{1.73_M2}
GFR SERPL CREATININE-BSD FRML MDRD: ABNORMAL ML/MIN/{1.73_M2}
GLUCOSE BLD-MCNC: 98 MG/DL (ref 70–99)
HCT VFR BLD CALC: 38.6 % (ref 36.3–47.1)
HEMOGLOBIN: 11.7 G/DL (ref 11.9–15.1)
IMMATURE GRANULOCYTES: 0 %
LACTIC ACID, WHOLE BLOOD: 0.8 MMOL/L (ref 0.7–2.1)
LYMPHOCYTES # BLD: 35 % (ref 24–43)
MAGNESIUM: 1.9 MG/DL (ref 1.6–2.6)
MCH RBC QN AUTO: 27.5 PG (ref 25.2–33.5)
MCHC RBC AUTO-ENTMCNC: 30.3 G/DL (ref 28.4–34.8)
MCV RBC AUTO: 90.6 FL (ref 82.6–102.9)
MONOCYTES # BLD: 13 % (ref 3–12)
NRBC AUTOMATED: 0 PER 100 WBC
PDW BLD-RTO: 13.8 % (ref 11.8–14.4)
PHOSPHORUS: 4.1 MG/DL (ref 2.6–4.5)
PLATELET # BLD: 357 K/UL (ref 138–453)
PLATELET ESTIMATE: ABNORMAL
PMV BLD AUTO: 9.2 FL (ref 8.1–13.5)
POTASSIUM SERPL-SCNC: 3.8 MMOL/L (ref 3.7–5.3)
PROCALCITONIN: 0.05 NG/ML
RBC # BLD: 4.26 M/UL (ref 3.95–5.11)
RBC # BLD: ABNORMAL 10*6/UL
SEG NEUTROPHILS: 44 % (ref 36–65)
SEGMENTED NEUTROPHILS ABSOLUTE COUNT: 3.35 K/UL (ref 1.5–8.1)
SODIUM BLD-SCNC: 136 MMOL/L (ref 135–144)
WBC # BLD: 7.6 K/UL (ref 3.5–11.3)
WBC # BLD: ABNORMAL 10*3/UL

## 2021-03-26 PROCEDURE — 6370000000 HC RX 637 (ALT 250 FOR IP): Performed by: INTERNAL MEDICINE

## 2021-03-26 PROCEDURE — 80048 BASIC METABOLIC PNL TOTAL CA: CPT

## 2021-03-26 PROCEDURE — 6360000002 HC RX W HCPCS: Performed by: STUDENT IN AN ORGANIZED HEALTH CARE EDUCATION/TRAINING PROGRAM

## 2021-03-26 PROCEDURE — 94640 AIRWAY INHALATION TREATMENT: CPT

## 2021-03-26 PROCEDURE — 74018 RADEX ABDOMEN 1 VIEW: CPT

## 2021-03-26 PROCEDURE — 36415 COLL VENOUS BLD VENIPUNCTURE: CPT

## 2021-03-26 PROCEDURE — 99024 POSTOP FOLLOW-UP VISIT: CPT | Performed by: INTERNAL MEDICINE

## 2021-03-26 PROCEDURE — 6370000000 HC RX 637 (ALT 250 FOR IP): Performed by: STUDENT IN AN ORGANIZED HEALTH CARE EDUCATION/TRAINING PROGRAM

## 2021-03-26 PROCEDURE — 6360000002 HC RX W HCPCS: Performed by: INTERNAL MEDICINE

## 2021-03-26 PROCEDURE — 83605 ASSAY OF LACTIC ACID: CPT

## 2021-03-26 PROCEDURE — 84100 ASSAY OF PHOSPHORUS: CPT

## 2021-03-26 PROCEDURE — APPSS45 APP SPLIT SHARED TIME 31-45 MINUTES: Performed by: INTERNAL MEDICINE

## 2021-03-26 PROCEDURE — 85025 COMPLETE CBC W/AUTO DIFF WBC: CPT

## 2021-03-26 PROCEDURE — 84145 PROCALCITONIN (PCT): CPT

## 2021-03-26 PROCEDURE — 83735 ASSAY OF MAGNESIUM: CPT

## 2021-03-26 RX ORDER — PANTOPRAZOLE SODIUM 40 MG/1
40 TABLET, DELAYED RELEASE ORAL
Qty: 30 TABLET | Refills: 3 | Status: SHIPPED | OUTPATIENT
Start: 2021-03-26 | End: 2021-07-08 | Stop reason: SDUPTHER

## 2021-03-26 RX ORDER — SENNA AND DOCUSATE SODIUM 50; 8.6 MG/1; MG/1
2 TABLET, FILM COATED ORAL DAILY
Qty: 60 TABLET | Refills: 0 | Status: SHIPPED | OUTPATIENT
Start: 2021-03-27 | End: 2021-04-21

## 2021-03-26 RX ORDER — METHOCARBAMOL 500 MG/1
500 TABLET, FILM COATED ORAL 3 TIMES DAILY
Qty: 30 TABLET | Refills: 0 | Status: SHIPPED | OUTPATIENT
Start: 2021-03-26 | End: 2021-04-05

## 2021-03-26 RX ORDER — POLYETHYLENE GLYCOL 3350 17 G/17G
17 POWDER, FOR SOLUTION ORAL 3 TIMES DAILY
Qty: 90 EACH | Refills: 0 | Status: SHIPPED | OUTPATIENT
Start: 2021-03-26 | End: 2021-04-21

## 2021-03-26 RX ORDER — BISACODYL 10 MG
10 SUPPOSITORY, RECTAL RECTAL DAILY PRN
Qty: 30 SUPPOSITORY | Refills: 0 | Status: SHIPPED | OUTPATIENT
Start: 2021-03-26 | End: 2021-04-21

## 2021-03-26 RX ORDER — METHADONE HYDROCHLORIDE 5 MG/5ML
90 SOLUTION ORAL DAILY
Status: DISCONTINUED | OUTPATIENT
Start: 2021-03-26 | End: 2021-03-26 | Stop reason: HOSPADM

## 2021-03-26 RX ADMIN — ACETAMINOPHEN 1000 MG: 500 TABLET ORAL at 00:21

## 2021-03-26 RX ADMIN — METOPROLOL TARTRATE 25 MG: 25 TABLET ORAL at 09:04

## 2021-03-26 RX ADMIN — PANTOPRAZOLE SODIUM 40 MG: 40 TABLET, DELAYED RELEASE ORAL at 16:14

## 2021-03-26 RX ADMIN — ENOXAPARIN SODIUM 40 MG: 40 INJECTION SUBCUTANEOUS at 09:04

## 2021-03-26 RX ADMIN — GABAPENTIN 600 MG: 300 CAPSULE ORAL at 05:49

## 2021-03-26 RX ADMIN — GABAPENTIN 600 MG: 300 CAPSULE ORAL at 14:14

## 2021-03-26 RX ADMIN — POLYETHYLENE GLYCOL 3350 17 G: 17 POWDER, FOR SOLUTION ORAL at 14:14

## 2021-03-26 RX ADMIN — DOCUSATE SODIUM 50MG AND SENNOSIDES 8.6MG 2 TABLET: 8.6; 5 TABLET, FILM COATED ORAL at 09:04

## 2021-03-26 RX ADMIN — METOCLOPRAMIDE 10 MG: 5 INJECTION, SOLUTION INTRAMUSCULAR; INTRAVENOUS at 09:04

## 2021-03-26 RX ADMIN — SUCRALFATE 1 G: 1 TABLET ORAL at 12:15

## 2021-03-26 RX ADMIN — SUCRALFATE 1 G: 1 TABLET ORAL at 00:21

## 2021-03-26 RX ADMIN — ACETAMINOPHEN 1000 MG: 500 TABLET ORAL at 16:14

## 2021-03-26 RX ADMIN — IPRATROPIUM BROMIDE AND ALBUTEROL SULFATE 1 AMPULE: .5; 3 SOLUTION RESPIRATORY (INHALATION) at 08:11

## 2021-03-26 RX ADMIN — METHOCARBAMOL TABLETS 1000 MG: 500 TABLET, COATED ORAL at 09:04

## 2021-03-26 RX ADMIN — METHADONE HYDROCHLORIDE 90 MG: 5 SOLUTION ORAL at 09:05

## 2021-03-26 RX ADMIN — PANTOPRAZOLE SODIUM 40 MG: 40 TABLET, DELAYED RELEASE ORAL at 05:49

## 2021-03-26 RX ADMIN — SUCRALFATE 1 G: 1 TABLET ORAL at 05:49

## 2021-03-26 RX ADMIN — METOCLOPRAMIDE 10 MG: 5 INJECTION, SOLUTION INTRAMUSCULAR; INTRAVENOUS at 14:14

## 2021-03-26 RX ADMIN — POTASSIUM CHLORIDE 40 MEQ: 1500 TABLET, EXTENDED RELEASE ORAL at 09:04

## 2021-03-26 RX ADMIN — FLUOXETINE HYDROCHLORIDE 40 MG: 20 CAPSULE ORAL at 09:04

## 2021-03-26 RX ADMIN — POLYETHYLENE GLYCOL 3350 17 G: 17 POWDER, FOR SOLUTION ORAL at 10:13

## 2021-03-26 RX ADMIN — ACETAMINOPHEN 1000 MG: 500 TABLET ORAL at 09:04

## 2021-03-26 RX ADMIN — METHOCARBAMOL TABLETS 1000 MG: 500 TABLET, COATED ORAL at 14:14

## 2021-03-26 RX ADMIN — METHYLNALTREXONE BROMIDE 12 MG: 12 INJECTION, SOLUTION SUBCUTANEOUS at 05:49

## 2021-03-26 NOTE — PROGRESS NOTES
CLINICAL PHARMACY NOTE: MEDS TO [address] Select Patient?: No  Total # of Prescriptions Filled: 6   The following medications were delivered to the patient:  · Lopressor  · Dulcolax  · miralax  · Senokot  · Robaxin  · protonix  Total # of Interventions Completed: 0  Time Spent (min): 0    Additional Documentation:    Delivered by Iliana Augustine.

## 2021-03-26 NOTE — PLAN OF CARE
Problem: Pain:  Goal: Pain level will decrease  Description: Pain level will decrease  Outcome: Ongoing  Goal: Control of acute pain  Description: Control of acute pain  Outcome: Ongoing  Goal: Control of chronic pain  Description: Control of chronic pain  Outcome: Ongoing     Problem: Pain:  Goal: Pain level will decrease  Description: Pain level will decrease  Outcome: Ongoing  Goal: Recognizes and communicates pain  Description: Recognizes and communicates pain  Outcome: Ongoing  Goal: Control of acute pain  Description: Control of acute pain  Outcome: Ongoing  Goal: Control of chronic pain  Description: Control of chronic pain  Outcome: Ongoing

## 2021-03-26 NOTE — CARE COORDINATION
Request for records to be sent to [facility] on [facility] as patient has a am appt per Rommel Mazariegos with Trauma.  H&P, facesheet, gi note and gen surg note all sent to fax number provided by Estela Richter [phone]    Discharge [address] [place] [address] Case Management Department  Written by: Lizandro Maldonado RN    Patient Name: Ita Turcios  Attending Provider: Genaro Moreno MD  Admit Date: 3/3/2021  9:23 PM  MRN: [id number]  Account: [de-identified]                     : 1970  Discharge Date: 3/26/2021      Disposition: Romie Underwood RN

## 2021-03-26 NOTE — PROGRESS NOTES
Discharge education given at bedside. All questions answered. IV removed. All questions answered. Wound care supplies given. Awaiting meds to beds.

## 2021-03-26 NOTE — PROGRESS NOTES
PROGRESS NOTE    PATIENT NAME: Torrie Banner [facility] RECORD NO. [id number]  DATE: 3/26/2021  SURGEON: Dr Charbel Mackenzie: Rebecca Gillespie MD    HD: # 22    ASSESSMENT    Patient Active Problem List   Diagnosis    Migraine    Opiate dependence ([facility] [address]    Chronic pain    Obesity    Patient overweight    Lumbar radicular pain    Lumbar disc disease    Closed nondisplaced fracture of fifth metatarsal bone of right foot    Intra-abdominal abscess ([facility] [address]    Toxic dilatation of colon ([facility] [address]    Colonic ischemia [facility])       MEDICAL DECISION MAKING AND PLAN    1. Neuro- Analgesia multimodal pain therapy. Methadone, weaning  2. CV- HR tachycardia improved, on metoprolol, continue to monitor BP/HR, Hgb stable. 3. Resp- continue to encourage incentive spirometry and deep breathing every hour while awake per RT and RN  4. GI-underwent decompressive colonoscopy 3/17 and 3/21, for colonic distention. Currently tolerating general diet. Repeat KUB 3/20 showing colon distension with cecum dilation 10.5cm. Having BM. Trial of neostigmine 3/20 with very little stool. Repeat KUB today with continued distension up to 7cm. 1.  GI consulted, appreciate recs:  Relistor 12 mg daily, Reglan, suppositories QD, Miralax TID   2. Will try titrating down Methadone. Pt amenable while in hospital.   5. Wound care: Daily dressing changes, dry dressing to midline. 6. Renal-voiding independently, heath strict I/Os  7. Msk- continue to encourage ambulation/activity, PT/OT eval and Tx   1. ID-leukocytosis resolved s/p decompressive colonoscopy. 2. ABX completed 3/16: zosyn, completed day 4/4 on 3/16  8. Ppx- Lovenox  for DVT ppx, GI ppx protonix BID, Endo BG checks   9. Discharge plan is home once Pt has return of bowel function      SUBJECTIVE    Patient seen and examined at bedside, no overnight events. Pt reports she had 3 small BM bowel movement this AM with passage of small amounts of flatus.  Pt still voiding independently. Walking halls with RN. Tolerating general diet. Afebrile, Tmax 37.2    OBJECTIVE  VITALS: Temp: Temp: 99.9 °F (37.7 °C)Temp  Av °F (37.2 °C)  Min: 98.6 °F (37 °C)  Max: 99.9 °F (50.6 °C) BP Systolic (53CEC), TFC:975 , Min:125 , HUJ:276   Diastolic (37JJY), OCW:67, Min:80, Max:93   Pulse Pulse  Av.6  Min: 86  Max: 92 Resp Resp  Av.4  Min: 8  Max: 20 Pulse ox SpO2  Av.3 %  Min: 92 %  Max: 98 %  GENERAL: alert, no distress  NEURO: GCS 15, AOx4, no FND, conversing appropriately  LUNGS: clear to ausculation, without wheezes, rales or rhonci  HEART: normal rate and regular rhythm  ABDOMEN: soft, appropriately tender, distention present, wound dressing dry, no peritoneal signs present  EXTREMITY: no cyanosis, clubbing or edema    I/O last 3 completed shifts: In: 1910 [P.O.:1910]  Out: -     Drain/tube output: In: 1260 [P.O.:1260]  Out: -     LAB:  CBC:   Recent Labs     21  0627   WBC 7.6   HGB 11.7*   HCT 38.6   MCV 90.6        BMP:   Recent Labs     21  0703 21  0627    136   K 3.6* 3.8    103   CO2 25 25   BUN 9 6   CREATININE 0.71 0.65   GLUCOSE 85 98     COAGS: No results for input(s): APTT, PROT, INR in the last 72 hours. RADIOLOGY:  Xr Abdomen (kub) (single Ap View)    Result Date: 3/26/2021  Stable gaseous distension of the colon. Xr Abdomen (kub) (single Ap View)    Result Date: 3/25/2021  Interval increase in colonic distension without evidence for small bowel distension. Gaviota Kumar DO  3/26/2021  10:33 AM       Attending Note      I have reviewed the above GCS note(s) and I either performed the key elements of the medical history and physical exam or was present with the surgical resident when the key elements of the medical history and physical exam were performed. I have discussed the findings, established the care plan and recommendations with the surgical team.  Passing flatus as well as BM's.   Persistent

## 2021-03-26 NOTE — PROGRESS NOTES
ondansetron      Data Review:  LABS and IMAGING:     CBC  Recent Labs     03/26/21  0627   WBC 7.6   HGB 11.7*   HCT 38.6   MCV 90.6   MCHC 30.3   RDW 13.8          Immature PLTs   No results found for: PLTFLUORE    ANEMIA STUDIES  No results for input(s): LABIRON, TIBC, IRON, FERRITIN, YQQOLSHU26, FOLATE, OCCULTBLD in the last 72 hours. BMP  Recent Labs     03/25/21  0703 03/26/21  0627    136   K 3.6* 3.8    103   CO2 25 25   BUN 9 6   CREATININE 0.71 0.65   GLUCOSE 85 98   CALCIUM 7.9* 8.3*       LFTS  No results for input(s): ALKPHOS, ALT, AST, BILITOT, BILIDIR, LABALBU in the last 72 hours. AMYLASE/LIPASE/AMMONIA  No results for input(s): AMYLASE, LIPASE, AMMONIA in the last 72 hours. Acute Hepatitis Panel   Lab Results   Component Value Date    HEPBSAG NONREACTIVE 10/27/2016    HEPCAB NONREACTIVE 10/27/2016    HEPBIGM NONREACTIVE 10/27/2016    HEPAIGM NONREACTIVE 10/27/2016       HCV Genotype   No results found for: HEPATITISCGENOTYPE    HCV Quantitative   No results found for: HCVQNT    LIVER WORK UP:    AFP  No results found for: AFP    Alpha 1 antitrypsin   No results found for: A1A    Anti - Liver/Kidney Ab  No results found for: LIVER-KIDNEYMICROSOMALAB    RAHEL  No results found for: RAHEL    AMA  No results found for: MITOAB    ASMA  No results found for: SMOOTHMUSCAB    Ceruloplasmin  No results found for: CERULOPLSM    Celiac panel  No results found for: TISSTRNTIIGG, TTGIGA, IGA    IgG  No results found for: IGG    IgM  No results found for: IGM    GGT   No results found for: LABGGT    PT/INR  No results for input(s): PROTIME, INR in the last 72 hours. Cancer Markers:  CEA:  No results for input(s): CEA in the last 72 hours. Ca 125:  No results for input(s):  in the last 72 hours. Ca 19-9:   No results for input(s):  in the last 72 hours. AFP: No results for input(s): AFP in the last 72 hours.     Lactic acid:  Recent Labs     03/26/21  0966   LACTACIDWB 0.8 Radiology Review:                ENDOSCOPY     Active Problems:    Intra-abdominal abscess ([address]    Toxic dilatation of colon ([address]    Colonic ischemia (HCC)  Resolved Problems:    * No resolved hospital problems. *       GI Assessment:  1. Colonic distention - 03/17/2021 and 03/21/2021-colonic decompression  KUB 03/25/2021 -interval increase in colonic distention without evidence for small bowel distention.  -Patient received a trial of neostigmine 3/20/2021 with very little stool.  -Patient tarted on Relistor daily  -Patient on MiraLAX 17 g 3 times daily      Plan of care:  1. Continue Reglan 10 mg IV - three times a day  2. Continue Miralax to TID  3. Recommend avoiding narcotics. Patient strongly advised to decrease methadone dose  4. Follow up on KUB this AM   5. Continue Relistor 12 mg daily      Please feel free to contact me with any questions or concerns. Thank you for allowing me to participate in the care of your patient. ABRAHAM Wan - CNP on 3/26/2021 at 10:10 AM   THE [facility]    Please note that this note was generated using a voice recognition dictation software. Although every effort was made to ensure the accuracy of this automated transcription, some errors in transcription may have occurred.

## 2021-03-26 NOTE — PROGRESS NOTES
Comprehensive Nutrition Assessment    Type and Reason for Visit:  Reassess    Nutrition Recommendations/Plan:   1. Continue General Diet  2. D/C Frozen ONS - pt dislikes all nutritional supplements  3. Encourage oral intakes  4. Will monitor nutritional status/plan of care    Nutrition Assessment:  Pt tolerating diet with no complaints of N/V. Reports unable to consume her usual amount at a time and only able to consume soups and liquids. 26-50% intakes per chart review. Pt does not like nutritional supplements and discontinued frozen ONS. Pts is having frequent BM. Labs/Meds reviewed. Malnutrition Assessment:  Malnutrition Status: Moderate malnutrition    Context:  Acute Illness     Findings of the 6 clinical characteristics of malnutrition:  Energy Intake:  1 - 75% or less of estimated energy requirements for 7 or more days  Weight Loss:  1 - 5% over 1 month(since admission - weight fluctuations noted)     Body Fat Loss:  1 - Mild body fat loss Orbital   Muscle Mass Loss:  No significant muscle mass loss    Fluid Accumulation:  No significant fluid accumulation     Strength:  Not Performed    Estimated Daily Nutrient Needs:  Energy (kcal):  MSJ x 1.2-1.3 = 0341-4335 kcals/day; Weight Used for Energy Requirements:  Admission     Protein (g):  1.5 gm/kg = 90 gm pro/day; Weight Used for Protein Requirements:  Ideal          Nutrition Related Findings:  Meds: Reglan, Glycolax, Klor-Con, Senokot, Dulcolax      Wounds:  Surgical Incision(to abdomen)       Current Nutrition Therapies:    DIET GENERAL; Anthropometric Measures:  · Height: 5' 7\" (170.2 cm)  · Current Body Weight: 188 lb 7.9 oz (85.5 kg)   · Admission Body Weight: 198 lb 4.8 oz (89.9 kg)    · Ideal Body Weight: 135 lbs; % Ideal Body Weight 139.6 %   · BMI: 29.5  · BMI Categories: Overweight (BMI 25.0-29. 9)       Nutrition Diagnosis:   · Inadequate oral intake related to altered GI function(GI surgeries, abdominal bloating/distention) as

## 2021-03-30 ENCOUNTER — OFFICE VISIT (OUTPATIENT)
Dept: SURGERY | Age: 51
End: 2021-03-30
Payer: MEDICARE

## 2021-03-30 VITALS
HEIGHT: 67 IN | BODY MASS INDEX: 29.03 KG/M2 | WEIGHT: 185 LBS | DIASTOLIC BLOOD PRESSURE: 77 MMHG | HEART RATE: 89 BPM | SYSTOLIC BLOOD PRESSURE: 111 MMHG

## 2021-03-30 DIAGNOSIS — Z48.89 ENCOUNTER FOR POSTOPERATIVE CARE: Primary | ICD-10-CM

## 2021-03-30 DIAGNOSIS — Z98.890 S/P EXPLORATORY LAPAROTOMY: ICD-10-CM

## 2021-03-30 DIAGNOSIS — Z98.0 H/O BILLROTH II OPERATION: ICD-10-CM

## 2021-03-30 PROCEDURE — 99212 OFFICE O/P EST SF 10 MIN: CPT | Performed by: SPECIALIST

## 2021-03-30 PROCEDURE — 99024 POSTOP FOLLOW-UP VISIT: CPT | Performed by: SPECIALIST

## 2021-03-30 NOTE — PROGRESS NOTES
at [facility] SUBCUTANEOUS  03/04/2021    CT ABSCESS DRAIN SUBCUTANEOUS 3/4/2021 [facility] CT SCAN    HERNIA REPAIR  1972    as child    HYSTERECTOMY      2007    LAPAROTOMY N/A 03/05/2021    EXPLORATORY LAPAROTOMY, PARTIAL GASTRECTOMY performed by Neida Carballo MD at [address] [place] [address] 03/11/2021    LAPAROTOMY EXPLORATORY, ABDOMINAL [address] OUT, FASCIAL CLOSURE  performed by Phuc Mckeon MD at [facility] 07/27/2015    right lumbar JHONY    NOSE SURGERY  1989    After nose was broken. Dr. Avery Glover 2007    Dr. Mariana Morrow  2007    Dr. Becerra Loulou    Dr. Sarah Zhao History:  reports that she has been smoking cigarettes. She has been smoking about 1.00 pack per day. She has never used smokeless tobacco. She reports previous drug use. Drugs: Marijuana, IV, and Opiates . She reports that she does not drink alcohol. Family History: family history includes ADHD in her son; Alcohol Abuse in her paternal grandfather; Anxiety Disorder in her daughter; Arthritis in her father and mother; Asthma in her daughter and son; Depression in her daughter and son; Diabetes in her maternal grandmother and son; Heart Attack in her father, maternal grandmother, and paternal grandfather; High Cholesterol in her father; Mental Illness in her paternal grandmother; Other in her brother and son; Other (age of onset: 64) in her mother; Other (age of onset: 6) in her maternal grandfather. Review of Systems:   Pertinent items are noted in HPI.     Allergies: Compazine [prochlorperazine maleate], Imitrex [sumatriptan], Thorazine [chlorpromazine], Cardizem [diltiazem], and Pcn [penicillins]    Current Meds:  Current Outpatient Medications:     metoprolol tartrate (LOPRESSOR) 25 MG tablet, Take 1 tablet by mouth 2 times daily, Disp: 60 tablet, Rfl: 3    bisacodyl (DULCOLAX) 10 MG suppository, Place 1 suppository rectally daily as needed for Constipation, Disp: 30 suppository, Rfl: 0    polyethylene glycol (GLYCOLAX) 17 g packet, Take 17 g by mouth 3 times daily, Disp: 90 each, Rfl: 0    sennosides-docusate sodium (SENOKOT-S) 8.6-50 MG tablet, Take 2 tablets by mouth daily, Disp: 60 tablet, Rfl: 0    methocarbamol (ROBAXIN) 500 MG tablet, Take 1 tablet by mouth 3 times daily for 10 days, Disp: 30 tablet, Rfl: 0    pantoprazole (PROTONIX) 40 MG tablet, Take 1 tablet by mouth 2 times daily (before meals), Disp: 30 tablet, Rfl: 3    FLUoxetine (PROZAC) 40 MG capsule, Take 40 mg by mouth daily, Disp: , Rfl:     gabapentin (NEURONTIN) 600 MG tablet, Take 600 mg by mouth 3 times daily. , Disp: , Rfl:     naloxone (NARCAN) 2 MG/2ML injection, , Disp: , Rfl:     albuterol sulfate HFA (PROVENTIL HFA) 108 (90 BASE) MCG/ACT inhaler, Inhale 1-2 puffs into the lungs every 4 hours as needed for Wheezing, Disp: 1 Inhaler, Rfl: 0    predniSONE (DELTASONE) 20 MG tablet, Take 1 tablet by mouth daily Take 3 tablets day 1-2. 2 tablets day 3-4 and 1 tablet day 5 (Patient not taking: Reported on 3/30/2021), Disp: 10 tablet, Rfl: 0    diphenhydrAMINE (BENADRYL) 25 MG capsule, Take 1 capsule by mouth every 6 hours as needed for Itching (Patient not taking: Reported on 3/30/2021), Disp: 20 capsule, Rfl: 0    Vital Signs:  Vitals:    03/30/21 1339   BP: 111/77   Pulse: 89       Physical Exam:  Vital signs and Nurse's note reviewed  Gen:  A&Ox3, NAD  HEENT: NCAT, PERRLA, EOMI, no scleral icterus, oral mucosa moist  Neck: Supple,   Chest: Symmetric rise with inhalation, no evidence of trauma  CVS: Regular rate and rhythm,   Resp: Unlabored breathing on RA  Abd: soft, non-tender, non-distended, midline incision well approximated, surgical staples in place. No discharge seen on overlying dressings. No erythema, induration, fluctuance, bleeding. No S/S infection.   Ext: No clubbing, cyanosis, edema, peripheral pulses 2+ Rad/Fem/DP/PT  CNS: Moves all extremities, no gross focal motor deficits  Skin: No erythema or ulcerations     Labs:   CBC: No results for input(s): WBC, HGB, PLT in the last 72 hours. BMP:  No results for input(s): NA, K, CL, CO2, BUN, CREATININE, GLUCOSE in the last 72 hours. Hepatic: No results for input(s): AST, ALT, ALB, ALKPHOS, BILITOT, BILIDIR, LIPASE, AMYLASE in the last 72 hours. Coagulation: No results for input(s): APTT, PROT, INR in the last 72 hours. Problem List:  Patient Active Problem List    Diagnosis Date Noted    Toxic dilatation of colon ([address]     Colonic ischemia ([address]     Intra-abdominal abscess ([facility] 03/05/2021    Closed nondisplaced fracture of fifth metatarsal bone of right foot 09/08/2016    Lumbar disc disease 07/27/2015    Lumbar radicular pain 02/11/2015    Migraine 07/22/2014    Opiate dependence ([facility] 07/22/2014    Chronic pain 07/22/2014    Obesity 07/22/2014    Patient overweight 07/22/2014       Impression:    Ramu Galarza is a 46 y.o. female 3 weeks s/p Billroth II for perforated gastric ulcer. Recommendation:    · Follow-up at methadone clinic for continued titration as tolerated  · Continue to use GlycoLax and/or Senokot on an as-needed basis  · Follow-up with prescheduled GI appointment  · Staples removed today from midline incision. · Follow-up in trauma surgery clinic on an as-needed basis.       Electronically signed by Morgan Souza DO  on 3/30/2021 at 2:14 PM

## 2021-03-30 NOTE — DISCHARGE SUMMARY
DISCHARGE SUMMARY:    PATIENT NAME:  Megha Taylor  YOB: 1970  MEDICAL RECORD NO. [id number]  DATE: 03/30/21  PRIMARY CARE PHYSICIAN: Alison Saenz MD  ADMIT DATE:  3/3/2021    DISCHARGE DATE:  3/26/2021  DISPOSITION:  [facility]  ADMITTING DIAGNOSIS:   Perforated gastric ulcer    DIAGNOSIS:   Patient Active Problem List   Diagnosis    Migraine    Opiate dependence ([address]    Chronic pain    Obesity    Patient overweight    Lumbar radicular pain    Lumbar disc disease    Closed nondisplaced fracture of fifth metatarsal bone of right foot    Intra-abdominal abscess ([address]    Toxic dilatation of colon ([address]    Colonic ischemia (HCC)       CONSULTANTS:  GI    PROCEDURES:   3/4: IR drain placement  3/5: Ex lap, bilroth 2, two MILTON drain placement  3/11: ex lap with washout  3/16: Decompressive colonoscopy  3/20: 2nd Decompressive colonoscopy    HOSPITAL COURSE:   Megha Taylor is a 46 y.o. female who was admitted on 3/3/2021  Hospital Course:    Presented to ED with Abdominal pain +N/V, +diarrhea for 3 days. CT w/ air fluid collection ([id number]) possible perforated gastric ulcer    3/4: IR placed 12fr drain in LUQ; 3/5: ex lap, BII with NGT placed past anastomosis (80cm at nose, bridled) R MILTON drain anterior to anastomosis, L MILTON drain in lesser sac/posterior to stomach  3/6: d/c ket/fet,start mmpt and methadone, DC fluids trickle tf; 3/7: advanced TF, propranolol for tachy, DC majano, Txfr to floor  3/8: Upper GI study without leak. Dressing changed. CLD.  NGT removed; 3/9: Antibiotics day 4, last doses  3/11: WBC cont to rise 18(14), afebrile, dehiscence-EX LAP w/ washout  3/12: CLD, d/c majano, re-started Zosyn, CT scan ileus, NGT placed; 3/14: NGT & Majano d/c. CLD; 3/15: FLD  3/16: general diet, day 4/4 Zosyn; 3/17: CT revealed sigmoid stricture and dilated cecum, GI performed decompressive scope, tolerating clears  3/18: advanced to general diet; 3/19: improved colonic diameter  3/20: increased distention. Start metoprolol, GI gave neostigmine; 3/21: GI for decompressive scope  3/22: CLD to general diet. KUB with continued distention, small BM. Relistor. 3/23: Relistor, Reglan, bowel prep, suppository  3/25: methadone weaned to 100 from 140mg  3/26: methadone 90mg      Labs and imaging were followed daily. On day of discharge John Umana  was tolerating a regular diet  had adequate analgeia on oral medications  had no signs of complication. She was deemed medically stable for discharged to Home        PHYSICAL EXAMINATION:        Discharge Vitals:  height is 5' 7\" (1.702 m) and weight is 188 lb 7.9 oz (85.5 kg). Her temporal temperature is 98.8 °F (37.1 °C). Her blood pressure is 118/100 (abnormal) and her pulse is 91. Her respiration is 18 and oxygen saturation is 95%. Exam on day of discharge:  GENERAL: alert, no distress  NEURO: GCS 15, AOx4, no FND, conversing appropriately  LUNGS: clear to ausculation, without wheezes, rales or rhonci  HEART: normal rate and regular rhythm  ABDOMEN: soft, appropriately tender, distention present, wound dressing dry, no peritoneal signs present  EXTREMITY: no cyanosis, clubbing or edema    LABS:   No results for input(s): WBC, HGB, HCT, PLT, NA, K, CL, CO2, BUN, CREATININE in the last 72 hours. DIAGNOSTIC TESTS:    Xr Chest Portable    Result Date: 3/3/2021  EXAMINATION: ONE XRAY VIEW OF THE CHEST 3/3/2021 9:44 pm COMPARISON: 02/15/2018 HISTORY: ORDERING SYSTEM PROVIDED HISTORY: sepsis TECHNOLOGIST PROVIDED HISTORY: sepsis Reason for Exam: upr,vomitting,headache,diarrhea,concern for covid FINDINGS: There are linear atelectasis/scarring at the lung bases more pronounced on the right side. No focal consolidation, pleural effusion or pneumothorax. Cardiomediastinal silhouette and hilar contours are stable and within normal range. . The osseous structures are stable. Linear atelectasis/scarring at the lung bases more pronounced on the right.  Otherwise unremarkable chest radiograph. No focal consolidation. Cta Abdomen Pelvis W Contrast    Result Date: 3/4/2021  EXAMINATION: CTA OF THE ABDOMEN AND PELVIS WITH CONTRAST 3/4/2021 1:35 am: TECHNIQUE: CTA of the abdomen and pelvis was performed with the administration of intravenous contrast. Multiplanar reformatted images are provided for review. MIP images are provided for review. Dose modulation, iterative reconstruction, and/or weight based adjustment of the mA/kV was utilized to reduce the radiation dose to as low as reasonably achievable. COMPARISON: None. HISTORY: ORDERING SYSTEM PROVIDED HISTORY: ABd pain, severe leukocytosis TECHNOLOGIST PROVIDED HISTORY: ABd pain, severe leukocytosis Decision Support Exception->Emergency Medical Condition (MA) Reason for Exam: ABd pain, severe leukocytosis Acuity: Acute Type of Exam: Initial Nausea vomiting abdominal pain for 3 days. FINDINGS: CTA ABDOMEN: The lung bases are clear. There is no pleural effusion. There is pneumoperitoneum scattered throughout the upper abdomen. There is an abnormal collection of air and fluid abutting the gastric fundus and extending along the posterior body of the stomach. This abnormal collection of air and fluid measures approximately 9.2 x 9.0 x 15.4 cm in AP by transverse by craniocaudal dimension. The stomach is decompressed. There is a suspected perforated gastric ulcer along the posterior body of the stomach best seen on axial image 167. No abnormally dilated loops of large or small bowel are present to suggest obstruction. The liver, spleen, pancreas, gallbladder and adrenal glands are normal in appearance. The left kidney is atrophic. There is dilatation of the left renal pelvis suggesting a chronic left UPJ obstruction. The right kidney is normal appearance. The abdominal aorta is normal in appearance. There is no aneurysm or dissection. The celiac axis, SMA and DYLON are patent.  No lytic or blastic osseous lesions are identified. CTA PELVIS: The aortic bifurcation and iliac arteries are normal.  There is no significant stenosis or aneurysm. There is no soft tissue mass or lymphadenopathy. There is a small amount of free fluid in the pelvis. The urinary bladder is normal in appearance. The appendix is normal.  No lytic or blastic osseous lesions are identified. 3D surface reformations were performed. Pneumoperitoneum and a large air fluid collection (9.2 x 9.0 x 15.4 cm in AP by transverse by craniocaudal dimension) abutting the posterior aspect of the stomach and extending into the left lower quadrant. There is a suspected perforated gastric ulcer along the posterior body of the stomach. The stomach is decompressed and the findings are consistent with an acute gastric perforation. Atrophic left kidney with findings suggesting a chronic left UPJ obstruction. The above findings were discussed with Dr. Tonya Bravo at 2:10 a.m. on 03/04/2021. Ct Abscess Drainage    Result Date: 3/4/2021  PROCEDURE: CT GUIDEDABDOMINALABSCESS DRAINAGE CATHETER PLACEMENT MODERATE CONSCIOUS SEDATION 3/4/2021 HISTORY: Left upper quadrant abscess; suspected gastric perforation SEDATION: 0.5 mgversed and 50 mcg fentanyl were titrated intravenously for moderate sedation monitored under my direction. Total intraservice time of sedation was 15 minutes. The patient's vital signs were monitored throughout the procedure and recorded in the patient's medical record by the nurse. TECHNIQUE: This procedure was performed by Ana Richter PA-C under direct supervision of Dr. Mel Infante. Informed consent was obtained after a detailed explanation of the procedure including risks, benefits, and alternatives. Universal protocol was followed. A suitable skin site was prepped and draped in standard sterile fashion following CT localization. Local anesthetic was achieved using 1% lidocaine. A small incision was made.   An 5 Western Zayda by 7 cm Yueh catheter was advanced under CT guidance into the fluid collection and a 0.035 guidewire was used to place a 12 French abscess drainage catheter after the fashion tract was dilated. Estimated blood loss was 1 mL. The catheter was sutured to the skin using 2 0 Ethilon and the patient tolerated the procedure well. The catheter was attached to leg bag  drainage. Postprocedure CT scan showed abscess drained in proper placement. Dose modulation, iterative reconstruction, and/or weight based adjustment of the mA/kV was utilized to reduce the radiation dose to as low as reasonably achievable. FINDINGS: A total of 10 mL of green/brown fluid was removed and sent for diagnostic tests. Successful CT guided placement of left upper quadrant abscess drainage catheter.        DISCHARGE INSTRUCTIONS     Discharge Medications:        Medication List      START taking these medications    bisacodyl 10 MG suppository  Commonly known as: DULCOLAX  Place 1 suppository rectally daily as needed for Constipation     methocarbamol 500 MG tablet  Commonly known as: ROBAXIN  Take 1 tablet by mouth 3 times daily for 10 days  Notes to patient: 9am - 2pm - 9 pm     metoprolol tartrate 25 MG tablet  Commonly known as: LOPRESSOR  Take 1 tablet by mouth 2 times daily     pantoprazole 40 MG tablet  Commonly known as: PROTONIX  Take 1 tablet by mouth 2 times daily (before meals)     polyethylene glycol 17 g packet  Commonly known as: GLYCOLAX  Take 17 g by mouth 3 times daily  Notes to patient: 9am - 2pm - 9pm     sennosides-docusate sodium 8.6-50 MG tablet  Commonly known as: SENOKOT-S  Take 2 tablets by mouth daily        CONTINUE taking these medications    albuterol sulfate  (90 Base) MCG/ACT inhaler  Commonly known as: Proventil HFA  Inhale 1-2 puffs into the lungs every 4 hours as needed for Wheezing     diphenhydrAMINE 25 MG capsule  Commonly known as: BENADRYL  Take 1 capsule by mouth every 6 hours as needed for Itching     gabapentin 600 MG tablet  Commonly known as: NEURONTIN     naloxone 2 MG/2ML injection  Commonly known as: NARCAN     predniSONE 20 MG tablet  Commonly known as: DELTASONE  Take 1 tablet by mouth daily Take 3 tablets day 1-2. 2 tablets day 3-4 and 1 tablet day 5     PROzac 40 MG capsule  Generic drug: FLUoxetine        STOP taking these medications    ibuprofen 600 MG tablet  Commonly known as: IBU     METHADONE HCL PO           Where to Get Your Medications      These medications were sent to [facility] [address] [facility], [address] [place] [address]  [address] [facility], [address]  [address]    Phone: [phone]   · bisacodyl 10 MG suppository  · methocarbamol 500 MG tablet  · metoprolol tartrate 25 MG tablet  · pantoprazole 40 MG tablet  · polyethylene glycol 17 g packet  · sennosides-docusate sodium 8.6-50 MG tablet       Diet: No diet orders on file diet as tolerated  Activity: As instructed WEIGHT BEARING STATUS: Weight bearing as tolerated  Wound Care: Daily and as needed. DISPOSITION: Home    Follow-up:  Corinne Ayoub MD  [address] [place] [address]  [phone]    Schedule an appointment as soon as possible for a visit  Follow up with PCP re: hospital visit     [facility]  [address] [facility] [address] [place] [address]  [phone]  Schedule an appointment as soon as possible for a visit on 3/30/2021  Follow up in [place] [address], For wound re-check    Ezio Zimmerman MD  [address]  [phone]    In 2 weeks  Follow-up in GI clinic to schedule colonoscopy after clearance from general surgery    [address] [facility]. [address] [place] [address] [phone]  On 3/27/2021  07:30, methadone appt.         SIGNED:  Adolph Costa DO   3/30/2021, 5:46 PM  Time Spent for discharge: 35 minutes  Discharge criteria reviewed with team.

## 2021-03-30 NOTE — PATIENT INSTRUCTIONS
Thank you for your visit today. Return to trauma surgery clinic only on an as-needed basis. Continue to work with your methadone clinic.

## 2021-04-09 ENCOUNTER — HOSPITAL ENCOUNTER (EMERGENCY)
Age: 51
Discharge: HOME | End: 2021-04-09
Attending: EMERGENCY MEDICINE
Payer: MEDICARE

## 2021-04-09 VITALS
SYSTOLIC BLOOD PRESSURE: 125 MMHG | TEMPERATURE: 97.9 F | HEART RATE: 74 BPM | WEIGHT: 185 LBS | OXYGEN SATURATION: 95 % | BODY MASS INDEX: 28.98 KG/M2 | DIASTOLIC BLOOD PRESSURE: 80 MMHG | RESPIRATION RATE: 17 BRPM

## 2021-04-09 DIAGNOSIS — L03.90 CELLULITIS, UNSPECIFIED CELLULITIS SITE: Primary | ICD-10-CM

## 2021-04-09 PROCEDURE — 99282 EMERGENCY DEPT VISIT SF MDM: CPT

## 2021-04-09 RX ORDER — CLINDAMYCIN PHOSPHATE 10 MG/G
GEL TOPICAL
Qty: 1 TUBE | Refills: 0 | Status: SHIPPED | OUTPATIENT
Start: 2021-04-09 | End: 2021-04-16

## 2021-04-09 ASSESSMENT — ENCOUNTER SYMPTOMS
NAUSEA: 1
VOMITING: 0
BACK PAIN: 0
ABDOMINAL PAIN: 0
COUGH: 0
DIARRHEA: 0
CONSTIPATION: 0
SHORTNESS OF BREATH: 0

## 2021-04-10 NOTE — CONSULTS
repair (8246); Nose surgery (1989); Tubal ligation (1997); ovarian cyst removal (Left, 2007); Nerve Block (Right, 07/27/2015); Hysterectomy; CT ABSCESS DRAINAGE (03/04/2021); laparotomy (N/A, 03/05/2021); laparotomy (N/A, 03/11/2021); Colonoscopy (N/A, 03/17/2021); Colonoscopy (03/21/2021); and Colonoscopy (N/A, 3/21/2021). Medications  Prior to Admission medications    Medication Sig Start Date End Date Taking? Authorizing Provider   metoprolol tartrate (LOPRESSOR) 25 MG tablet Take 1 tablet by mouth 2 times daily 3/26/21  Yes Heather Perera DO   pantoprazole (PROTONIX) 40 MG tablet Take 1 tablet by mouth 2 times daily (before meals) 3/26/21  Yes Emily Mays DO   FLUoxetine (PROZAC) 40 MG capsule Take 40 mg by mouth daily   Yes Historical Provider, MD   gabapentin (NEURONTIN) 600 MG tablet Take 600 mg by mouth 3 times daily. Yes Historical Provider, MD   albuterol sulfate HFA (PROVENTIL HFA) 108 (90 BASE) MCG/ACT inhaler Inhale 1-2 puffs into the lungs every 4 hours as needed for Wheezing 3/29/16  Yes Linda Tyler MD   bisacodyl (DULCOLAX) 10 MG suppository Place 1 suppository rectally daily as needed for Constipation 3/26/21 4/25/21  Emily Mays DO   polyethylene glycol (GLYCOLAX) 17 g packet Take 17 g by mouth 3 times daily 3/26/21 4/25/21  Emily Mays DO   sennosides-docusate sodium (SENOKOT-S) 8.6-50 MG tablet Take 2 tablets by mouth daily 3/27/21   Emily Mays DO   predniSONE (DELTASONE) 20 MG tablet Take 1 tablet by mouth daily Take 3 tablets day 1-2. 2 tablets day 3-4 and 1 tablet day 5 10/3/17   ABRAHAM Adams - CNP   diphenhydrAMINE (BENADRYL) 25 MG capsule Take 1 capsule by mouth every 6 hours as needed for Itching  Patient not taking: Reported on 3/30/2021 10/3/17   William Phelan APRN - CNP   naloxone [facility]) 2 MG/2ML injection  11/1/16   Historical Provider, MD    Scheduled Meds:  Continuous Infusions:  PRN Meds:.   Allergies  is allergic to compazine [prochlorperazine maleate]; imitrex [sumatriptan]; thorazine [chlorpromazine]; cardizem [diltiazem]; and pcn [penicillins]. Family History  family history includes ADHD in her son; Alcohol Abuse in her paternal grandfather; Anxiety Disorder in her daughter; Arthritis in her father and mother; Asthma in her daughter and son; Depression in her daughter and son; Diabetes in her maternal grandmother and son; Heart Attack in her father, maternal grandmother, and paternal grandfather; High Cholesterol in her father; Mental Illness in her paternal grandmother; Other in her brother and son; Other (age of onset: 64) in her mother; Other (age of onset: 6) in her maternal grandfather. Social History   reports that she has been smoking cigarettes. She has been smoking about 1.00 pack per day. She has never used smokeless tobacco.   reports no history of alcohol use. reports previous drug use. Drugs: Marijuana, IV, and Opiates . Review of Systems  As reviewed in HPI. All other systems were reviewed and were negative. PHYSICAL:   VITALS:  weight is 185 lb (83.9 kg). Her temperature is 97.9 °F (36.6 °C). Her blood pressure is 125/80 and her pulse is 74. Her respiration is 17 and oxygen saturation is 95%. CONSTITUTIONAL: awake, alert, cooperative, no apparent distress, and appears stated age and Alert and oriented times 3, no acute distress and cooperative to examination. HEENT: Head is normocephalic, atraumatic. EOMI, PERRLA  NECK: Soft, trachea midline and straight  LUNGS: Chest expands equally bilaterally upon respiration, no accessory muscle used. Ausculation reveals no wheezes, rales or rhonchi. CARDIOVASCULAR: Heart regular rate and rhythm  ABDOMEN: Soft, minimal distention, mild TTP just over small area of erythema near the periumbilical portion of the incision. Small amount of induration without fluctuance. No active discharge  NEUROLOGIC: CN II-XII are grossly intact.  There are no focalizing motor or sensory DO Trever  4/9/21, 10:57 PM           Trauma Attending Attestation      I have reviewed the above GCS note(s) and confirmed the key elements of the medical history and physical exam. I have seen and examined the pt. I have discussed the findings, established the care plan and recommendations with Resident, GCS RN, bedside nurse.   Pt discharged prior to my evaluation         Darrius Molina DO  4/10/2021  5:01 PM

## 2021-04-10 NOTE — ED NOTES
Pt. Resting in cot reading a book, NAD noted. Will continue to monitor.       Anna Puentes RN  04/09/21 [id number]

## 2021-04-10 NOTE — ED NOTES
Pt presents to ED with complaints of post op problem. Pt has yellow drainage from abdominal surgical site for a couple days. Pt states having surgery in early March. Pt states taking tylenol for pain. PT denies fever and chills. Pt alert and oriented x4, respirations even and unlabored, and in no signs of distress. Pt talking in clear and complete sentences and acting appropriate for age. Pt's VSS. Will continue to monitor.      Nadeem Parsons, FERNANDO  04/09/21 2056

## 2021-04-10 NOTE — ED PROVIDER NOTES
[facility]  ED     Emergency Department     Faculty Attestation    I performed a history and physical examination of the patient and discussed management with the resident. I reviewed the residents note and agree with the documented findings and plan of care. Any areas of disagreement are noted on the chart. I was personally present for the key portions of any procedures. I have documented in the chart those procedures where I was not present during the key portions. I have reviewed the emergency nurses triage note. I agree with the chief complaint, past medical history, past surgical history, allergies, medications, social and family history as documented unless otherwise noted below. For Physician Assistant/ Nurse Practitioner cases/documentation I have personally evaluated this patient and have completed at least one if not all key elements of the E/M (history, physical exam, and MDM). Additional findings are as noted. Patient presents with redness and drainage from her abdominal incision site. Patient had an ex lap done on March 5 due to a perforated duodenal ulcer. She then had to go back to the OR on 11 March for a washout. Patient says that about 3 days ago she noticed increasing redness and some drainage from the incision site. She denies fevers. She says she has had some nausea. She says she has been having normal bowel movements. On exam, patient was resting comfortably reading a book when I entered the room. She appears well. There is erythema and purulent drainage to the inferior aspect of the abdominal incision. Lateral to the incision, the abdomen is soft and nontender. Will speak with general surgery.       Kai Nichols MD  Attending Emergency  Physician              Ro Limon MD  04/09/21 [id number]

## 2021-04-10 NOTE — ED PROVIDER NOTES
[facility]  ED  Emergency Department Encounter  Emergency Medicine Resident     Pt Name: Nieves Rojas  MRN: [id number]  [place] 1970  Date of evaluation: 4/9/21  PCP:  Kelly Foster MD    [address] [place] [address]       Chief Complaint   Patient presents with    Post-op Problem     Abdomen       HISTORY OFPRESENT ILLNESS  (Location/Symptom, Timing/Onset, Context/Setting, Quality, Duration, Modifying Factors,Severity.)      Nieves Rojas is a 46 y.o. female who presents with complaints of postop problem. Patient underwent ex lap on 3/5/2021 with Dr. Tamiko Montero and Dr. Evangelina Trent. At that time patient had perforated ulcer, and then subsequently developed some wound dehiscence which was repaired. She states that she is on methadone at home and her pain is well controlled with this and Tylenol. She states that the area of the wound dehiscence has had some increased redness and yellowish drainage. She states that she believes is right at the area that her pants rub. She does admit to some mild nausea but denies any vomiting, abdominal pain elsewhere, fever, constipation, diarrhea. Patient continues to pass gas without difficulty. She denies any lightheadedness, dizziness, chest pain, shortness of breath. PAST MEDICAL / SURGICAL / SOCIAL / FAMILY HISTORY      has a past medical history of Asthma, Connective tissue disease, undifferentiated ([address] DDD (degenerative disc disease), Depression, Lumbar radicular pain, Migraine, Mitral valve prolapse, and Opiate addiction ([address] has a past surgical history that includes tomy and bso (cervix removed) (2007); hernia repair (1972); Nose surgery (1989); Tubal ligation (1997); ovarian cyst removal (Left, 2007); Nerve Block (Right, 07/27/2015); Hysterectomy; CT ABSCESS DRAINAGE (03/04/2021); laparotomy (N/A, 03/05/2021); laparotomy (N/A, 03/11/2021); Colonoscopy (N/A, 03/17/2021); Colonoscopy (03/21/2021); and Colonoscopy (N/A, 3/21/2021).      Social History     Socioeconomic History    Marital status:      Spouse name: Not on file    Number of children: Not on file    Years of education: Not on file    Highest education level: Not on file   Occupational History    Not on file   Social Needs    Financial resource strain: Not on file    Food insecurity     Worry: Not on file     Inability: Not on file    Transportation needs     Medical: Not on file     Non-medical: Not on file   Tobacco Use    Smoking status: Current Every Day Smoker     Packs/day: 1.00     Types: Cigarettes    Smokeless tobacco: Never Used   Substance and Sexual Activity    Alcohol use: No     Alcohol/week: 0.0 standard drinks    Drug use: Not Currently     Types: Marijuana, IV, Opiates      Comment:  been a year since last used marijuana, heroin last used 2/13/2018    Sexual activity: Yes     Partners: Male   Lifestyle    Physical activity     Days per week: Not on file     Minutes per session: Not on file    Stress: Not on file   Relationships    Social connections     Talks on phone: Not on file     Gets together: Not on file     Attends Confucianism service: Not on file     Active member of club or organization: Not on file     Attends meetings of clubs or organizations: Not on file     Relationship status: Not on file    Intimate partner violence     Fear of current or ex partner: Not on file     Emotionally abused: Not on file     Physically abused: Not on file     Forced sexual activity: Not on file   Other Topics Concern    Not on file   Social History Narrative    Not on file       Family History   Problem Relation Age of Onset    Other Mother 64        Multi Organ Failure    Arthritis Mother     High Cholesterol Father     Heart Attack Father     Arthritis Father         Rheumatoid    Other Brother         Vericose Veins    Diabetes Maternal Grandmother     Heart Attack Maternal Grandmother     Other Maternal Grandfather 8        Black Lung, Clotting Disorder, Vericose Veins    Mental Illness Paternal Grandmother     Alcohol Abuse Paternal Grandfather     Heart Attack Paternal Grandfather     Depression Daughter     Anxiety Disorder Daughter     Asthma Daughter     Depression Son    Wilhelminia Blazing Other Son         Opitional Disorder, Behavioral and Congnitive disorder.  ADHD Son     Asthma Son    Wilhelminia Blazing Diabetes Son         Allergies:  Compazine [prochlorperazine maleate], Imitrex [sumatriptan], Thorazine [chlorpromazine], Cardizem [diltiazem], and Pcn [penicillins]    Home Medications:  Prior to Admission medications    Medication Sig Start Date End Date Taking? Authorizing Provider   clindamycin (CLEOCIN-T) 1 % gel Apply topically 2 times daily. 4/9/21 4/16/21 Yes Sujatha Steven,    metoprolol tartrate (LOPRESSOR) 25 MG tablet Take 1 tablet by mouth 2 times daily 3/26/21  Yes Yfn Metcalf DO   pantoprazole (PROTONIX) 40 MG tablet Take 1 tablet by mouth 2 times daily (before meals) 3/26/21  Yes Yfn Metcalf DO   FLUoxetine (PROZAC) 40 MG capsule Take 40 mg by mouth daily   Yes Historical Provider, MD   gabapentin (NEURONTIN) 600 MG tablet Take 600 mg by mouth 3 times daily.    Yes Historical Provider, MD   albuterol sulfate HFA (PROVENTIL HFA) 108 (90 BASE) MCG/ACT inhaler Inhale 1-2 puffs into the lungs every 4 hours as needed for Wheezing 3/29/16  Yes Luz Allen MD   bisacodyl (DULCOLAX) 10 MG suppository Place 1 suppository rectally daily as needed for Constipation 3/26/21 4/25/21  Yfn Metcalf DO   polyethylene glycol (GLYCOLAX) 17 g packet Take 17 g by mouth 3 times daily 3/26/21 4/25/21  Yfn Metcalf DO   sennosides-docusate sodium (SENOKOT-S) 8.6-50 MG tablet Take 2 tablets by mouth daily 3/27/21   Yfn Metcalf DO   predniSONE (DELTASONE) 20 MG tablet Take 1 tablet by mouth daily Take 3 tablets day 1-2. 2 tablets day 3-4 and 1 tablet day 5 10/3/17   ABRAHAM Porter - CNP   diphenhydrAMINE (BENADRYL) 25 MG capsule Take 1 capsule by mouth every 6 hours as needed for Itching  Patient not taking: Reported on 3/30/2021 10/3/17   Raheem Hallman APRN - CNP   naloxone [facility]) 2 MG/2ML injection  11/1/16   Historical Provider, MD       REVIEW OFSYSTEMS    (2-9 systems for level 4, 10 or more for level 5)      Review of Systems   Constitutional: Negative for activity change, appetite change, chills, fatigue and fever. HENT: Negative for congestion. Respiratory: Negative for cough and shortness of breath. Cardiovascular: Negative for chest pain. Gastrointestinal: Positive for nausea. Negative for abdominal pain, constipation, diarrhea and vomiting. Genitourinary: Negative for dysuria and frequency. Musculoskeletal: Negative for back pain. Skin: Positive for wound. Negative for rash. Neurological: Negative for syncope, weakness, light-headedness and headaches. PHYSICAL EXAM   (up to 7 for level 4, 8 or more forlevel 5)      INITIAL VITALS:   ED Triage Vitals   BP Temp Temp src Pulse Resp SpO2 Height Weight   04/09/21 1842 04/09/21 1842 -- 04/09/21 1842 -- 04/09/21 1842 -- 04/09/21 2130   125/80 97.9 °F (36.6 °C)  74  95 %  185 lb (83.9 kg)       Physical Exam  Vitals signs and nursing note reviewed. Constitutional:       General: She is not in acute distress. Appearance: Normal appearance. She is well-developed. She is not diaphoretic. HENT:      Head: Normocephalic and atraumatic. Cardiovascular:      Rate and Rhythm: Normal rate and regular rhythm. Heart sounds: Normal heart sounds. Pulmonary:      Effort: Pulmonary effort is normal. No respiratory distress. Breath sounds: Normal breath sounds. No wheezing or rales. Abdominal:      General: There is no distension. Palpations: Abdomen is soft. Tenderness: There is abdominal tenderness. There is no guarding. Comments: Midline ex lap large midline ex lap scar that is clean, dry, intact.   There is a small area just to the left of midline of the umbilicus that has some yellowish drainage, surrounding erythema and mild warmth. No fluctuance. Minimal tenderness at the area of warmth but no other abdominal tenderness. Musculoskeletal: Normal range of motion. General: No swelling or tenderness. Skin:     General: Skin is warm and dry. Neurological:      Mental Status: She is alert. Mental status is at baseline. Psychiatric:         Behavior: Behavior normal.         Thought Content: Thought content normal.         DIFFERENTIAL  DIAGNOSIS     PLAN (LABS / IMAGING / EKG):  Orders Placed This Encounter   Procedures    Inpatient consult to Trauma Surgery       MEDICATIONS ORDERED:  Orders Placed This Encounter   Medications    clindamycin (CLEOCIN-T) 1 % gel     Sig: Apply topically 2 times daily. Dispense:  1 Tube     Refill:  0           Initial MDM/Plan/ED COURSE:    46 y.o. female who presents with complaints of erythema, drainage from incision site. On exam patient is well-appearing, nontoxic. Vital signs are within normal limits. On physical exam Midline ex lap large midline ex lap scar that is clean, dry, intact. There is a small area just to the left of midline of the umbilicus that has some yellowish drainage, surrounding erythema and mild warmth. No fluctuance. Minimal tenderness at the area of warmth but no other abdominal tenderness. Cardiac regular rate and rhythm. Lungs clear to auscultation bilaterally. At this time we will hold off on any labs or imaging as patient's vitals are within normal limits, abdomen is benign, there is no evidence of deeper infection at this time. We will plan for general surgery consult but likely discharge home with p.o. antibiotics and follow-up outpatient.     ED Course as of Apr 10 0329   Fri Apr 09, 2021   4427 Surgery at bedside.    [LW]      ED Course User Index  [LW] Maryellen Raines, DO      Surgery, Dr. Alfonzo Rios recommend topical clindamycin, follow-up as needed in the

## 2021-04-21 ENCOUNTER — TELEPHONE (OUTPATIENT)
Dept: GASTROENTEROLOGY | Age: 51
End: 2021-04-21

## 2021-04-21 ENCOUNTER — OFFICE VISIT (OUTPATIENT)
Dept: GASTROENTEROLOGY | Age: 51
End: 2021-04-21
Payer: MEDICARE

## 2021-04-21 VITALS — BODY MASS INDEX: 29.29 KG/M2 | TEMPERATURE: 97 F | WEIGHT: 187 LBS

## 2021-04-21 DIAGNOSIS — K27.9 PUD (PEPTIC ULCER DISEASE): Primary | ICD-10-CM

## 2021-04-21 DIAGNOSIS — Z12.11 COLON CANCER SCREENING: ICD-10-CM

## 2021-04-21 PROCEDURE — 4004F PT TOBACCO SCREEN RCVD TLK: CPT | Performed by: INTERNAL MEDICINE

## 2021-04-21 PROCEDURE — G8419 CALC BMI OUT NRM PARAM NOF/U: HCPCS | Performed by: INTERNAL MEDICINE

## 2021-04-21 PROCEDURE — G8427 DOCREV CUR MEDS BY ELIG CLIN: HCPCS | Performed by: INTERNAL MEDICINE

## 2021-04-21 PROCEDURE — 99214 OFFICE O/P EST MOD 30 MIN: CPT | Performed by: INTERNAL MEDICINE

## 2021-04-21 PROCEDURE — 3017F COLORECTAL CA SCREEN DOC REV: CPT | Performed by: INTERNAL MEDICINE

## 2021-04-21 PROCEDURE — 1111F DSCHRG MED/CURRENT MED MERGE: CPT | Performed by: INTERNAL MEDICINE

## 2021-04-21 ASSESSMENT — ENCOUNTER SYMPTOMS
CHOKING: 0
TROUBLE SWALLOWING: 0
ABDOMINAL PAIN: 0
DIARRHEA: 0
CONSTIPATION: 0
ANAL BLEEDING: 0
NAUSEA: 1
VOMITING: 0
COUGH: 0
ABDOMINAL DISTENTION: 0
RECTAL PAIN: 0
BLOOD IN STOOL: 0
WHEEZING: 0

## 2021-04-21 NOTE — PROGRESS NOTES
GI CLINIC FOLLOW UP    INTERVAL HISTORY:   Terrye Litten, MD  [address] [place] [address], [id number] Replaced by [facility]    Chief Complaint   Patient presents with    Follow-up     Patient is f/u on hospital visit where he had colonic distention           HISTORY OF PRESENT ILLNESS: Ms.Inez Marina Negron is a 46 y.o. female , referred for evaluation of recent perforated gastric ulcers with exploratory lap, and Billroth II surgery, colon distention with possible ischemic changes and stricture. Here for F/U    seeing me for the first time    seen at [facility]   She was admitted with  abdominal pain, nausea, vomiting questionable coffee-ground emesis due to high-dose aspirin use. And and NSAIDs use     -On 3/5/2021  she was taken to the OR for exploratory laparotomy due to perforated gastric ulcer (most likely due to frequent use of large doses of ASA for her migraines), and NSAIDs, had antrectomy with Billroth II reconstruction and placement of 2 MILTON drains.   -On 3/11/2021 patient was taken back to the OR for exploratory laparotomy, abdominal washout and midline fascial closure with retention sutures. -3/21/2021 She was found to have significant colonic distention on CT scan  with noted sigmoid stricture. Decompression colonoscopy done twice   · Severely dilated: Emerald Patel Successful decompression performed. · Ischemia in the cecum and ascending colon. Did well and she was  D/C ON   MIRALAX AND REGLAN   Stopped them because of diarrhea   curently doing   well  She had no abdominal distention no abdominal pain no bleeding no nausea no vomiting no fever no chills  And review of system with her otherwise unremarkable  Past Medical,Family, and Social History reviewed and does contribute to the patient presentingcondition. Patient's PMH/PSH,SH,PSYCH Hx, MEDs, ALLERGIES, and ROS were all reviewed and updated in the appropriate sections.     PAST MEDICAL HISTORY:  Past Medical History:   Diagnosis Date    Asthma     Connective tissue disease, undifferentiated (HCC) 1987    Dr. Jackie Juarez (Rheum.)    DDD (degenerative disc disease) 2000    Dr. Orona Daily Depression     Lumbar radicular pain     Migraine 1988    Dr. Clive Escudero Mitral valve prolapse 1978    Dr. Beny Kapoor addiction ([facility] [address] 01/02/2013    Dr. Jim Conklin / on 10/3/17 pt states she completed tx 2016       Past Surgical History:   Procedure Laterality Date    COLONOSCOPY N/A 03/17/2021    COLONOSCOPY FLEXIBLE W/ DECOMPRESSION performed by Carmelo Crouch MD at  [facility] [address] COLONOSCOPY  03/21/2021    COLONOSCOPY N/A 3/21/2021    COLONOSCOPY DIAGNOSTIC performed by Marlene Hernandez MD at [address]  03/04/2021    [address] [facility] 3/4/2021 [address]    as child    HYSTERECTOMY      2007    LAPAROTOMY N/A 03/05/2021    EXPLORATORY LAPAROTOMY, PARTIAL GASTRECTOMY performed by Moshe Dinh MD at [address] 03/11/2021    LAPAROTOMY EXPLORATORY, ABDOMINAL [address] OUT, FASCIAL CLOSURE  performed by Francisco Mccoy MD at [address] [facility] 07/27/2015    right lumbar JHONY    NOSE SURGERY  1989    After nose was broken. Dr. Maeve Moe Left 2007    Dr. Silvino Grace  2007    Dr. Trudy Munguia:    Current Outpatient Medications:     metoprolol tartrate (LOPRESSOR) 25 MG tablet, Take 1 tablet by mouth 2 times daily, Disp: 60 tablet, Rfl: 3    pantoprazole (PROTONIX) 40 MG tablet, Take 1 tablet by mouth 2 times daily (before meals), Disp: 30 tablet, Rfl: 3    FLUoxetine (PROZAC) 40 MG capsule, Take 40 mg by mouth daily, Disp: , Rfl:     gabapentin (NEURONTIN) 600 MG tablet, Take 600 mg by mouth 3 times daily. , Disp: , Rfl:     albuterol sulfate HFA (PROVENTIL HFA) 108 (90 BASE) MCG/ACT inhaler, Inhale 1-2 puffs into the lungs every 4 hours as needed for Wheezing, Disp: 1 Inhaler, Rfl: 0    ALLERGIES:   Allergies   Allergen Reactions    Compazine [Prochlorperazine Maleate] Other (See Comments)     Seizure like syndrome, eyes roll to back of head    Imitrex [Sumatriptan] Other (See Comments)     TIA    Thorazine [Chlorpromazine] Other (See Comments)     Seizure like syndrome    Cardizem [Diltiazem] Rash    Pcn [Penicillins] Rash       FAMILY HISTORY:       Problem Relation Age of Onset    Other Mother 64        Multi Organ Failure    Arthritis Mother     High Cholesterol Father     Heart Attack Father     Arthritis Father         Rheumatoid    Other Brother         Vericose Veins    Diabetes Maternal Grandmother     Heart Attack Maternal Grandmother     Other Maternal Grandfather 8        Black Lung, Clotting Disorder, Vericose Veins    Mental Illness Paternal Grandmother     Alcohol Abuse Paternal Grandfather     Heart Attack Paternal Grandfather     Depression Daughter     Anxiety Disorder Daughter     Asthma Daughter     Depression Son     Other Son         Opitional Disorder, Behavioral and Congnitive disorder.     ADHD Son     Asthma Son    Michael Riddles Diabetes Son          SOCIAL HISTORY:   Social History     Socioeconomic History    Marital status:      Spouse name: Not on file    Number of children: Not on file    Years of education: Not on file    Highest education level: Not on file   Occupational History    Not on file   Social Needs    Financial resource strain: Not on file    Food insecurity     Worry: Not on file     Inability: Not on file    Transportation needs     Medical: Not on file     Non-medical: Not on file   Tobacco Use    Smoking status: Current Every Day Smoker     Packs/day: 1.00     Types: Cigarettes    Smokeless tobacco: Never Used   Substance and Sexual Activity    Alcohol use: No     Alcohol/week: 0.0 standard drinks    Drug use: Not Currently     Types: Marijuana, IV, Opiates      Comment:  been a year since last used marijuana, heroin last used 2/13/2018    Sexual activity: Yes     Partners: Male   Lifestyle    Physical activity     Days per week: Not on file     Minutes per session: Not on file    Stress: Not on file   Relationships    Social connections     Talks on phone: Not on file     Gets together: Not on file     Attends [employer] service: Not on file     Active member of club or organization: Not on file     Attends meetings of clubs or organizations: Not on file     Relationship status: Not on file    Intimate partner violence     Fear of current or ex partner: Not on file     Emotionally abused: Not on file     Physically abused: Not on file     Forced sexual activity: Not on file   Other Topics Concern    Not on file   Social History Narrative    Not on file       REVIEW OF SYSTEMS: A 12-point review of systemswas obtained and pertinent positives and negatives were enumerated above in the history of present illness. All other reviewed systems / symptoms were negative. Review of Systems   Constitutional: Positive for appetite change and fatigue. Negative for unexpected weight change. HENT: Negative for trouble swallowing. Eyes: Positive for visual disturbance (glasses). Respiratory: Negative for cough, choking and wheezing. Cardiovascular: Negative for chest pain, palpitations and leg swelling. Gastrointestinal: Positive for nausea. Negative for abdominal distention, abdominal pain, anal bleeding, blood in stool, constipation, diarrhea, rectal pain and vomiting. Heartburn   Genitourinary: Negative for difficulty urinating. Allergic/Immunologic: Negative for environmental allergies and food allergies. Neurological: Negative for dizziness, weakness, light-headedness, numbness and headaches. Hematological: Does not bruise/bleed easily. Psychiatric/Behavioral: Negative for sleep disturbance. The patient is not nervous/anxious.             LABORATORY DATA: Reviewed  Lab Results   Component Value Date    WBC 7.6 03/26/2021    HGB 11.7 (L) 03/26/2021    HCT 38.6 03/26/2021    MCV 90.6 03/26/2021     03/26/2021     03/26/2021    K 3.8 03/26/2021     03/26/2021    CO2 25 03/26/2021    BUN 6 03/26/2021    CREATININE 0.65 03/26/2021    LABALBU 2.4 (L) 03/07/2021    BILITOT 0.34 03/07/2021    ALKPHOS 61 03/07/2021    AST 16 03/07/2021    ALT 10 03/07/2021    INR 1.1 03/04/2021         Lab Results   Component Value Date    RBC 4.26 03/26/2021    HGB 11.7 (L) 03/26/2021    MCV 90.6 03/26/2021    MCH 27.5 03/26/2021    MCHC 30.3 03/26/2021    RDW 13.8 03/26/2021    MPV 9.2 03/26/2021    BASOPCT 1 03/26/2021    LYMPHSABS 2.65 03/26/2021    MONOSABS 1.01 03/26/2021    NEUTROABS 3.35 03/26/2021    EOSABS 0.52 (H) 03/26/2021    BASOSABS 0.05 03/26/2021         DIAGNOSTIC TESTING:     Xr Abdomen (kub) (single Ap View)    Result Date: 3/26/2021  EXAMINATION: ONE SUPINE XRAY VIEW(S) OF THE ABDOMEN 3/26/2021 9:22 am COMPARISON: In 03/25/2021 HISTORY: ORDERING SYSTEM PROVIDED HISTORY: colonic inertia TECHNOLOGIST PROVIDED HISTORY: colonic inertia Reason for Exam: port supine, colonic inertia Acuity: Unknown Type of Exam: Unknown FINDINGS: Gaseous distension of the colon unchanged. Normal caliber gas-filled small bowel loops noted. Minimal or no gas seen skin staples. Bones grossly intact. In the rectum     Stable gaseous distension of the colon. Xr Abdomen (kub) (single Ap View)    Result Date: 3/25/2021  EXAMINATION: ONE SUPINE XRAY VIEW(S) OF THE ABDOMEN 3/25/2021 8:19 am COMPARISON: 03/24/2021, 03/22/2021 and 03/21/2021 HISTORY: ORDERING SYSTEM PROVIDED HISTORY: colonic distension TECHNOLOGIST PROVIDED HISTORY: colonic distension Reason for Exam: supine port abd for distention FINDINGS: Midline abdominal skin staples are present. Continued gaseous distention of the colon is noted, which appears more prominent. The transverse colon measures approximately 8 cm.   Small amount of gas within small bowel in the mid abdomen and pelvis. No evidence for free air on this supine exam.     Interval increase in colonic distension without evidence for small bowel distension. Xr Abdomen (kub) (single Ap View)    Result Date: 3/24/2021  EXAMINATION: ONE SUPINE XRAY VIEW(S) OF THE ABDOMEN 3/24/2021 8:54 am COMPARISON: 03/22/2021, 03/20/2021, 03/19/2021, 03/17/2021 HISTORY: ORDERING SYSTEM PROVIDED HISTORY: bowel distension TECHNOLOGIST PROVIDED HISTORY: bowel distension Reason for Exam: supine potable FINDINGS: Previously seen tubing overlying the left abdomen is no longer present. Midline abdominal skin staples are again noted. Gaseous distention of the colon again noted. Paucity of small bowel gas. No evidence for free air on this supine exam.     Colonic gaseous distension again noted, now more prominently involving the distal colon. No significant small bowel distension identified. PHYSICAL EXAMINATION: Vital signs reviewed per the nursing documentation. Temp 97 °F (36.1 °C)   Wt 187 lb (84.8 kg)   BMI 29.29 kg/m²   Body mass index is 29.29 kg/m². Physical Exam  Vitals signs and nursing note reviewed. Constitutional:       General: She is not in acute distress. Appearance: She is well-developed. She is not diaphoretic. HENT:      Head: Normocephalic. Mouth/Throat:      Pharynx: No oropharyngeal exudate. Eyes:      General: No scleral icterus. Pupils: Pupils are equal, round, and reactive to light. Neck:      Musculoskeletal: Normal range of motion and neck supple. Thyroid: No thyromegaly. Vascular: No JVD. Trachea: No tracheal deviation. Cardiovascular:      Rate and Rhythm: Normal rate and regular rhythm. Heart sounds: Normal heart sounds. No murmur. Pulmonary:      Effort: Pulmonary effort is normal. No respiratory distress. Breath sounds: Normal breath sounds. No wheezing.    Abdominal:      General: Bowel sounds are normal. There is no distension. Palpations: Abdomen is soft. Tenderness: There is no abdominal tenderness. There is no guarding or rebound. Comments: No ascites   Musculoskeletal: Normal range of motion. Skin:     General: Skin is warm. Coloration: Skin is not pale. Findings: No erythema or rash. Comments: She is not diaphoretic   Neurological:      Mental Status: She is alert and oriented to person, place, and time. Deep Tendon Reflexes: Reflexes are normal and symmetric. Psychiatric:         Behavior: Behavior normal.         Thought Content: Thought content normal.         Judgment: Judgment normal.           IMPRESSION: Ms. Rosana Giron is a 46 y.o. female with      Diagnosis Orders   1. PUD (peptic ulcer disease)  EGD   2. Colon cancer screening  COLONOSCOPY W/ OR W/O BIOPSY   Patient had perforated gastric ulcer treated with Billroth II as above   He had dilated distended colon, possible ischemic strictures   Vazquez CAT scan,   normal LFT/CBC   Continue Protonix for now     Diet/life style/natural hx /complication of the dx were all explained in details   Past medical, past surgical, social history, psychiatric history, medications or allergies, all reviewed and  updated    Thank you for allowing me to participate in the care of Ms. Walton. For any further questions please do not hesitate to contact me. I have reviewed and agree with the ROS entered by the MA/RN. Note is dictated utilizing voice recognition software. Unfortunately this leads to occasional typographical errors. Please contact our office if you have any questions.       Kody Curtis MD  [facility]  O: #[phone]

## 2021-07-08 ENCOUNTER — OFFICE VISIT (OUTPATIENT)
Dept: FAMILY MEDICINE CLINIC | Age: 51
End: 2021-07-08
Payer: MEDICARE

## 2021-07-08 VITALS
WEIGHT: 163.4 LBS | TEMPERATURE: 97.2 F | BODY MASS INDEX: 25.59 KG/M2 | HEART RATE: 77 BPM | OXYGEN SATURATION: 100 % | SYSTOLIC BLOOD PRESSURE: 138 MMHG | DIASTOLIC BLOOD PRESSURE: 89 MMHG

## 2021-07-08 DIAGNOSIS — Z76.89 ENCOUNTER TO ESTABLISH CARE WITH NEW DOCTOR: Primary | ICD-10-CM

## 2021-07-08 DIAGNOSIS — Z87.891 PERSONAL HISTORY OF TOBACCO USE: ICD-10-CM

## 2021-07-08 DIAGNOSIS — Z12.31 ENCOUNTER FOR SCREENING MAMMOGRAM FOR MALIGNANT NEOPLASM OF BREAST: ICD-10-CM

## 2021-07-08 DIAGNOSIS — Z23 NEED FOR SHINGLES VACCINE: ICD-10-CM

## 2021-07-08 DIAGNOSIS — Z11.59 NEED FOR HEPATITIS C SCREENING TEST: ICD-10-CM

## 2021-07-08 PROCEDURE — 99203 OFFICE O/P NEW LOW 30 MIN: CPT | Performed by: FAMILY MEDICINE

## 2021-07-08 PROCEDURE — G0296 VISIT TO DETERM LDCT ELIG: HCPCS | Performed by: FAMILY MEDICINE

## 2021-07-08 PROCEDURE — 4004F PT TOBACCO SCREEN RCVD TLK: CPT | Performed by: FAMILY MEDICINE

## 2021-07-08 PROCEDURE — G8419 CALC BMI OUT NRM PARAM NOF/U: HCPCS | Performed by: FAMILY MEDICINE

## 2021-07-08 PROCEDURE — 3017F COLORECTAL CA SCREEN DOC REV: CPT | Performed by: FAMILY MEDICINE

## 2021-07-08 PROCEDURE — G8427 DOCREV CUR MEDS BY ELIG CLIN: HCPCS | Performed by: FAMILY MEDICINE

## 2021-07-08 RX ORDER — ZOSTER VACCINE RECOMBINANT, ADJUVANTED 50 MCG/0.5
0.5 KIT INTRAMUSCULAR ONCE
Qty: 0.5 ML | Refills: 1 | Status: SHIPPED | OUTPATIENT
Start: 2021-07-08 | End: 2021-07-08

## 2021-07-08 RX ORDER — PANTOPRAZOLE SODIUM 40 MG/1
40 TABLET, DELAYED RELEASE ORAL
Qty: 30 TABLET | Refills: 3 | Status: SHIPPED | OUTPATIENT
Start: 2021-07-08 | End: 2021-09-07

## 2021-07-08 SDOH — ECONOMIC STABILITY: FOOD INSECURITY: WITHIN THE PAST 12 MONTHS, THE FOOD YOU BOUGHT JUST DIDN'T LAST AND YOU DIDN'T HAVE MONEY TO GET MORE.: NEVER TRUE

## 2021-07-08 SDOH — ECONOMIC STABILITY: FOOD INSECURITY: WITHIN THE PAST 12 MONTHS, YOU WORRIED THAT YOUR FOOD WOULD RUN OUT BEFORE YOU GOT MONEY TO BUY MORE.: NEVER TRUE

## 2021-07-08 ASSESSMENT — PATIENT HEALTH QUESTIONNAIRE - PHQ9
SUM OF ALL RESPONSES TO PHQ QUESTIONS 1-9: 2
SUM OF ALL RESPONSES TO PHQ QUESTIONS 1-9: 2
2. FEELING DOWN, DEPRESSED OR HOPELESS: 2
1. LITTLE INTEREST OR PLEASURE IN DOING THINGS: 0
SUM OF ALL RESPONSES TO PHQ9 QUESTIONS 1 & 2: 2
SUM OF ALL RESPONSES TO PHQ QUESTIONS 1-9: 2

## 2021-07-08 ASSESSMENT — SOCIAL DETERMINANTS OF HEALTH (SDOH): HOW HARD IS IT FOR YOU TO PAY FOR THE VERY BASICS LIKE FOOD, HOUSING, MEDICAL CARE, AND HEATING?: NOT HARD AT ALL

## 2021-07-08 NOTE — PROGRESS NOTES
2021    Alfie Khan (:  1970) is a 46 y.o. female, here to establish care. The patient was seen in this office around 5 years ago. She states since then she has been on a \"roller coaster\". She states that she was clean/did not take any pain meds. But then she did see a pain specialist who prescribed her pain medication and she started to use again. She has been clean since 2018. She is on methadone. She feels that she is doing well overall. The patient states that she overall does not have any concerns for me. She would like to establish care again. She was seen in the hospital almost the whole 2021 because of perforated ulcer. She has a follow-up with gastroenterology. . 3 kids. 2 grand babies. No violence at the current living place. No concerns about sexual transmitted diseases. Tobacco use: 1.5 ppd/40 years. Alcohol use:noen  Other drug use:  none  Depressed:  Not dressed - prozac. Vaccinations: Needs to have tetanus vaccine and multiple other vaccinations. Mammogram: Was ordered  Pap smear: Needs to have done  Colonoscopy: Was done. Patient Active Problem List   Diagnosis    Migraine    Opiate dependence ([address]    Chronic pain    Obesity    Patient overweight    Lumbar radicular pain    Lumbar disc disease    Closed nondisplaced fracture of fifth metatarsal bone of right foot    Intra-abdominal abscess (HCC)    Toxic dilatation of colon ([address]    Colonic ischemia ([address]       Review of Systems   Pertinent items are noted in HPI. Prior to Visit Medications    Medication Sig Taking?  Authorizing Provider   pantoprazole (PROTONIX) 40 MG tablet Take 1 tablet by mouth 2 times daily (before meals) Yes Dee Gomez MD   metoprolol tartrate (LOPRESSOR) 25 MG tablet Take 1 tablet by mouth 2 times daily Yes Dee Gomez MD   FLUoxetine (PROZAC) 40 MG capsule Take 40 mg by mouth daily Yes Historical Provider, MD   gabapentin (NEURONTIN) 600 MG tablet Take 600 mg by mouth 3 times daily. Yes Historical Provider, MD   albuterol sulfate HFA (PROVENTIL HFA) 108 (90 BASE) MCG/ACT inhaler Inhale 1-2 puffs into the lungs every 4 hours as needed for Wheezing Yes Hattie Fowler MD        Allergies   Allergen Reactions    Compazine [Prochlorperazine Maleate] Other (See Comments)     Seizure like syndrome, eyes roll to back of head    Imitrex [Sumatriptan] Other (See Comments)     TIA    Thorazine [Chlorpromazine] Other (See Comments)     Seizure like syndrome    Cardizem [Diltiazem] Rash    Pcn [Penicillins] Rash       Past Medical History:   Diagnosis Date    Asthma     Connective tissue disease, undifferentiated (HCC) 1987    Dr. Km Pace ([facility]    DDD (degenerative disc disease) 2000    Dr. Naya Palmer Depression     Lumbar radicular pain     Migraine 1988    Dr. Vasyl Sethi Mitral valve prolapse 1978    [facility]. Santos Srin[facility]an [facility] ([facility] 01/02/2013    Dr. Hooper Bear / on 10/3/17 pt states she completed tx 2016       Past Surgical History:   Procedure Laterality Date    COLONOSCOPY N/A 03/17/2021    COLONOSCOPY FLEXIBLE W/ DECOMPRESSION performed by Dior Menon MD at  [address] COLONOSCOPY  03/21/2021    COLONOSCOPY N/A 3/21/2021    COLONOSCOPY DIAGNOSTIC performed by Deena Hagan MD at [address] [place] [address]  03/04/2021    [address] [facility] 3/4/2021 [address] [address]    as child    HYSTERECTOMY      2007    LAPAROTOMY N/A 03/05/2021    EXPLORATORY LAPAROTOMY, PARTIAL GASTRECTOMY performed by Ramy Montague MD at [address] [place] [address] 03/11/2021    LAPAROTOMY EXPLORATORY, ABDOMINAL [address] OUT, FASCIAL CLOSURE  performed by Cherelle Rojas MD at [facility] 07/27/2015    right lumbar JHONY    NOSE SURGERY  1989    After nose was broken.  Dr. Helga Garcia Left 2007    Dr. Candi Gillette  2007    Dr. Baron Parker Rojelio Sarabia       Social History     Socioeconomic History    Marital status:      Spouse name: Not on file    Number of children: Not on file    Years of education: Not on file    Highest education level: Not on file   Occupational History    Not on file   Tobacco Use    Smoking status: Current Every Day Smoker     Packs/day: 1.00     Years: 40.00     Pack years: 40.00     Types: Cigarettes    Smokeless tobacco: Never Used   Substance and Sexual Activity    Alcohol use: No     Alcohol/week: 0.0 standard drinks    Drug use: Not Currently     Types: Marijuana, IV, Opiates      Comment:  been a year since last used marijuana, heroin last used 2/13/2018    Sexual activity: Yes     Partners: Male   Other Topics Concern    Not on file   Social History Narrative    Not on file     Social Determinants of Health     Financial Resource Strain: Low Risk     Difficulty of Paying Living Expenses: Not hard at all   Food Insecurity: No Food Insecurity    Worried About Running Out of Food in the Last Year: Never true    Whitney of Food in the Last Year: Never true   Transportation Needs:     Lack of Transportation (Medical):      Lack of Transportation (Non-Medical):    Physical Activity:     Days of Exercise per Week:     Minutes of Exercise per Session:    Stress:     Feeling of Stress :    Social Connections:     Frequency of Communication with Friends and Family:     Frequency of Social Gatherings with Friends and Family:     Attends Buddhism Services:     Active Member of Clubs or Organizations:     Attends Club or Organization Meetings:     Marital Status:    Intimate Partner Violence:     Fear of Current or Ex-Partner:     Emotionally Abused:     Physically Abused:     Sexually Abused:         Family History   Problem Relation Age of Onset    Other Mother 64        Multi Organ Failure    Arthritis Mother     High Cholesterol Father     Heart Attack Father     Arthritis Father         Rheumatoid    Other Brother         Vericose Veins    Diabetes Maternal Grandmother     Heart Attack Maternal Grandmother     Other Maternal Grandfather 8        Black Lung, Clotting Disorder, Vericose Veins    Mental Illness Paternal Grandmother     Alcohol Abuse Paternal Grandfather     Heart Attack Paternal Grandfather     Depression Daughter     Anxiety Disorder Daughter     Asthma Daughter     Depression Son     Other Son         Opitional Disorder, Behavioral and Congnitive disorder.  ADHD Son     Asthma Son     Diabetes Son        ADVANCE DIRECTIVE: N, <no information>    Vitals:    07/08/21 1534   BP: 138/89   Pulse: 77   Temp: 97.2 °F (36.2 °C)   SpO2: 100%   Weight: 163 lb 6.4 oz (74.1 kg)     Estimated body mass index is 25.59 kg/m² as calculated from the following:    Height as of 3/30/21: 5' 7\" (1.702 m). Weight as of this encounter: 163 lb 6.4 oz (74.1 kg). Physical Exam   HENT:   /89   Pulse 77   Temp 97.2 °F (36.2 °C)   Wt 163 lb 6.4 oz (74.1 kg)   SpO2 100%   BMI 25.59 kg/m²   Constitutional: Alert and oriented. Well-nourished. Head: Normocephalic and atraumatic. Right Ear: External ear normal. TM: no bulging, erythema or fluid seen. Left Ear: External ear normal. TM: no bulging, erythema or fluid seen. Nose: Nose normal.   Mouth/Throat: Oropharynx is clear and moist. Multiple teeth missing. Eyes: Pupils are equal, round, and reactive to light. Right eye exhibits no discharge. Left eye exhibits no discharge. No scleral icterus. Neck: Normal range of motion. Neck supple. No JVD present. No tracheal deviation present. No thyromegaly present. Cardiovascular: Normal rate, regular rhythm, normal heart sounds. Pulmonary/Chest: Effort normal and breath sounds normal. No respiratory distress. She has no wheezes. She has no rales. Abdominal: Soft. Bowel sounds are normal.  She exhibits no distension and no mass.  There is no tenderness. There is no rebound and no guarding. Musculoskeletal: Normal range of motion. She exhibits no edema or tenderness. Lymphadenopathy:    She has no cervical adenopathy. Neurological:  She is alert and oriented to person, place, and time. Cranial nerves grossly intact. No sensation problem noted. Muscle strength 5/5 throughout. Skin: Skin is warm and dry. No rash noted. No erythema. Psychiatric:  She has a normal mood and affect. Behavior is normal.      No flowsheet data found. Lab Results   Component Value Date    CHOL 158 04/01/2016    TRIG 67 03/07/2021    TRIG 72 04/01/2016    HDL 41 04/01/2016    LDLCHOLESTEROL 103 04/01/2016    GLUCOSE 98 03/26/2021    GLUCOSE 90 09/02/2017       The ASCVD Risk score (Mahesh Alvarez, et al., 2013) failed to calculate for the following reasons:    Cannot find a previous HDL lab    Cannot find a previous total cholesterol lab      There is no immunization history on file for this patient. Health Maintenance   Topic Date Due    Pneumococcal 0-64 years Vaccine (1 of 2 - PPSV23) Never done    COVID-19 Vaccine (1) Never done    DTaP/Tdap/Td vaccine (1 - Tdap) Never done    Breast cancer screen  03/14/2020    Shingles Vaccine (1 of 2) Never done    Low dose CT lung screening  03/14/2020    Lipid screen  04/01/2021    Flu vaccine (1) 09/01/2021    Colon cancer screen colonoscopy  03/21/2031    Hepatitis C screen  Completed    HIV screen  Completed    Hepatitis A vaccine  Aged Out    Hepatitis B vaccine  Aged Out    Hib vaccine  Aged Out    Meningococcal (ACWY) vaccine  Aged Out          Cass Earl was seen today for new patient. Diagnoses and all orders for this visit:    Encounter to establish care with new doctor  -     TSH with Reflex; Future    Encounter for screening mammogram for malignant neoplasm of breast  -     JASMINE DIGITAL SCREEN W OR WO CAD BILATERAL;  Future    Personal history of tobacco use  -     LA VISIT TO DISCUSS LUNG CA SCREEN W LDCT  -     CT Lung Screen (Annual); Future    Need for hepatitis C screening test  -     Hepatitis C Antibody; Future    Need for shingles vaccine  -     zoster recombinant adjuvanted vaccine [facility]) 50 MCG/0.5ML SUSR injection; Inject 0.5 mLs into the muscle once for 1 dose Repeat in 2-6 monhts    Other orders  -     pantoprazole (PROTONIX) 40 MG tablet; Take 1 tablet by mouth 2 times daily (before meals)  -     metoprolol tartrate (LOPRESSOR) 25 MG tablet; Take 1 tablet by mouth 2 times daily    The patient will continue to follow-up with the specialist and also the methadone clinic. She will call for any changes. I did refill her medications. Call or return to clinic prn if these symptoms worsen or fail to improve as anticipated. I have reviewed the instructions with the patient, answering all questions to her satisfaction. Return in about 6 months (around 1/8/2022), or if symptoms worsen or fail to improve. An electronic signature was used to authenticate this note. --Ge Jackson MD on 7/9/2021 at 6:41 AM     (Please note that portions of this note were completed with a voice recognition program. Efforts were made to edit the dictations but occasionally words are mis-transcribed.)      Low Dose CT (LDCT) Lung Screening criteria met   Age 55-77   Pack year smoking >30   Still smoking or less than 15 year since quit   No sign or symptoms of lung cancer   > 11 months since last LDCT     Risks and benefits of lung cancer screening with LDCT scans discussed:    Significance of positive screen - False-positive LDCT results often occur. 95% of all positive results do not lead to a diagnosis of cancer. Usually further imaging can resolve most false-positive results; however, some patients may require invasive procedures.     Over diagnosis risk - 10% to 12% of screen-detected lung cancer cases are over diagnosedthat is, the cancer would not have been detected in the patient's lifetime without the screening. Need for follow up screens annually to continue lung cancer screening effectiveness     Risks associated with radiation from annual LDCT- Radiation exposure is about the same as for a mammogram, which is about 1/3 of the annual background radiation exposure from everyday life. Starting screening at age 54 is not likely to increase cancer risk from radiation exposure. Patients with comorbidities resulting in life expectancy of < 10 years, or that would preclude treatment of an abnormality identified on CT, should not be screened due to lack of benefit.     To obtain maximal benefit from this screening, smoking cessation and long-term abstinence from smoking is critical

## 2021-07-29 ENCOUNTER — OFFICE VISIT (OUTPATIENT)
Dept: GASTROENTEROLOGY | Age: 51
End: 2021-07-29
Payer: MEDICARE

## 2021-07-29 VITALS
SYSTOLIC BLOOD PRESSURE: 151 MMHG | HEART RATE: 71 BPM | OXYGEN SATURATION: 98 % | WEIGHT: 160.9 LBS | HEIGHT: 68 IN | DIASTOLIC BLOOD PRESSURE: 98 MMHG | BODY MASS INDEX: 24.38 KG/M2

## 2021-07-29 DIAGNOSIS — K59.09 OTHER CONSTIPATION: ICD-10-CM

## 2021-07-29 DIAGNOSIS — R11.0 CHRONIC NAUSEA: Primary | ICD-10-CM

## 2021-07-29 DIAGNOSIS — D64.9 ANEMIA, UNSPECIFIED TYPE: ICD-10-CM

## 2021-07-29 PROCEDURE — 4004F PT TOBACCO SCREEN RCVD TLK: CPT | Performed by: INTERNAL MEDICINE

## 2021-07-29 PROCEDURE — G8427 DOCREV CUR MEDS BY ELIG CLIN: HCPCS | Performed by: INTERNAL MEDICINE

## 2021-07-29 PROCEDURE — G8420 CALC BMI NORM PARAMETERS: HCPCS | Performed by: INTERNAL MEDICINE

## 2021-07-29 PROCEDURE — 99214 OFFICE O/P EST MOD 30 MIN: CPT | Performed by: INTERNAL MEDICINE

## 2021-07-29 PROCEDURE — 3017F COLORECTAL CA SCREEN DOC REV: CPT | Performed by: INTERNAL MEDICINE

## 2021-07-29 RX ORDER — QUETIAPINE FUMARATE 25 MG/1
TABLET, FILM COATED ORAL
COMMUNITY
Start: 2021-07-07

## 2021-07-29 ASSESSMENT — ENCOUNTER SYMPTOMS: NAUSEA: 1

## 2021-07-29 NOTE — PROGRESS NOTES
GI CLINIC FOLLOW UP    INTERVAL HISTORY:   No referring provider defined for this encounter. Chief Complaint   Patient presents with    Nausea     has been surviving on ramen noodles , can't eat solid foods everyday.  Follow-up     need letter for [facility] to state she can tolerate 80mg of methadone they are trying to lower it. HISTORY OF PRESENT ILLNESS: Shahana Palomares is a 46 y.o. female , referred for evaluation of**recent perforated gastric ulcers with exploratory lap, and Billroth II surgery, colon distention with possible ischemic changes and stricture. GERD, Nausea, abnormal ct abd     Patient is here for follow-up  We saw her last visit please refer to the details on the last visit  Noncompliant did not do her EGD colonoscopy which were scheduled last visit   does smoke marijuana  Continues to complain about nausea and constipation and weight loss, she said the only reason she is here because she wants a letter to her methadone doctor not to decrease her dose  Looking at her labs she is anemic, when she was in the hospital her CAT scan also was abnormal on March 17, 2021 with colonic thickening  Last visit we ordered egd/colon    not done   She is on Protonix and not sure if she is taking it she denied NSAIDs denied aspirin denied bleeding  Patient has not happy that decreasing her methadone dose and she said that the sole reason she is here for me to write her a letter        Past Medical,Family, and Social History reviewed and does contribute to the patient presentingcondition. Patient's PMH/PSH,SH,PSYCH Hx, MEDs, ALLERGIES, and ROS were all reviewed and updated in the appropriate sections.     PAST MEDICAL HISTORY:  Past Medical History:   Diagnosis Date    Asthma     Connective tissue disease, undifferentiated (HCC) 1987    Dr. Nimco Blanco ([facility]    DDD (degenerative disc disease) 2000    Dr. Lida Martel Depression     Lumbar radicular pain     Migraine 1988     Chelsi    Mitral valve prolapse 1978    Dr. Francisco Jesus addiction ([facility] 01/02/2013    Dr. Sunshine Dotson / on 10/3/17 pt states she completed tx 2016       Past Surgical History:   Procedure Laterality Date    COLONOSCOPY N/A 03/17/2021    COLONOSCOPY FLEXIBLE W/ DECOMPRESSION performed by Marice Leyden, [facility] at [facility]    COLONOSCOPY  03/21/2021    COLONOSCOPY N/A 3/21/2021    COLONOSCOPY DIAGNOSTIC performed by Arabella Bennett MD at [address]  03/04/2021    [address] [facility] SUBCUTANEOUS 3/4/2021 [facility]Z CT [address]    as child    HYSTERECTOMY      2007    LAPAROTOMY N/A 03/05/2021    EXPLORATORY LAPAROTOMY, PARTIAL GASTRECTOMY performed by Renee Rock MD at [address] [facility] [address] 03/11/2021    LAPAROTOMY EXPLORATORY, ABDOMINAL [address] OUT, FASCIAL CLOSURE  performed by Orlando Bello MD at [address] 07/27/2015    right lumbar JHONY    NOSE SURGERY  1989    After nose was broken. Dr. Max Sutherland Left 2007    Dr. Bassam Ugalde  2007    Dr. Annetta Bardales:    Current Outpatient Medications:     QUEtiapine (SEROQUEL) 25 MG tablet, TAKE 1 TABLET BY MOUTH AT BEDTIME AS NEEDED, Disp: , Rfl:     pantoprazole (PROTONIX) 40 MG tablet, Take 1 tablet by mouth 2 times daily (before meals), Disp: 30 tablet, Rfl: 3    metoprolol tartrate (LOPRESSOR) 25 MG tablet, Take 1 tablet by mouth 2 times daily, Disp: 60 tablet, Rfl: 3    FLUoxetine (PROZAC) 40 MG capsule, Take 40 mg by mouth daily, Disp: , Rfl:     gabapentin (NEURONTIN) 600 MG tablet, Take 600 mg by mouth 3 times daily. , Disp: , Rfl:     albuterol sulfate HFA (PROVENTIL HFA) 108 (90 BASE) MCG/ACT inhaler, Inhale 1-2 puffs into the lungs every 4 hours as needed for Wheezing, Disp: 1 Inhaler, Rfl: 0    ALLERGIES:   Allergies   Allergen Reactions    Compazine [Prochlorperazine Maleate] Other (See Comments)     Seizure like syndrome, eyes roll to back of head    Imitrex [Sumatriptan] Other (See Comments)     TIA    Thorazine [Chlorpromazine] Other (See Comments)     Seizure like syndrome    Cardizem [Diltiazem] Rash    Pcn [Penicillins] Rash       FAMILY HISTORY:       Problem Relation Age of Onset    Other Mother 64        Multi Organ Failure    Arthritis Mother     High Cholesterol Father     Heart Attack Father     Arthritis Father         Rheumatoid    Other Brother         Vericose Veins    Diabetes Maternal Grandmother     Heart Attack Maternal Grandmother     Other Maternal Grandfather 8        Black Lung, Clotting Disorder, Vericose Veins    Mental Illness Paternal Grandmother     Alcohol Abuse Paternal Grandfather     Heart Attack Paternal Grandfather     Depression Daughter     Anxiety Disorder Daughter     Asthma Daughter     Depression Son     Other Son         Opitional Disorder, Behavioral and Congnitive disorder.     ADHD Son     Asthma Son    [facility] Diabetes Son          SOCIAL HISTORY:   Social History     Socioeconomic History    Marital status:      Spouse name: Not on file    Number of children: Not on file    Years of education: Not on file    Highest education level: Not on file   Occupational History    Not on file   Tobacco Use    Smoking status: Current Every Day Smoker     Packs/day: 1.00     Years: 40.00     Pack years: 40.00     Types: Cigarettes    Smokeless tobacco: Never Used   Substance and Sexual Activity    Alcohol use: No     Alcohol/week: 0.0 standard drinks    Drug use: Not Currently     Types: Marijuana, IV, Opiates      Comment:  been a year since last used marijuana, heroin last used 2/13/2018    Sexual activity: Yes     Partners: Male   Other Topics Concern    Not on file   Social History Narrative    Not on file     Social Determinants of Health     Financial Resource Strain: Low Risk     Difficulty of Paying Living Expenses: Not hard at all   Food Insecurity: No Food Insecurity    Worried About [address] [employer] in the Last Year: Never true    Whitney of Food in the Last Year: Never true   Transportation Needs:     Lack of Transportation (Medical):  Lack of Transportation (Non-Medical):    Physical Activity:     Days of Exercise per Week:     Minutes of Exercise per Session:    Stress:     Feeling of Stress :    Social Connections:     Frequency of Communication with Friends and Family:     Frequency of Social Gatherings with Friends and Family:     Attends Lutheran Services:     Active Member of Clubs or Organizations:     Attends Club or Organization Meetings:     Marital Status:    Intimate Partner Violence:     Fear of Current or Ex-Partner:     Emotionally Abused:     Physically Abused:     Sexually Abused:        REVIEW OF SYSTEMS: A 12-point review of systemswas obtained and pertinent positives and negatives were enumerated above in the history of present illness. All other reviewed systems / symptoms were negative. Review of Systems   Gastrointestinal: Positive for nausea.            LABORATORY DATA: Reviewed  Lab Results   Component Value Date    WBC 7.6 03/26/2021    HGB 11.7 (L) 03/26/2021    HCT 38.6 03/26/2021    MCV 90.6 03/26/2021     03/26/2021     03/26/2021    K 3.8 03/26/2021     03/26/2021    CO2 25 03/26/2021    BUN 6 03/26/2021    CREATININE 0.65 03/26/2021    LABALBU 2.4 (L) 03/07/2021    BILITOT 0.34 03/07/2021    ALKPHOS 61 03/07/2021    AST 16 03/07/2021    ALT 10 03/07/2021    INR 1.1 03/04/2021         Lab Results   Component Value Date    RBC 4.26 03/26/2021    HGB 11.7 (L) 03/26/2021    MCV 90.6 03/26/2021    MCH 27.5 03/26/2021    MCHC 30.3 03/26/2021    RDW 13.8 03/26/2021    MPV 9.2 03/26/2021    BASOPCT 1 03/26/2021    LYMPHSABS 2.65 03/26/2021    MONOSABS 1.01 03/26/2021    NEUTROABS 3.35 03/26/2021    EOSABS 0.52 (H) 03/26/2021    CORNELIO 0.05 03/26/2021         DIAGNOSTIC TESTING:     No results found. PHYSICAL EXAMINATION: Vital signs reviewed per the nursing documentation. BP (!) 151/98   Pulse 71   Ht 5' 7.5\" (1.715 m)   Wt 160 lb 14.4 oz (73 kg)   SpO2 98%   BMI 24.83 kg/m²   Body mass index is 24.83 kg/m². Physical Exam  Vitals and nursing note reviewed. Constitutional:       General: She is not in acute distress. Appearance: She is well-developed. She is not diaphoretic. HENT:      Head: Normocephalic. Mouth/Throat:      Pharynx: No oropharyngeal exudate. Eyes:      General: No scleral icterus. Pupils: Pupils are equal, round, and reactive to light. Neck:      Thyroid: No thyromegaly. Vascular: No JVD. Trachea: No tracheal deviation. Cardiovascular:      Rate and Rhythm: Normal rate and regular rhythm. Heart sounds: Normal heart sounds. No murmur heard. Pulmonary:      Effort: Pulmonary effort is normal. No respiratory distress. Breath sounds: Normal breath sounds. No wheezing. Abdominal:      General: Bowel sounds are normal. There is no distension. Palpations: Abdomen is soft. Tenderness: There is no abdominal tenderness. There is no guarding or rebound. Comments: No ascites   Musculoskeletal:         General: Normal range of motion. Cervical back: Normal range of motion and neck supple. Skin:     General: Skin is warm. Coloration: Skin is not pale. Findings: No erythema or rash. Comments: She is not diaphoretic   Neurological:      Mental Status: She is alert and oriented to person, place, and time. Deep Tendon Reflexes: Reflexes are normal and symmetric. Psychiatric:         Behavior: Behavior normal.         Thought Content: Thought content normal.         Judgment: Judgment normal.           IMPRESSION: Ms. [facility] is a 46 y.o. female with      Diagnosis Orders   1. Chronic nausea  EGD   2.  Other constipation  COLONOSCOPY W/ OR W/O BIOPSY   3. Anemia, unspecified type  Iron and TIBC    Vitamin B12    Folate    Hepatic Function Panel     Try to educate this patient about compliance  About the need to quit marijuana with her significant nausea and weight loss  About the hypomotility  Under distention of the bowel on imaging  And the need to listen to her methadone doctor and see if she can be weaned off  All of this were failed and the patient is very satisfied and very obsessed about keeping her methadone dose on the same, she said marijuana is the only thing which make her feel better. The patient did not do her endoscopies I told her she needs to do those especially with her anemia especially with the CAT scan abnormality of colon thickening, will order the above and I type to make her feel better and see if she will be compliant with us so we can take care of her, I am not sure about this patient if she can be compliant I told her after we do the studies on the labs we can make further recommendation but before that I cannot just write her letters blindly    Diet/life style/natural hx /complication of the dx were all explained in details   Past medical, past surgical, social history, psychiatric history, medications or allergies, all reviewed and  updated    Thank you for allowing me to participate in the care of Ms. Walton. For any further questions please do not hesitate to contact me. I have reviewed and agree with the ROS entered by the MA/RN. Note is dictated utilizing voice recognition software. Unfortunately this leads to occasional typographical errors. Please contact our office if you have any questions.       Ramandeep Guillen MD  [facility]  O: #[phone]

## 2021-07-30 RX ORDER — POLYETHYLENE GLYCOL 3350 17 G/17G
POWDER, FOR SOLUTION ORAL
Qty: 238 G | Refills: 0 | Status: ON HOLD | OUTPATIENT
Start: 2021-07-30 | End: 2021-08-19 | Stop reason: ALTCHOICE

## 2021-08-02 ENCOUNTER — TELEPHONE (OUTPATIENT)
Dept: GASTROENTEROLOGY | Age: 51
End: 2021-08-02

## 2021-08-02 NOTE — TELEPHONE ENCOUNTER
Writer left msg on pt's vm informing pt she is sched for covid test @ [facility] 8/15/21 @ 10:10am. Left msg instructing pt where to report.   Also left msg informing pt she will receive a call from surgery nurse to go over pre-testing questions on 8/5/21 @ 10:10am.

## 2021-08-05 ENCOUNTER — HOSPITAL ENCOUNTER (OUTPATIENT)
Dept: PREADMISSION TESTING | Age: 51
Discharge: HOME | End: 2021-08-09
Payer: MEDICARE

## 2021-08-05 VITALS — HEIGHT: 68 IN | WEIGHT: 165 LBS | BODY MASS INDEX: 25.01 KG/M2

## 2021-08-05 RX ORDER — METHADONE HYDROCHLORIDE 10 MG/5ML
80 SOLUTION ORAL DAILY
COMMUNITY

## 2021-08-05 NOTE — PROGRESS NOTES
Pre-op Instructions For Out-Patient Surgery    Medication Instructions:  · Please stop herbs and any supplements now (includes vitamins and minerals). · Please contact your surgeon and prescribing physician for pre-op instructions for any blood thinners. · If you have inhalers/aerosol treatments at home, please use them the morning of your surgery and bring the inhalers with you to the hospital.    · Please take the following medications the morning of your surgery with a sip of water:  Metoprolol, methadone. Surgery Instructions:  1. After midnight before surgery:  Do not eat or drink anything, including water, mints, gum, and hard candy. You may brush your teeth without swallowing. No smoking, chewing tobacco, or street drugs. 2. Please shower or bathe before surgery. If you were given Surgical Scrub Chlorhexidine Gluconate Liquid (CHG), please shower the night before and the morning of your surgery following the detailed instructions you received during your pre-admission visit. 3. Please do not wear any cologne, lotion, powder, deodorant, jewelry, piercings, perfume, makeup, nail polish, hair accessories, or hair spray on the day of surgery. Wear loose comfortable clothing. 4. Leave your valuables at home. Bring a storage case for any glasses/contacts. 5. An adult who is responsible for you MUST drive you home and should be with you for the first 24 hours after surgery. 6. If having out-patient knee and foot surgeries, please arrange for planned crutches, walker, or wheelchair before arriving to the hospital.    The Day of Surgery:  · Arrive at [facility] Surgery Entrance at the time directed by your surgeon and check in at the desk. · If you have a living will or healthcare power of , please bring a copy. · You will be taken to the pre-op holding area where you will be prepared for surgery.   A physical assessment will be performed by a nurse practitioner or house officer. Your IV will be started and you will meet your anesthesiologist.    · When you go to surgery, your family will be directed to the surgical waiting room, where the doctor should speak with them after your surgery. · After surgery, you will be taken to the recovery room then when you are awake and stable you will go to the short stay unit for preparation to be discharged. Only your one designated person is allowed to come to short stay for your discharge. · If you use a Bi-PAP or C-PAP machine, please bring it with you and leave it in the car in case it is needed in recovery room. Instructions read to Cinda Lino and understanding verbalized.

## 2021-08-16 NOTE — TELEPHONE ENCOUNTER
Patient called back and LVM that she missed call regarding Covid testing . Please return her call to [phone].  Thank You

## 2021-08-16 NOTE — TELEPHONE ENCOUNTER
Patient left vm stating she missed her covid test.  Writer called patient back to re-schedule her covid test at [phone] but no answer. Left vm to all us back to reschedule.

## 2021-08-17 NOTE — TELEPHONE ENCOUNTER
Writer returned phone call and spoke to pt over the phone in regards to rescheduling missed covid test.  Writer asked pt if I am able to get appt for covid test today, 8/17/21, would she be able to go. Pt states she could doing testing in the afternoon between 1-2pm.  Writer spoke to Brandy Bryan from [facility] surgery scheduling and she states they will not be able to do a covid test due to not enough time for results. Brandy Bryan was able to approval for rapid covid test day of proc. Writer informed pt she will have a rapid covid test done on the day of proc and pt voices her understanding.

## 2021-08-18 ENCOUNTER — ANESTHESIA EVENT (OUTPATIENT)
Dept: ENDOSCOPY | Age: 51
End: 2021-08-18
Payer: MEDICARE

## 2021-08-19 ENCOUNTER — ANESTHESIA (OUTPATIENT)
Dept: ENDOSCOPY | Age: 51
End: 2021-08-19
Payer: MEDICARE

## 2021-08-19 ENCOUNTER — HOSPITAL ENCOUNTER (OUTPATIENT)
Age: 51
Setting detail: OUTPATIENT SURGERY
Discharge: HOME | End: 2021-08-19
Attending: INTERNAL MEDICINE | Admitting: INTERNAL MEDICINE
Payer: MEDICARE

## 2021-08-19 VITALS
SYSTOLIC BLOOD PRESSURE: 150 MMHG | BODY MASS INDEX: 25.9 KG/M2 | HEART RATE: 72 BPM | DIASTOLIC BLOOD PRESSURE: 76 MMHG | TEMPERATURE: 96.8 F | HEIGHT: 67 IN | WEIGHT: 165 LBS | RESPIRATION RATE: 16 BRPM | OXYGEN SATURATION: 99 %

## 2021-08-19 VITALS
OXYGEN SATURATION: 97 % | RESPIRATION RATE: 24 BRPM | SYSTOLIC BLOOD PRESSURE: 150 MMHG | DIASTOLIC BLOOD PRESSURE: 71 MMHG

## 2021-08-19 LAB
SARS-COV-2, RAPID: NOT DETECTED
SPECIMEN DESCRIPTION: NORMAL

## 2021-08-19 PROCEDURE — 2500000003 HC RX 250 WO HCPCS: Performed by: SPECIALIST

## 2021-08-19 PROCEDURE — 2500000003 HC RX 250 WO HCPCS: Performed by: ANESTHESIOLOGY

## 2021-08-19 PROCEDURE — 2580000003 HC RX 258: Performed by: ANESTHESIOLOGY

## 2021-08-19 PROCEDURE — 3609027000 HC COLONOSCOPY: Performed by: INTERNAL MEDICINE

## 2021-08-19 PROCEDURE — 88342 IMHCHEM/IMCYTCHM 1ST ANTB: CPT

## 2021-08-19 PROCEDURE — 7100000000 HC PACU RECOVERY - FIRST 15 MIN: Performed by: INTERNAL MEDICINE

## 2021-08-19 PROCEDURE — 7100000001 HC PACU RECOVERY - ADDTL 15 MIN: Performed by: INTERNAL MEDICINE

## 2021-08-19 PROCEDURE — 3700000001 HC ADD 15 MINUTES (ANESTHESIA): Performed by: INTERNAL MEDICINE

## 2021-08-19 PROCEDURE — 88305 TISSUE EXAM BY PATHOLOGIST: CPT

## 2021-08-19 PROCEDURE — 6360000002 HC RX W HCPCS: Performed by: SPECIALIST

## 2021-08-19 PROCEDURE — 7100000030 HC ASPR PHASE II RECOVERY - FIRST 15 MIN: Performed by: INTERNAL MEDICINE

## 2021-08-19 PROCEDURE — 7100000011 HC PHASE II RECOVERY - ADDTL 15 MIN: Performed by: INTERNAL MEDICINE

## 2021-08-19 PROCEDURE — 2709999900 HC NON-CHARGEABLE SUPPLY: Performed by: INTERNAL MEDICINE

## 2021-08-19 PROCEDURE — 87635 SARS-COV-2 COVID-19 AMP PRB: CPT

## 2021-08-19 PROCEDURE — 88312 SPECIAL STAINS GROUP 1: CPT

## 2021-08-19 PROCEDURE — 2580000003 HC RX 258: Performed by: SPECIALIST

## 2021-08-19 PROCEDURE — 3700000000 HC ANESTHESIA ATTENDED CARE: Performed by: INTERNAL MEDICINE

## 2021-08-19 PROCEDURE — 7100000010 HC PHASE II RECOVERY - FIRST 15 MIN: Performed by: INTERNAL MEDICINE

## 2021-08-19 PROCEDURE — 43239 EGD BIOPSY SINGLE/MULTIPLE: CPT | Performed by: INTERNAL MEDICINE

## 2021-08-19 PROCEDURE — 3609012400 HC EGD TRANSORAL BIOPSY SINGLE/MULTIPLE: Performed by: INTERNAL MEDICINE

## 2021-08-19 PROCEDURE — 45378 DIAGNOSTIC COLONOSCOPY: CPT | Performed by: INTERNAL MEDICINE

## 2021-08-19 PROCEDURE — 7100000031 HC ASPR PHASE II RECOVERY - ADDTL 15 MIN: Performed by: INTERNAL MEDICINE

## 2021-08-19 RX ORDER — PROPOFOL 10 MG/ML
INJECTION, EMULSION INTRAVENOUS CONTINUOUS PRN
Status: DISCONTINUED | OUTPATIENT
Start: 2021-08-19 | End: 2021-08-19 | Stop reason: SDUPTHER

## 2021-08-19 RX ORDER — PROPOFOL 10 MG/ML
INJECTION, EMULSION INTRAVENOUS PRN
Status: DISCONTINUED | OUTPATIENT
Start: 2021-08-19 | End: 2021-08-19 | Stop reason: SDUPTHER

## 2021-08-19 RX ORDER — LIDOCAINE HYDROCHLORIDE 10 MG/ML
INJECTION, SOLUTION EPIDURAL; INFILTRATION; INTRACAUDAL; PERINEURAL PRN
Status: DISCONTINUED | OUTPATIENT
Start: 2021-08-19 | End: 2021-08-19 | Stop reason: SDUPTHER

## 2021-08-19 RX ORDER — LABETALOL HYDROCHLORIDE 5 MG/ML
5 INJECTION, SOLUTION INTRAVENOUS EVERY 10 MIN PRN
Status: DISCONTINUED | OUTPATIENT
Start: 2021-08-19 | End: 2021-08-19 | Stop reason: HOSPADM

## 2021-08-19 RX ORDER — OXYCODONE HYDROCHLORIDE AND ACETAMINOPHEN 5; 325 MG/1; MG/1
1 TABLET ORAL PRN
Status: DISCONTINUED | OUTPATIENT
Start: 2021-08-19 | End: 2021-08-19 | Stop reason: HOSPADM

## 2021-08-19 RX ORDER — ONDANSETRON 2 MG/ML
4 INJECTION INTRAMUSCULAR; INTRAVENOUS
Status: DISCONTINUED | OUTPATIENT
Start: 2021-08-19 | End: 2021-08-19 | Stop reason: HOSPADM

## 2021-08-19 RX ORDER — LIDOCAINE HYDROCHLORIDE 10 MG/ML
1 INJECTION, SOLUTION EPIDURAL; INFILTRATION; INTRACAUDAL; PERINEURAL
Status: COMPLETED | OUTPATIENT
Start: 2021-08-19 | End: 2021-08-19

## 2021-08-19 RX ORDER — SODIUM CHLORIDE, SODIUM LACTATE, POTASSIUM CHLORIDE, CALCIUM CHLORIDE 600; 310; 30; 20 MG/100ML; MG/100ML; MG/100ML; MG/100ML
INJECTION, SOLUTION INTRAVENOUS CONTINUOUS
Status: DISCONTINUED | OUTPATIENT
Start: 2021-08-19 | End: 2021-08-19 | Stop reason: HOSPADM

## 2021-08-19 RX ORDER — LIDOCAINE HYDROCHLORIDE 10 MG/ML
INJECTION, SOLUTION EPIDURAL; INFILTRATION; INTRACAUDAL; PERINEURAL PRN
Status: DISCONTINUED | OUTPATIENT
Start: 2021-08-19 | End: 2021-08-19

## 2021-08-19 RX ORDER — OXYCODONE HYDROCHLORIDE AND ACETAMINOPHEN 5; 325 MG/1; MG/1
2 TABLET ORAL PRN
Status: DISCONTINUED | OUTPATIENT
Start: 2021-08-19 | End: 2021-08-19 | Stop reason: HOSPADM

## 2021-08-19 RX ORDER — DIPHENHYDRAMINE HYDROCHLORIDE 50 MG/ML
12.5 INJECTION INTRAMUSCULAR; INTRAVENOUS
Status: DISCONTINUED | OUTPATIENT
Start: 2021-08-19 | End: 2021-08-19 | Stop reason: HOSPADM

## 2021-08-19 RX ORDER — SODIUM CHLORIDE, SODIUM LACTATE, POTASSIUM CHLORIDE, CALCIUM CHLORIDE 600; 310; 30; 20 MG/100ML; MG/100ML; MG/100ML; MG/100ML
INJECTION, SOLUTION INTRAVENOUS CONTINUOUS PRN
Status: DISCONTINUED | OUTPATIENT
Start: 2021-08-19 | End: 2021-08-19 | Stop reason: SDUPTHER

## 2021-08-19 RX ADMIN — LIDOCAINE HYDROCHLORIDE 1 ML: 10 INJECTION, SOLUTION EPIDURAL; INFILTRATION; INTRACAUDAL; PERINEURAL at 11:33

## 2021-08-19 RX ADMIN — PROPOFOL 80 MCG/KG/MIN: 10 INJECTION, EMULSION INTRAVENOUS at 13:07

## 2021-08-19 RX ADMIN — LIDOCAINE HYDROCHLORIDE 50 MG: 10 INJECTION, SOLUTION EPIDURAL; INFILTRATION; INTRACAUDAL; PERINEURAL at 12:45

## 2021-08-19 RX ADMIN — PROPOFOL 70 MG: 10 INJECTION, EMULSION INTRAVENOUS at 12:45

## 2021-08-19 RX ADMIN — SODIUM CHLORIDE, POTASSIUM CHLORIDE, SODIUM LACTATE AND CALCIUM CHLORIDE: 600; 310; 30; 20 INJECTION, SOLUTION INTRAVENOUS at 11:35

## 2021-08-19 RX ADMIN — SODIUM CHLORIDE, POTASSIUM CHLORIDE, SODIUM LACTATE AND CALCIUM CHLORIDE: 600; 310; 30; 20 INJECTION, SOLUTION INTRAVENOUS at 12:38

## 2021-08-19 RX ADMIN — PROPOFOL 100 MCG/KG/MIN: 10 INJECTION, EMULSION INTRAVENOUS at 12:50

## 2021-08-19 ASSESSMENT — PULMONARY FUNCTION TESTS
PIF_VALUE: 1
PIF_VALUE: 0
PIF_VALUE: 1

## 2021-08-19 ASSESSMENT — PAIN SCALES - GENERAL
PAINLEVEL_OUTOF10: 0

## 2021-08-19 ASSESSMENT — ENCOUNTER SYMPTOMS
SHORTNESS OF BREATH: 0
SORE THROAT: 0
COUGH: 0
WHEEZING: 0

## 2021-08-19 ASSESSMENT — PAIN - FUNCTIONAL ASSESSMENT: PAIN_FUNCTIONAL_ASSESSMENT: 0-10

## 2021-08-19 ASSESSMENT — LIFESTYLE VARIABLES: SMOKING_STATUS: 1

## 2021-08-19 NOTE — OP NOTE
Operative Note  PROCEDURE NOTE    DATE OF PROCEDURE: 8/19/2021     SURGEON: Jenn Castro MD  Facility: [facility]  ASSISTANT: None  Anesthesia: MAc   PREOPERATIVE DIAGNOSIS:   Anemia  Nausea    Diagnosis:  Candida esophagitis in the proximal esophagus biopsies were taken  Status post gastrectomy, gastritis with the anastomosis edematous biopsies were taken    Random small bowel biopsies      POSTOPERATIVE DIAGNOSIS: As described below    OPERATION: Upper GI endoscopy with Biopsy    ANESTHESIA: Moderate Sedation     ESTIMATED BLOOD LOSS: Less than 50 ml    COMPLICATIONS: None. SPECIMENS:  Was Obtained:     Candida esophagitis in the proximal esophagus biopsies were taken  Status post gastrectomy, gastritis with the anastomosis edematous biopsies were taken    Random small bowel biopsies    HISTORY: The patient is a 46y.o. year old female with history of above preop diagnosis. I recommended esophagogastroduodenoscopy with possible biopsy and I explained the risk, benefits, expected outcome, and alternatives to the procedure. Risks included but are not limited to bleeding, infection, respiratory distress, hypotension, and perforation of the esophagus, stomach, or duodenum. Patient understands and is in agreement. The patient was counseled at length about the risks of sherrill Covid-19 during their perioperative period and any recovery window from their procedure. The patient was made aware that sherrill Covid-19  may worsen their prognosis for recovering from their procedure  and lend to a higher morbidity and/or mortality risk. All material risks, benefits, and reasonable alternatives including postponing the procedure were discussed. The patient does wish to proceed with the procedure at this time. PROCEDURE: The patient was given IV conscious sedation. The patient's SPO2 remained above 90% throughout the procedure. The gastroscope was inserted orally and advanced under direct vision through the esophagus, through the stomach, through the pylorus, and into the descending duodenum. Post sedation note : The patient's SPO2 remained above 90% throughout the procedure. the vital signs remained stable , and no immediate complication form the procedure noted, patient will be ready for d/c when criteria is met . 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 Recommendations/Plan:   1. F/U Biopsies  2. F/U In Office in 3-4 weeks  3.  Discussed with the family    Electronically signed by Blanquita Woodward MD  on 8/19/2021 at 12:57 PM

## 2021-08-19 NOTE — OP NOTE
Operative Note  PROCEDURE NOTE    DATE OF PROCEDURE: 8/19/2021    SURGEON: Agustin Aguirre MD  Facility : [facility]  ASSISTANT: None  Anesthesia: MAC  PREOPERATIVE DIAGNOSIS:   Anemia      POSTOPERATIVE DIAGNOSIS: as described below    OPERATION: Total colonoscopy     ANESTHESIA: Moderate Sedation    ESTIMATED BLOOD LOSS: less than 50     COMPLICATIONS: None. SPECIMENS:  Was Not Obtained    HISTORY: The patient is a 46y.o. year old female with history of above preop diagnosis. I recommended colonoscopy with possible biopsy or polypectomy and I explained the risk, benefits, expected outcome, and alternatives to the procedure. Risks included but are not limited to bleeding, infection, respiratory distress, hypotension, and perforation of the colon and possibility of missing a lesion. The patient understands and is in agreement. The patient was counseled at length about the risks of sherrill Covid-19 during their perioperative period and any recovery window from their procedure. The patient was made aware that sherrill Covid-19  may worsen their prognosis for recovering from their procedure  and lend to a higher morbidity and/or mortality risk. All material risks, benefits, and reasonable alternatives including postponing the procedure were discussed. The patient does wish to proceed with the procedure at this time. PROCEDURE: The patient was given IV conscious sedation. The patient's SPO2 remained above 90% throughout the procedure. The colonoscope was inserted per rectum and advanced under direct vision to the cecum without difficulty. Post sedation note : The patient's SPO2 remained above 90% throughout the procedure. the vital signs remained stable , and no immediate complication form the procedure noted, patient will be ready for d/c when criteria is met . The prep was poor.       Findings:  Terminal ileum: normal    Cecum/Ascending colon: abnormal: Diverticulosis    Transverse colon: normal    Descending/Sigmoid colon: abnormal: Diverticulosis  Redundant colon    Rectum/Anus: examined in normal and retroflexed positions and was abnormal: Hemorrhoids    Withdrawal Time was (minutes): 10    The colon was decompressed and the scope was removed. The patient tolerated the procedure well. Recommendations/Plan:   1. Lifestyle and dietary modifications as discussed  2. F/U Biopsies  3. F/U In OfficeYes  4. Discussed with the family  5.  Repeat colonoscopy [id number]    Electronically signed by Lonell Simmonds, MD  on 8/19/2021 at 1:40 PM

## 2021-08-19 NOTE — H&P
HISTORY and [id number]       NAME:  James Messina  MRN: [id number]   YOB: 1970   Date: 8/19/2021   Age: 46 y.o. Gender: female       COMPLAINT AND PRESENT HISTORY:     James Messina is 46 y.o., female, having a diagnostic colonoscopy and EGD. Pt has had previous colonoscopy with last being in March, 2021 during hospital admission for acute gastric ulcer with perforation. Pt s/o partial gastrectomy in March, 2021. Pt has intermittent nausea with associated vomiting that has improved since hospital admission. She is c/o reflux with all foods and even with water. Takes Protonix with mild relief. Pt c/o tenderness along well healing midline surgical incision line which she attributes to scar tissue. Denies abdominal pain otherwise. Denies current issues with diarrhea or constipation. Denies hematochezia or melena. Reports a 50 lb weight loss since March, 2021. Pt has decreased appetite. She avoids fried, greasy or high fat foods. She is able to tolerate Ramen noodles mostly. She uses marijuana daily with last use last night. Denies Family Hx of colon cancer. Completed and followed prescribed prep. NPO. Took methadone, gabapentin and lopressor this am with sip of water. Denies taking any blood thinning medications. Denies chest pain/pressure, palpitations, SOB, recent URI, fever or chills. Narrative   EXAMINATION:   CT OF THE ABDOMEN AND PELVIS WITH CONTRAST 3/17/2021 9:16 am       TECHNIQUE:   CT of the abdomen and pelvis was performed with the administration of   intravenous contrast. Multiplanar reformatted images are provided for review.    Dose modulation, iterative reconstruction, and/or weight based adjustment of   the mA/kV was utilized to reduce the radiation dose to as low as reasonably   achievable.       COMPARISON:   CT abdomen and pelvis performed 03/12/2021.       HISTORY:   ORDERING SYSTEM PROVIDED HISTORY: s/p BII reconstruction (3/5) for perf gastric ulcer and fascia repair s/p midline separation (3/11), cont WBC and   tachy   TECHNOLOGIST PROVIDED HISTORY:       s/p BII reconstruction (3/5) for perf gastric ulcer and fascia repair s/p   midline separation (3/11), cont WBC and tachy   Reason for Exam: abd distension   Acuity: Unknown   Type of Exam: Unknown       FINDINGS:   Lower Chest: There is atelectasis versus scarring in the lung bases   bilaterally.  The visualized cardiac structures are unremarkable.       Organs: The liver and spleen are normal size and overall attenuation.  The   gallbladder, pancreas, and adrenal glands are unremarkable. Mark Alpa is stable   prominence of the left renal collecting system.  The ureters are not   significantly dilated.  The urinary bladder is unremarkable.       GI/Bowel: The stomach the is unremarkable. Mark Ragley is postsurgical change with   apparent gastrojejunostomy.  Loops of small bowel are normal in caliber   without evidence for obstruction.  The colon is again prominent containing   air and contrast material. Mark Alpa is a focal area colonic wall thickening in   the sigmoid region.  The appendix is normal. Mark Ragley is no intraperitoneal   free fluid.  There are few scattered foci of free air that is likely   postoperative. Mark Ragley is a soft tissue drain in the upper mid abdomen.       Pelvis: The uterus has been surgically removed.       Peritoneum/Retroperitoneum: The psoas muscles are symmetric.  The abdominal   aorta is normal in caliber.  The inferior vena cava is unremarkable. Mark Alpa   is no retroperitoneal or mesenteric adenopathy.       Bones/Soft Tissues:  The extra-abdominal soft tissues are unremarkable. Mark Ragley   is no acute osseous abnormality.           Impression   Colonic distension containing air and contrast.  There is a focal area of   colonic wall thickening in the sigmoid region of uncertain significance and   correlation with colonoscopy is recommended for further evaluation.       Few scattered foci of intraperitoneal air that is likely postoperative. There is a soft tissue drain in the upper mid abdomen. PAST MEDICAL HISTORY     Past Medical History:   Diagnosis Date    Acute gastric ulcer with perforation (HCC)     hx. of    Asthma     Connective tissue disease, undifferentiated (HCC) 1987    Dr. Nimco Blanco (Rheum.)    DDD (degenerative disc disease) 2000    Dr. Lida Martel Depression     Lumbar radicular pain     Migraine 1988    Dr. Diana Woods Mitral valve Ronald Koyanagi Dr. Helayne Haus [facility] 01/02/2013    Dr. Celestino Perez / on 10/3/17 pt states she completed tx 2016       SURGICAL HISTORY       Past Surgical History:   Procedure Laterality Date    ABDOMEN SURGERY      COLONOSCOPY N/A 03/17/2021    COLONOSCOPY FLEXIBLE W/ DECOMPRESSION performed by Wesley Esteban MD at  [address] COLONOSCOPY  03/21/2021    COLONOSCOPY N/A 3/21/2021    COLONOSCOPY DIAGNOSTIC performed by Landy Villalpando MD at [address]  03/04/2021    CT ABSCESS DRAIN SUBCUTANEOUS 3/4/2021 [facility] CT SCAN    GASTRECTOMY  03/05/2021    \"partial\"   [address]    as child    HYSTERECTOMY      2007    LAPAROTOMY N/A 03/05/2021    EXPLORATORY LAPAROTOMY, PARTIAL GASTRECTOMY performed by Kelsy Espinosa MD at [id number] [facility] 03/11/2021    LAPAROTOMY EXPLORATORY, ABDOMINAL [address] OUT, FASCIAL CLOSURE  performed by Blake Erickson MD at [address] [facility] [address] [address] Right 07/27/2015    right lumbar JHONY    NOSE SURGERY  1989    After nose was broken.  Dr. Charlene Jorgensen Left 2007    Dr. Trudy Foote  2007    Dr. Puma Steele History   Problem Relation Age of Onset    Other Mother 64        Multi Organ Failure    Arthritis Mother     High Cholesterol Father     Heart Attack Father     Arthritis Father         Rheumatoid    Other Brother Vericose Veins    Diabetes Maternal Grandmother     Heart Attack Maternal Grandmother     Other Maternal Grandfather 8        Black Lung, Clotting Disorder, Vericose Veins    Mental Illness Paternal Grandmother     Alcohol Abuse Paternal Grandfather     Heart Attack Paternal Grandfather     Depression Daughter     Anxiety Disorder Daughter     Asthma Daughter     Depression Son     Other Son         Opitional Disorder, Behavioral and Congnitive disorder.  ADHD Son     Asthma Son    Floydene Ada Diabetes Son        SOCIAL HISTORY       Social History     Socioeconomic History    Marital status:      Spouse name: Not on file    Number of children: Not on file    Years of education: Not on file    Highest education level: Not on file   Occupational History    Not on file   Tobacco Use    Smoking status: Current Every Day Smoker     Packs/day: 1.00     Years: 40.00     Pack years: 40.00     Types: Cigarettes    Smokeless tobacco: Never Used   Substance and Sexual Activity    Alcohol use: No     Alcohol/week: 0.0 standard drinks    Drug use: Not Currently     Types: Marijuana, IV, Opiates      Comment:  been a year since last used marijuana, heroin last used 2/13/2018    Sexual activity: Yes     Partners: Male   Other Topics Concern    Not on file   Social History Narrative    Not on file     Social Determinants of Health     Financial Resource Strain: Low Risk     Difficulty of Paying Living Expenses: Not hard at all   Food Insecurity: No Food Insecurity    Worried About Running Out of Food in the Last Year: Never true    Whitney of Food in the Last Year: Never true   Transportation Needs:     Lack of Transportation (Medical):      Lack of Transportation (Non-Medical):    Physical Activity:     Days of Exercise per Week:     Minutes of Exercise per Session:    Stress:     Feeling of Stress :    Social Connections:     Frequency of Communication with Friends and Family:     Frequency of Social Gatherings with Friends and Family:     Attends Taoism Services:     Active Member of Clubs or Organizations:     Attends Club or Organization Meetings:     Marital Status:    Intimate Partner Violence:     Fear of Current or Ex-Partner:     Emotionally Abused:     Physically Abused:     Sexually Abused:         REVIEW OF SYSTEMS      Allergies   Allergen Reactions    Compazine [Prochlorperazine Maleate] Other (See Comments)     Seizure like syndrome, eyes roll to back of head    Imitrex [Sumatriptan] Other (See Comments)     TIA    Thorazine [Chlorpromazine] Other (See Comments)     Seizure like syndrome    Cardizem [Diltiazem] Rash    Pcn [Penicillins] Rash       Current Facility-Administered Medications on File Prior to Encounter   Medication Dose Route Frequency Provider Last Rate Last Admin    sodium chloride flush 0.9 % injection 10 mL  10 mL Intravenous PRN Sharlene Penny MD   10 mL at 07/27/15 0840     Current Outpatient Medications on File Prior to Encounter   Medication Sig Dispense Refill    magnesium citrate solution Take 296 mLs by mouth once for 1 dose 296 mL 0    polyethylene glycol (GLYCOLAX) 17 GM/SCOOP powder Use as directed by following your patient instructions given by office. 238 g 0    bisacodyl (DULCOLAX) 5 MG EC tablet TAKE 4 TABS AT 10 AM THE DAY PRIOR TO COLONOSCOPY 4 tablet 0    QUEtiapine (SEROQUEL) 25 MG tablet TAKE 1 TABLET BY MOUTH AT BEDTIME AS NEEDED      pantoprazole (PROTONIX) 40 MG tablet Take 1 tablet by mouth 2 times daily (before meals) 30 tablet 3    metoprolol tartrate (LOPRESSOR) 25 MG tablet Take 1 tablet by mouth 2 times daily 60 tablet 3    FLUoxetine (PROZAC) 40 MG capsule Take 50 mg by mouth daily       gabapentin (NEURONTIN) 600 MG tablet Take 600 mg by mouth 3 times daily.       albuterol sulfate HFA (PROVENTIL HFA) 108 (90 BASE) MCG/ACT inhaler Inhale 1-2 puffs into the lungs every 4 hours as needed for Wheezing 1 Inhaler 0 Notation: Above medications are not currently reconciled at time of signing this H&P note, to be reconciled in pre-op per RN. Review of Systems   Constitutional: Positive for appetite change (Decreased appetite) and unexpected weight change (Roughly 50 lb unintentional weight loss since March, 2021). Negative for chills and fever. HENT: Negative for congestion and sore throat. Eyes: Negative for visual disturbance. Respiratory: Negative for cough, shortness of breath and wheezing. Cardiovascular: Negative for chest pain, palpitations and leg swelling. Gastrointestinal:        See HPI   Genitourinary: Negative. Musculoskeletal: Negative. Skin: Negative. Neurological: Negative. Hematological: Negative. Psychiatric/Behavioral: Negative. GENERAL PHYSICAL EXAM     Vitals: Review vitals per RN flowsheet. Physical Exam  Constitutional:       General: She is not in acute distress. Appearance: She is not ill-appearing, toxic-appearing or diaphoretic. HENT:      Head: Normocephalic and atraumatic. Nose: Nose normal. No congestion. Mouth/Throat:      Mouth: Mucous membranes are moist.      Pharynx: Oropharynx is clear. No oropharyngeal exudate or posterior oropharyngeal erythema. Eyes:      General: No scleral icterus. Conjunctiva/sclera: Conjunctivae normal.   Cardiovascular:      Rate and Rhythm: Normal rate and regular rhythm. Heart sounds: Normal heart sounds. No murmur heard. No friction rub. No gallop. Pulmonary:      Effort: Pulmonary effort is normal. No respiratory distress. Breath sounds: Normal breath sounds. No wheezing, rhonchi or rales. Abdominal:      General: Bowel sounds are normal. There is no distension. Palpations: Abdomen is soft. Tenderness: There is generalized abdominal tenderness. There is no guarding. Comments:  Well healing midline surgical incision with no erythema, no open wounds, no drainage or bleeding   Musculoskeletal:         General: No swelling or tenderness. Cervical back: Neck supple. No tenderness. Right lower leg: No edema. Left lower leg: No edema. Skin:     General: Skin is warm and dry. Coloration: Skin is not jaundiced. Neurological:      General: No focal deficit present. Mental Status: She is alert and oriented to person, place, and time.       Gait: Gait normal.   Psychiatric:         Mood and Affect: Mood normal.        PROVISIONAL DIAGNOSES / SURGERY:      CHRONIC NAUSEA & CONSTIPATION     EGD / COLONOSCOPY DIAGNOSTIC    Patient Active Problem List    Diagnosis Date Noted    Toxic dilatation of colon ([address]     Colonic ischemia ([address]     Intra-abdominal abscess ([address] 03/05/2021    Closed nondisplaced fracture of fifth metatarsal bone of right foot 09/08/2016    Lumbar disc disease 07/27/2015    Lumbar radicular pain 02/11/2015    Migraine 07/22/2014    Opiate dependence ([address] 07/22/2014    Chronic pain 07/22/2014    Obesity 07/22/2014    Patient overweight 07/22/2014           ABRAHAM Rosado - CNP on 8/19/2021 at 10:58 AM

## 2021-08-19 NOTE — ANESTHESIA POSTPROCEDURE EVALUATION
Department of Anesthesiology  Postprocedure Note    Patient: Brennan Mesa  MRN: [id number]  YOB: 1970  Date of evaluation: 8/19/2021  Time:  7:06 PM     Procedure Summary     Date: 08/19/21 Room / Location: [address] [place] [address] ENDO 04 / [facility] ENDO    Anesthesia Start: 9135 Anesthesia Stop: 9005    Procedures:       EGD BIOPSY (N/A Esophagus)      COLONOSCOPY DIAGNOSTIC (N/A Anus) Diagnosis: (CHRONIC NAUSEA & CONSTIPATION (RAPID COVID TEST ON ADMIT))    Surgeons: Rebel Willard MD Responsible Provider: Kyrie Godinez MD    Anesthesia Type: general ASA Status: 3          Anesthesia Type: general    Jamarcus Phase I: Jamarcus Score: 9    Jamarcus Phase II: Jamarcus Score: 10    Last vitals: Reviewed and per EMR flowsheets.        Anesthesia Post Evaluation    Comments: POST- ANESTHESIA EVALUATION       Pt Name: Brennan Mesa  MRN: [id number]  YOB: 1970  Date of evaluation: 8/19/2021  Time:  7:06 PM      BP (!) 150/76   Pulse 72   Temp 96.8 °F (36 °C) (Infrared)   Resp 16   Ht 5' 7\" (1.702 m)   Wt 165 lb (74.8 kg)   SpO2 99%   BMI 25.84 kg/m²      Consciousness Level  Awake  Cardiopulmonary Status  Stable  Pain Adequately Treated YES  Nausea / Vomiting  NO  Adequate Hydration  YES  Anesthesia Related Complications NONE      Electronically signed by Kyrie Godinez MD on 8/19/2021 at 7:06 PM

## 2021-08-19 NOTE — ANESTHESIA PRE PROCEDURE
Department of Anesthesiology  Preprocedure Note       Name:  Yuki Monsalve   Age:  46 y.o.  :  1970                                          MRN:  [id number]         Date:  2021      Surgeon: Norma Madrigal):  Marie Bravo MD    Procedure: Procedure(s):  EGD ESOPHAGOGASTRODUODENOSCOPY  COLONOSCOPY DIAGNOSTIC    Medications prior to admission:   Prior to Admission medications    Medication Sig Start Date End Date Taking? Authorizing Provider   methadone 10 MG/5ML solution Take 80 mg by mouth every 4 hours as needed for Pain (daily. ). Historical Provider, MD   QUEtiapine (SEROQUEL) 25 MG tablet TAKE 1 TABLET BY MOUTH AT BEDTIME AS NEEDED 21   Historical Provider, MD   pantoprazole (PROTONIX) 40 MG tablet Take 1 tablet by mouth 2 times daily (before meals) 21   Tonya Shaw MD   metoprolol tartrate (LOPRESSOR) 25 MG tablet Take 1 tablet by mouth 2 times daily 21   Tonya Shaw MD   FLUoxetine (PROZAC) 40 MG capsule Take 50 mg by mouth daily     Historical Provider, MD   gabapentin (NEURONTIN) 600 MG tablet Take 600 mg by mouth 3 times daily. Historical Provider, MD   albuterol sulfate HFA (PROVENTIL HFA) 108 (90 BASE) MCG/ACT inhaler Inhale 1-2 puffs into the lungs every 4 hours as needed for Wheezing 3/29/16   Tonya Shaw MD       Current medications:    Current Facility-Administered Medications   Medication Dose Route Frequency Provider Last Rate Last Admin    lactated ringers infusion   Intravenous Continuous Rehan Jani Carlson MD        lidocaine PF 1 % injection 1 mL  1 mL Intradermal Once PRN Yoselyn Rojo MD         Facility-Administered Medications Ordered in Other Encounters   Medication Dose Route Frequency Provider Last Rate Last Admin    sodium chloride flush 0.9 % injection 10 mL  10 mL Intravenous PRN Brian Champagne MD   10 mL at 07/27/15 0840       Allergies:     Allergies   Allergen Reactions    Compazine [Prochlorperazine Maleate] Other (See Comments)     Seizure like syndrome, eyes roll to back of head    Imitrex [Sumatriptan] Other (See Comments)     TIA    Thorazine [Chlorpromazine] Other (See Comments)     Seizure like syndrome    Cardizem [Diltiazem] Rash    Pcn [Penicillins] Rash       Problem List:    Patient Active Problem List   Diagnosis Code    Migraine G43.909    Opiate dependence ([facility] F11.20    Chronic pain G89.29    Obesity E66.9    Patient overweight E66.3    Lumbar radicular pain M54.16    Lumbar disc disease M51.9    Closed nondisplaced fracture of fifth metatarsal bone of right foot S92.354A    Intra-abdominal abscess (HCC) K65.1    Toxic dilatation of colon (HCC) K59.31    Colonic ischemia (HCC) K55.9       Past Medical History:        Diagnosis Date    Acute gastric ulcer with perforation (HCC)     hx. of    Asthma     Connective tissue disease, undifferentiated (HCC) 1987    Dr. Andrew Berry (Rheum.)    DDD (degenerative disc disease) 2000    Dr. Chuck Melvin Depression     Lumbar radicular pain     Migraine 1988    Dr. Daya Cook Mitral valve prolapse 1978    [facility] 01/[facility]    Dr. Bulmaro Oseguera / on 10/3/17 pt states she completed tx 2016       Past Surgical History:        Procedure Laterality Date    ABDOMEN SURGERY      COLONOSCOPY N/A 03/17/2021    COLONOSCOPY FLEXIBLE W/ DECOMPRESSION performed by William Marte MD at [facility]    COLONOSCOPY  03/21/2021    COLONOSCOPY N/A 3/21/2021    COLONOSCOPY DIAGNOSTIC performed by Shawna Childress MD at [address]  03/04/2021    CT ABSCESS DRAIN SUBCUTANEOUS 3/4/2021 V CT SCAN    GASTRECTOMY  03/05/2021    \"partial\"   [address]    as child    HYSTERECTOMY      2007    LAPAROTOMY N/A 03/05/2021    EXPLORATORY LAPAROTOMY, PARTIAL GASTRECTOMY performed by Anshu Mckeon MD at [address] 03/11/2021    LAPAROTOMY EXPLORATORY, ABDOMINAL [address] OUT, FASCIAL CLOSURE  performed by [facility] at [facility] 07/27/2015    right lumbar JHONY    NOSE SURGERY  1989    After nose was broken. Dr. Angeli Carter Left 2007    Dr. Laron Ahumada  2007    Dr. Ann Schneider History:    Social History     Tobacco Use    Smoking status: Current Every Day Smoker     Packs/day: 1.00     Years: 40.00     Pack years: 40.00     Types: Cigarettes    Smokeless tobacco: Never Used   Substance Use Topics    Alcohol use: No     Alcohol/week: 0.0 standard drinks                                Ready to quit: Not Answered  Counseling given: Not Answered      Vital Signs (Current): There were no vitals filed for this visit. BP Readings from Last 3 Encounters:   07/29/21 (!) 151/98   07/08/21 138/89   04/09/21 125/80       NPO Status:                                                                                 BMI:   Wt Readings from Last 3 Encounters:   08/05/21 165 lb (74.8 kg)   07/29/21 160 lb 14.4 oz (73 kg)   07/08/21 163 lb 6.4 oz (74.1 kg)     There is no height or weight on file to calculate BMI.    CBC:   Lab Results   Component Value Date    WBC 7.6 03/26/2021    RBC 4.26 03/26/2021    RBC 5.02 09/02/2017    HGB 11.7 03/26/2021    HCT 38.6 03/26/2021    MCV 90.6 03/26/2021    RDW 13.8 03/26/2021     03/26/2021       CMP:   Lab Results   Component Value Date     03/26/2021    K 3.8 03/26/2021     03/26/2021    CO2 25 03/26/2021    BUN 6 03/26/2021    CREATININE 0.65 03/26/2021    GFRAA >60 03/26/2021    LABGLOM >60 03/26/2021    GLUCOSE 98 03/26/2021    GLUCOSE 90 09/02/2017    PROT 5.6 03/07/2021    CALCIUM 8.3 03/26/2021    BILITOT 0.34 03/07/2021    ALKPHOS 61 03/07/2021    AST 16 03/07/2021    ALT 10 03/07/2021       POC Tests: No results for input(s): POCGLU, POCNA, POCK, POCCL, POCBUN, POCHEMO, POCHCT in the last 72 hours.     Coags:   Lab Results Component Value Date    PROTIME 11.8 03/04/2021    INR 1.1 03/04/2021    APTT 26.2 03/03/2021       HCG (If Applicable): No results found for: PREGTESTUR, PREGSERUM, HCG, HCGQUANT     ABGs: No results found for: PHART, PO2ART, VHW1KIH, VOD7VUR, BEART, Y8TMKCPX     Type & Screen (If Applicable):  No results found for: LABABO, LABRH    Drug/Infectious Status (If Applicable):  Lab Results   Component Value Date    HEPCAB NONREACTIVE 10/27/2016       COVID-19 Screening (If Applicable):   Lab Results   Component Value Date    COVID19 Not Detected 08/19/2021           Anesthesia Evaluation  Patient summary reviewed and Nursing notes reviewed no history of anesthetic complications:   Airway: Mallampati: II  TM distance: >3 FB   Neck ROM: full  Mouth opening: > = 3 FB Dental: normal exam         Pulmonary:normal exam  breath sounds clear to auscultation  (+) asthma: current smoker                           Cardiovascular:    (+) valvular problems/murmurs: MVP,         Rhythm: regular  Rate: normal                    Neuro/Psych:   (+) neuromuscular disease:, headaches: migraine headaches, psychiatric history:depression/anxiety              ROS comment: Lumbar disc disease GI/Hepatic/Renal:   (+) PUD,           Endo/Other:    (+) : arthritis: OA., .                  ROS comment: H/o Opiate Dependence - On Methadone Abdominal:             Vascular: negative vascular ROS. Other Findings:             Anesthesia Plan      general     ASA 3       Induction: intravenous. MIPS: Prophylactic antiemetics administered. Anesthetic plan and risks discussed with patient. Plan discussed with CRNA.                   Joe Horta MD   8/19/2021

## 2021-08-24 LAB — SURGICAL PATHOLOGY REPORT: NORMAL

## 2021-08-30 ENCOUNTER — TELEPHONE (OUTPATIENT)
Dept: GASTROENTEROLOGY | Age: 51
End: 2021-08-30

## 2021-08-30 NOTE — TELEPHONE ENCOUNTER
Results reviewed. Diflucan orders as given. Writer LM for pt to call office to review path results. Med pended until pharmacy is confirmed.

## 2021-09-03 RX ORDER — FLUCONAZOLE 100 MG/1
100 TABLET ORAL DAILY
Qty: 7 TABLET | Refills: 0 | Status: SHIPPED | OUTPATIENT
Start: 2021-09-03 | End: 2021-09-10

## 2021-09-07 RX ORDER — PANTOPRAZOLE SODIUM 40 MG/1
TABLET, DELAYED RELEASE ORAL
Qty: 30 TABLET | Refills: 3 | Status: SHIPPED | OUTPATIENT
Start: 2021-09-07 | End: 2022-01-24

## 2021-09-07 NOTE — TELEPHONE ENCOUNTER
[facility] is calling to request a refill on the following medication(s):    Last Visit Date (If Applicable):  5/6/4653    Next Visit Date:    Visit date not found    Medication Request:  Requested Prescriptions     Pending Prescriptions Disp Refills    pantoprazole (PROTONIX) 40 MG tablet [Pharmacy Med Name: PANTOPRAZOLE 40MG TABLETS] 30 tablet 3     Sig: TAKE 1 TABLET BY MOUTH TWICE DAILY BEFORE MEALS

## 2021-11-26 NOTE — TELEPHONE ENCOUNTER
[facility] is calling to request a refill on the following medication(s):    Last Visit Date (If Applicable):  4/1/2146    Next Visit Date:    Visit date not found    Medication Request:  Requested Prescriptions     Pending Prescriptions Disp Refills    metoprolol tartrate (LOPRESSOR) 25 MG tablet [Pharmacy Med Name: METOPROLOL TARTRATE 25MG TABLETS] 60 tablet 3     Sig: TAKE 1 TABLET BY MOUTH TWICE DAILY

## 2021-12-07 NOTE — PROGRESS NOTES
GI CLINIC FOLLOW UP    INTERVAL HISTORY:   No referring provider defined for this encounter. Chief Complaint   Patient presents with    Nausea     Patient is f/u on EGD and colonosocpy. She c/o nausea. HISTORY OF PRESENT ILLNESS: Edil Suárez is a 46 y.o. female , referred for evaluation of**recent perforated gastric ulcers with exploratory lap, and Billroth II surgery, colon distention with possible ischemic changes and stricture. GERD, Nausea, abnormal ct abd     Patient is here for follow-up  Did not do the anemia work-up I have ordered on her  But she did the EGD and the colonoscopy results are as below we give her treatment for the Candida esophagitis  She is requesting Phenergan specifically  She is with the methadone clinic she is still smoking marijuana    Her hemoglobin actually is normal right now at 13  Symptoms are chronically and not any more than usual in fact she is better but she said  No weight loss      Findings:     Retropharyngeal area was grossly normal appearing     Esophagus: abnormal: Candida esophagitis in the proximal esophagus biopsies were taken        Stomach:  Status post gastrectomy, looks like the patient had Billroth II, gastritis with the anastomosis edematous biopsies were taken     Random small bowel biopsies     Small bowel:   Looks like the patient had Billroth II, the 2 lm looked with normal mucosa one is short and blind and the other one is patent  Biopsies were taken randomly  The scope was removed and the patient tolerated the procedure well.      Recommendations/Plan:   1. F/U Biopsies  2. F/U In Office in 3-4 weeks  3.  Discussed with the family     Electronically signed by Danny White MD  on 8/19/2021 at 12:57 PM   The prep was poor.       Findings:  Terminal ileum: normal     Cecum/Ascending colon: abnormal: Diverticulosis     Transverse colon: normal     Descending/Sigmoid colon: abnormal: Diverticulosis  Redundant colon     Rectum/Anus: examined in normal and retroflexed positions and was abnormal: Hemorrhoids     Withdrawal Time was (minutes): 10     The colon was decompressed and the scope was removed. The patient tolerated the procedure well.      Recommendations/Plan:   1. Lifestyle and dietary modifications as discussed  2. F/U Biopsies  3. F/U In OfficeYes  4. Discussed with the family  5. Repeat colonoscopy [id number]     Electronically signed by Kelly Delvalle MD  on 8/19/2021 at 1:40 PM   -- Diagnosis --   1.  SMALL BOWEL BIOPSIES:  NORMAL SMALL INTESTINAL MUCOSA. 2.  STOMACH BIOPSY AT ANASTOMOSIS:  MODERATE CHRONIC INACTIVE   GASTRITIS.  NEGATIVE FOR H. PYLORI ON IMMUNOSTAIN. 3.  ESOPHAGEAL BIOPSIES:  MODERATE TO SEVERE ACTIVE ESOPHAGITIS.  PAS   STAIN POSITIVE FOR YEAST AND PSEUDOHYPHAE COMPATIBLE WITH ALEX (SEE   MICROSCOPIC DESCRIPTION). AYLIN Hartman   **Electronically Signed Out**         ajb/8/23/2021       Past Medical,Family, and Social History reviewed and does contribute to the patient presentingcondition. Patient's PMH/PSH,SH,PSYCH Hx, MEDs, ALLERGIES, and ROS were all reviewed and updated in the appropriate sections.     PAST MEDICAL HISTORY:  Past Medical History:   Diagnosis Date    Acute gastric ulcer with perforation (HCC)     hx. of    Asthma     Connective tissue disease, undifferentiated (HCC) 1987    Dr. Rodrigo Lowe ([facility]he[facility]    DDD (degenerative disc disease) 2000    Dr. Danie Shah     Lumbar radicular pain     Migraine 1988    Dr. Jourdan Gongora Mitral valve prolapse 1978    [facility] Der[facility] 01/02/2013    Dr. Betsey Henriquezh / on 10/3/17 pt states she completed tx 2016       Past Surgical History:   Procedure Laterality Date    ABDOMEN SURGERY      COLONOSCOPY N/A 03/17/2021    COLONOSCOPY FLEXIBLE W/ DECOMPRESSION performed by Sherie Biswas MD at [address] [place] [address] COLONOSCOPY  03/21/2021    COLONOSCOPY N/A 3/21/2021    COLONOSCOPY DIAGNOSTIC performed by Bam Orosco MD at [facility] COLONOSCOPY N/A 8/19/2021    COLONOSCOPY DIAGNOSTIC performed by Stanton Alvarez MD at [address] [place] [address] [address] SUBCUTANEOUS  03/04/2021    CT ABSCESS DRAIN SUBCUTANEOUS 3/4/2021 [facility] CT SCAN    GASTRECTOMY  03/05/2021    \"partial\"   [address]    as child    HYSTERECTOMY      2007    LAPAROTOMY N/A 03/05/2021    EXPLORATORY LAPAROTOMY, PARTIAL GASTRECTOMY performed by Ry Jeffrey MD at [address] [place] [address] 03/11/2021    LAPAROTOMY EXPLORATORY, ABDOMINAL [address] OUT, FASCIAL CLOSURE  performed by Satish Spangler MD at [facility] 07/27/2015    right lumbar JHONY    NOSE SURGERY  1989    After nose was broken. Dr. Latrice Broderick 2007    Dr. Adrienne Bowen  2007    Dr. Jen Todd [address] [address] N/A 8/19/2021    EGD BIOPSY performed by Stanton Alvarez MD at [address]:    Current Outpatient Medications:     DULoxetine (CYMBALTA) 60 MG extended release capsule, TAKE 1 CAPSULE BY MOUTH DAILY, Disp: , Rfl:     metoprolol tartrate (LOPRESSOR) 25 MG tablet, TAKE 1 TABLET BY MOUTH TWICE DAILY, Disp: 60 tablet, Rfl: 3    pantoprazole (PROTONIX) 40 MG tablet, TAKE 1 TABLET BY MOUTH TWICE DAILY BEFORE MEALS, Disp: 30 tablet, Rfl: 3    methadone 10 MG/5ML solution, Take 80 mg by mouth daily. , Disp: , Rfl:     QUEtiapine (SEROQUEL) 25 MG tablet, TAKE 1 TABLET BY MOUTH AT BEDTIME AS NEEDED, Disp: , Rfl:     gabapentin (NEURONTIN) 600 MG tablet, Take 600 mg by mouth 3 times daily. , Disp: , Rfl:     albuterol sulfate HFA (PROVENTIL HFA) 108 (90 BASE) MCG/ACT inhaler, Inhale 1-2 puffs into the lungs every 4 hours as needed for Wheezing, Disp: 1 Inhaler, Rfl: 0    ALLERGIES:   Allergies   Allergen Reactions    Compazine [Prochlorperazine Maleate] Other (See Comments)     Seizure like syndrome, eyes roll to back of head    Imitrex [Sumatriptan] Other (See Comments)     TIA    Thorazine [Chlorpromazine] Other (See Comments)     Seizure like syndrome    Cardizem [Diltiazem] Rash    Pcn [Penicillins] Rash       FAMILY HISTORY:       Problem Relation Age of Onset    Other Mother 64        Multi Organ Failure    Arthritis Mother     High Cholesterol Father     Heart Attack Father     Arthritis Father         Rheumatoid    Other Brother         Vericose Veins    Diabetes Maternal Grandmother     Heart Attack Maternal Grandmother     Other Maternal Grandfather 8        Black Lung, Clotting Disorder, Vericose Veins    Mental Illness Paternal Grandmother     Alcohol Abuse Paternal Grandfather     Heart Attack Paternal Grandfather     Depression Daughter     Anxiety Disorder Daughter     Asthma Daughter     Depression Son     Other Son         Opitional Disorder, Behavioral and Congnitive disorder.     ADHD Son     Asthma Son    Janet Figueredo Diabetes Son          SOCIAL HISTORY:   Social History     Socioeconomic History    Marital status:      Spouse name: Not on file    Number of children: Not on file    Years of education: Not on file    Highest education level: Not on file   Occupational History    Not on file   Tobacco Use    Smoking status: Current Every Day Smoker     Packs/day: 1.00     Years: 40.00     Pack years: 40.00     Types: Cigarettes    Smokeless tobacco: Never Used   Substance and Sexual Activity    Alcohol use: No     Alcohol/week: 0.0 standard drinks    Drug use: Not Currently     Types: Marijuana (Gordon Pert), IV, Opiates      Comment:  been a year since last used marijuana, heroin last used 2/13/2018    Sexual activity: Yes     Partners: Male   Other Topics Concern    Not on file   Social History Narrative    Not on file     Social Determinants of Health     Financial Resource Strain: Low Risk     Difficulty of Paying Living Expenses: Not hard at all   Food Insecurity: No Food Insecurity    Worried About Running Out of Food in the Last Year: Never true    Ran Out of Food in the Last Year: Never true   Transportation Needs:     Lack of Transportation (Medical): Not on file    Lack of Transportation (Non-Medical): Not on file   Physical Activity:     Days of Exercise per Week: Not on file    Minutes of Exercise per Session: Not on file   Stress:     Feeling of Stress : Not on file   Social Connections:     Frequency of Communication with Friends and Family: Not on file    Frequency of Social Gatherings with Friends and Family: Not on file    Attends Jain Services: Not on file    Active Member of [address] [place] [address] [employer] or Organizations: Not on file    Attends Club or Organization Meetings: Not on file    Marital Status: Not on file   Intimate Partner Violence:     Fear of Current or Ex-Partner: Not on file    Emotionally Abused: Not on file    Physically Abused: Not on file    Sexually Abused: Not on file   Housing Stability:     Unable to Pay for Housing in the Last Year: Not on file    Number of [place] in the Last Year: Not on file    Unstable Housing in the Last Year: Not on file       REVIEW OF SYSTEMS: A 12-point review of systemswas obtained and pertinent positives and negatives were enumerated above in the history of present illness. All other reviewed systems / symptoms were negative. Review of Systems   Constitutional: Negative for appetite change, fatigue and unexpected weight change. HENT: Negative for trouble swallowing. Eyes: Positive for visual disturbance (glasses). Respiratory: Negative for cough, choking and wheezing. Cardiovascular: Negative for chest pain, palpitations and leg swelling. Gastrointestinal: Positive for nausea. Negative for abdominal distention, abdominal pain, anal bleeding, blood in stool, constipation, diarrhea, rectal pain and vomiting. Genitourinary: Negative for difficulty urinating.    Allergic/Immunologic: Negative for environmental allergies and food allergies. Neurological: Negative for dizziness, weakness, light-headedness, numbness and headaches. Hematological: Does not bruise/bleed easily. Psychiatric/Behavioral: Negative for sleep disturbance. The patient is not nervous/anxious. LABORATORY DATA: Reviewed  Lab Results   Component Value Date    WBC 7.6 03/26/2021    HGB 11.7 (L) 03/26/2021    HCT 38.6 03/26/2021    MCV 90.6 03/26/2021     03/26/2021     03/26/2021    K 3.8 03/26/2021     03/26/2021    CO2 25 03/26/2021    BUN 6 03/26/2021    CREATININE 0.65 03/26/2021    LABALBU 2.4 (L) 03/07/2021    BILITOT 0.34 03/07/2021    ALKPHOS 61 03/07/2021    AST 16 03/07/2021    ALT 10 03/07/2021    INR 1.1 03/04/2021         Lab Results   Component Value Date    RBC 4.26 03/26/2021    HGB 11.7 (L) 03/26/2021    MCV 90.6 03/26/2021    MCH 27.5 03/26/2021    MCHC 30.3 03/26/2021    RDW 13.8 03/26/2021    MPV 9.2 03/26/2021    BASOPCT 1 03/26/2021    LYMPHSABS 2.65 03/26/2021    MONOSABS 1.01 03/26/2021    NEUTROABS 3.35 03/26/2021    EOSABS 0.52 (H) 03/26/2021    BASOSABS 0.05 03/26/2021         DIAGNOSTIC TESTING:     No results found. PHYSICAL EXAMINATION: Vital signs reviewed per the nursing documentation. /84   Pulse 104   Temp 97.1 °F (36.2 °C)   Wt 155 lb (70.3 kg)   BMI 24.28 kg/m²   Body mass index is 24.28 kg/m². Physical Exam  Vitals and nursing note reviewed. Constitutional:       General: She is not in acute distress. Appearance: She is well-developed. She is not diaphoretic. HENT:      Head: Normocephalic. Mouth/Throat:      Pharynx: No oropharyngeal exudate. Eyes:      General: No scleral icterus. Pupils: Pupils are equal, round, and reactive to light. Neck:      Thyroid: No thyromegaly. Vascular: No JVD. Trachea: No tracheal deviation. Cardiovascular:      Rate and Rhythm: Normal rate and regular rhythm. Heart sounds: Normal heart sounds.  No murmur heard.      Pulmonary:      Effort: Pulmonary effort is normal. No respiratory distress. Breath sounds: Normal breath sounds. No wheezing. Abdominal:      General: Bowel sounds are normal. There is no distension. Palpations: Abdomen is soft. Tenderness: There is no abdominal tenderness. There is no guarding or rebound. Comments: No ascites   Musculoskeletal:         General: Normal range of motion. Cervical back: Normal range of motion and neck supple. Skin:     General: Skin is warm. Coloration: Skin is not pale. Findings: No erythema or rash. Comments: She is not diaphoretic   Neurological:      Mental Status: She is alert and oriented to person, place, and time. Deep Tendon Reflexes: Reflexes are normal and symmetric. Psychiatric:         Behavior: Behavior normal.         Thought Content: Thought content normal.         Judgment: Judgment normal.           IMPRESSION: Ms. Lindsay Carolina is a 46 y.o. female with      Diagnosis Orders   1. Chronic nausea     2. Other constipation     3. Anemia, unspecified type  Iron and TIBC    Vitamin B12    Folate    CBC   4. Diverticulosis     5. Other hemorrhoids     6. Acute superficial gastritis without hemorrhage       Once again educated the patient about the importance of quitting marijuana  And following the methadone clinic, and try to come off that to when they allow her meanwhile we will continue just to observe we will repeat her labs and see where she is with the anemia profile  Diet/life style/natural hx /complication of the dx were all explained in details   Past medical, past surgical, social history, psychiatric history, medications or allergies, all reviewed and  updated    Thank you for allowing me to participate in the care of Ms. Walton. For any further questions please do not hesitate to contact me. I have reviewed and agree with the ROS entered by the MA/RN.      Note is dictated utilizing voice recognition software. Unfortunately this leads to occasional typographical errors. Please contact our office if you have any questions.       Kelsey Carrillo MD  [facility]  O: #[phone]

## 2021-12-08 ENCOUNTER — OFFICE VISIT (OUTPATIENT)
Dept: GASTROENTEROLOGY | Age: 51
End: 2021-12-08
Payer: MEDICARE

## 2021-12-08 VITALS
WEIGHT: 155 LBS | SYSTOLIC BLOOD PRESSURE: 132 MMHG | HEART RATE: 104 BPM | BODY MASS INDEX: 24.28 KG/M2 | DIASTOLIC BLOOD PRESSURE: 84 MMHG | TEMPERATURE: 97.1 F

## 2021-12-08 DIAGNOSIS — K57.90 DIVERTICULOSIS: ICD-10-CM

## 2021-12-08 DIAGNOSIS — K29.00 ACUTE SUPERFICIAL GASTRITIS WITHOUT HEMORRHAGE: ICD-10-CM

## 2021-12-08 DIAGNOSIS — R11.0 CHRONIC NAUSEA: Primary | ICD-10-CM

## 2021-12-08 DIAGNOSIS — D64.9 ANEMIA, UNSPECIFIED TYPE: ICD-10-CM

## 2021-12-08 DIAGNOSIS — K64.8 OTHER HEMORRHOIDS: ICD-10-CM

## 2021-12-08 DIAGNOSIS — K59.09 OTHER CONSTIPATION: ICD-10-CM

## 2021-12-08 PROCEDURE — G8420 CALC BMI NORM PARAMETERS: HCPCS | Performed by: INTERNAL MEDICINE

## 2021-12-08 PROCEDURE — G8427 DOCREV CUR MEDS BY ELIG CLIN: HCPCS | Performed by: INTERNAL MEDICINE

## 2021-12-08 PROCEDURE — 99214 OFFICE O/P EST MOD 30 MIN: CPT | Performed by: INTERNAL MEDICINE

## 2021-12-08 PROCEDURE — 3017F COLORECTAL CA SCREEN DOC REV: CPT | Performed by: INTERNAL MEDICINE

## 2021-12-08 PROCEDURE — 4004F PT TOBACCO SCREEN RCVD TLK: CPT | Performed by: INTERNAL MEDICINE

## 2021-12-08 PROCEDURE — G8484 FLU IMMUNIZE NO ADMIN: HCPCS | Performed by: INTERNAL MEDICINE

## 2021-12-08 RX ORDER — DULOXETIN HYDROCHLORIDE 60 MG/1
CAPSULE, DELAYED RELEASE ORAL
COMMUNITY
Start: 2021-11-29

## 2021-12-08 ASSESSMENT — ENCOUNTER SYMPTOMS
ABDOMINAL DISTENTION: 0
WHEEZING: 0
BLOOD IN STOOL: 0
TROUBLE SWALLOWING: 0
RECTAL PAIN: 0
DIARRHEA: 0
COUGH: 0
ANAL BLEEDING: 0
NAUSEA: 1
CONSTIPATION: 0
VOMITING: 0
ABDOMINAL PAIN: 0
CHOKING: 0

## 2022-01-24 RX ORDER — PANTOPRAZOLE SODIUM 40 MG/1
TABLET, DELAYED RELEASE ORAL
Qty: 30 TABLET | Refills: 3 | Status: SHIPPED | OUTPATIENT
Start: 2022-01-24 | End: 2022-06-20

## 2022-01-24 NOTE — TELEPHONE ENCOUNTER
Pharm requested    Health Maintenance   Topic Date Due    COVID-19 Vaccine (1) Never done    Pneumococcal 0-64 years Vaccine (1 of 2 - PPSV23) Never done    DTaP/Tdap/Td vaccine (1 - Tdap) Never done    Breast cancer screen  03/14/2020    Shingles Vaccine (1 of 2) Never done    Low dose CT lung screening  03/14/2020    Lipid screen  04/01/2021    Flu vaccine (1) Never done    Depression Screen  07/08/2022    Colon cancer screen colonoscopy  08/19/2024    Hepatitis C screen  Completed    HIV screen  Completed    Hepatitis A vaccine  Aged Out    Hepatitis B vaccine  Aged Out    Hib vaccine  Aged Out    Meningococcal (ACWY) vaccine  Aged Out             (applicable per patient's age: Cancer Screenings, Depression Screening, Fall Risk Screening, Immunizations)    LDL Cholesterol (mg/dL)   Date Value   04/01/2016 103     AST (U/L)   Date Value   03/07/2021 16     ALT (U/L)   Date Value   03/07/2021 10     BUN (mg/dL)   Date Value   03/26/2021 6      (goal A1C is < 7)   (goal LDL is <100) need 30-50% reduction from baseline     BP Readings from Last 3 Encounters:   12/08/21 132/84   08/19/21 (!) 150/71   08/19/21 (!) 150/76    (goal /80)      All Future Testing planned in [facility]:  Lab Frequency Next Occurrence   CT Lung Screen (Annual) Once 12/30/2021   JASMINE DIGITAL SCREEN W OR WO CAD BILATERAL Once 12/30/2021   TSH with Reflex Once 10/08/2021   Hepatitis C Antibody Once 07/08/2022   Vitamin B12 Once 10/29/2021   Folate Once 10/29/2021   Hepatic Function Panel Once 10/29/2021   COVID-19 Once 08/15/2021   Iron and TIBC Once 12/08/2021   Vitamin B12 Once 03/10/2022   Folate Once 03/10/2022   CBC Once 12/08/2021   Up as tolerated PRN        Next Visit Date:  Future Appointments   Date Time Provider Mark Anthony Duran   6/2/2022  3:00 PM Patricia Menjivar MD [facility]            Patient Active Problem List:     Migraine     Opiate dependence ([address]     Chronic pain     Obesity     Patient overweight     Lumbar radicular pain     Lumbar disc disease     Closed nondisplaced fracture of fifth metatarsal bone of right foot     Intra-abdominal abscess (HCC)     Toxic dilatation of colon ([address]     Colonic ischemia ([address]

## 2022-02-23 NOTE — TELEPHONE ENCOUNTER
Mike Diaz is calling to request a refill on the following medication(s):    Last Visit Date (If Applicable):  7/5/7996    Next Visit Date:    Visit date not found    Medication Request:  Requested Prescriptions     Pending Prescriptions Disp Refills    metoprolol tartrate (LOPRESSOR) 25 MG tablet [Pharmacy Med Name: METOPROLOL TARTRATE 25MG TABLETS] 60 tablet 3     Sig: TAKE 1 TABLET BY MOUTH TWICE DAILY

## 2022-06-02 ENCOUNTER — TELEPHONE (OUTPATIENT)
Dept: GASTROENTEROLOGY | Age: 52
End: 2022-06-02

## 2022-06-20 RX ORDER — PANTOPRAZOLE SODIUM 40 MG/1
TABLET, DELAYED RELEASE ORAL
Qty: 30 TABLET | Refills: 3 | Status: SHIPPED | OUTPATIENT
Start: 2022-06-20 | End: 2022-10-17

## 2022-06-27 ENCOUNTER — TELEPHONE (OUTPATIENT)
Dept: FAMILY MEDICINE CLINIC | Age: 52
End: 2022-06-27

## 2022-08-17 DIAGNOSIS — M79.641 PAIN IN RIGHT HAND: Primary | ICD-10-CM

## 2022-10-11 ENCOUNTER — HOSPITAL ENCOUNTER (EMERGENCY)
Age: 52
Discharge: HOME | End: 2022-10-11

## 2022-10-11 VITALS — TEMPERATURE: 98.1 F

## 2022-10-17 RX ORDER — PANTOPRAZOLE SODIUM 40 MG/1
TABLET, DELAYED RELEASE ORAL
Qty: 30 TABLET | Refills: 3 | Status: SHIPPED | OUTPATIENT
Start: 2022-10-17

## 2022-10-17 NOTE — TELEPHONE ENCOUNTER
Tiffanie Carter is calling to request a refill on the following medication(s):    Medication Request:  Requested Prescriptions     Pending Prescriptions Disp Refills    pantoprazole (PROTONIX) 40 MG tablet [Pharmacy Med Name: PANTOPRAZOLE 40MG TABLETS] 30 tablet 3     Sig: TAKE 1 TABLET BY MOUTH TWICE DAILY BEFORE MEALS       Last Visit Date (If Applicable):  7/89/8162    Next Visit Date:    Visit date not found

## 2022-11-04 ENCOUNTER — HOSPITAL ENCOUNTER (OUTPATIENT)
Age: 52
Setting detail: OBSERVATION
Discharge: HOME | End: 2022-11-06
Attending: EMERGENCY MEDICINE | Admitting: EMERGENCY MEDICINE
Payer: MEDICARE

## 2022-11-04 ENCOUNTER — APPOINTMENT (OUTPATIENT)
Dept: CT IMAGING | Age: 52
End: 2022-11-04
Payer: MEDICARE

## 2022-11-04 DIAGNOSIS — M21.371 RIGHT FOOT DROP: ICD-10-CM

## 2022-11-04 DIAGNOSIS — M21.331 RIGHT WRIST DROP: Primary | ICD-10-CM

## 2022-11-04 PROBLEM — R53.1 WEAKNESS: Status: ACTIVE | Noted: 2022-11-04

## 2022-11-04 LAB
ABSOLUTE EOS #: 0.3 K/UL (ref 0–0.44)
ABSOLUTE IMMATURE GRANULOCYTE: <0.03 K/UL (ref 0–0.3)
ABSOLUTE LYMPH #: 2.57 K/UL (ref 1.1–3.7)
ABSOLUTE MONO #: 0.68 K/UL (ref 0.1–1.2)
ANION GAP SERPL CALCULATED.3IONS-SCNC: 7 MMOL/L (ref 9–17)
BASOPHILS # BLD: 1 % (ref 0–2)
BASOPHILS ABSOLUTE: 0.03 K/UL (ref 0–0.2)
BUN BLDV-MCNC: 8 MG/DL (ref 6–20)
CALCIUM SERPL-MCNC: 8.5 MG/DL (ref 8.6–10.4)
CHLORIDE BLD-SCNC: 101 MMOL/L (ref 98–107)
CO2: 29 MMOL/L (ref 20–31)
CREAT SERPL-MCNC: 0.87 MG/DL (ref 0.5–0.9)
EOSINOPHILS RELATIVE PERCENT: 5 % (ref 1–4)
GFR SERPL CREATININE-BSD FRML MDRD: >60 ML/MIN/1.73M2
GLUCOSE BLD-MCNC: 100 MG/DL (ref 70–99)
HCT VFR BLD CALC: 38 % (ref 36.3–47.1)
HEMOGLOBIN: 12 G/DL (ref 11.9–15.1)
IMMATURE GRANULOCYTES: 0 %
INR BLD: 1
LYMPHOCYTES # BLD: 46 % (ref 24–43)
MCH RBC QN AUTO: 28.6 PG (ref 25.2–33.5)
MCHC RBC AUTO-ENTMCNC: 31.6 G/DL (ref 28.4–34.8)
MCV RBC AUTO: 90.5 FL (ref 82.6–102.9)
MONOCYTES # BLD: 12 % (ref 3–12)
NRBC AUTOMATED: 0 PER 100 WBC
PDW BLD-RTO: 14.1 % (ref 11.8–14.4)
PLATELET # BLD: 263 K/UL (ref 138–453)
PMV BLD AUTO: 9.7 FL (ref 8.1–13.5)
POTASSIUM SERPL-SCNC: 3.9 MMOL/L (ref 3.7–5.3)
PROTHROMBIN TIME: 10.7 SEC (ref 9.1–12.3)
RBC # BLD: 4.2 M/UL (ref 3.95–5.11)
SEG NEUTROPHILS: 36 % (ref 36–65)
SEGMENTED NEUTROPHILS ABSOLUTE COUNT: 2.04 K/UL (ref 1.5–8.1)
SODIUM BLD-SCNC: 137 MMOL/L (ref 135–144)
WBC # BLD: 5.6 K/UL (ref 3.5–11.3)

## 2022-11-04 PROCEDURE — 85652 RBC SED RATE AUTOMATED: CPT

## 2022-11-04 PROCEDURE — 86140 C-REACTIVE PROTEIN: CPT

## 2022-11-04 PROCEDURE — 70498 CT ANGIOGRAPHY NECK: CPT

## 2022-11-04 PROCEDURE — 6360000004 HC RX CONTRAST MEDICATION: Performed by: STUDENT IN AN ORGANIZED HEALTH CARE EDUCATION/TRAINING PROGRAM

## 2022-11-04 PROCEDURE — 70450 CT HEAD/BRAIN W/O DYE: CPT

## 2022-11-04 PROCEDURE — 85610 PROTHROMBIN TIME: CPT

## 2022-11-04 PROCEDURE — 80048 BASIC METABOLIC PNL TOTAL CA: CPT

## 2022-11-04 PROCEDURE — 85025 COMPLETE CBC W/AUTO DIFF WBC: CPT

## 2022-11-04 PROCEDURE — 93005 ELECTROCARDIOGRAM TRACING: CPT | Performed by: STUDENT IN AN ORGANIZED HEALTH CARE EDUCATION/TRAINING PROGRAM

## 2022-11-04 PROCEDURE — 99285 EMERGENCY DEPT VISIT HI MDM: CPT

## 2022-11-04 RX ADMIN — IOPAMIDOL 90 ML: 755 INJECTION, SOLUTION INTRAVENOUS at 20:11

## 2022-11-04 ASSESSMENT — PAIN - FUNCTIONAL ASSESSMENT
PAIN_FUNCTIONAL_ASSESSMENT: 0-10
PAIN_FUNCTIONAL_ASSESSMENT: NONE - DENIES PAIN

## 2022-11-04 ASSESSMENT — PAIN DESCRIPTION - ORIENTATION: ORIENTATION: LEFT

## 2022-11-04 ASSESSMENT — PAIN SCALES - GENERAL: PAINLEVEL_OUTOF10: 7

## 2022-11-04 NOTE — ED PROVIDER NOTES
[facility]     Emergency Department     Faculty Attestation        I performed a history and physical examination of the patient and discussed management with the resident. I reviewed the residents note and agree with the documented findings and plan of care. Any areas of disagreement are noted on the chart. I was personally present for the key portions of any procedures. I have documented in the chart those procedures where I was not present during the key portions. I have reviewed the emergency nurses triage note. I agree with the chief complaint, past medical history, past surgical history, allergies, medications, social and family history as documented unless otherwise noted below. For mid-level providers such as nurse practitioners as well as physicians assistants:    I have personally seen and evaluated the patient. I find the patient's history and physical exam are consistent with NP/PA documentation. I agree with the care provided, treatment rendered, disposition, & follow-up plan. Additional findings are as noted. Vital Signs: BP (!) 147/91   Pulse 75   Temp 98.5 °F (36.9 °C) (Oral)   Resp 16   Wt 147 lb (66.7 kg)   SpO2 96%   BMI 23.02 kg/m²   PCP:  Jigna Beltran MD    Pertinent Comments:     Patient complaining of right-sided foot drop and now has right wrist drop. Is been present for multiple days.   Due to neurological plaints obtain labs CT head CTA head and neck, stroke consultation, probable admission      Critical Care  None          Hardik Wang MD    Attending Emergency Medicine Physician            Nayla Viera MD  11/04/22 Leonor Martinez MD  11/04/22 [id number]

## 2022-11-04 NOTE — ED NOTES
Pt arrives to ED for left sided weakness. Pt states she has sciatica and usually has numbness/tingling on the left side but it has been worse the past week. Pt states she has hx of arthritis and lupus as well. Pt rates pain as 7/10 but states that is baseline for her. Pt is currently taking methadone every morning. Pt A&Ox4, RR even and nonlabored.       Phuong Salvador RN  11/04/22 [id number]

## 2022-11-05 ENCOUNTER — APPOINTMENT (OUTPATIENT)
Dept: MRI IMAGING | Age: 52
End: 2022-11-05
Payer: MEDICARE

## 2022-11-05 PROBLEM — M21.331 RIGHT WRIST DROP: Status: ACTIVE | Noted: 2022-11-05

## 2022-11-05 PROBLEM — M21.371 RIGHT FOOT DROP: Status: ACTIVE | Noted: 2022-11-05

## 2022-11-05 LAB
C-REACTIVE PROTEIN: <3 MG/L (ref 0–5)
EKG ATRIAL RATE: 74 BPM
EKG P AXIS: 48 DEGREES
EKG P-R INTERVAL: 152 MS
EKG Q-T INTERVAL: 392 MS
EKG QRS DURATION: 76 MS
EKG QTC CALCULATION (BAZETT): 435 MS
EKG R AXIS: -17 DEGREES
EKG T AXIS: 35 DEGREES
EKG VENTRICULAR RATE: 74 BPM
SARS-COV-2, RAPID: NOT DETECTED
SEDIMENTATION RATE, ERYTHROCYTE: 13 MM/HR (ref 0–30)
SPECIMEN DESCRIPTION: NORMAL

## 2022-11-05 PROCEDURE — 86235 NUCLEAR ANTIGEN ANTIBODY: CPT

## 2022-11-05 PROCEDURE — 6370000000 HC RX 637 (ALT 250 FOR IP): Performed by: STUDENT IN AN ORGANIZED HEALTH CARE EDUCATION/TRAINING PROGRAM

## 2022-11-05 PROCEDURE — G0378 HOSPITAL OBSERVATION PER HR: HCPCS

## 2022-11-05 PROCEDURE — 87635 SARS-COV-2 COVID-19 AMP PRB: CPT

## 2022-11-05 PROCEDURE — 83516 IMMUNOASSAY NONANTIBODY: CPT

## 2022-11-05 PROCEDURE — 84425 ASSAY OF VITAMIN B-1: CPT

## 2022-11-05 PROCEDURE — 93010 ELECTROCARDIOGRAM REPORT: CPT | Performed by: INTERNAL MEDICINE

## 2022-11-05 PROCEDURE — 86038 ANTINUCLEAR ANTIBODIES: CPT

## 2022-11-05 PROCEDURE — 86225 DNA ANTIBODY NATIVE: CPT

## 2022-11-05 PROCEDURE — 36415 COLL VENOUS BLD VENIPUNCTURE: CPT

## 2022-11-05 PROCEDURE — 6360000002 HC RX W HCPCS

## 2022-11-05 PROCEDURE — 99220 PR INITIAL OBSERVATION CARE/DAY 70 MINUTES: CPT | Performed by: PSYCHIATRY & NEUROLOGY

## 2022-11-05 PROCEDURE — 96374 THER/PROPH/DIAG INJ IV PUSH: CPT

## 2022-11-05 PROCEDURE — 70551 MRI BRAIN STEM W/O DYE: CPT

## 2022-11-05 PROCEDURE — 72141 MRI NECK SPINE W/O DYE: CPT

## 2022-11-05 PROCEDURE — 2580000003 HC RX 258: Performed by: STUDENT IN AN ORGANIZED HEALTH CARE EDUCATION/TRAINING PROGRAM

## 2022-11-05 RX ORDER — PANTOPRAZOLE SODIUM 20 MG/1
20 TABLET, DELAYED RELEASE ORAL
Status: DISCONTINUED | OUTPATIENT
Start: 2022-11-05 | End: 2022-11-06 | Stop reason: HOSPADM

## 2022-11-05 RX ORDER — METHADONE HYDROCHLORIDE 5 MG/5ML
80 SOLUTION ORAL DAILY
Status: DISCONTINUED | OUTPATIENT
Start: 2022-11-05 | End: 2022-11-06 | Stop reason: HOSPADM

## 2022-11-05 RX ORDER — POTASSIUM CHLORIDE 20 MEQ/1
40 TABLET, EXTENDED RELEASE ORAL PRN
Status: DISCONTINUED | OUTPATIENT
Start: 2022-11-05 | End: 2022-11-06 | Stop reason: HOSPADM

## 2022-11-05 RX ORDER — POTASSIUM CHLORIDE 7.45 MG/ML
10 INJECTION INTRAVENOUS PRN
Status: DISCONTINUED | OUTPATIENT
Start: 2022-11-05 | End: 2022-11-06 | Stop reason: HOSPADM

## 2022-11-05 RX ORDER — LORAZEPAM 2 MG/ML
1 INJECTION INTRAMUSCULAR ONCE
Status: COMPLETED | OUTPATIENT
Start: 2022-11-05 | End: 2022-11-05

## 2022-11-05 RX ORDER — SODIUM CHLORIDE 0.9 % (FLUSH) 0.9 %
10 SYRINGE (ML) INJECTION PRN
Status: DISCONTINUED | OUTPATIENT
Start: 2022-11-05 | End: 2022-11-05

## 2022-11-05 RX ORDER — SODIUM CHLORIDE 0.9 % (FLUSH) 0.9 %
5-40 SYRINGE (ML) INJECTION EVERY 12 HOURS SCHEDULED
Status: DISCONTINUED | OUTPATIENT
Start: 2022-11-05 | End: 2022-11-06 | Stop reason: HOSPADM

## 2022-11-05 RX ORDER — SODIUM CHLORIDE 9 MG/ML
25 INJECTION, SOLUTION INTRAVENOUS PRN
Status: DISCONTINUED | OUTPATIENT
Start: 2022-11-05 | End: 2022-11-06 | Stop reason: HOSPADM

## 2022-11-05 RX ORDER — SODIUM CHLORIDE 0.9 % (FLUSH) 0.9 %
5-40 SYRINGE (ML) INJECTION PRN
Status: DISCONTINUED | OUTPATIENT
Start: 2022-11-05 | End: 2022-11-06 | Stop reason: HOSPADM

## 2022-11-05 RX ORDER — NICOTINE 21 MG/24HR
1 PATCH, TRANSDERMAL 24 HOURS TRANSDERMAL DAILY
Status: DISCONTINUED | OUTPATIENT
Start: 2022-11-05 | End: 2022-11-06 | Stop reason: HOSPADM

## 2022-11-05 RX ORDER — QUETIAPINE FUMARATE 25 MG/1
25 TABLET, FILM COATED ORAL NIGHTLY
Status: DISCONTINUED | OUTPATIENT
Start: 2022-11-05 | End: 2022-11-06 | Stop reason: HOSPADM

## 2022-11-05 RX ORDER — ENOXAPARIN SODIUM 100 MG/ML
40 INJECTION SUBCUTANEOUS DAILY
Status: DISCONTINUED | OUTPATIENT
Start: 2022-11-05 | End: 2022-11-06 | Stop reason: HOSPADM

## 2022-11-05 RX ORDER — GABAPENTIN 600 MG/1
600 TABLET ORAL 3 TIMES DAILY
Status: DISCONTINUED | OUTPATIENT
Start: 2022-11-05 | End: 2022-11-05

## 2022-11-05 RX ORDER — POLYETHYLENE GLYCOL 3350 17 G/17G
17 POWDER, FOR SOLUTION ORAL DAILY PRN
Status: DISCONTINUED | OUTPATIENT
Start: 2022-11-05 | End: 2022-11-06 | Stop reason: HOSPADM

## 2022-11-05 RX ORDER — GABAPENTIN 600 MG/1
600 TABLET ORAL 3 TIMES DAILY
Status: DISCONTINUED | OUTPATIENT
Start: 2022-11-05 | End: 2022-11-06 | Stop reason: HOSPADM

## 2022-11-05 RX ORDER — DULOXETIN HYDROCHLORIDE 30 MG/1
60 CAPSULE, DELAYED RELEASE ORAL DAILY
Status: DISCONTINUED | OUTPATIENT
Start: 2022-11-05 | End: 2022-11-06 | Stop reason: HOSPADM

## 2022-11-05 RX ORDER — ACETAMINOPHEN 325 MG/1
650 TABLET ORAL EVERY 6 HOURS PRN
Status: DISCONTINUED | OUTPATIENT
Start: 2022-11-05 | End: 2022-11-06 | Stop reason: HOSPADM

## 2022-11-05 RX ORDER — MIDAZOLAM HYDROCHLORIDE 2 MG/2ML
0.5 INJECTION, SOLUTION INTRAMUSCULAR; INTRAVENOUS ONCE
Status: COMPLETED | OUTPATIENT
Start: 2022-11-05 | End: 2022-11-06

## 2022-11-05 RX ORDER — ONDANSETRON 2 MG/ML
4 INJECTION INTRAMUSCULAR; INTRAVENOUS EVERY 4 HOURS PRN
Status: DISCONTINUED | OUTPATIENT
Start: 2022-11-05 | End: 2022-11-06 | Stop reason: HOSPADM

## 2022-11-05 RX ADMIN — METHADONE HYDROCHLORIDE 80 MG: 5 SOLUTION ORAL at 15:36

## 2022-11-05 RX ADMIN — DULOXETINE HYDROCHLORIDE 60 MG: 30 CAPSULE, DELAYED RELEASE ORAL at 10:11

## 2022-11-05 RX ADMIN — METOPROLOL TARTRATE 25 MG: 25 TABLET ORAL at 20:36

## 2022-11-05 RX ADMIN — SODIUM CHLORIDE, PRESERVATIVE FREE 10 ML: 5 INJECTION INTRAVENOUS at 20:34

## 2022-11-05 RX ADMIN — LORAZEPAM 1 MG: 2 INJECTION INTRAMUSCULAR; INTRAVENOUS at 08:23

## 2022-11-05 RX ADMIN — METOPROLOL TARTRATE 25 MG: 25 TABLET ORAL at 10:11

## 2022-11-05 RX ADMIN — GABAPENTIN 600 MG: 600 TABLET ORAL at 20:36

## 2022-11-05 RX ADMIN — PANTOPRAZOLE SODIUM 20 MG: 20 TABLET, DELAYED RELEASE ORAL at 10:11

## 2022-11-05 RX ADMIN — ACETAMINOPHEN 650 MG: 325 TABLET ORAL at 18:23

## 2022-11-05 RX ADMIN — SODIUM CHLORIDE, PRESERVATIVE FREE 10 ML: 5 INJECTION INTRAVENOUS at 10:14

## 2022-11-05 RX ADMIN — QUETIAPINE FUMARATE 25 MG: 25 TABLET ORAL at 20:36

## 2022-11-05 RX ADMIN — GABAPENTIN 600 MG: 600 TABLET ORAL at 15:35

## 2022-11-05 RX ADMIN — GABAPENTIN 600 MG: 600 TABLET ORAL at 10:11

## 2022-11-05 RX ADMIN — ACETAMINOPHEN 650 MG: 325 TABLET ORAL at 03:21

## 2022-11-05 ASSESSMENT — PAIN SCALES - WONG BAKER
WONGBAKER_NUMERICALRESPONSE: 0
WONGBAKER_NUMERICALRESPONSE: 4

## 2022-11-05 ASSESSMENT — PAIN DESCRIPTION - LOCATION
LOCATION: BACK
LOCATION: BACK

## 2022-11-05 ASSESSMENT — PAIN DESCRIPTION - PAIN TYPE
TYPE: CHRONIC PAIN
TYPE: CHRONIC PAIN

## 2022-11-05 ASSESSMENT — PAIN DESCRIPTION - DESCRIPTORS
DESCRIPTORS: ACHING
DESCRIPTORS: ACHING

## 2022-11-05 ASSESSMENT — PAIN SCALES - GENERAL
PAINLEVEL_OUTOF10: 0
PAINLEVEL_OUTOF10: 0
PAINLEVEL_OUTOF10: 8
PAINLEVEL_OUTOF10: 0
PAINLEVEL_OUTOF10: 0
PAINLEVEL_OUTOF10: 9
PAINLEVEL_OUTOF10: 0
PAINLEVEL_OUTOF10: 7
PAINLEVEL_OUTOF10: 0

## 2022-11-05 ASSESSMENT — ENCOUNTER SYMPTOMS
NAUSEA: 0
VOMITING: 0
COUGH: 0
SHORTNESS OF BREATH: 0
ABDOMINAL PAIN: 0

## 2022-11-05 ASSESSMENT — PAIN DESCRIPTION - FREQUENCY
FREQUENCY: CONTINUOUS
FREQUENCY: CONTINUOUS

## 2022-11-05 ASSESSMENT — PAIN DESCRIPTION - ORIENTATION
ORIENTATION: LOWER;MID
ORIENTATION: MID;LOWER

## 2022-11-05 ASSESSMENT — PAIN DESCRIPTION - ONSET
ONSET: ON-GOING
ONSET: ON-GOING

## 2022-11-05 NOTE — PROGRESS NOTES
[facility]  PROGRESS NOTE    Shift date: 11/4/22  Shift day: Friday   Shift # 2    Room # 23/23   Name: Parisa Hatch                Temple:    Place of Restorationism:     Referral: Routine Visit    Admit Date & Time: 11/4/2022  6:47 PM    Assessment:  Parisa Hatch is a 46 y.o. female   Intervention:  Writer introduced self and title as . Patient did not appear to mind  presence and engaged in conversation. Writer offered space for patient  to express feelings, needs, and concerns and provided a ministry presence. Outcome:  While bhargav/spirituality were not discussed, patient appeared receptive to  visit. Plan:  Chaplains will remain available to offer spiritual and emotional support as needed.       Electronically signed by Ivelisse Snyder on 11/4/2022 at 11:13 PM.  Murali Mcwilliams  [phone]

## 2022-11-05 NOTE — CARE COORDINATION
11/05/22 1119   Service Assessment   Patient Orientation Alert and Oriented   Cognition Alert   History Provided By Patient   Primary Caregiver Self   Support Systems Spouse/Significant Other;Children   PCP Verified by CM Yes   Last Visit to PCP   (unsure)   Prior Functional Level Independent in ADLs/IADLs   Current Functional Level Independent in ADLs/IADLs   Can patient return to prior living arrangement Yes   Ability to make needs known: Good   Social/Functional History   Lives With Spouse   Type of [address] Responsibilities Yes   Ambulation Assistance Independent   Transfer Assistance Independent   Discharge Planning   Type of [address] Prior To Admission None   Potential Assistance Needed N/A   DME Ordered?  No   Potential Assistance Purchasing Medications No   Type of Home Care Services None   Patient expects to be discharged to: House   Condition of Participation: Discharge Planning   The Plan for Transition of Care is related to the following treatment goals: increased comfort level   Home with family

## 2022-11-05 NOTE — ED PROVIDER NOTES
[facility]  ED  Emergency Department Encounter  Emergency Medicine Resident     Pt Name: Fan Alvarez  MRN: [id number]  Markgfjared 1970  Date of evaluation: 11/5/22  PCP:  Cielo Henderson MD    [address] [place] [address]       Chief Complaint   Patient presents with    Fatigue     Left side       HISTORY OFPRESENT ILLNESS  (Location/Symptom, Timing/Onset, Context/Setting, Quality, Duration, Modifying Jorje .)      Fan Alvarez is a 46 y.o. female with past medical history remarkable for asthma, unspecified connective tissue disease, degenerative disc disease, right-sided sciatica who presents with multiple complaints. Patient states that 1 week ago she began experiencing \"dropfoot \"with her right lower extremity and now she cannot point her toe up. She states that this has affected her gait. No injury or trauma the area. She does have a history of sciatica has been ongoing for several years however she reports that this symptom is new and not consistent with prior episodes of sciatic flareup. Patient became concerned as yesterday around 8 PM she fell asleep in a chair and when she woke up she could not extend her right wrist.  Patient as well as family were at bedside state that she normally sleeps in the chair and has never had issues with right-sided wrist drop prior to last night. Patient also endorses right hand numbness. PAST MEDICAL / SURGICAL / SOCIAL / FAMILY HISTORY      has a past medical history of Acute gastric ulcer with perforation ([address] Asthma, Connective tissue disease, undifferentiated ([address] DDD (degenerative disc disease), Depression, Lumbar radicular pain, Migraine, Mitral valve prolapse, and Opiate addiction ([address] has a past surgical history that includes Total abdominal hysterectomy w/ bilateral salpingoophorectomy (2007); hernia repair (1972); Nose surgery (1989); Tubal ligation (1997); ovarian cyst removal (Left, 2007);  Nerve Block (Right, 07/27/2015); Anxiety Disorder Daughter     Asthma Daughter     Depression Son     Other Son         Opitional Disorder, Behavioral and Congnitive disorder. ADHD Son     Asthma Son     Diabetes Son        Allergies:  Compazine [prochlorperazine maleate], Imitrex [sumatriptan], Thorazine [chlorpromazine], Cardizem [diltiazem], and Pcn [penicillins]    Home Medications:  Prior to Admission medications    Medication Sig Start Date End Date Taking? Authorizing Provider   pantoprazole (PROTONIX) 40 MG tablet TAKE 1 TABLET BY MOUTH TWICE DAILY BEFORE MEALS 10/17/22   Alia Davies MD   metoprolol tartrate (LOPRESSOR) 25 MG tablet TAKE 1 TABLET BY MOUTH TWICE DAILY 10/3/22   Constanza Hector DO   DULoxetine (CYMBALTA) 60 MG extended release capsule TAKE 1 CAPSULE BY MOUTH DAILY 11/29/21   Historical Provider, MD   methadone 10 MG/5ML solution Take 80 mg by mouth daily. Historical Provider, MD   QUEtiapine (SEROQUEL) 25 MG tablet TAKE 1 TABLET BY MOUTH AT BEDTIME AS NEEDED  Patient not taking: Reported on 11/4/2022 7/7/21   Historical Provider, MD   gabapentin (NEURONTIN) 600 MG tablet Take 600 mg by mouth 3 times daily. Historical Provider, MD   albuterol sulfate HFA (PROVENTIL HFA) 108 (90 BASE) MCG/ACT inhaler Inhale 1-2 puffs into the lungs every 4 hours as needed for Wheezing 3/29/16   Alia Davies MD       REVIEW OF SYSTEMS    (2-9 systems for level 4, 10 or more for level 5)      Review of Systems   Constitutional:  Negative for fever. HENT:  Negative for congestion. Respiratory:  Negative for cough and shortness of breath. Cardiovascular:  Negative for chest pain. Gastrointestinal:  Negative for abdominal pain, nausea and vomiting. Genitourinary:  Negative for dysuria. Musculoskeletal:  Positive for gait problem. Negative for myalgias. Skin:  Negative for rash. Neurological:  Positive for weakness and numbness. Negative for headaches.      PHYSICAL EXAM   (up to 7 for level 4, 8 or more for level 5) INITIAL VITALS:    weight is 147 lb (66.7 kg). Her oral temperature is 98.5 °F (36.9 °C). Her blood pressure is 102/88 and her pulse is 73. Her respiration is 17 and oxygen saturation is 93%. Physical Exam  Constitutional:       General: She is not in acute distress. Appearance: Normal appearance. She is not ill-appearing or toxic-appearing. Eyes:      Extraocular Movements: Extraocular movements intact. Pupils: Pupils are equal, round, and reactive to light. Cardiovascular:      Rate and Rhythm: Normal rate and regular rhythm. Pulses: Normal pulses. Heart sounds: No murmur heard. No gallop. Pulmonary:      Effort: Pulmonary effort is normal. No respiratory distress. Breath sounds: Normal breath sounds. No wheezing. Abdominal:      General: Abdomen is flat. Palpations: Abdomen is soft. Tenderness: There is no abdominal tenderness. Musculoskeletal:      Cervical back: No tenderness. Skin:     General: Skin is warm and dry. Coloration: Skin is not jaundiced. Neurological:      Mental Status: She is alert. Comments: GCS 15. Hand grasp is 5/5 bilaterally. She does have right-sided hand numbness present in the dorsal aspect of all 5 fingers. Patient has 0/5 strength with right-sided wrist extension. Intact right hand flexion. No sensory deficits in the bilateral lower extremities. She does have 0/5 strength with right lower extremity dorsiflexion.        DIFFERENTIAL  DIAGNOSIS     PLAN (LABS / IMAGING / EKG):  Orders Placed This Encounter   Procedures    COVID-19, Rapid    CT HEAD WO CONTRAST    CTA HEAD NECK W CONTRAST    MRI BRAIN W WO CONTRAST    MRI CERVICAL SPINE W WO CONTRAST    CBC with Auto Differential    Basic Metabolic Panel    Protime-INR    Anti-Neutrophilic Cytoplasmic Antibody    RAHEL SCREEN WITH REFLEX    C-Reactive Protein    Sedimentation Rate    Inpatient Consult to Neurology    EKG 12 Lead    Place in Observation Service MEDICATIONS ORDERED:  Orders Placed This Encounter   Medications    iopamidol (ISOVUE-370) 76 % injection 90 mL       DDX: Peripheral nerve palsy, CVA, intracranial aneurysm, arterial dissection    Initial MDM/Plan: 46 y.o. female who presents with right foot drop ongoing for 1 week and right hand drop of 1 day duration. On examination vitals unremarkable patient awake and alert and oriented to person, place, time. No acute distress. Visible examination demonstrates 0/5 strength with dorsiflexion of the right leg and wrist extension of the right hand. Also sensory deficit in the dorsal aspect of the right hand. Given history of unknown connective tissue disease concern for left-sided arterial dissection as a cause of right-sided weakness. Given symptoms ongoing for greater than 1 day no indication for stroke alert however will obtain CT and CTA, consult neurology.     DIAGNOSTIC RESULTS / EMERGENCY DEPARTMENT COURSE / MDM     LABS:  Labs Reviewed   CBC WITH AUTO DIFFERENTIAL - Abnormal; Notable for the following components:       Result Value    Lymphocytes 46 (*)     Eosinophils % 5 (*)     All other components within normal limits   BASIC METABOLIC PANEL - Abnormal; Notable for the following components:    Glucose 100 (*)     Calcium 8.5 (*)     Anion Gap 7 (*)     All other components within normal limits   COVID-19, RAPID   PROTIME-INR   ANTI-NEUTROPHILIC CYTOPLASMIC ANTIBODY   RAHEL SCREEN WITH REFLEX   C-REACTIVE PROTEIN   SEDIMENTATION RATE         RADIOLOGY:  CT HEAD WO CONTRAST    Result Date: 11/4/2022  EXAM: CT Head Without Intravenous Contrast EXAM DATE/TIME: 11/4/2022 8:20 pm CLINICAL HISTORY: ORDERING SYSTEM PROVIDED  right hand drop, right foot drop  TECHNOLOGIST PROVIDED HISTORY:  right hand drop, right foot drop  Decision Support Exception - unselect if not a suspected or confirmed emergency medical condition->Emergency Medical Condition (MA)  Is the patient pregnant?->No TECHNIQUE: Axial computed tomography images of the head/brain without intravenous contrast.  This CT exam was performed using one or more of the following dose reduction techniques:  automated exposure control, adjustment of the mA and/or kV according to patient size, and/or use of iterative reconstruction technique. COMPARISON: 09/21/2016 FINDINGS: Brain:  No acute findings. No hemorrhage. No significant white matter disease. No edema. Ventricles:  No acute findings. No ventriculomegaly. Bones/joints:  No acute findings. No acute fracture. Soft tissues:  No acute findings. Sinuses:  Unremarkable as visualized. No acute sinusitis. Mastoid air cells:  Unremarkable as visualized. No mastoid effusion. No acute intracranial abnormality noted. CTA HEAD NECK W CONTRAST    Result Date: 11/4/2022  EXAMINATION: CTA OF THE HEAD AND NECK WITH CONTRAST, 11/4/2022 8:20 pm TECHNIQUE: CTA of the head and neck was performed with the administration of intravenous contrast. Multiplanar reformatted images are provided for review. MIP images are provided for review. Stenosis of the internal carotid arteries measured using NASCET criteria. Automated exposure control, iterative reconstruction, and/or weight based adjustment of the mA/kV was utilized to reduce the radiation dose to as low as reasonably achievable. COMPARISON: None HISTORY: ORDERING SYSTEM PROVIDED HISTORY:  Right arm weakness, right leg weakness. TECHNOLOGIST PROVIDED HISTORY: Right arm weakness, right leg weakness. Decision Support Exception - unselect if not a suspected or confirmed emergency medical condition->Emergency Medical Condition (MA). FINDINGS: CTA NECK: AORTIC ARCH/ARCH VESSELS: No dissection or arterial injury. No significant stenosis of the brachiocephalic or subclavian arteries. CAROTID ARTERIES: There is mild beading of the internal carotid artery cervical segments without significant stenosis. The common carotid arteries are patent.  VERTEBRAL ARTERIES: No dissection, arterial injury, or significant stenosis. SOFT TISSUES: The lung apices are clear. No cervical or superior mediastinal lymphadenopathy. The larynx and pharynx are unremarkable. No acute abnormality of the salivary and thyroid glands. BONES: No acute osseous abnormality. CTA HEAD: ANTERIOR CIRCULATION: No significant stenosis of the intracranial internal carotid, anterior cerebral, or middle cerebral arteries. No aneurysm. POSTERIOR CIRCULATION: Multifocal mild stenosis/irregularities of the basilar artery. The proximal portions of the superior cerebellar and posteroinferior cerebellar arteries are patent. The right posterior cerebral artery P1 segment is hypoplastic with a dominant posterior communicating artery. There is a focal severe stenosis of the right P2 segment. The left posterior cerebral artery is patent. OTHER: No dural venous sinus thrombosis on this non-dedicated study. BRAIN: No mass effect or midline shift. No extra-axial fluid collection. The gray-white differentiation is maintained. No large vessel occlusion in the head or neck. Right posterior cerebral artery P2 segment severe stenosis. Mild internal carotid artery fibromuscular dysplasia. EMERGENCY DEPARTMENT COURSE:     CT head negative. CTA demonstrates posterior cerebral artery severe stenosis however this would not explain the patients neurological symptoms. Neurology consulted requested placement in observation unit for MRIs in the morning patient placed in observation unit. PROCEDURES:  None    CONSULTS:  IP CONSULT TO NEUROLOGY    CRITICAL CARE:  Please see attending note    FINAL IMPRESSION      1. Right wrist drop    2. Right foot drop          DISPOSITION / PLAN     DISPOSITION Admitted 11/04/2022 11:54:33 PM        PATIENTREFERRED TO:  No follow-up provider specified.     DISCHARGE MEDICATIONS:  New Prescriptions    No medications on file       Tena Perez DO  EmergencyMedicine Resident    (Please note that portions of this note were completed with a voice recognition program.  Efforts were made to edit the dictations but occasionally words are mis-transcribed.)        Lola Castle DO  Resident  11/05/22 2148

## 2022-11-05 NOTE — H&P
[facility]  CDU / [facility] NOTE     Pt Name: Brittney Ugalde  MRN: [id number]  [place] 1970  Date of evaluation: 11/5/22  Patient's PCP is :  Adrian Joe MD    [address] [place] [address]       Chief Complaint   Patient presents with    Fatigue     Left side         HISTORY OF PRESENT ILLNESS    Brittney Ugalde is a 46 y.o. female who presents neurological deficits including right foot drop for the past week and right wrist drop since Friday. Patient does have a history of connective tissue disease, lumbar radiculopathy, and IV drug use. Denies any inciting event, trauma, recent illness. States that the foot drop makes it difficult for her to walk. Location/Symptom: Right foot, right wrist  Timing/Onset: Within the past week  Provocation: Uncertain, spontaneous  Quality: Weakness and paresthesia  Radiation: N/A  Severity: N/A  Timing/Duration: N/A  Modifying Factors: None    REVIEW OF SYSTEMS       General ROS - No fevers, No malaise   Ophthalmic ROS - No discharge, No changes in vision  ENT ROS -  No sore throat, No rhinorrhea,   Respiratory ROS - no shortness of breath, no cough, no  wheezing  Cardiovascular ROS - No chest pain, no dyspnea on exertion  Gastrointestinal ROS - No abdominal pain, no nausea or vomiting, no change in bowel habits, no black or bloody stools  Genito-Urinary ROS - No dysuria, trouble voiding, or hematuria  Musculoskeletal ROS - No myalgias, No arthalgias  Neurological ROS - No headache, no dizziness/lightheadedness, positive for focal weakness, positive for loss of sensation  Dermatological ROS - No lesions, No rash     (PQRS) Advance directives on face sheet per hospital policy.  No change unless specifically mentioned in chart    PAST MEDICAL HISTORY    has a past medical history of Acute gastric ulcer with perforation ([address] Asthma, Connective tissue disease, undifferentiated ([address] DDD (degenerative disc disease), Depression, Lumbar radicular pain, Migraine, Mitral valve prolapse, and Opiate addiction ([facility] [address] I have reviewed the past medical history with the patient and it is pertinent to this complaint. SURGICAL HISTORY      has a past surgical history that includes Total abdominal hysterectomy w/ bilateral salpingoophorectomy (2007); hernia repair (1972); Nose surgery (1989); Tubal ligation (1997); ovarian cyst removal (Left, 2007); Nerve Block (Right, 07/27/2015); Hysterectomy; CT ABSCESS DRAINAGE (03/04/2021); laparotomy (N/A, 03/05/2021); laparotomy (N/A, 03/11/2021); Colonoscopy (N/A, 03/17/2021); Colonoscopy (03/21/2021); Colonoscopy (N/A, 3/21/2021); Abdomen surgery; gastrectomy (03/05/2021); Upper gastrointestinal endoscopy (N/A, 8/19/2021); and Colonoscopy (N/A, 8/19/2021). I have reviewed and agree with Surgical History entered and it is pertinent to this complaint. CURRENT MEDICATIONS     sodium chloride flush 0.9 % injection 5-40 mL, 2 times per day  sodium chloride flush 0.9 % injection 5-40 mL, PRN  0.9 % sodium chloride infusion, PRN  potassium chloride (KLOR-CON M) extended release tablet 40 mEq, PRN   Or  potassium bicarb-citric acid (EFFER-K) effervescent tablet 40 mEq, PRN   Or  potassium chloride 10 mEq/100 mL IVPB (Peripheral Line), PRN  enoxaparin (LOVENOX) injection 40 mg, Daily  ondansetron (ZOFRAN) injection 4 mg, Q4H PRN  polyethylene glycol (GLYCOLAX) packet 17 g, Daily PRN  acetaminophen (TYLENOL) tablet 650 mg, Q6H PRN  DULoxetine (CYMBALTA) extended release capsule 60 mg, Daily  methadone 10 MG/5ML solution 80 mg, Daily  metoprolol tartrate (LOPRESSOR) tablet 25 mg, BID  QUEtiapine (SEROQUEL) tablet 25 mg, Nightly  pantoprazole (PROTONIX) tablet 20 mg, QAM AC  gabapentin (NEURONTIN) tablet 600 mg, TID    sodium chloride flush 0.9 % injection 10 mL, PRN        All medication charted and reviewed.     ALLERGIES     is allergic to compazine [prochlorperazine maleate], imitrex [sumatriptan], thorazine [chlorpromazine], cardizem [diltiazem], and pcn [penicillins]. FAMILY HISTORY     She indicated that her mother is . She indicated that her father is alive. She indicated that her brother is alive. She indicated that her maternal grandmother is . She indicated that her maternal grandfather is . She indicated that her paternal grandmother is . She indicated that her paternal grandfather is . She indicated that her daughter is alive. She indicated that both of her sons are alive. family history includes ADHD in her son; Alcohol Abuse in her paternal grandfather; Anxiety Disorder in her daughter; Arthritis in her father and mother; Asthma in her daughter and son; Depression in her daughter and son; Diabetes in her maternal grandmother and son; Heart Attack in her father, maternal grandmother, and paternal grandfather; High Cholesterol in her father; Mental Illness in her paternal grandmother; Other in her brother and son; Other (age of onset: 64) in her mother; Other (age of onset: 6) in her maternal grandfather. The patient denies any pertinent family history. I have reviewed and agree with the family history entered. I have reviewed the Family History and it is not significant to the case    SOCIAL HISTORY      reports that she has been smoking cigarettes. She has a 40.00 pack-year smoking history. She has never used smokeless tobacco. She reports that she does not currently use drugs after having used the following drugs: Marijuana (Kimberley Breech), IV, and Opiates . She reports that she does not drink alcohol. I have reviewed and agree with all Social.  There are no concerns for substance abuse/use. PHYSICAL EXAM     INITIAL VITALS:  height is 5' 7\" (1.702 m) and weight is 142 lb 3.2 oz (64.5 kg). Her oral temperature is 97.8 °F (36.6 °C). Her blood pressure is 139/92 (abnormal) and her pulse is 65. Her respiration is 16 and oxygen saturation is 94%. CONSTITUTIONAL: AOx4, no apparent distress, appears stated age    HEAD: normocephalic, atraumatic   EYES: PERRLA, EOMI    ENT: moist mucous membranes, uvula midline   NECK: supple, symmetric   BACK: symmetric   LUNGS: clear to auscultation bilaterally   CARDIOVASCULAR: regular rate and rhythm, no murmurs, rubs or gallops   ABDOMEN: soft, non-tender, non-distended with normal active bowel sounds   NEUROLOGIC:  1) paresthesia over the median distribution of the right hand, inability extend wrist, but wrist flexion preserved.   2) decreased dorsiflexion on the right ankle   MUSCULOSKELETAL: no clubbing, cyanosis or edema   SKIN: no rash or wounds       DIFFERENTIAL DIAGNOSIS/MDM:     CVA, sclerosis, demyelinating disease [unspecified], myopathy    DIAGNOSTIC RESULTS     EKG: All EKG's are interpreted by the Observation Physician who either signs or Co-signs this chart in the absence of a cardiologist.    EKG Interpretation    Interpreted by observation physician    Rhythm: normal sinus   Rate: normal  Axis: left  Ectopy: none  Conduction: normal  ST Segments: no acute change  T Waves: no acute change  Q Waves: none    Clinical Impression: no acute changes    Adrian Johnson MD      RADIOLOGY:   I directly visualized the following  images and reviewed the radiologist interpretations:    CT HEAD WO CONTRAST    Result Date: 11/4/2022  EXAM: CT Head Without Intravenous Contrast EXAM DATE/TIME: 11/4/2022 8:20 pm CLINICAL HISTORY: ORDERING SYSTEM PROVIDED  right hand drop, right foot drop  TECHNOLOGIST PROVIDED HISTORY:  right hand drop, right foot drop  Decision Support Exception - unselect if not a suspected or confirmed emergency medical condition->Emergency Medical Condition (MA)  Is the patient pregnant?->No TECHNIQUE: Axial computed tomography images of the head/brain without intravenous contrast.  This CT exam was performed using one or more of the following dose reduction techniques:  automated exposure control, adjustment of the mA and/or kV according to patient size, and/or use of iterative reconstruction technique. COMPARISON: 09/21/2016 FINDINGS: Brain:  No acute findings. No hemorrhage. No significant white matter disease. No edema. Ventricles:  No acute findings. No ventriculomegaly. Bones/joints:  No acute findings. No acute fracture. Soft tissues:  No acute findings. Sinuses:  Unremarkable as visualized. No acute sinusitis. Mastoid air cells:  Unremarkable as visualized. No mastoid effusion. No acute intracranial abnormality noted. CTA HEAD NECK W CONTRAST    Result Date: 11/4/2022  EXAMINATION: CTA OF THE HEAD AND NECK WITH CONTRAST, 11/4/2022 8:20 pm TECHNIQUE: CTA of the head and neck was performed with the administration of intravenous contrast. Multiplanar reformatted images are provided for review. MIP images are provided for review. Stenosis of the internal carotid arteries measured using NASCET criteria. Automated exposure control, iterative reconstruction, and/or weight based adjustment of the mA/kV was utilized to reduce the radiation dose to as low as reasonably achievable. COMPARISON: None HISTORY: ORDERING SYSTEM PROVIDED HISTORY:  Right arm weakness, right leg weakness. TECHNOLOGIST PROVIDED HISTORY: Right arm weakness, right leg weakness. Decision Support Exception - unselect if not a suspected or confirmed emergency medical condition->Emergency Medical Condition (MA). FINDINGS: CTA NECK: AORTIC ARCH/ARCH VESSELS: No dissection or arterial injury. No significant stenosis of the brachiocephalic or subclavian arteries. CAROTID ARTERIES: There is mild beading of the internal carotid artery cervical segments without significant stenosis. The common carotid arteries are patent. VERTEBRAL ARTERIES: No dissection, arterial injury, or significant stenosis. SOFT TISSUES: The lung apices are clear. No cervical or superior mediastinal lymphadenopathy. The larynx and pharynx are unremarkable. No acute abnormality of the salivary and thyroid glands. BONES: No acute osseous abnormality. CTA HEAD: ANTERIOR CIRCULATION: No significant stenosis of the intracranial internal carotid, anterior cerebral, or middle cerebral arteries. No aneurysm. POSTERIOR CIRCULATION: Multifocal mild stenosis/irregularities of the basilar artery. The proximal portions of the superior cerebellar and posteroinferior cerebellar arteries are patent. The right posterior cerebral artery P1 segment is hypoplastic with a dominant posterior communicating artery. There is a focal severe stenosis of the right P2 segment. The left posterior cerebral artery is patent. OTHER: No dural venous sinus thrombosis on this non-dedicated study. BRAIN: No mass effect or midline shift. No extra-axial fluid collection. The gray-white differentiation is maintained. No large vessel occlusion in the head or neck. Right posterior cerebral artery P2 segment severe stenosis. Mild internal carotid artery fibromuscular dysplasia. LABS:  I have reviewed and interpreted all available lab results. Labs Reviewed   CBC WITH AUTO DIFFERENTIAL - Abnormal; Notable for the following components:       Result Value    Lymphocytes 46 (*)     Eosinophils % 5 (*)     All other components within normal limits   BASIC METABOLIC PANEL - Abnormal; Notable for the following components:    Glucose 100 (*)     Calcium 8.5 (*)     Anion Gap 7 (*)     All other components within normal limits   COVID-19, RAPID   PROTIME-INR   C-REACTIVE PROTEIN   SEDIMENTATION RATE   ANTI-NEUTROPHILIC CYTOPLASMIC ANTIBODY   RAHEL SCREEN WITH REFLEX         CDU IMPRESSION / PLAN      Radha Negron is a 46 y.o. female who presents with new neurological deficits    CT head negative. CTA demonstrates posterior cerebral artery severe stenosis however this would not explain the patients neurological symptoms.     Neurology consulted - MRI brain and cervical spine ordered, CRP + ESR + ANCA + RAHEL + thiamine ordered  Awaiting MRI lumbar spine - can likely be discharged after this and follow-up as outpatient for EMG. PT and OT consulted  Continue home medications and pain control  Monitor vitals, labs, and imaging    DISPO: pending neurology consult and lumbar MRI, PT and OT consult    CONSULTS:    IP CONSULT TO NEUROLOGY    PROCEDURES:  Not indicated       PATIENT REFERRED TO:    No follow-up provider specified. --  Cuco Chanel MD   Emergency Medicine Resident    This dictation was generated by voice recognition computer software. Although all attempts are made to edit the dictation for accuracy, there may be errors in the transcription that are not intended.

## 2022-11-05 NOTE — ED PROVIDER NOTES
Faculty Sign-Out Attestation  Handoff taken on the following patient from prior Attending Physician: Red Halsted    I was available and discussed any additional care issues that arose and coordinated the management plans with the resident(s) caring for the patient during my duty period. Any areas of disagreement with residents documentation of care or procedures are noted on the chart. I was personally present for the key portions of any/all procedures during my duty period. I have documented in the chart those procedures where I was not present during the key portions.     Fatigue, wrist / foot drop, neurology following,   Needing admit,     Rafa Moran DO  Attending Physician       Rafa Moran DO  11/04/22 [id number]

## 2022-11-05 NOTE — ED NOTES
The following labs were labeled with appropriate pt sticker and tubed to lab: by me    [] Blue     [] Lavender   [] on ice  [] Green/yellow  [] Green/black [] on ice  [] Stacie Guadeloupe  [] on ice  [] Yellow  [] Red  [] Type/ Screen  [] ABG  [] VBG    [x] COVID-19 swab    [] Rapid  [] PCR  [] Flu swab  [] Peds Viral Panel     [] Urine Sample  [] Pelvic Cultures  [] Blood Cultures  [] X 2  [] STREP Cultures         Nayely Dennison RN  11/05/22 1517

## 2022-11-05 NOTE — ED NOTES
Report called to Coy Chowdhury RN receiving pt to room 5023   All questions answered     Hill Bello RN  11/05/22 7021

## 2022-11-05 NOTE — CONSULTS
Neurology Consult Note        Reason for Consult:  right foot/wrist drop  Requesting Physician:  Dr. Dinero Honor:  weakness of right hand and right foot    History Obtained From:  patient       HISTORY OF PRESENT ILLNESS:              The patient is a 46 y.o. female with significant past medical history of unspecified connective tissue disease, sciatica, fatty cyst in neck, smoker, recovering heroin addict on chronic methadone, who presents with c/o of right foot drop x1 week that started upon waking up (has progressively improved with home exercises), and new right hand drop since waking up at midnight after falling asleep 1 hour prior. Always sleeps on chair due to back pain intolerable with lying flat. Foot and hand drop started spontaneously. Denies any trauma, recent illness, or triggers. Admits to numbness in foot as well and has been dragging it to walk.          Past Medical History:        Diagnosis Date    Acute gastric ulcer with perforation (HCC)     hx. of    Asthma     Connective tissue disease, undifferentiated (HCC) 1987    Dr. Mayelin Butt (Rheum.)    DDD (degenerative disc disease) 2000    Dr. Anamika Lugo    Depression     Lumbar radicular pain     Migraine 1988    Dr. Brianna Al    Mitral valve Clair Hernandez addiction [facility]) 01/02/2013    Dr. Yvrose Villaseñor / on 10/3/17 pt states she completed tx 2016     Past Surgical History:        Procedure Laterality Date    ABDOMEN SURGERY      COLONOSCOPY N/A 03/17/2021    COLONOSCOPY FLEXIBLE W/ DECOMPRESSION performed by Jose L Gilman MD at [facility]    COLONOSCOPY  03/21/2021    COLONOSCOPY N/A 3/21/2021    COLONOSCOPY DIAGNOSTIC performed by Claude Rico MD at [place] [address] 8/19/2021    COLONOSCOPY DIAGNOSTIC performed by Kalyan Griggs MD at [address] [place] [address]  03/04/2021    CT ABSCESS DRAIN SUBCUTANEOUS 3/4/2021 [facility] CT SCAN    GASTRECTOMY  03/05/2021    \"partial\"    HERNIA REPAIR  1972    as child    HYSTERECTOMY (CERVIX STATUS UNKNOWN)      2007    LAPAROTOMY N/A 03/05/2021    EXPLORATORY LAPAROTOMY, PARTIAL GASTRECTOMY performed by Codi Barahona MD at [facility] 03/11/2021    LAPAROTOMY EXPLORATORY, ABDOMINAL [address] OUT, FASCIAL CLOSURE  performed by Constantine Bowie MD at [address] Right 07/27/2015    right lumbar JHONY    NOSE SURGERY  1989    After nose was broken. Dr. Arvind Nagel 2007    Dr. Andrea Ahmadi (CERVIX REMOVED)  2007    Dr. Johnny Redman N/A 8/19/2021    EGD BIOPSY performed by Valorie Garcia MD at [facility]     Current Medications:   No current facility-administered medications for this encounter. Facility-Administered Medications Ordered in Other Encounters: sodium chloride flush 0.9 % injection 10 mL, 10 mL, IntraVENous, PRN  Allergies:  Compazine [prochlorperazine maleate], Imitrex [sumatriptan], Thorazine [chlorpromazine], Cardizem [diltiazem], and Pcn [penicillins]    Social History:  TOBACCO:   reports that she has been smoking cigarettes. She has a 40.00 pack-year smoking history. She has never used smokeless tobacco.  ETOH:   reports no history of alcohol use. DRUGS:   reports that she does not currently use drugs after having used the following drugs: Marijuana Jurline Critchley), IV, and Opiates . REVIEW OF SYSTEMS:  Review of Systems   Eyes:  Negative for visual disturbance. Musculoskeletal:  Positive for gait problem. Neurological:  Positive for weakness and numbness. Negative for dizziness, speech difficulty and headaches. Psychiatric/Behavioral:  Negative for confusion. PHYSICAL EXAM:    Vitals:  /84   Pulse 57   Temp 98.5 °F (36.9 °C) (Oral)   Resp 16   Wt 147 lb (66.7 kg)   SpO2 90%   BMI 23.02 kg/m²      General Appearance:  thin, appears older than age    Mental status   Alert.   Oriented to person, place, and time  Speech is fluent without paraphasic errors  Following commands  Good repetition and naming  Language appropriate  No hallucinations or delusions   Cranial nerves   II - VFF intact to confrontation  III, IV, VI - extra-ocular muscles full: no pupillary defect; no LUNA, no nystagmus, no ptosis       V - sensation symmetric         VII -  No facial droop or NLF  VIII - intact hearing to conversational tone          IX, X - symmetrical palate elevation   XI - 5/5 strength  XII - tongue midline   Motor function  5/5 in b/l upper and lower extremity  Right wrist 2-3/5 (no wrist extension), 3/5 intrinsic hand muscles on right hand  Above wrist 5/5 on RUE  5/5 left upper and lwoer  4-/5 on distal right lower extremity with 3/5 with dorsiflexion,  5/5 proximal right lower extremity    Normal muscle bulk. No increased tone   Sensory function Decreased to touch and pinprick at lateral foot and toes of right lower extremity   Cerebellar No dysmetria    Reflex function 3+ b/l symmetric in biceps, brachioradialis, patellar,   2+ calcaneal (right hyporeflexic relative to left)   Gait                  Unsteady, right foot drop  Slight swaying with Romberg testing       DATA  Lab Results:   CBC:   Recent Labs     11/04/22 2002   WBC 5.6   HGB 12.0        BMP:    Recent Labs     11/04/22 2002      K 3.9      CO2 29   BUN 8   CREATININE 0.87   GLUCOSE 100*         Lab Results   Component Value Date    CHOL 158 04/01/2016    LDLCHOLESTEROL 103 04/01/2016    HDL 41 04/01/2016    TRIG 67 03/07/2021    ALT 10 03/07/2021    AST 16 03/07/2021    TSH 0.68 10/27/2016    INR 1.0 11/04/2022       No results found for: PHENYTOIN, PHENYTOIN, VALPROATE, CBMZ    Imaging:  CT HEAD WO CONTRAST    Result Date: 11/4/2022  No acute intracranial abnormality noted. CTA HEAD NECK W CONTRAST    Result Date: 11/4/2022  No large vessel occlusion in the head or neck. Right posterior cerebral artery P2 segment severe stenosis. Mild internal carotid artery fibromuscular dysplasia. IMPRESSION/RECOMMENDATIONS:     Case of a 46year old female with hx of sciatic who presents with isolated right foot drop and right wrist drop without any history of trauma or identifiable triggers. Right foot drop x1 week. Right wrist drop x1 day. Does sleep on chair nightly    Right foot drop,  Right wrist drop,  Hx of Connective tissue disease    Ddx: Possible Radial and peroneal nerve palsies, of unknwon etiology. Could be compressive in nature given hx of unspecified CT disorder or DJD or Saturday night palsy from chronically sleeping in chair. Could be rheumatologic in nature; Will check vasculitis panel, 2/2 nutritional deficiency although less likely given uni laterality; will check thiamine levels. Rule out central process such as ALS; MRI brain.      Mri brain + MRI cervical w wo  PT/OT  Will likely need OP EMG  Labs: crp, esr, anca, richelle, thiamine      Tan Almeida DO  Neurology Resident PGY2  11/4/2022

## 2022-11-05 NOTE — ED NOTES
Pt back from ct     [facility]  11/04/22 2035

## 2022-11-05 NOTE — PLAN OF CARE
Problem: Safety - Adult  Goal: Free from fall injury  Outcome: Progressing  Flowsheets (Taken 11/5/2022 0300 by Esther Parrish RN)  Free From Fall Injury: Instruct family/caregiver on patient safety

## 2022-11-05 NOTE — ED NOTES
Pt lying in bedside resting   resp even and non labored denies pain when asked   Neurology at bedside to evaluate   Call light in reach        [facility], FERNANDO  11/04/22 5367

## 2022-11-06 ENCOUNTER — APPOINTMENT (OUTPATIENT)
Dept: MRI IMAGING | Age: 52
End: 2022-11-06
Payer: MEDICARE

## 2022-11-06 VITALS
OXYGEN SATURATION: 94 % | HEART RATE: 81 BPM | SYSTOLIC BLOOD PRESSURE: 122 MMHG | TEMPERATURE: 98.5 F | WEIGHT: 148.81 LBS | RESPIRATION RATE: 18 BRPM | BODY MASS INDEX: 23.36 KG/M2 | DIASTOLIC BLOOD PRESSURE: 76 MMHG | HEIGHT: 67 IN

## 2022-11-06 PROCEDURE — 72148 MRI LUMBAR SPINE W/O DYE: CPT

## 2022-11-06 PROCEDURE — 99225 PR SBSQ OBSERVATION CARE/DAY 25 MINUTES: CPT | Performed by: PSYCHIATRY & NEUROLOGY

## 2022-11-06 PROCEDURE — APPSS30 APP SPLIT SHARED TIME 16-30 MINUTES: Performed by: NURSE PRACTITIONER

## 2022-11-06 PROCEDURE — G0378 HOSPITAL OBSERVATION PER HR: HCPCS

## 2022-11-06 PROCEDURE — 6370000000 HC RX 637 (ALT 250 FOR IP): Performed by: STUDENT IN AN ORGANIZED HEALTH CARE EDUCATION/TRAINING PROGRAM

## 2022-11-06 PROCEDURE — 2580000003 HC RX 258: Performed by: STUDENT IN AN ORGANIZED HEALTH CARE EDUCATION/TRAINING PROGRAM

## 2022-11-06 PROCEDURE — 96375 TX/PRO/DX INJ NEW DRUG ADDON: CPT

## 2022-11-06 PROCEDURE — 6360000002 HC RX W HCPCS: Performed by: EMERGENCY MEDICINE

## 2022-11-06 RX ADMIN — ACETAMINOPHEN 650 MG: 325 TABLET ORAL at 07:38

## 2022-11-06 RX ADMIN — METHADONE HYDROCHLORIDE 80 MG: 5 SOLUTION ORAL at 09:49

## 2022-11-06 RX ADMIN — GABAPENTIN 600 MG: 600 TABLET ORAL at 09:49

## 2022-11-06 RX ADMIN — METOPROLOL TARTRATE 25 MG: 25 TABLET ORAL at 09:49

## 2022-11-06 RX ADMIN — PANTOPRAZOLE SODIUM 20 MG: 20 TABLET, DELAYED RELEASE ORAL at 09:49

## 2022-11-06 RX ADMIN — SODIUM CHLORIDE, PRESERVATIVE FREE 10 ML: 5 INJECTION INTRAVENOUS at 07:38

## 2022-11-06 RX ADMIN — DULOXETINE HYDROCHLORIDE 60 MG: 30 CAPSULE, DELAYED RELEASE ORAL at 09:49

## 2022-11-06 RX ADMIN — MIDAZOLAM 0.5 MG: 1 INJECTION INTRAMUSCULAR; INTRAVENOUS at 07:38

## 2022-11-06 ASSESSMENT — PAIN SCALES - WONG BAKER
WONGBAKER_NUMERICALRESPONSE: 0

## 2022-11-06 ASSESSMENT — PAIN SCALES - GENERAL
PAINLEVEL_OUTOF10: 0
PAINLEVEL_OUTOF10: 7
PAINLEVEL_OUTOF10: 0

## 2022-11-06 NOTE — PROGRESS NOTES
OBS/CDU   RESIDENT NOTE      Patients PCP is:  Klaudia Palmer MD        SUBJECTIVE      Patient states that she is feeling well. She reports that her back pain is the same, however her ability to extend her wrist and dorsiflex her foot have improved. We discussed discharge and follow-up with neurology as an outpatient. Has been able to tolerate a full diet without nausea or vomiting. The patient is urinating on her own and is passing flatus. Denies fever, chills, nausea, vomiting, chest pain, shortness of breath, abdominal pain, focal weakness, numbness, tingling, urinary/bowel symptoms, vision changes, visual hallucinations, or headache. PHYSICAL EXAM      General: NAD, AO X 3  Heent: EMOI, PERRL  Neck: SUPPLE, NO JVD  Cardiovascular: RRR, S1S2  Pulmonary: CTAB, NO SOB  Abdomen: SOFT, NTTP, ND, +BS  Extremities: +2/4 PULSES DISTAL, NO SWELLING. Paresthesia over the radial distribution of the right hand, inability extend wrist, but wrist flexion preserved. Decreased dorsiflexion on the right ankle  Neuro / Psych: MENTATION AT BASELINE    PERTINENT TEST /EXAMS      I have reviewed all available laboratory results.     MEDICATIONS CURRENT   sodium chloride flush 0.9 % injection 5-40 mL, 2 times per day  sodium chloride flush 0.9 % injection 5-40 mL, PRN  0.9 % sodium chloride infusion, PRN  potassium chloride (KLOR-CON M) extended release tablet 40 mEq, PRN   Or  potassium bicarb-citric acid (EFFER-K) effervescent tablet 40 mEq, PRN   Or  potassium chloride 10 mEq/100 mL IVPB (Peripheral Line), PRN  enoxaparin (LOVENOX) injection 40 mg, Daily  ondansetron (ZOFRAN) injection 4 mg, Q4H PRN  polyethylene glycol (GLYCOLAX) packet 17 g, Daily PRN  acetaminophen (TYLENOL) tablet 650 mg, Q6H PRN  DULoxetine (CYMBALTA) extended release capsule 60 mg, Daily  methadone 5 MG/5ML solution 80 mg, Daily  metoprolol tartrate (LOPRESSOR) tablet 25 mg, BID  QUEtiapine (SEROQUEL) tablet 25 mg, Nightly  pantoprazole (PROTONIX) tablet 20 mg, QAM AC  gabapentin (NEURONTIN) tablet 600 mg, TID  nicotine (NICODERM CQ) 21 MG/24HR 1 patch, Daily    sodium chloride flush 0.9 % injection 10 mL, PRN        All medication charted and reviewed. CONSULTS      IP CONSULT TO NEUROLOGY    ASSESSMENT/PLAN       Tommy Lesch is a 46 y.o. female who presents with new neurological deficits including R wrist drop and R foot drop, associated with a cute on chronic lower back pain     CT head negative. CTA demonstrates posterior cerebral artery severe stenosis however this would not explain the patients neurological symptoms. Neurology consulted - MRI brain and cervical unremarkable, MRI lumbar spine not yet read but cleared to discharge by Dr. Jennifer Mark as per RN Yamilex Walsh  Will follow-up as outpatient for EMG  PT and OT consulted - will obtain AFO and cock up wrist splint  Continue home medications and pain control  Monitor vitals, labs, and imaging    DISPO: discharge once AFO and wrist splint fitted  --  Angélica Hutchison MD  Emergency Medicine Resident Physician     This dictation was generated by voice recognition computer software. Although all attempts are made to edit the dictation for accuracy, there may be errors in the transcription that are not intended. Spontaneous, unlabored and symmetrical

## 2022-11-06 NOTE — PLAN OF CARE
Problem: Pain  Goal: Verbalizes/displays adequate comfort level or baseline comfort level  11/5/2022 2051 by Junior Camarillo RN  Outcome: Progressing  11/5/2022 1043 by Erma Francis RN  Outcome: Progressing     Problem: Safety - Adult  Goal: Free from fall injury  11/5/2022 2051 by Junior Camarillo RN  Outcome: Progressing  11/5/2022 1043 by Erma Francis RN  Outcome: Progressing  Flowsheets (Taken 11/5/2022 0300 by Junior Camarillo RN)  Free From Fall Injury: Instruct family/caregiver on patient safety

## 2022-11-06 NOTE — PROGRESS NOTES
[facility] [employer]  CDU / OBSERVATION ENCOUNTER  ATTENDING NOTE       I performed a history and physical examination of the patient and discussed management with the resident or midlevel provider. I reviewed the resident or midlevel provider's note and agree with the documented findings and plan of care. Any areas of disagreement are noted on the chart. I was personally present for the key portions of any procedures. I have documented in the chart those procedures where I was not present during the key portions. I have reviewed the nurses notes. I agree with the chief complaint, past medical history, past surgical history, allergies, medications, social and family history as documented unless otherwise noted below. The Family history, social history, and ROS are effectively unchanged since admission unless noted elsewhere in the chart. Josh Oliva MD [facility]  Attending Emergency  Physician    This patient presented to the Emergency Dept. with right-sided wrist drop and foot drop. The foot drop was first noted approxi-1 week ago. Wrist drop was noted only several days ago. Patient notes that she was sleeping in a chair and awakened to discover the symptoms both times. She denies any trauma. No headache. She also reports she had some paresthesias over the right hand and indicates the region of the dorsal aspect of the hand between the thumb and index metacarpals. She reports that that is improving. On examination patient is awake and alert. She is cooperative and responsive. Examination of the right upper and lower extremities confirms the presence of wrist drop as well as a foot drop. Distal capillary refill and sensation appears to be preserved. Patient has been evaluated by neurology service who feel she may be discharged and will arrange for the patient to receive orthotics for the foot drop as well as the risk splint for the wrist drop.   We will also attempt to arrange the recommended EMG and nerve conduction studies of the affected extremities as suggested by neurology. The patient indicates she is comfortable going home at this time.

## 2022-11-06 NOTE — PROGRESS NOTES
NEUROLOGY INPATIENT PROGRESS NOTE    11/6/2022         Current Exam:     Chart reviewed. Discussed with RN. MRI lumbar spine with advanced facet arthropathy at L5-S1 with no severe spinal canal stenosis. She reports some improvement in her strength to her right wrist and ankle. Brief History:    Senthil Murillo is a  46 y.o. female with H/O connective tissue disease, DDD, migraines, mitral valve prolapse, prior opioid addiction, who was admitted on 11/4/2022 with a 1 week history of right-sided foot drop and a 1 day history of right wrist drop. Prior brain with no acute intracranial abnormalities. MRI C-spine with mild stenosis at C2-3 with multilevel areas of mild to moderate neuroforaminal narrowing present. MRI lumbar spine was ordered to rule out a lumbar radiculopathy as etiology of the right foot drop. Recommended splinting and outpatient EMG/NCS. Current Facility-Administered Medications on File Prior to Encounter   Medication Dose Route Frequency Provider Last Rate Last Admin    sodium chloride flush 0.9 % injection 10 mL  10 mL IntraVENous PRN Armando Kendall MD   10 mL at 07/27/15 0840     Current Outpatient Medications on File Prior to Encounter   Medication Sig Dispense Refill    pantoprazole (PROTONIX) 40 MG tablet TAKE 1 TABLET BY MOUTH TWICE DAILY BEFORE MEALS 30 tablet 3    metoprolol tartrate (LOPRESSOR) 25 MG tablet TAKE 1 TABLET BY MOUTH TWICE DAILY 60 tablet 3    DULoxetine (CYMBALTA) 60 MG extended release capsule TAKE 1 CAPSULE BY MOUTH DAILY      methadone 10 MG/5ML solution Take 80 mg by mouth daily. QUEtiapine (SEROQUEL) 25 MG tablet TAKE 1 TABLET BY MOUTH AT BEDTIME AS NEEDED (Patient not taking: No sig reported)      gabapentin (NEURONTIN) 600 MG tablet Take 600 mg by mouth 3 times daily.       albuterol sulfate HFA (PROVENTIL HFA) 108 (90 BASE) MCG/ACT inhaler Inhale 1-2 puffs into the lungs every 4 hours as needed for Wheezing 1 Inhaler 0       Allergies: Mariela Johnson Lidia Briceno is allergic to compazine [prochlorperazine maleate], imitrex [sumatriptan], thorazine [chlorpromazine], cardizem [diltiazem], and pcn [penicillins]. Past Medical History:   Diagnosis Date    Acute gastric ulcer with perforation (HCC)     hx. of    Asthma     Connective tissue disease, undifferentiated (HCC) 1987    Dr. Denae Duggan ([facility]he[facility].)    DDD (degenerative disc disease) 2000    Dr. Kevon Shah     Lumbar radicular pain     Migraine 1988    Dr. Reynaldo Diaz    Mitral valve Adeola Ditch    [facility] Mat[facility]on [facility] 01/02/2013    Dr. Radha Vuong / on 10/3/17 pt states she completed tx 2016       Past Surgical History:   Procedure Laterality Date    ABDOMEN SURGERY      COLONOSCOPY N/A 03/17/2021    COLONOSCOPY FLEXIBLE W/ DECOMPRESSION performed by Nico Sumner MD at [facility]    COLONOSCOPY  03/21/2021    COLONOSCOPY N/A 3/21/2021    COLONOSCOPY DIAGNOSTIC performed by Sanju Carey MD at [place] [address] 8/19/2021    COLONOSCOPY DIAGNOSTIC performed by Beata Villagran MD at [address] [place] [address]  03/04/2021    CT ABSCESS DRAIN SUBCUTANEOUS 3/4/2021 [facility] CT SCAN    GASTRECTOMY  03/05/2021    \"partial\"    [facility],[address]    as child    HYSTERECTOMY ([facility])      2007    LAPAROTOMY N/A 03/05/2021    EXPLORATORY LAPAROTOMY, PARTIAL GASTRECTOMY performed by Lachelle Rich MD at [address] [place] [address] N/A 03/11/2021    LAPAROTOMY EXPLORATORY, ABDOMINAL [address] OUT, FASCIAL CLOSURE  performed by Armand Meza MD at [address] [place] [address],[address] Right 07/27/2015    right lumbar JHONY    NOSE SURGERY  1989    After nose was broken.  Dr. Nelli Marte 2007    Dr. Petra Rascon (CERVIX REMOVED)  2007    Dr. Parish Israel N/A 8/19/2021    EGD BIOPSY performed by Beata Villagran MD at [address] [place] [address] History: Alfie 484David  reports that she has been smoking cigarettes. She has a 40.00 pack-year smoking history. She has never used smokeless tobacco. She reports that she does not currently use drugs after having used the following drugs: Marijuana (Dalton Si), IV, and Opiates . She reports that she does not drink alcohol. Family History   Problem Relation Age of Onset    Other Mother 64        Multi Organ Failure    Arthritis Mother     High Cholesterol Father     Heart Attack Father     Arthritis Father         Rheumatoid    Other Brother         Vericose Veins    Diabetes Maternal Grandmother     Heart Attack Maternal Grandmother     Other Maternal Grandfather 8        Black Lung, Clotting Disorder, Vericose Veins    Mental Illness Paternal Grandmother     Alcohol Abuse Paternal Grandfather     Heart Attack Paternal Grandfather     Depression Daughter     Anxiety Disorder Daughter     Asthma Daughter     Depression Son     Other Son         Opitional Disorder, Behavioral and Congnitive disorder. ADHD Son     Asthma Son     Diabetes Son        Objective:   /76   Pulse 81   Temp 98.5 °F (36.9 °C) (Oral)   Resp 18   Ht 5' 7\" (1.702 m)   Wt 148 lb 13 oz (67.5 kg)   SpO2 94%   BMI 23.31 kg/m²     Blood pressure range: Systolic (19PPR), QVQ:499 , Min:122 , EWN:676   ; Diastolic (20URQ), KPB:72, Min:76, Max:83      Review of Systems:  Constitutional  Negative for fever and chills    HEENT  Negative for ear discharge, ear pain, nosebleed    Eyes  Negative for photophobia, pain and discharge    Respiratory  Negative for hemoptysis and sputum    Cardiovascular  Negative for orthopnea, claudication and PND    Gastrointestinal  Negative for abdominal pain, diarrhea, blood in stool    Musculoskeletal  Negative for joint pain, negative for myalgia    Skin  Negative for rash or itching    Endo/heme/allergies  Negative for polydipsia, environmental allergy    Psychiatric/behavioral  Negative for suicidal ideation.  Patient is not anxious        NEUROLOGIC EXAMINATION  GENERAL  Appears comfortable and in no distress   HEENT  NC/ AT   NECK  Supple   MENTAL STATUS:  Alert, oriented, intact memory, no confusion, normal speech, normal language, no hallucination or delusion   CRANIAL NERVES: II     -      Visual fields intact to confrontation  III,IV,VI -  EOMs full, no afferent defect, no LUNA, no ptosis  V     -     Normal facial sensation  VII    -     Normal facial symmetry  VIII   -     Intact hearing  IX,X -     Symmetrical palate  XI    -     Symmetrical shoulder shrug  XII   -     Midline tongue, no atrophy    MOTOR FUNCTION:  Full strength other than R ankle and wrist 3/5 with extension, 4/5 with flexion with normal bulk, normal tone and no involuntary movements, no tremor   SENSORY FUNCTION:  Normal touch, normal pin   CEREBELLAR FUNCTION:  Intact fine motor control over upper limbs   REFLEX FUNCTION:  Symmetric, no perverted reflex, no Babinski sign   STATION and GAIT  Not tested       Data:    Lab Results:   CBC:   Recent Labs     11/04/22 2002   WBC 5.6   HGB 12.0        BMP:    Recent Labs     11/04/22 2002      K 3.9      CO2 29   BUN 8   CREATININE 0.87   GLUCOSE 100*         Lab Results   Component Value Date    CHOL 158 04/01/2016    LDLCHOLESTEROL 103 04/01/2016    HDL 41 04/01/2016    TRIG 67 03/07/2021    ALT 10 03/07/2021    AST 16 03/07/2021    TSH 0.68 10/27/2016    INR 1.0 11/04/2022           Diagnostic data reviewed:  CT HEAD (11/4/22) -  No acute intracranial abnormality noted. CTA HEAD AND NECK (11/4/22) -  No large vessel occlusion in the head or neck. Right posterior cerebral artery P2 segment severe stenosis. Mild internal carotid artery fibromuscular dysplasia. BRAIN MRI (11/5/22) -  Unremarkable MRI of the brain without acute abnormality.       MRI CERVICAL SPINE (11/5/22) -  Multilevel degenerative disc disease with uncovertebral and facet hypertrophy   resulting in canal stenosis most pronounced at C2-3. Bilateral foraminal narrowing as described above. MRI LUMBAR SPINE (11/6/22) -  Advanced facet arthropathy at L5-S1. No severe spinal canal stenosis. Impression:  -1 week history of right foot drop and 1 day history of right wrist drop    Plan:  -MRI lumbar spine with advanced facet hypertrophy at L5-S1 with no severe spinal canal stenosis   -MRI cervical spine with mild stenosis at C2-3 and mild to moderate neuroforaminal narrowing  -Recommend outpatient EMG/NCS of right upper and lower extremity  -May benefit from CSF studies as an outpatient  -Continue therapies; will need AFO and cock-up wrist splint  -Neurologically clear. Will follow with you while she is here. Please note that this note was generated using a voice recognition dictation software. Although every effort was made to ensure the accuracy of this automated transcription, some errors in transcription may have occurred.

## 2022-11-06 NOTE — DISCHARGE SUMMARY
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 Improved with symptomatic care and long cock up wrist splint  2) Acute right foot drop likely due to peripheral neuropathy. Improved with symptomatic care and AFO. 3) Acute on chronic lower back pain. Improved with symptomatic management    The patient was seen in the emergency department for right wrist drop and right foot drop. Given the close timing between the 2 symptoms, the patient was admitted for neurological evaluation. MRIs were unremarkable for etiology. Given that stroke and central nervous system causes have been out ruled, this is most likely a peripheral neuropathy. The patient will follow-up with neurology as an outpatient for EMG. She was discharged with ankle-foot orthotic, wrist splint, and return precautions. Follow-up:  Call today/tomorrow for a follow up appointment with Freddy Bonilla MD , or return to the Emergency Room with worsening symptoms. Call the neurologist to book an appointment for EMG. Stressed to patient the importance of following up with primary care doctor for further workup/management of symptoms. Pt verbalizes understanding and agrees with plan.     Discharge Medications:  Changes to medications           Medication List        CONTINUE taking these medications      albuterol sulfate  (90 Base) MCG/ACT inhaler  Commonly known as: Proventil HFA  Inhale 1-2 puffs into the lungs every 4 hours as needed for Wheezing     DULoxetine 60 MG extended release capsule  Commonly known as: CYMBALTA     gabapentin 600 MG tablet  Commonly known as: NEURONTIN     methadone 10 MG/5ML solution     metoprolol tartrate 25 MG tablet  Commonly known as: LOPRESSOR  TAKE 1 TABLET BY MOUTH TWICE DAILY     pantoprazole 40 MG tablet  Commonly known as: PROTONIX  TAKE 1 TABLET BY MOUTH TWICE DAILY BEFORE MEALS            ASK your doctor about these medications      QUEtiapine 25 MG tablet  Commonly known as: SEROQUEL              Diet:  ADULT DIET; Regular , Advance as tolerated     Activity:  As tolerated    Consultants: IP CONSULT TO NEUROLOGY    Procedures:  Not indicated     Diagnostic Test:   Results for orders placed or performed during the hospital encounter of 11/04/22   COVID-19, Rapid    Specimen: Nasopharyngeal Swab   Result Value Ref Range    Specimen Description . NASOPHARYNGEAL SWAB     SARS-CoV-2, Rapid Not Detected Not Detected   CBC with Auto Differential   Result Value Ref Range    WBC 5.6 3.5 - 11.3 k/uL    RBC 4.20 3.95 - 5.11 m/uL    Hemoglobin 12.0 11.9 - 15.1 g/dL    Hematocrit 38.0 36.3 - 47.1 %    MCV 90.5 82.6 - 102.9 fL    MCH 28.6 25.2 - 33.5 pg    MCHC 31.6 28.4 - 34.8 g/dL    RDW 14.1 11.8 - 14.4 %    Platelets 731 059 - 389 k/uL    MPV 9.7 8.1 - 13.5 fL    NRBC Automated 0.0 0.0 per 100 WBC    Seg Neutrophils 36 36 - 65 %    Lymphocytes 46 (H) 24 - 43 %    Monocytes 12 3 - 12 %    Eosinophils % 5 (H) 1 - 4 %    Basophils 1 0 - 2 %    Immature Granulocytes 0 0 %    Segs Absolute 2.04 1.50 - 8.10 k/uL    Absolute Lymph # 2.57 1.10 - 3.70 k/uL    Absolute Mono # 0.68 0.10 - 1.20 k/uL    Absolute Eos # 0.30 0.00 - 0.44 k/uL    Basophils Absolute 0.03 0.00 - 0.20 k/uL    Absolute Immature Granulocyte <0.03 0.00 - 0.30 k/uL   Basic Metabolic Panel   Result Value Ref Range    Glucose 100 (H) 70 - 99 mg/dL    BUN 8 6 - 20 mg/dL    Creatinine 0.87 0.50 - 0.90 mg/dL    Est, Glom Filt Rate >60 >60 mL/min/1.73m2    Calcium 8.5 (L) 8.6 - 10.4 mg/dL    Sodium 137 135 - 144 mmol/L    Potassium 3.9 3.7 - 5.3 mmol/L    Chloride 101 98 - 107 mmol/L    CO2 29 20 - 31 mmol/L    Anion Gap 7 (L) 9 - 17 mmol/L   Protime-INR   Result Value Ref Range    Protime 10.7 9.1 - 12.3 sec    INR 1.0    C-Reactive Protein   Result Value Ref Range    CRP <3.0 0.0 - 5.0 mg/L   Sedimentation Rate   Result Value Ref Range    Sed Rate 13 0 - 30 mm/Hr   EKG 12 Lead   Result Value Ref Range    Ventricular Rate 74 BPM    Atrial Rate 74 BPM    P-R Interval 152 ms    QRS Duration 76 ms    Q-T Interval 392 ms    QTc Calculation (Bazett) 435 ms    P Axis 48 degrees    R Axis -17 degrees    T Axis 35 degrees     CT HEAD WO CONTRAST    Result Date: 11/4/2022  EXAM: CT Head Without Intravenous Contrast EXAM DATE/TIME: 11/4/2022 8:20 pm CLINICAL HISTORY: ORDERING SYSTEM PROVIDED  right hand drop, right foot drop  TECHNOLOGIST PROVIDED HISTORY:  right hand drop, right foot drop  Decision Support Exception - unselect if not a suspected or confirmed emergency medical condition->Emergency Medical Condition (MA)  Is the patient pregnant?->No TECHNIQUE: Axial computed tomography images of the head/brain without intravenous contrast.  This CT exam was performed using one or more of the following dose reduction techniques:  automated exposure control, adjustment of the mA and/or kV according to patient size, and/or use of iterative reconstruction technique. COMPARISON: 09/21/2016 FINDINGS: Brain:  No acute findings. No hemorrhage. No significant white matter disease. No edema. Ventricles:  No acute findings. No ventriculomegaly. Bones/joints:  No acute findings. No acute fracture. Soft tissues:  No acute findings. Sinuses:  Unremarkable as visualized. No acute sinusitis. Mastoid air cells:  Unremarkable as visualized. No mastoid effusion. No acute intracranial abnormality noted. MRI CERVICAL SPINE WO CONTRAST    Result Date: 11/5/2022  EXAMINATION: MRI OF THE CERVICAL SPINE WITHOUT CONTRAST 11/5/2022 8:31 am TECHNIQUE: Multiplanar multisequence MRI of the cervical spine was performed without the administration of intravenous contrast. COMPARISON: None.  HISTORY: ORDERING SYSTEM PROVIDED HISTORY: right foot drop, right wrist drop TECHNOLOGIST PROVIDED HISTORY: right foot drop, right wrist drop Reason for Exam: Right foot drop, right wrist drop - Fatigue FINDINGS: BONES/ALIGNMENT: There is minimal reversal of the normal cervical lordosis. The vertebral body heights are maintained. There is age-appropriate bone marrow signal.  There is multilevel degenerative disc disease with loss of disc signal.  There is no disc space narrowing. There is no spondylolisthesis. SPINAL CORD: The spinal cord is normal in caliber and signal. SOFT TISSUES: Posterior paraspinal soft tissues are unremarkable. The prevertebral soft tissues are unremarkable. C2-C3: There is a disc osteophyte complex with uncovertebral and facet hypertrophy. There is canal stenosis measuring 8 mm in AP dimension. There is moderate bilateral foraminal narrowing. C3-C4: There is a disc osteophyte complex with uncovertebral and facet hypertrophy. There is no canal stenosis. There is mild left and moderate right foraminal narrowing. C4-C5: There is a disc osteophyte complex with uncovertebral and facet hypertrophy. There is no canal stenosis or left foraminal narrowing. There is moderate right foraminal narrowing. C5-C6: There is a disc osteophyte complex with midline disc protrusion. There is canal stenosis measuring 9 mm in AP dimension. There is no significant foraminal narrowing. C6-C7: There is a disc osteophyte complex with uncovertebral and facet hypertrophy. There is no canal stenosis or left foraminal narrowing. There is moderate right foraminal narrowing. C7-T1: There is no significant disc protrusion, spinal canal stenosis or neural foraminal narrowing. Multilevel degenerative disc disease with uncovertebral and facet hypertrophy resulting in canal stenosis most pronounced at C2-3. Bilateral foraminal narrowing as described above.      CTA HEAD NECK W CONTRAST    Result Date: 11/4/2022  EXAMINATION: CTA OF THE HEAD AND NECK WITH CONTRAST, 11/4/2022 8:20 pm TECHNIQUE: CTA of the head and neck was performed with the administration of intravenous contrast. Multiplanar reformatted images are provided for review. MIP images are provided for review. Stenosis of the internal carotid arteries measured using NASCET criteria. Automated exposure control, iterative reconstruction, and/or weight based adjustment of the mA/kV was utilized to reduce the radiation dose to as low as reasonably achievable. COMPARISON: None HISTORY: ORDERING SYSTEM PROVIDED HISTORY:  Right arm weakness, right leg weakness. TECHNOLOGIST PROVIDED HISTORY: Right arm weakness, right leg weakness. Decision Support Exception - unselect if not a suspected or confirmed emergency medical condition->Emergency Medical Condition (MA). FINDINGS: CTA NECK: AORTIC ARCH/ARCH VESSELS: No dissection or arterial injury. No significant stenosis of the brachiocephalic or subclavian arteries. CAROTID ARTERIES: There is mild beading of the internal carotid artery cervical segments without significant stenosis. The common carotid arteries are patent. VERTEBRAL ARTERIES: No dissection, arterial injury, or significant stenosis. SOFT TISSUES: The lung apices are clear. No cervical or superior mediastinal lymphadenopathy. The larynx and pharynx are unremarkable. No acute abnormality of the salivary and thyroid glands. BONES: No acute osseous abnormality. CTA HEAD: ANTERIOR CIRCULATION: No significant stenosis of the intracranial internal carotid, anterior cerebral, or middle cerebral arteries. No aneurysm. POSTERIOR CIRCULATION: Multifocal mild stenosis/irregularities of the basilar artery. The proximal portions of the superior cerebellar and posteroinferior cerebellar arteries are patent. The right posterior cerebral artery P1 segment is hypoplastic with a dominant posterior communicating artery. There is a focal severe stenosis of the right P2 segment.   The left posterior cerebral artery is patent. OTHER: No dural venous sinus thrombosis on this non-dedicated study. BRAIN: No mass effect or midline shift. No extra-axial fluid collection. The gray-white differentiation is maintained. No large vessel occlusion in the head or neck. Right posterior cerebral artery P2 segment severe stenosis. Mild internal carotid artery fibromuscular dysplasia. MRI BRAIN WO CONTRAST    Result Date: 11/5/2022  EXAMINATION: MRI OF THE BRAIN WITHOUT CONTRAST  11/5/2022 8:31 am TECHNIQUE: Multiplanar multisequence MRI of the brain was performed without the administration of intravenous contrast. COMPARISON: CT brain performed 11/04/2022. HISTORY: ORDERING SYSTEM PROVIDED HISTORY: right foot drop and right wrist drop TECHNOLOGIST PROVIDED HISTORY: right foot drop and right wrist drop Reason for Exam: Right foot drop and right wrist drop - Fatigue. FINDINGS: INTRACRANIAL STRUCTURES/VENTRICLES: The sellar and suprasellar structures, optic chiasm, corpus callosum, pineal gland, tectum, and midline brainstem structures are unremarkable. The craniocervical junction is unremarkable. There is no acute hemorrhage, mass effect, or midline shift. There is satisfactory overall gray-white matter differentiation. The ventricular structures are symmetric and unremarkable. The infratentorial structures including the cerebellopontine angles and internal auditory canals are unremarkable. There is no abnormal restricted diffusion. There is no abnormal blooming artifact on susceptibility weighted imaging. ORBITS: The visualized portion of the orbits demonstrate no acute abnormality. SINUSES: The visualized paranasal sinuses and mastoid air cells demonstrate no acute abnormality. BONES/SOFT TISSUES: The bone marrow signal intensity appears normal. The soft tissues demonstrate no acute abnormality. Unremarkable MRI of the brain without acute abnormality.            Physical Exam:    General: NAD, AO X 3  Heent: EMOI, PERRL  Neck: SUPPLE, NO JVD  Cardiovascular: RRR, S1S2  Pulmonary: CTAB, NO SOB  Abdomen: SOFT, NTTP, ND, +BS  Extremities: +2/4 PULSES DISTAL, NO SWELLING. Paresthesia over the radial distribution of the right hand, inability extend wrist, but wrist flexion preserved. Decreased dorsiflexion on the right ankle  Neuro / Psych: MENTATION AT [facility] Course:  Clinical course has improved, labs and imaging reviewed. Alfie Khan originally presented to the hospital on 11/4/2022  6:47 PM. with peripheral neuropathies. At that time it was determined that She required further observation and neurological evaluation. She was admitted and labs and imaging were followed daily. Imaging results as above. She is medically stable to be discharged. Disposition: Home    Patient stated that they will not drive themselves home from the hospital if they have gotten pain killers/ narcotics earlier that day and that they will arrange for transportation on their own or work with the  for a ride. Patient counseled NOT to drive while under the influence of narcotics/ pain killers. Condition: Good    Patient stable and ready for discharge home. I have discussed plan of care with patient and they are in understanding. They were instructed to read discharge paperwork. All of their questions and concerns were addressed. Time Spent: 0 day      --  Antoinette Dee MD  Emergency Medicine Resident Physician    This dictation was generated by voice recognition computer software. Although all attempts are made to edit the dictation for accuracy, there may be errors in the transcription that are not intended.

## 2022-11-06 NOTE — DISCHARGE INSTRUCTIONS
You were seen in the emergency department for sudden onset wrist drop and foot drop on your right side. You were evaluated by neurology and there is no emergent explanation for your new neurological signs. Given that they are improving and there is no serious cause, you are being discharged to follow-up with neurology as an outpatient. Their number is attached, call them as soon as possible to schedule an outpatient appointment. You have been fitted with a ankle-foot orthotic. Wear this when walking to prevent your foot from dragging which can be a potential tripping hazard. You have also been given a wrist splint, use this for comfort. Return to the emergency department should he have any new weakness, numbness, difficulty speaking, visual changes, chest pain, abdominal pain, difficulty breathing, or any other acute concern. Otherwise, please follow-up with your primary care doctor.

## 2022-11-07 LAB
ANCA MYELOPEROXIDASE: 1.3 AU/ML (ref 0–3.5)
ANCA PROTEINASE 3: 1.5 AU/ML (ref 0–2)
ANTI DNA DOUBLE STRANDED: 10 IU/ML
ANTI-NUCLEAR ANTIBODY (ANA): POSITIVE
ENA ANTIBODIES SCREEN: >32 U/ML

## 2022-11-08 LAB
ANTI JO-1 IGG: 2.5 U/ML
ANTI SSA: >240 U/ML
ANTI SSB: 3.7 U/ML
ANTI-CENTROMERE: 1 U/ML
ANTI-RNP70: 2.5 U/ML
ANTI-SCLERODERMA: 1.2 U/ML
ANTI-SMITH: 4.8 U/ML
ANTI-U1RNP: 3.5 U/ML

## 2022-11-11 LAB — VITAMIN B1 WHOLE BLOOD: 100 NMOL/L (ref 70–180)

## 2022-11-12 ENCOUNTER — HOSPITAL ENCOUNTER (OUTPATIENT)
Age: 52
Discharge: HOME | End: 2022-11-12
Payer: MEDICARE

## 2022-11-12 DIAGNOSIS — D64.9 ANEMIA, UNSPECIFIED TYPE: ICD-10-CM

## 2022-11-12 LAB
FOLATE: 9.7 NG/ML
HCT VFR BLD CALC: 38.4 % (ref 36.3–47.1)
HEMOGLOBIN: 11.7 G/DL (ref 11.9–15.1)
MCH RBC QN AUTO: 28.3 PG (ref 25.2–33.5)
MCHC RBC AUTO-ENTMCNC: 30.5 G/DL (ref 28.4–34.8)
MCV RBC AUTO: 92.8 FL (ref 82.6–102.9)
NRBC AUTOMATED: 0 PER 100 WBC
PDW BLD-RTO: 14.1 % (ref 11.8–14.4)
PLATELET # BLD: 215 K/UL (ref 138–453)
PMV BLD AUTO: 9.4 FL (ref 8.1–13.5)
RBC # BLD: 4.14 M/UL (ref 3.95–5.11)
VITAMIN B-12: 403 PG/ML (ref 232–1245)
WBC # BLD: 5 K/UL (ref 3.5–11.3)

## 2022-11-12 PROCEDURE — 82607 VITAMIN B-12: CPT

## 2022-11-12 PROCEDURE — 85027 COMPLETE CBC AUTOMATED: CPT

## 2022-11-12 PROCEDURE — 36415 COLL VENOUS BLD VENIPUNCTURE: CPT

## 2022-11-12 PROCEDURE — 82746 ASSAY OF FOLIC ACID SERUM: CPT

## 2022-11-16 NOTE — PROGRESS NOTES
[address] [facility]  CDU / OBSERVATION ENCOUNTER  ATTENDING NOTE       I performed a history and physical examination of the patient and discussed management with the resident or midlevel provider. I reviewed the resident or midlevel provider's note and agree with the documented findings and plan of care. Any areas of disagreement are noted on the chart. I was personally present for the key portions of any procedures. I have documented in the chart those procedures where I was not present during the key portions. I have reviewed the nurses notes. I agree with the chief complaint, past medical history, past surgical history, allergies, medications, social and family history as documented unless otherwise noted below. The Family history, social history, and ROS are effectively unchanged since admission unless noted elsewhere in the chart. Patient admitted with right foot drop and right wrist drop. Both peripheral deficits occurring at separate times. Patient with admission for neurologic evaluation. Appreciate consultant input.     Johnie Anaya MD  Attending Emergency  Physician

## 2022-12-05 ENCOUNTER — OFFICE VISIT (OUTPATIENT)
Dept: FAMILY MEDICINE CLINIC | Age: 52
End: 2022-12-05
Payer: MEDICARE

## 2022-12-05 VITALS
BODY MASS INDEX: 23.4 KG/M2 | TEMPERATURE: 98.2 F | DIASTOLIC BLOOD PRESSURE: 76 MMHG | HEART RATE: 78 BPM | WEIGHT: 149.4 LBS | OXYGEN SATURATION: 95 % | SYSTOLIC BLOOD PRESSURE: 115 MMHG

## 2022-12-05 DIAGNOSIS — R76.8 POSITIVE ANA (ANTINUCLEAR ANTIBODY): Primary | ICD-10-CM

## 2022-12-05 PROCEDURE — 99214 OFFICE O/P EST MOD 30 MIN: CPT | Performed by: FAMILY MEDICINE

## 2022-12-05 PROCEDURE — G8484 FLU IMMUNIZE NO ADMIN: HCPCS | Performed by: FAMILY MEDICINE

## 2022-12-05 PROCEDURE — 3017F COLORECTAL CA SCREEN DOC REV: CPT | Performed by: FAMILY MEDICINE

## 2022-12-05 PROCEDURE — 4004F PT TOBACCO SCREEN RCVD TLK: CPT | Performed by: FAMILY MEDICINE

## 2022-12-05 PROCEDURE — G8427 DOCREV CUR MEDS BY ELIG CLIN: HCPCS | Performed by: FAMILY MEDICINE

## 2022-12-05 PROCEDURE — G8420 CALC BMI NORM PARAMETERS: HCPCS | Performed by: FAMILY MEDICINE

## 2022-12-05 RX ORDER — ONDANSETRON HYDROCHLORIDE 8 MG/1
TABLET, FILM COATED ORAL
COMMUNITY
Start: 2022-09-29

## 2022-12-05 RX ORDER — IBUPROFEN 800 MG/1
TABLET ORAL
COMMUNITY
Start: 2022-09-28

## 2022-12-05 RX ORDER — GABAPENTIN 800 MG/1
TABLET ORAL
COMMUNITY
Start: 2022-11-29

## 2022-12-05 RX ORDER — PROMETHAZINE HYDROCHLORIDE 25 MG/1
25 TABLET ORAL 4 TIMES DAILY PRN
Qty: 20 TABLET | Refills: 0 | Status: SHIPPED | OUTPATIENT
Start: 2022-12-05 | End: 2022-12-12

## 2022-12-05 RX ORDER — PREDNISONE 1 MG/1
TABLET ORAL
COMMUNITY
Start: 2022-09-28

## 2022-12-05 RX ORDER — CLINDAMYCIN HYDROCHLORIDE 300 MG/1
CAPSULE ORAL
COMMUNITY
Start: 2022-09-28

## 2022-12-05 SDOH — ECONOMIC STABILITY: FOOD INSECURITY: WITHIN THE PAST 12 MONTHS, YOU WORRIED THAT YOUR FOOD WOULD RUN OUT BEFORE YOU GOT MONEY TO BUY MORE.: NEVER TRUE

## 2022-12-05 SDOH — ECONOMIC STABILITY: FOOD INSECURITY: WITHIN THE PAST 12 MONTHS, THE FOOD YOU BOUGHT JUST DIDN'T LAST AND YOU DIDN'T HAVE MONEY TO GET MORE.: NEVER TRUE

## 2022-12-05 ASSESSMENT — PATIENT HEALTH QUESTIONNAIRE - PHQ9
SUM OF ALL RESPONSES TO PHQ QUESTIONS 1-9: 0
SUM OF ALL RESPONSES TO PHQ QUESTIONS 1-9: 0
1. LITTLE INTEREST OR PLEASURE IN DOING THINGS: 0
SUM OF ALL RESPONSES TO PHQ QUESTIONS 1-9: 0
2. FEELING DOWN, DEPRESSED OR HOPELESS: 0
SUM OF ALL RESPONSES TO PHQ QUESTIONS 1-9: 0
SUM OF ALL RESPONSES TO PHQ9 QUESTIONS 1 & 2: 0

## 2022-12-05 ASSESSMENT — SOCIAL DETERMINANTS OF HEALTH (SDOH): HOW HARD IS IT FOR YOU TO PAY FOR THE VERY BASICS LIKE FOOD, HOUSING, MEDICAL CARE, AND HEATING?: NOT HARD AT ALL

## 2022-12-05 NOTE — PROGRESS NOTES
General FM note    Tommy Lesch is a 46 y.o. female who presents today for follow up on her  medical conditions as noted below.   Tommy Lesch is c/o of   Chief Complaint   Patient presents with    Hypertension    Extremity Weakness     11/4/22 [facility]       Patient Active Problem List:     Migraine     Opiate dependence ([facility]     Chronic pain     Obesity     Patient overweight     Lumbar radicular pain     Lumbar disc disease     Closed nondisplaced fracture of fifth metatarsal bone of right foot     Intra-abdominal abscess (HCC)     Toxic dilatation of colon (HCC)     Colonic ischemia (HCC)     Weakness     Right wrist drop     Right foot drop     Past Medical History:   Diagnosis Date    Acute gastric ulcer with perforation (HCC)     hx. of    Asthma     Connective tissue disease, undifferentiated (HCC) 1987    Dr. Denae Duggan ([facility]heum.)    DDD (degenerative disc disease) 2000    Dr. Kevon Velez    Depression     Lumbar radicular pain     Migraine 1988    Dr. Reynaldo Diaz    Mitral valve prolapse 1978    Dr. Matias Dubon addiction ([facility] 01/02/2013    Dr. Radha Vuong / on 10/3/17 pt states she completed tx 2016      Past Surgical History:   Procedure Laterality Date    ABDOMEN SURGERY      COLONOSCOPY N/A 03/17/2021    COLONOSCOPY FLEXIBLE W/ DECOMPRESSION performed by Nico Sumner MD at [facility]    COLONOSCOPY  03/21/2021    COLONOSCOPY N/A 3/21/2021    COLONOSCOPY DIAGNOSTIC performed by Sanju Carey MD at [place] [address] 8/19/2021    COLONOSCOPY DIAGNOSTIC performed by Beata Villagran MD at [address] [place] [address]  03/04/2021    CT ABSCESS DRAIN SUBCUTANEOUS 3/4/2021 [facility] CT SCAN    GASTRECTOMY  03/05/2021    \"partial\"    [address] [facility]    as child    HYSTERECTOMY ([facility])      2007    LAPAROTOMY N/A 03/05/2021    EXPLORATORY LAPAROTOMY, PARTIAL GASTRECTOMY performed by Lachelle Rich MD at [address] [place] [address] N/A 03/11/2021    LAPAROTOMY (LOPRESSOR) 25 MG tablet TAKE 1 TABLET BY MOUTH TWICE DAILY 60 tablet 3    DULoxetine (CYMBALTA) 60 MG extended release capsule TAKE 1 CAPSULE BY MOUTH DAILY      methadone 10 MG/5ML solution Take 80 mg by mouth daily. gabapentin (NEURONTIN) 600 MG tablet Take 600 mg by mouth 3 times daily. albuterol sulfate HFA (PROVENTIL HFA) 108 (90 BASE) MCG/ACT inhaler Inhale 1-2 puffs into the lungs every 4 hours as needed for Wheezing 1 Inhaler 0    QUEtiapine (SEROQUEL) 25 MG tablet TAKE 1 TABLET BY MOUTH AT BEDTIME AS NEEDED (Patient not taking: No sig reported)       No current facility-administered medications for this visit. Facility-Administered Medications Ordered in Other Visits   Medication Dose Route Frequency Provider Last Rate Last Admin    sodium chloride flush 0.9 % injection 10 mL  10 mL IntraVENous PRN Griselda Ortiz MD   10 mL at 07/27/15 0840     ALLERGIES:    Allergies   Allergen Reactions    Compazine [Prochlorperazine Maleate] Other (See Comments)     Seizure like syndrome, eyes roll to back of head    Imitrex [Sumatriptan] Other (See Comments)     TIA    Thorazine [Chlorpromazine] Other (See Comments)     Seizure like syndrome    Cardizem [Diltiazem] Rash    Pcn [Penicillins] Rash       Social History     Tobacco Use    Smoking status: Every Day     Packs/day: 1.00     Years: 40.00     Pack years: 40.00     Types: Cigarettes    Smokeless tobacco: Never   Substance Use Topics    Alcohol use: No     Alcohol/week: 0.0 standard drinks      Body mass index is 23.4 kg/m². /76   Pulse 78   Temp 98.2 °F (36.8 °C)   Wt 149 lb 6.4 oz (67.8 kg)   SpO2 95%   BMI 23.40 kg/m²     Subjective:      HPI    46 y.o. female coming in today after she was admitted to the hospital on November 4 because of a right wrist drop right foot drop. Patient states that she just woke up with these findings.   The patient was found to have inflammatory markers including RAHEL positive and anti-SSA positive. Overall she states that she feels that the nerve issue is related to her longtime drug abuse. She also tells me that she was diagnosed in the past with autoimmune disease. She really cannot remember. She states that she is not able to sleep because she feels when going to sleep and that reminds her of her past.  She also has a  who is an alcoholic and she feels living with him is at times a strain. She has a daughter who is pregnant again. And she feels that she is trying to do everything to keep herself healthy. Review of Systems   Constitutional: Negative for fever and unexpected weight change. Pertinent items are noted in HPI. Objective:   Physical Exam  Constitutional: VS (see above). General appearance: normal development, habitus and attention, no deformities. No distress. The patient looks quite aged since her last visit with me. Eyes: normal conjunctiva and lids. CAV: RRR, no RMG. No edema lower extremities. Pulmo: CTA bilateral, no CWR. Skin: no rashes, lesions or ulcers. Musculoskeletal: normal gait. Nails: no clubbing or cyanosis. Psychiatric: alert and oriented to place, time and person. Normal mood and affect. Assessment:       Diagnosis Orders   1. Positive RAHEL (antinuclear antibody)  CARLITA - Luis Barcenas DO, Rheumatology, [place]    Vitamin B12 & Folate    Vitamin D 25 Hydroxy          Plan:   She needs to see a rheumatologist.  I did order additional blood work. The patient says that she has not been using any drugs. She has been on methadone. She has been clean for last 5 years. Told her that she should follow-up with the specialist.  Call out anytime if you need to. She also has an appointment with a gastroenterologist as she has a history of hepatitis C. Return in about 6 months (around 6/5/2023), or if symptoms worsen or fail to improve.   Orders Placed This Encounter   Procedures    Vitamin B12 & Folate     Standing Status:   Future Standing Expiration Date:   12/5/2023    Vitamin D 25 Hydroxy     Standing Status:   Future     Standing Expiration Date:   12/5/2023    CARLITA - Luis Barcenas DO, Rheumatology, [place]     Referral Priority:   Routine     Referral Type:   Eval and Treat     Referral Reason:   Specialty Services Required     Referred to Provider:   Danielle Sue DO     Requested Specialty:   Rheumatology     Number of Visits Requested:   1     Orders Placed This Encounter   Medications    promethazine (PHENERGAN) 25 MG tablet     Sig: Take 1 tablet by mouth 4 times daily as needed for Nausea     Dispense:  20 tablet     Refill:  0         Call or return to clinic prn if these symptoms worsen or fail to improve as anticipated. I have reviewed the instructions with the patient, answering all questions to patient's satisfaction. Megan Beckwith received counseling on the following healthy behaviors: nutrition, exercise, and medication adherence  Reviewed prior labs and health maintenance. Continue current medications, diet and exercise. Discussed use, benefit, and side effects of prescribed medications. Barriers to medication compliance addressed. Patient given educational materials - see patient instructions. All patient questions answered. Patient voiced understanding.       Electronically signed by Deepthi Portillo MD on 12/6/2022 at 6:31 AM       (Please note that portions of this note were completed with a voice recognition program. Efforts were made to edit the dictations but occasionally words are mis-transcribed.)

## 2022-12-27 RX ORDER — PROMETHAZINE HYDROCHLORIDE 25 MG/1
TABLET ORAL
Qty: 20 TABLET | Refills: 0 | Status: SHIPPED | OUTPATIENT
Start: 2022-12-27

## 2023-01-12 RX ORDER — PROMETHAZINE HYDROCHLORIDE 25 MG/1
TABLET ORAL
Qty: 20 TABLET | Refills: 0 | Status: SHIPPED | OUTPATIENT
Start: 2023-01-12

## 2023-01-20 RX ORDER — PROMETHAZINE HYDROCHLORIDE 25 MG/1
TABLET ORAL
Qty: 20 TABLET | Refills: 0 | Status: SHIPPED | OUTPATIENT
Start: 2023-01-20

## 2023-01-23 RX ORDER — PANTOPRAZOLE SODIUM 40 MG/1
TABLET, DELAYED RELEASE ORAL
Qty: 30 TABLET | Refills: 3 | Status: SHIPPED | OUTPATIENT
Start: 2023-01-23

## 2023-01-25 RX ORDER — PROMETHAZINE HYDROCHLORIDE 25 MG/1
TABLET ORAL
Qty: 20 TABLET | Refills: 0 | Status: SHIPPED | OUTPATIENT
Start: 2023-01-25

## 2023-01-30 RX ORDER — PROMETHAZINE HYDROCHLORIDE 25 MG/1
TABLET ORAL
Qty: 20 TABLET | Refills: 0 | Status: SHIPPED | OUTPATIENT
Start: 2023-01-30 | End: 2023-02-03

## 2023-02-03 RX ORDER — PROMETHAZINE HYDROCHLORIDE 25 MG/1
TABLET ORAL
Qty: 20 TABLET | Refills: 0 | Status: SHIPPED | OUTPATIENT
Start: 2023-02-03

## 2023-02-10 RX ORDER — PROMETHAZINE HYDROCHLORIDE 25 MG/1
TABLET ORAL
Qty: 20 TABLET | Refills: 0 | Status: SHIPPED | OUTPATIENT
Start: 2023-02-10

## 2023-02-10 NOTE — TELEPHONE ENCOUNTER
Gabo Taylor is calling to request a refill on the following medication(s):    Last Visit Date (If Applicable):  90/8/9212    Next Visit Date:    Visit date not found    Medication Request:  Requested Prescriptions     Pending Prescriptions Disp Refills    promethazine (PHENERGAN) 25 MG tablet [Pharmacy Med Name: PROMETHAZINE 25MG TABLETS] 20 tablet 0     Sig: TAKE 1 TABLET BY MOUTH FOUR TIMES DAILY AS NEEDED FOR NAUSEA

## 2023-02-14 NOTE — TELEPHONE ENCOUNTER
Sue Timmons is calling to request a refill on the following medication(s):    Last Visit Date (If Applicable):  09/1/1889    Next Visit Date:    Visit date not found    Medication Request:  Requested Prescriptions     Pending Prescriptions Disp Refills    promethazine (PHENERGAN) 25 MG tablet [Pharmacy Med Name: PROMETHAZINE 25MG TABLETS] 20 tablet 0     Sig: TAKE 1 TABLET BY MOUTH FOUR TIMES DAILY AS NEEDED FOR NAUSEA

## 2023-02-15 RX ORDER — PROMETHAZINE HYDROCHLORIDE 25 MG/1
TABLET ORAL
Qty: 20 TABLET | Refills: 0 | Status: SHIPPED | OUTPATIENT
Start: 2023-02-15

## 2023-02-20 RX ORDER — PROMETHAZINE HYDROCHLORIDE 25 MG/1
TABLET ORAL
Qty: 20 TABLET | Refills: 0 | Status: SHIPPED | OUTPATIENT
Start: 2023-02-20

## 2023-02-24 RX ORDER — PROMETHAZINE HYDROCHLORIDE 25 MG/1
TABLET ORAL
Qty: 20 TABLET | Refills: 0 | OUTPATIENT
Start: 2023-02-24

## 2023-02-27 RX ORDER — PROMETHAZINE HYDROCHLORIDE 25 MG/1
TABLET ORAL
Qty: 20 TABLET | Refills: 0 | Status: SHIPPED | OUTPATIENT
Start: 2023-02-27

## 2023-02-27 NOTE — TELEPHONE ENCOUNTER
Yovana Khalil is calling to request a refill on the following medication(s):    Last Visit Date (If Applicable):  51/3/8222    Next Visit Date:    Visit date not found    Medication Request:  Requested Prescriptions     Pending Prescriptions Disp Refills    promethazine (PHENERGAN) 25 MG tablet [Pharmacy Med Name: PROMETHAZINE 25MG TABLETS] 20 tablet 0     Sig: TAKE 1 TABLET BY MOUTH FOUR TIMES DAILY AS NEEDED FOR NAUSEA

## 2023-03-06 RX ORDER — PROMETHAZINE HYDROCHLORIDE 25 MG/1
TABLET ORAL
Qty: 20 TABLET | Refills: 0 | Status: SHIPPED | OUTPATIENT
Start: 2023-03-06 | End: 2023-03-13

## 2023-03-08 ENCOUNTER — OFFICE VISIT (OUTPATIENT)
Dept: GASTROENTEROLOGY | Age: 53
End: 2023-03-08
Payer: MEDICAID

## 2023-03-08 VITALS
WEIGHT: 145 LBS | TEMPERATURE: 96.9 F | DIASTOLIC BLOOD PRESSURE: 87 MMHG | SYSTOLIC BLOOD PRESSURE: 134 MMHG | HEART RATE: 91 BPM | BODY MASS INDEX: 22.71 KG/M2

## 2023-03-08 DIAGNOSIS — R11.0 NAUSEA: ICD-10-CM

## 2023-03-08 DIAGNOSIS — Z12.11 COLON CANCER SCREENING: ICD-10-CM

## 2023-03-08 DIAGNOSIS — K27.9 PUD (PEPTIC ULCER DISEASE): ICD-10-CM

## 2023-03-08 DIAGNOSIS — K64.8 OTHER HEMORRHOIDS: ICD-10-CM

## 2023-03-08 DIAGNOSIS — R10.84 ABDOMINAL PAIN, GENERALIZED: Primary | ICD-10-CM

## 2023-03-08 DIAGNOSIS — R11.0 CHRONIC NAUSEA: ICD-10-CM

## 2023-03-08 DIAGNOSIS — D64.9 ANEMIA, UNSPECIFIED TYPE: ICD-10-CM

## 2023-03-08 DIAGNOSIS — K57.90 DIVERTICULOSIS: ICD-10-CM

## 2023-03-08 PROCEDURE — 99214 OFFICE O/P EST MOD 30 MIN: CPT | Performed by: INTERNAL MEDICINE

## 2023-03-08 ASSESSMENT — ENCOUNTER SYMPTOMS
BLOOD IN STOOL: 0
RECTAL PAIN: 0
WHEEZING: 0
TROUBLE SWALLOWING: 0
VOMITING: 0
ABDOMINAL DISTENTION: 0
CHOKING: 0
ABDOMINAL PAIN: 0
CONSTIPATION: 0
COUGH: 0
NAUSEA: 1
DIARRHEA: 0
ANAL BLEEDING: 0

## 2023-03-08 NOTE — PROGRESS NOTES
GI CLINIC FOLLOW UP    INTERVAL HISTORY:   No referring provider defined for this encounter. Chief Complaint   Patient presents with    Gastroesophageal Reflux     Patient is f/u on anemia ad GERD. She takes Protonix 40 mg BID    Nausea     Patient c/o constant nausea. She is currently taking Phenergan  from PCP. Hepatitis C     Patient states she was told she had Hep C, not active. 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 [facility] told her of Hep C           HISTORY OF PRESENT ILLNESS: Gurinder Spears is a 46 y.o. female , referred for evaluation of C Nausea, constipation, anemia, diverticulosis, hemorrhoids, gastritis, GERD   S/o B2 gastrectomy, Sol Garcia     recent perforated gastric ulcers with exploratory lap, and Billroth II surgery, colon distention with possible ischemic changes and stricture. GERD, Nausea, abnormal ct abd      Patient is here for follow-up  On Protonix/ zofran/pheregan   Methadone and Marijuana       Saying only nausea and GERD symptoms   Loosing wt since the gastecomy,She states she was told she had hepatitis C but it was not active at the [facility]  I do not have those labs available  She denied issues right now except for the nausea and the exacerbation of her acid reflux she is taking Protonix once a day  She does take Zofran and Phenergan  Patient still smoking marijuana she is on methadone  She take NSAIDs  She is also on prednisone  In addition to multiple other medical issues  The available labs ordered by other physician on [facility] and care everywhere were reviewed          Past Medical,Family, and Social History reviewed and does contribute to the patient presentingcondition.     I did review all the labs results available for the labs which were ordered by the primary care physician, and the other consultants, we search on [facility] at [facility] and all the available care everywhere epic    I did review all the imaging studies of the abdomen available on EMR, ordered by the primary care physician and the other consultant    I did review all the pathology from the biopsies done on the previous endoscopies    Patient's PMH/PSH,SH,PSYCH Hx, MEDs, ALLERGIES, and ROS were all reviewed and updated in the appropriate sections. PAST MEDICAL HISTORY:  Past Medical History:   Diagnosis Date    Acute gastric ulcer with perforation (HCC)     hx. of    Asthma     Connective tissue disease, undifferentiated (HCC) 1987    Dr. Davis Meter (Rheum.)    DDD (degenerative disc disease) 2000    Dr. John Gupta    Depression     Lumbar radicular pain     Migraine 1988    Dr. Gaviota Fierro    Mitral valve Yoly Abts    [facility] 01/02/2013    Dr. Jon Broussard / on 10/3/17 pt states she completed tx 2016       Past Surgical History:   Procedure Laterality Date    ABDOMEN SURGERY      COLONOSCOPY N/A 03/17/2021    COLONOSCOPY FLEXIBLE W/ DECOMPRESSION performed by Venkata Knott MD at [facility]    COLONOSCOPY  03/21/2021    COLONOSCOPY N/A 3/21/2021    COLONOSCOPY DIAGNOSTIC performed by Susan Martinez MD at [place] [address] 8/19/2021    COLONOSCOPY DIAGNOSTIC performed by Army Vinicio MD at [address] [place] [address]  03/04/2021    CT ABSCESS DRAIN SUBCUTANEOUS 3/4/2021 [facility] CT SCAN    GASTRECTOMY  03/05/2021    \"partial\"    [facility]    as child    HYSTERECTOMY ([facility])      2007    LAPAROTOMY N/A 03/05/2021    EXPLORATORY LAPAROTOMY, PARTIAL GASTRECTOMY performed by Luz Elena Gaspar MD at [address] [place] [address] N/A 03/11/2021    LAPAROTOMY EXPLORATORY, ABDOMINAL [address] OUT, FASCIAL CLOSURE  performed by Chloe Cosby MD at [address] [place] [address],[address] Right 07/27/2015    right lumbar JHONY    NOSE SURGERY  1989    After nose was broken.  Dr. Minesh Zavala 2007    Dr. Reid Batista (CERVIX REMOVED)  2007    Dr. Eboni Dotson GASTROINTESTINAL ENDOSCOPY N/A 8/19/2021    EGD BIOPSY performed by Danny White MD at [address]:    Current Outpatient Medications:     promethazine (PHENERGAN) 25 MG tablet, TAKE 1 TABLET BY MOUTH FOUR TIMES DAILY AS NEEDED FOR NAUSEA, Disp: 20 tablet, Rfl: 0    metoprolol tartrate (LOPRESSOR) 25 MG tablet, TAKE 1 TABLET BY MOUTH TWICE DAILY, Disp: 60 tablet, Rfl: 3    pantoprazole (PROTONIX) 40 MG tablet, TAKE 1 TABLET BY MOUTH TWICE DAILY BEFORE MEALS, Disp: 30 tablet, Rfl: 3    gabapentin (NEURONTIN) 800 MG tablet, TAKE 1 TABLET BY MOUTH THREE TIMES DAILY, Disp: , Rfl:     ibuprofen (ADVIL;MOTRIN) 800 MG tablet, TAKE 1 TABLET BY MOUTH EVERY 6-8 HOURS AS NEEDED FOR PAIN (Patient not taking: Reported on 3/8/2023), Disp: , Rfl:     DULoxetine (CYMBALTA) 60 MG extended release capsule, TAKE 1 CAPSULE BY MOUTH DAILY, Disp: , Rfl:     methadone 10 MG/5ML solution, Take 80 mg by mouth daily. , Disp: , Rfl:     QUEtiapine (SEROQUEL) 25 MG tablet, TAKE 1 TABLET BY MOUTH AT BEDTIME AS NEEDED (Patient not taking: No sig reported), Disp: , Rfl:     albuterol sulfate HFA (PROVENTIL HFA) 108 (90 BASE) MCG/ACT inhaler, Inhale 1-2 puffs into the lungs every 4 hours as needed for Wheezing, Disp: 1 Inhaler, Rfl: 0    ALLERGIES:   Allergies   Allergen Reactions    Compazine [Prochlorperazine Maleate] Other (See Comments)     Seizure like syndrome, eyes roll to back of head    Imitrex [Sumatriptan] Other (See Comments)     TIA    Thorazine [Chlorpromazine] Other (See Comments)     Seizure like syndrome    Cardizem [Diltiazem] Rash    Pcn [Penicillins] Rash       FAMILY HISTORY:       Problem Relation Age of Onset    Other Mother 64        Multi Organ Failure    Arthritis Mother     High Cholesterol Father     Heart Attack Father     Arthritis Father         Rheumatoid    Other Brother         Vericose Veins    Diabetes Maternal Grandmother     Heart Attack Maternal Grandmother     Other Maternal Grandfather 8        Black Lung, Clotting Disorder, Vericose Veins    Mental Illness Paternal Grandmother     Alcohol Abuse Paternal Grandfather     Heart Attack Paternal Grandfather     Depression Daughter     Anxiety Disorder Daughter     Asthma Daughter     Depression Son     Other Son         Opitional Disorder, Behavioral and Congnitive disorder. ADHD Son     Asthma Son     Diabetes Son          SOCIAL HISTORY:   Social History     Socioeconomic History    Marital status:      Spouse name: Not on file    Number of children: Not on file    Years of education: Not on file    Highest education level: Not on file   Occupational History    Not on file   Tobacco Use    Smoking status: Every Day     Packs/day: 1.00     Years: 40.00     Pack years: 40.00     Types: Cigarettes    Smokeless tobacco: Never   Vaping Use    Vaping Use: Never used   Substance and Sexual Activity    Alcohol use: No     Alcohol/week: 0.0 standard drinks    Drug use: Not Currently     Types: Marijuana ([facility]), IV, Opiates      Comment: 3/8/23 pt states 7 yrs sober of Heroin    Sexual activity: Yes     Partners: Male   Other Topics Concern    Not on file   Social History Narrative    Not on file     Social Determinants of Health     Financial Resource Strain: Low Risk     Difficulty of Paying Living Expenses: Not hard at all   Food Insecurity: No Food Insecurity    Worried About Running Out of Food in the Last Year: Never true    Ran Out of Food in the Last Year: Never true   Transportation Needs: Not on file   Physical Activity: Not on file   Stress: Not on file   Social Connections: Not on file   Intimate Partner Violence: Not on file   Housing Stability: Not on file       REVIEW OF SYSTEMS: A 12-point review of systemswas obtained and pertinent positives and negatives were enumerated above in the history of present illness. All other reviewed systems / symptoms were negative.     Review of Systems   Constitutional:  Negative for appetite change, fatigue and unexpected weight change. HENT:  Negative for trouble swallowing. Eyes:  Positive for visual disturbance (glasses). Respiratory:  Negative for cough, choking and wheezing. Cardiovascular:  Negative for chest pain, palpitations and leg swelling. Gastrointestinal:  Positive for nausea. Negative for abdominal distention, abdominal pain, anal bleeding, blood in stool, constipation, diarrhea, rectal pain and vomiting. Genitourinary:  Negative for difficulty urinating. Allergic/Immunologic: Negative for environmental allergies and food allergies. Neurological:  Negative for dizziness, weakness, light-headedness, numbness and headaches. Hematological:  Does not bruise/bleed easily. Psychiatric/Behavioral:  Negative for sleep disturbance. The patient is not nervous/anxious. LABORATORY DATA: Reviewed  Lab Results   Component Value Date    WBC 5.0 11/12/2022    HGB 11.7 (L) 11/12/2022    HCT 38.4 11/12/2022    MCV 92.8 11/12/2022     11/12/2022     11/04/2022    K 3.9 11/04/2022     11/04/2022    CO2 29 11/04/2022    BUN 8 11/04/2022    CREATININE 0.87 11/04/2022    LABALBU 2.4 (L) 03/07/2021    BILITOT 0.34 03/07/2021    ALKPHOS 61 03/07/2021    AST 16 03/07/2021    ALT 10 03/07/2021    INR 1.0 11/04/2022         Lab Results   Component Value Date    RBC 4.14 11/12/2022    HGB 11.7 (L) 11/12/2022    MCV 92.8 11/12/2022    MCH 28.3 11/12/2022    MCHC 30.5 11/12/2022    RDW 14.1 11/12/2022    MPV 9.4 11/12/2022    BASOPCT 1 11/04/2022    LYMPHSABS 2.57 11/04/2022    MONOSABS 0.68 11/04/2022    NEUTROABS 2.04 11/04/2022    EOSABS 0.30 11/04/2022    BASOSABS 0.03 11/04/2022         DIAGNOSTIC TESTING:     No results found. PHYSICAL EXAMINATION: Vital signs reviewed per the nursing documentation. /87   Pulse 91   Temp 96.9 °F (36.1 °C)   Wt 145 lb (65.8 kg)   BMI 22.71 kg/m²   Body mass index is 22.71 kg/m².    Physical Exam  Vitals and nursing note reviewed. Constitutional:       General: She is not in acute distress. Appearance: She is well-developed. She is not diaphoretic. HENT:      Head: Normocephalic. Mouth/Throat:      Pharynx: No oropharyngeal exudate. Eyes:      General: No scleral icterus. Pupils: Pupils are equal, round, and reactive to light. Neck:      Thyroid: No thyromegaly. Vascular: No JVD. Trachea: No tracheal deviation. Cardiovascular:      Rate and Rhythm: Normal rate and regular rhythm. Heart sounds: Normal heart sounds. No murmur heard. Pulmonary:      Effort: Pulmonary effort is normal. No respiratory distress. Breath sounds: Normal breath sounds. No wheezing. Abdominal:      General: Bowel sounds are normal. There is no distension. Palpations: Abdomen is soft. Tenderness: There is no abdominal tenderness. There is no guarding or rebound. Comments: No ascites   Musculoskeletal:         General: Normal range of motion. Cervical back: Normal range of motion and neck supple. Skin:     General: Skin is warm. Coloration: Skin is not pale. Findings: No erythema or rash. Comments: She is not diaphoretic   Neurological:      Mental Status: She is alert and oriented to person, place, and time. Deep Tendon Reflexes: Reflexes are normal and symmetric. Psychiatric:         Behavior: Behavior normal.         Thought Content: Thought content normal.         Judgment: Judgment normal.         IMPRESSION: Ms. Criss Martinez is a 46 y.o. female with      Diagnosis Orders   1. Abdominal pain, generalized  CT ABDOMEN PELVIS W IV CONTRAST      2. Nausea  CT ABDOMEN PELVIS W IV CONTRAST      3. Chronic nausea        4. Anemia, unspecified type        5. Diverticulosis        6. Other hemorrhoids        7. PUD (peptic ulcer disease)        8.  Colon cancer screening          Once again we told this patient she needs to quit marijuana  She needs to see if the methadone also will be weaned off  With her psychiatrist  Minimize her medication I am thinking all her symptoms are from her medication  She most likely had marijuana related nausea  We will double her Protonix for the time being  Prescription given    Medication where reviewed, side effects from the GI medication were reviewed with the patient  We did refill the GI medications            Diet/life style/natural hx /complication of the dx were all explained in details   Past medical, past surgical, social history, psychiatric history, medications or allergies, all reviewed and  updated    Thank you for allowing me to participate in the care of Ms. Walton. For any further questions please do not hesitate to contact me. I have reviewed and agree with the ROS entered by the MA/RN. Note is dictated utilizing voice recognition software. Unfortunately this leads to occasional typographical errors. Please contact our office if you have any questions.       Demetrius Martinez MD  [facility]  O: #[phone]

## 2023-03-09 ENCOUNTER — TELEPHONE (OUTPATIENT)
Dept: GASTROENTEROLOGY | Age: 53
End: 2023-03-09

## 2023-03-09 NOTE — TELEPHONE ENCOUNTER
Patient called office stating Dr Walter told her he would double Protonix and she needs it sent to pharmacy.  Dr Walter's note states double as well.  Patient is already receiving 40 mg BID.  Will confirm with  what he would like to do.

## 2023-03-10 RX ORDER — FAMOTIDINE 20 MG/1
20 TABLET, FILM COATED ORAL NIGHTLY
Qty: 30 TABLET | Refills: 3 | Status: SHIPPED | OUTPATIENT
Start: 2023-03-10

## 2023-03-13 RX ORDER — PROMETHAZINE HYDROCHLORIDE 25 MG/1
TABLET ORAL
Qty: 20 TABLET | Refills: 0 | Status: SHIPPED | OUTPATIENT
Start: 2023-03-13

## 2023-03-20 ENCOUNTER — HOSPITAL ENCOUNTER (OUTPATIENT)
Age: 53
Discharge: HOME | End: 2023-03-20

## 2023-03-20 ENCOUNTER — HOSPITAL ENCOUNTER (OUTPATIENT)
Dept: CT IMAGING | Age: 53
Discharge: HOME | End: 2023-03-22
Payer: MEDICAID

## 2023-03-20 DIAGNOSIS — R10.84 ABDOMINAL PAIN, GENERALIZED: Primary | ICD-10-CM

## 2023-03-20 DIAGNOSIS — R11.0 NAUSEA: ICD-10-CM

## 2023-03-20 LAB
CREAT SERPL-MCNC: 0.82 MG/DL (ref 0.5–0.9)
GFR SERPL CREATININE-BSD FRML MDRD: >60 ML/MIN/1.73M2

## 2023-03-20 PROCEDURE — 82565 ASSAY OF CREATININE: CPT

## 2023-03-20 PROCEDURE — 74177 CT ABD & PELVIS W/CONTRAST: CPT

## 2023-03-20 PROCEDURE — 6360000004 HC RX CONTRAST MEDICATION: Performed by: INTERNAL MEDICINE

## 2023-03-20 RX ORDER — PROMETHAZINE HYDROCHLORIDE 25 MG/1
TABLET ORAL
Qty: 20 TABLET | Refills: 0 | Status: SHIPPED | OUTPATIENT
Start: 2023-03-20

## 2023-03-20 RX ADMIN — IOPAMIDOL 75 ML: 755 INJECTION, SOLUTION INTRAVENOUS at 15:03

## 2023-03-20 NOTE — TELEPHONE ENCOUNTER
Mansi Fan is calling to request a refill on the following medication(s):    Last Visit Date (If Applicable):  Visit date not found    Next Visit Date:    Visit date not found    Medication Request:  Requested Prescriptions     Pending Prescriptions Disp Refills    promethazine (PHENERGAN) 25 MG tablet [Pharmacy Med Name: PROMETHAZINE 25MG TABLETS] 20 tablet 0     Sig: TAKE 1 TABLET BY MOUTH FOUR TIMES DAILY AS NEEDED FOR NAUSEA Order to remove vega catheter. Process explained to pt, verbalizes understanding. Vega care and jason care performed with Chlorhexidine wipes. Vega catheter removed with no complications. Pt tolerated well. Jason care performed again and bed pad changed. Order received for arterial line removal.  Right radial arterial line discontinued. Manual pressure held for 5 minutes and tegaderm on site. No hematoma, no oozing noted. Patient tolerated well.         Electronically signed by Cecil Fernández RN on 1/5/2023 at 9:00 AM

## 2023-03-27 RX ORDER — PROMETHAZINE HYDROCHLORIDE 25 MG/1
TABLET ORAL
Qty: 20 TABLET | Refills: 0 | Status: SHIPPED | OUTPATIENT
Start: 2023-03-27

## 2023-03-27 NOTE — TELEPHONE ENCOUNTER
Michelle Mckeon is calling to request a refill on the following medication(s):    Last Visit Date (If Applicable):  12/8/6952    Next Visit Date:    Visit date not found    Medication Request:  Requested Prescriptions     Pending Prescriptions Disp Refills    promethazine (PHENERGAN) 25 MG tablet [Pharmacy Med Name: PROMETHAZINE 25MG TABLETS] 20 tablet 0     Sig: TAKE 1 TABLET BY MOUTH FOUR TIMES DAILY AS NEEDED FOR NAUSEA

## 2023-03-30 ENCOUNTER — TELEPHONE (OUTPATIENT)
Dept: GASTROENTEROLOGY | Age: 53
End: 2023-03-30

## 2023-03-30 DIAGNOSIS — R93.5 ABNORMAL COMPUTED TOMOGRAPHY OF ABDOMEN AND PELVIS: Primary | ICD-10-CM

## 2023-03-30 NOTE — TELEPHONE ENCOUNTER
Writer called and informed pt of MRI order requested by Dr Aman Thomas from CT results. She will schedule. Order is placed.

## 2023-03-31 NOTE — TELEPHONE ENCOUNTER
Pt states she is very claustrophobic and will need to be sedated for any imaging, adv pt will place note to RN to see if pt can be sedated, thanked writer call ended.

## 2023-04-03 RX ORDER — PROMETHAZINE HYDROCHLORIDE 25 MG/1
TABLET ORAL
Qty: 20 TABLET | Refills: 0 | Status: SHIPPED | OUTPATIENT
Start: 2023-04-03

## 2023-04-04 RX ORDER — LORAZEPAM 1 MG/1
1 TABLET ORAL ONCE
Qty: 1 TABLET | Refills: 0 | OUTPATIENT
Start: 2023-04-04 | End: 2023-04-04

## 2023-04-04 NOTE — TELEPHONE ENCOUNTER
Ativan order given from Dr Kellie Major. Writer called and informed pt of order. She confirmed this will help and confirms pharmacy where med needs sent. Order is called in to [facility] pharmacist at [facility].

## 2023-04-05 ENCOUNTER — TELEPHONE (OUTPATIENT)
Dept: FAMILY MEDICINE CLINIC | Age: 53
End: 2023-04-05

## 2023-04-05 NOTE — TELEPHONE ENCOUNTER
----- Message from Viv Fitzpatrick sent at 4/5/2023 11:45 AM EDT -----  Subject: Message to Provider    QUESTIONS  Information for Provider? patient states that the rheumatology office she   was referred to will not see her until a reason on why she needs to be   seen is sent to his office. Dr Leopoldo Robert, [address] [facility] [address] [place] [address]   [address], [address] Phone number is [phone]. Referral has already   been sent he just needs the reason for patient to be seen. Please call   patient once the reason has been sent so she can call them to make the   appointment.  ---------------------------------------------------------------------------  --------------  [address] [facility]  [phone]; OK to leave message on voicemail  ---------------------------------------------------------------------------  --------------  SCRIPT ANSWERS  Relationship to Patient?  Self

## 2023-04-17 RX ORDER — PROMETHAZINE HYDROCHLORIDE 25 MG/1
TABLET ORAL
Qty: 20 TABLET | Refills: 0 | Status: SHIPPED | OUTPATIENT
Start: 2023-04-17

## 2023-04-18 RX ORDER — PANTOPRAZOLE SODIUM 40 MG/1
TABLET, DELAYED RELEASE ORAL
Qty: 30 TABLET | Refills: 3 | Status: SHIPPED | OUTPATIENT
Start: 2023-04-18

## 2023-04-24 RX ORDER — PROMETHAZINE HYDROCHLORIDE 25 MG/1
TABLET ORAL
Qty: 20 TABLET | Refills: 0 | Status: SHIPPED | OUTPATIENT
Start: 2023-04-24

## 2023-04-24 RX ORDER — PROMETHAZINE HYDROCHLORIDE 25 MG/1
TABLET ORAL
Qty: 20 TABLET | Refills: 0 | OUTPATIENT
Start: 2023-04-24

## 2023-04-24 NOTE — TELEPHONE ENCOUNTER
Milean Dasilva is calling to request a refill on the following medication(s):    Last Visit Date (If Applicable):  71/5/0478    Next Visit Date:    Visit date not found    Medication Request:  Requested Prescriptions     Pending Prescriptions Disp Refills    promethazine (PHENERGAN) 25 MG tablet 20 tablet 0     Sig: TAKE 1 TABLET BY MOUTH FOUR TIMES DAILY AS NEEDED FOR NAUSEA     Refused Prescriptions Disp Refills    promethazine (PHENERGAN) 25 MG tablet [Pharmacy Med Name: PROMETHAZINE 25MG TABLETS] 20 tablet 0     Sig: TAKE 1 TABLET BY MOUTH FOUR TIMES DAILY AS NEEDED FOR NAUSEA     Refused By: Pérez Nesbitt     Reason for Refusal: Request already responded to by other means (e.g. phone or fax)

## 2023-04-28 ENCOUNTER — HOSPITAL ENCOUNTER (OUTPATIENT)
Dept: MRI IMAGING | Age: 53
End: 2023-04-28
Payer: MEDICAID

## 2023-04-28 DIAGNOSIS — R93.5 ABNORMAL COMPUTED TOMOGRAPHY OF ABDOMEN AND PELVIS: ICD-10-CM

## 2023-04-28 PROCEDURE — A9576 INJ PROHANCE MULTIPACK: HCPCS | Performed by: INTERNAL MEDICINE

## 2023-04-28 PROCEDURE — 74183 MRI ABD W/O CNTR FLWD CNTR: CPT

## 2023-04-28 PROCEDURE — 6360000004 HC RX CONTRAST MEDICATION: Performed by: INTERNAL MEDICINE

## 2023-04-28 PROCEDURE — 2580000003 HC RX 258: Performed by: INTERNAL MEDICINE

## 2023-04-28 RX ORDER — 0.9 % SODIUM CHLORIDE 0.9 %
50 INTRAVENOUS SOLUTION INTRAVENOUS ONCE
Status: COMPLETED | OUTPATIENT
Start: 2023-04-28 | End: 2023-04-28

## 2023-04-28 RX ADMIN — GADOTERIDOL 13 ML: 279.3 INJECTION, SOLUTION INTRAVENOUS at 16:55

## 2023-04-28 RX ADMIN — SODIUM CHLORIDE 50 ML: 9 INJECTION, SOLUTION INTRAVENOUS at 16:55

## 2023-05-01 RX ORDER — PROMETHAZINE HYDROCHLORIDE 25 MG/1
TABLET ORAL
Qty: 20 TABLET | Refills: 0 | Status: SHIPPED | OUTPATIENT
Start: 2023-05-01

## 2023-05-08 RX ORDER — PROMETHAZINE HYDROCHLORIDE 25 MG/1
TABLET ORAL
Qty: 20 TABLET | Refills: 0 | Status: SHIPPED | OUTPATIENT
Start: 2023-05-08

## 2023-05-12 RX ORDER — PROMETHAZINE HYDROCHLORIDE 25 MG/1
TABLET ORAL
Qty: 20 TABLET | Refills: 0 | OUTPATIENT
Start: 2023-05-12

## 2023-05-15 RX ORDER — PROMETHAZINE HYDROCHLORIDE 25 MG/1
TABLET ORAL
Qty: 20 TABLET | Refills: 0 | OUTPATIENT
Start: 2023-05-15

## 2023-05-29 RX ORDER — PROMETHAZINE HYDROCHLORIDE 25 MG/1
TABLET ORAL
Qty: 20 TABLET | Refills: 0 | Status: SHIPPED | OUTPATIENT
Start: 2023-05-29

## 2023-06-08 RX ORDER — PROMETHAZINE HYDROCHLORIDE 25 MG/1
TABLET ORAL
Qty: 20 TABLET | Refills: 0 | Status: SHIPPED | OUTPATIENT
Start: 2023-06-08

## 2023-06-14 RX ORDER — PROMETHAZINE HYDROCHLORIDE 25 MG/1
TABLET ORAL
Qty: 20 TABLET | Refills: 0 | OUTPATIENT
Start: 2023-06-14

## 2023-06-16 RX ORDER — PROMETHAZINE HYDROCHLORIDE 25 MG/1
TABLET ORAL
Qty: 20 TABLET | Refills: 0 | OUTPATIENT
Start: 2023-06-16

## 2023-06-20 DIAGNOSIS — R10.84 ABDOMINAL PAIN, GENERALIZED: Primary | ICD-10-CM

## 2023-06-20 RX ORDER — FAMOTIDINE 20 MG/1
TABLET, FILM COATED ORAL
Qty: 30 TABLET | Refills: 3 | Status: SHIPPED | OUTPATIENT
Start: 2023-06-20

## 2023-06-20 RX ORDER — PROMETHAZINE HYDROCHLORIDE 25 MG/1
TABLET ORAL
Qty: 20 TABLET | Refills: 0 | Status: SHIPPED | OUTPATIENT
Start: 2023-06-20

## 2023-06-20 NOTE — TELEPHONE ENCOUNTER
Adair Diaz is calling to request a refill on the following medication(s):    Last Visit Date (If Applicable):  41/1/2416    Next Visit Date:    Visit date not found    Medication Request:  Requested Prescriptions     Pending Prescriptions Disp Refills    promethazine (PHENERGAN) 25 MG tablet [Pharmacy Med Name: PROMETHAZINE 25MG TABLETS] 20 tablet 0     Sig: TAKE 1 TABLET BY MOUTH FOUR TIMES DAILY AS NEEDED FOR NAUSEA

## 2023-06-28 RX ORDER — PROMETHAZINE HYDROCHLORIDE 25 MG/1
TABLET ORAL
Qty: 20 TABLET | Refills: 0 | Status: SHIPPED | OUTPATIENT
Start: 2023-06-28

## 2023-07-05 RX ORDER — PROMETHAZINE HYDROCHLORIDE 25 MG/1
TABLET ORAL
Qty: 20 TABLET | Refills: 0 | OUTPATIENT
Start: 2023-07-05

## 2023-07-07 RX ORDER — PROMETHAZINE HYDROCHLORIDE 25 MG/1
TABLET ORAL
Qty: 20 TABLET | Refills: 0 | OUTPATIENT
Start: 2023-07-07

## 2023-07-11 RX ORDER — PANTOPRAZOLE SODIUM 40 MG/1
TABLET, DELAYED RELEASE ORAL
Qty: 30 TABLET | Refills: 3 | Status: SHIPPED | OUTPATIENT
Start: 2023-07-11

## 2023-07-11 NOTE — TELEPHONE ENCOUNTER
Bartolome Castillo is calling to request a refill on the following medication(s):    Last Visit Date (If Applicable):  75/6/6118    Next Visit Date:    Visit date not found    Medication Request:  Requested Prescriptions     Pending Prescriptions Disp Refills    pantoprazole (PROTONIX) 40 MG tablet [Pharmacy Med Name: PANTOPRAZOLE 40MG TABLETS] 30 tablet 3     Sig: TAKE 1 TABLET BY MOUTH TWICE DAILY BEFORE MEALS    metoprolol tartrate (LOPRESSOR) 25 MG tablet [Pharmacy Med Name: METOPROLOL TARTRATE 25MG  TABLETS] 60 tablet 3     Sig: TAKE 1 TABLET BY MOUTH TWICE DAILY

## 2023-07-12 RX ORDER — PROMETHAZINE HYDROCHLORIDE 25 MG/1
TABLET ORAL
Qty: 20 TABLET | Refills: 0 | OUTPATIENT
Start: 2023-07-12

## 2023-07-12 RX ORDER — PROMETHAZINE HYDROCHLORIDE 25 MG/1
TABLET ORAL
Qty: 20 TABLET | Refills: 0 | Status: SHIPPED | OUTPATIENT
Start: 2023-07-12

## 2023-07-19 RX ORDER — PROMETHAZINE HYDROCHLORIDE 25 MG/1
TABLET ORAL
Qty: 20 TABLET | Refills: 0 | OUTPATIENT
Start: 2023-07-19

## 2023-07-21 RX ORDER — PROMETHAZINE HYDROCHLORIDE 25 MG/1
TABLET ORAL
Qty: 20 TABLET | Refills: 0 | OUTPATIENT
Start: 2023-07-21

## 2023-07-21 NOTE — TELEPHONE ENCOUNTER
Genie Colón is calling to request a refill on the following medication(s):    Last Visit Date (If Applicable):  57/2/4228    Next Visit Date:    Visit date not found    Medication Request:  Requested Prescriptions     Pending Prescriptions Disp Refills    promethazine (PHENERGAN) 25 MG tablet [Pharmacy Med Name: PROMETHAZINE 25MG TABLETS] 20 tablet 0     Sig: TAKE 1 TABLET BY MOUTH FOUR TIMES DAILY AS NEEDED FOR NAUSEA

## 2023-07-24 RX ORDER — PROMETHAZINE HYDROCHLORIDE 25 MG/1
TABLET ORAL
Qty: 20 TABLET | Refills: 0 | OUTPATIENT
Start: 2023-07-24

## 2023-07-28 ENCOUNTER — TELEPHONE (OUTPATIENT)
Dept: FAMILY MEDICINE CLINIC | Age: 53
End: 2023-07-28

## 2023-07-28 NOTE — TELEPHONE ENCOUNTER
MIRACLE    Pt had the following:    Fall 2 weeks ago, today has numbness feet/toes/left side and pain. Offered appt on Monday, pt not able to come to see us time offered. Has appt on Monday. Referred to: [facility].

## 2023-07-29 ENCOUNTER — HOSPITAL ENCOUNTER (EMERGENCY)
Age: 53
Discharge: HOME | End: 2023-07-29
Attending: EMERGENCY MEDICINE
Payer: MEDICAID

## 2023-07-29 ENCOUNTER — APPOINTMENT (OUTPATIENT)
Dept: GENERAL RADIOLOGY | Age: 53
End: 2023-07-29
Payer: MEDICAID

## 2023-07-29 VITALS
OXYGEN SATURATION: 96 % | HEART RATE: 88 BPM | SYSTOLIC BLOOD PRESSURE: 137 MMHG | DIASTOLIC BLOOD PRESSURE: 93 MMHG | RESPIRATION RATE: 18 BRPM | WEIGHT: 145 LBS | HEIGHT: 67 IN | BODY MASS INDEX: 22.76 KG/M2 | TEMPERATURE: 98.4 F

## 2023-07-29 DIAGNOSIS — G43.909 MIGRAINE WITHOUT STATUS MIGRAINOSUS, NOT INTRACTABLE, UNSPECIFIED MIGRAINE TYPE: ICD-10-CM

## 2023-07-29 DIAGNOSIS — S20.229A CONTUSION OF BACK, UNSPECIFIED LATERALITY, INITIAL ENCOUNTER: Primary | ICD-10-CM

## 2023-07-29 PROCEDURE — 96372 THER/PROPH/DIAG INJ SC/IM: CPT

## 2023-07-29 PROCEDURE — 6360000002 HC RX W HCPCS: Performed by: EMERGENCY MEDICINE

## 2023-07-29 PROCEDURE — 99284 EMERGENCY DEPT VISIT MOD MDM: CPT

## 2023-07-29 PROCEDURE — 72100 X-RAY EXAM L-S SPINE 2/3 VWS: CPT

## 2023-07-29 RX ORDER — KETOROLAC TROMETHAMINE 30 MG/ML
30 INJECTION, SOLUTION INTRAMUSCULAR; INTRAVENOUS ONCE
Status: COMPLETED | OUTPATIENT
Start: 2023-07-29 | End: 2023-07-29

## 2023-07-29 RX ORDER — CYCLOBENZAPRINE HCL 10 MG
10 TABLET ORAL 3 TIMES DAILY PRN
Qty: 21 TABLET | Refills: 0 | Status: SHIPPED | OUTPATIENT
Start: 2023-07-29 | End: 2023-08-08

## 2023-07-29 RX ORDER — IBUPROFEN 800 MG/1
800 TABLET ORAL EVERY 8 HOURS PRN
Qty: 30 TABLET | Refills: 0 | Status: SHIPPED | OUTPATIENT
Start: 2023-07-29

## 2023-07-29 RX ORDER — DIPHENHYDRAMINE HYDROCHLORIDE 50 MG/ML
25 INJECTION INTRAMUSCULAR; INTRAVENOUS ONCE
Status: COMPLETED | OUTPATIENT
Start: 2023-07-29 | End: 2023-07-29

## 2023-07-29 RX ORDER — METOCLOPRAMIDE HYDROCHLORIDE 5 MG/ML
10 INJECTION INTRAMUSCULAR; INTRAVENOUS ONCE
Status: COMPLETED | OUTPATIENT
Start: 2023-07-29 | End: 2023-07-29

## 2023-07-29 RX ADMIN — DIPHENHYDRAMINE HYDROCHLORIDE 25 MG: 50 INJECTION INTRAMUSCULAR; INTRAVENOUS at 18:06

## 2023-07-29 RX ADMIN — KETOROLAC TROMETHAMINE 30 MG: 30 INJECTION, SOLUTION INTRAMUSCULAR; INTRAVENOUS at 18:06

## 2023-07-29 RX ADMIN — METOCLOPRAMIDE 10 MG: 5 INJECTION, SOLUTION INTRAMUSCULAR; INTRAVENOUS at 18:06

## 2023-07-29 ASSESSMENT — ENCOUNTER SYMPTOMS
EYE PAIN: 0
FACIAL SWELLING: 0
EYE DISCHARGE: 0
CHEST TIGHTNESS: 0
SINUS PRESSURE: 0
SORE THROAT: 0
CONSTIPATION: 0
DIARRHEA: 0
EYE REDNESS: 0
COLOR CHANGE: 0
VOMITING: 0
SHORTNESS OF BREATH: 0
WHEEZING: 0
BACK PAIN: 1
COUGH: 0
RHINORRHEA: 0
BLOOD IN STOOL: 0
NAUSEA: 1
ABDOMINAL PAIN: 0
TROUBLE SWALLOWING: 0

## 2023-07-29 ASSESSMENT — PAIN SCALES - GENERAL: PAINLEVEL_OUTOF10: 8

## 2023-07-29 ASSESSMENT — PAIN DESCRIPTION - DESCRIPTORS: DESCRIPTORS: CRAMPING;TIGHTNESS;ACHING

## 2023-07-29 ASSESSMENT — LIFESTYLE VARIABLES
HOW MANY STANDARD DRINKS CONTAINING ALCOHOL DO YOU HAVE ON A TYPICAL DAY: PATIENT DOES NOT DRINK
HOW OFTEN DO YOU HAVE A DRINK CONTAINING ALCOHOL: NEVER

## 2023-07-29 ASSESSMENT — PAIN DESCRIPTION - PAIN TYPE: TYPE: ACUTE PAIN

## 2023-07-29 ASSESSMENT — PAIN DESCRIPTION - ORIENTATION: ORIENTATION: MID;LOWER;LEFT

## 2023-07-29 ASSESSMENT — PAIN DESCRIPTION - LOCATION: LOCATION: BACK;LEG

## 2023-07-30 RX ORDER — PROMETHAZINE HYDROCHLORIDE 25 MG/1
TABLET ORAL
Qty: 20 TABLET | Refills: 0 | Status: SHIPPED | OUTPATIENT
Start: 2023-07-30

## 2023-08-14 RX ORDER — PROMETHAZINE HYDROCHLORIDE 25 MG/1
TABLET ORAL
Qty: 20 TABLET | Refills: 0 | Status: SHIPPED | OUTPATIENT
Start: 2023-08-14

## 2023-08-14 NOTE — TELEPHONE ENCOUNTER
Almita Mcpherson is calling to request a refill on the following medication(s):    Last Visit Date (If Applicable):  05/3/5009    Next Visit Date:    Visit date not found    Medication Request:  Requested Prescriptions     Pending Prescriptions Disp Refills    promethazine (PHENERGAN) 25 MG tablet [Pharmacy Med Name: PROMETHAZINE 25MG TABLETS] 20 tablet 0     Sig: TAKE 1 TABLET BY MOUTH FOUR TIMES DAILY AS NEEDED FOR NAUSEA

## 2023-08-24 RX ORDER — PROMETHAZINE HYDROCHLORIDE 25 MG/1
TABLET ORAL
Qty: 20 TABLET | Refills: 0 | Status: SHIPPED | OUTPATIENT
Start: 2023-08-24

## 2023-08-31 RX ORDER — PROMETHAZINE HYDROCHLORIDE 25 MG/1
TABLET ORAL
Qty: 20 TABLET | Refills: 0 | OUTPATIENT
Start: 2023-08-31

## 2023-09-01 RX ORDER — PROMETHAZINE HYDROCHLORIDE 25 MG/1
TABLET ORAL
Qty: 20 TABLET | Refills: 0 | OUTPATIENT
Start: 2023-09-01

## 2023-09-05 RX ORDER — PROMETHAZINE HYDROCHLORIDE 25 MG/1
TABLET ORAL
Qty: 20 TABLET | Refills: 0 | OUTPATIENT
Start: 2023-09-05

## 2023-09-06 ASSESSMENT — ENCOUNTER SYMPTOMS
CONSTIPATION: 0
COLOR CHANGE: 0
SINUS PRESSURE: 0
SHORTNESS OF BREATH: 0
DIARRHEA: 0
SINUS PAIN: 0
ABDOMINAL PAIN: 0
CHEST TIGHTNESS: 0

## 2023-09-07 ENCOUNTER — OFFICE VISIT (OUTPATIENT)
Dept: FAMILY MEDICINE CLINIC | Age: 53
End: 2023-09-07

## 2023-09-07 VITALS
SYSTOLIC BLOOD PRESSURE: 134 MMHG | WEIGHT: 140.6 LBS | TEMPERATURE: 97 F | BODY MASS INDEX: 22.02 KG/M2 | OXYGEN SATURATION: 96 % | DIASTOLIC BLOOD PRESSURE: 89 MMHG | HEART RATE: 92 BPM

## 2023-09-07 DIAGNOSIS — F11.90 METHADONE USE: ICD-10-CM

## 2023-09-07 DIAGNOSIS — B18.2 CHRONIC HEPATITIS C WITHOUT HEPATIC COMA (HCC): ICD-10-CM

## 2023-09-07 DIAGNOSIS — F11.21 OPIOID DEPENDENCE IN REMISSION (HCC): ICD-10-CM

## 2023-09-07 DIAGNOSIS — K21.9 GASTROESOPHAGEAL REFLUX DISEASE, UNSPECIFIED WHETHER ESOPHAGITIS PRESENT: ICD-10-CM

## 2023-09-07 DIAGNOSIS — Z87.11 HISTORY OF GASTRIC ULCER: Primary | ICD-10-CM

## 2023-09-07 RX ORDER — RABEPRAZOLE SODIUM 20 MG/1
20 TABLET, DELAYED RELEASE ORAL DAILY
Qty: 30 TABLET | Refills: 3 | Status: SHIPPED | OUTPATIENT
Start: 2023-09-07

## 2023-09-07 RX ORDER — SUCRALFATE ORAL 1 G/10ML
1 SUSPENSION ORAL 4 TIMES DAILY
Qty: 1200 ML | Refills: 3 | Status: SHIPPED | OUTPATIENT
Start: 2023-09-07

## 2023-09-07 SDOH — ECONOMIC STABILITY: FOOD INSECURITY: WITHIN THE PAST 12 MONTHS, YOU WORRIED THAT YOUR FOOD WOULD RUN OUT BEFORE YOU GOT MONEY TO BUY MORE.: NEVER TRUE

## 2023-09-07 SDOH — ECONOMIC STABILITY: HOUSING INSECURITY
IN THE LAST 12 MONTHS, WAS THERE A TIME WHEN YOU DID NOT HAVE A STEADY PLACE TO SLEEP OR SLEPT IN A SHELTER (INCLUDING NOW)?: NO

## 2023-09-07 SDOH — ECONOMIC STABILITY: FOOD INSECURITY: WITHIN THE PAST 12 MONTHS, THE FOOD YOU BOUGHT JUST DIDN'T LAST AND YOU DIDN'T HAVE MONEY TO GET MORE.: NEVER TRUE

## 2023-09-07 SDOH — ECONOMIC STABILITY: INCOME INSECURITY: HOW HARD IS IT FOR YOU TO PAY FOR THE VERY BASICS LIKE FOOD, HOUSING, MEDICAL CARE, AND HEATING?: NOT HARD AT ALL

## 2023-09-07 ASSESSMENT — PATIENT HEALTH QUESTIONNAIRE - PHQ9
SUM OF ALL RESPONSES TO PHQ QUESTIONS 1-9: 1
SUM OF ALL RESPONSES TO PHQ QUESTIONS 1-9: 1
2. FEELING DOWN, DEPRESSED OR HOPELESS: 1
1. LITTLE INTEREST OR PLEASURE IN DOING THINGS: 0
SUM OF ALL RESPONSES TO PHQ9 QUESTIONS 1 & 2: 1
SUM OF ALL RESPONSES TO PHQ QUESTIONS 1-9: 1
SUM OF ALL RESPONSES TO PHQ QUESTIONS 1-9: 1

## 2023-09-07 NOTE — PROGRESS NOTES
General FM note    Bartolome Castillo is a 48 y.o. female who presents today for follow up on her  medical conditions as noted below.   Bartolome Castillo is c/o of   Chief Complaint   Patient presents with    Nausea    Referral - General     gastroenterology       Patient Active Problem List:     Migraine     Opiate dependence ([address])     Chronic pain     Obesity     Patient overweight     Lumbar radicular pain     Lumbar disc disease     Closed nondisplaced fracture of fifth metatarsal bone of right foot     Intra-abdominal abscess (HCC)     Toxic dilatation of colon (HCC)     Colonic ischemia (HCC)     Weakness     Right wrist drop     Right foot drop     Past Medical History:   Diagnosis Date    Acute gastric ulcer with perforation (HCC)     hx. of    Asthma     Connective tissue disease, undifferentiated (HCC) 1987    Dr. Sally Olvera (Rheum.)    DDD (degenerative disc disease) 2000    Dr. Sandro Perdomo    Depression     Lumbar radicular pain     Migraine 1988    Dr. Dedra Cortez    Mitral valve prolapse 1978    Dr. Vitaliy Thomas addiction ([address]) 01/02/2013    Dr. Cecil Sandoval / on 10/3/17 pt states she completed tx 2016      Past Surgical History:   Procedure Laterality Date    ABDOMEN SURGERY      COLONOSCOPY N/A 03/17/2021    COLONOSCOPY FLEXIBLE W/ DECOMPRESSION performed by Imelda Smith MD at [facility]    COLONOSCOPY  03/21/2021    COLONOSCOPY N/A 3/21/2021    COLONOSCOPY DIAGNOSTIC performed by Malathi Man MD at [address] [place] 8/19/2021    COLONOSCOPY DIAGNOSTIC performed by Jesús Dai MD at [address]  03/04/2021    CT ABSCESS DRAIN SUBCUTANEOUS 3/4/2021 [facility] CT SCAN    GASTRECTOMY  03/05/2021    \"partial\"    [address]    as child    HYSTERECTOMY ([facility])      2007    LAPAROTOMY N/A 03/05/2021    EXPLORATORY LAPAROTOMY, PARTIAL GASTRECTOMY performed by Samantha Fitzpatrick MD at [address] [facility] N/A 03/11/2021    LAPAROTOMY EXPLORATORY,
Mood and Affect: Mood normal.         Behavior: Behavior normal.         Thought Content: Thought content normal.         Judgment: Judgment normal.       Diagnoses / Plan:   {There are no diagnoses linked to this encounter. (Refresh or delete this SmartLink)}     Understanding and agreement was voiced with all above plans. All questions answered to satisfaction. Encouraged healthy diet and exercise. Call office with any questions or new or worsening symptoms or concerns. No follow-ups on file. Electronically signed by ABRAHAM Bustamante CNP on 9/6/2023 at 2:48 PM    Note is dictated utilizing voice recognition software. Unfortunately this leads to occasional typographical errors. Please contact our office if you have any questions.

## 2023-09-08 ENCOUNTER — TELEPHONE (OUTPATIENT)
Dept: FAMILY MEDICINE CLINIC | Age: 53
End: 2023-09-08

## 2023-09-08 RX ORDER — PROMETHAZINE HYDROCHLORIDE 25 MG/1
TABLET ORAL
Qty: 20 TABLET | Refills: 0 | Status: SHIPPED | OUTPATIENT
Start: 2023-09-08

## 2023-09-08 RX ORDER — PROMETHAZINE HYDROCHLORIDE 25 MG/1
25 TABLET ORAL 4 TIMES DAILY PRN
Qty: 20 TABLET | Refills: 0 | Status: SHIPPED | OUTPATIENT
Start: 2023-09-08 | End: 2023-09-15

## 2023-09-13 ENCOUNTER — OFFICE VISIT (OUTPATIENT)
Dept: GASTROENTEROLOGY | Age: 53
End: 2023-09-13
Payer: COMMERCIAL

## 2023-09-13 VITALS
HEIGHT: 67 IN | DIASTOLIC BLOOD PRESSURE: 85 MMHG | WEIGHT: 143 LBS | HEART RATE: 74 BPM | SYSTOLIC BLOOD PRESSURE: 134 MMHG | BODY MASS INDEX: 22.44 KG/M2

## 2023-09-13 DIAGNOSIS — K27.9 PUD (PEPTIC ULCER DISEASE): ICD-10-CM

## 2023-09-13 DIAGNOSIS — K64.8 OTHER HEMORRHOIDS: ICD-10-CM

## 2023-09-13 DIAGNOSIS — K57.90 DIVERTICULOSIS: ICD-10-CM

## 2023-09-13 DIAGNOSIS — R93.5 ABNORMAL COMPUTED TOMOGRAPHY OF ABDOMEN AND PELVIS: ICD-10-CM

## 2023-09-13 DIAGNOSIS — D64.9 ANEMIA, UNSPECIFIED TYPE: ICD-10-CM

## 2023-09-13 DIAGNOSIS — B18.2 CHRONIC HEPATITIS C WITHOUT HEPATIC COMA (HCC): Primary | ICD-10-CM

## 2023-09-13 PROCEDURE — G8420 CALC BMI NORM PARAMETERS: HCPCS | Performed by: INTERNAL MEDICINE

## 2023-09-13 PROCEDURE — 4004F PT TOBACCO SCREEN RCVD TLK: CPT | Performed by: INTERNAL MEDICINE

## 2023-09-13 PROCEDURE — G8427 DOCREV CUR MEDS BY ELIG CLIN: HCPCS | Performed by: INTERNAL MEDICINE

## 2023-09-13 PROCEDURE — 3017F COLORECTAL CA SCREEN DOC REV: CPT | Performed by: INTERNAL MEDICINE

## 2023-09-13 PROCEDURE — 99214 OFFICE O/P EST MOD 30 MIN: CPT | Performed by: INTERNAL MEDICINE

## 2023-09-13 NOTE — PROGRESS NOTES
Comments: She is not diaphoretic   Neurological:      Mental Status: She is alert and oriented to person, place, and time. Deep Tendon Reflexes: Reflexes are normal and symmetric. Psychiatric:         Behavior: Behavior normal.         Thought Content: Thought content normal.         Judgment: Judgment normal.           IMPRESSION: Ms. Hiram Rivero is a 48 y.o. female with      Diagnosis Orders   1. Chronic hepatitis C without hepatic coma (HCC)  RAHEL    Smooth Muscle Antibody Quant    MITOCHONDRIAL ANTIBODIES, M2, IGG    AFP Tumor Marker    Hepatitis C RNA, quantitative, PCR    US LIVER      2. Abnormal computed tomography of abdomen and pelvis  US LIVER      3. Anemia, unspecified type        4. Diverticulosis        5. Other hemorrhoids        6. PUD (peptic ulcer disease)          Proceed with the above  Having ultrasound to see if there is any CBD abnormality and evaluate the need for ERCP or not otherwise we will continue the same  Is a colonoscopy done on August 19, 2021. Medication where reviewed, side effects from the GI medication were reviewed with the patient  We did refill the GI medications            Diet/life style/natural hx /complication of the dx were all explained in details   Past medical, past surgical, social history, psychiatric history, medications or allergies, all reviewed and  updated    Thank you for allowing me to participate in the care of Ms. Walton. For any further questions please do not hesitate to contact me. I have reviewed and agree with the ROS entered by the MA/RN. Note is dictated utilizing voice recognition software. Unfortunately this leads to occasional typographical errors. Please contact our office if you have any questions.       Frieda Vigil MD  [facility]  O: #[phone]

## 2023-10-02 RX ORDER — PROMETHAZINE HYDROCHLORIDE 25 MG/1
TABLET ORAL
Qty: 20 TABLET | Refills: 0 | Status: SHIPPED | OUTPATIENT
Start: 2023-10-02

## 2023-10-02 NOTE — TELEPHONE ENCOUNTER
Desi Cifuentes is calling to request a refill on the following medication(s):    Last Visit Date (If Applicable):  9/8/7286    Next Visit Date:    Visit date not found    Medication Request:  Requested Prescriptions     Pending Prescriptions Disp Refills    promethazine (PHENERGAN) 25 MG tablet [Pharmacy Med Name: PROMETHAZINE 25MG TABLETS] 20 tablet 0     Sig: TAKE 1 TABLET BY MOUTH FOUR TIMES DAILY AS NEEDED FOR NAUSEA

## 2023-10-12 RX ORDER — PANTOPRAZOLE SODIUM 40 MG/1
TABLET, DELAYED RELEASE ORAL
Qty: 30 TABLET | Refills: 3 | Status: SHIPPED | OUTPATIENT
Start: 2023-10-12

## 2023-10-12 RX ORDER — PROMETHAZINE HYDROCHLORIDE 25 MG/1
TABLET ORAL
Qty: 20 TABLET | Refills: 0 | Status: SHIPPED | OUTPATIENT
Start: 2023-10-12

## 2023-10-24 RX ORDER — PROMETHAZINE HYDROCHLORIDE 25 MG/1
TABLET ORAL
Qty: 20 TABLET | Refills: 0 | Status: SHIPPED | OUTPATIENT
Start: 2023-10-24

## 2023-10-24 NOTE — TELEPHONE ENCOUNTER
Angie Chirinos is calling to request a refill on the following medication(s):    Last Visit Date (If Applicable):  4/6/1710    Next Visit Date:    Visit date not found    Medication Request:  Requested Prescriptions     Pending Prescriptions Disp Refills    promethazine (PHENERGAN) 25 MG tablet [Pharmacy Med Name: PROMETHAZINE 25MG TABLETS] 20 tablet 0     Sig: TAKE 1 TABLET BY MOUTH FOUR TIMES DAILY AS NEEDED FOR NAUSEA

## 2023-11-06 RX ORDER — PROMETHAZINE HYDROCHLORIDE 25 MG/1
TABLET ORAL
Qty: 20 TABLET | Refills: 0 | Status: SHIPPED | OUTPATIENT
Start: 2023-11-06

## 2023-11-15 RX ORDER — PROMETHAZINE HYDROCHLORIDE 25 MG/1
TABLET ORAL
Qty: 20 TABLET | Refills: 0 | Status: SHIPPED | OUTPATIENT
Start: 2023-11-15

## 2023-11-16 RX ORDER — PANTOPRAZOLE SODIUM 40 MG/1
TABLET, DELAYED RELEASE ORAL
Qty: 30 TABLET | Refills: 3 | Status: SHIPPED | OUTPATIENT
Start: 2023-11-16

## 2023-11-24 RX ORDER — PROMETHAZINE HYDROCHLORIDE 25 MG/1
TABLET ORAL
Qty: 20 TABLET | Refills: 0 | Status: SHIPPED | OUTPATIENT
Start: 2023-11-24

## 2023-11-27 NOTE — PLAN OF CARE
"Physical Therapy Daily Treatment Note      Patient: Kevin Ellington   : 1935  Referring practitioner: Leanna Ryder MD  Date of Initial Visit: Type: THERAPY  Noted: 10/30/2023  Today's Date: 2023  Patient seen for 8 sessions       Visit Diagnoses:    ICD-10-CM ICD-9-CM   1. Bulging lumbar disc  M51.36 722.52   2. Weakness of both lower extremities  R29.898 729.89   3. Balance problem  R26.89 781.99       Subjective   \"The exercises are getting a little easier, I feel stronger\". Would like to be able to walk without the walker.     Objective   See Exercise, Manual, and Modality Logs for complete treatment.       Assessment/Plan    Subjectively, pt reports no increase of pain or discomfort with interventions performed today. Performed well with continued functional strengthening interventions. Continues to demonstrate good tolerance to exercise progressions. Continues to benefit from verbal/tactile cues to ensure proper form and technique for exercise performance.        Timed:         Manual Therapy:    0     mins  19507;     Therapeutic Exercise:    10     mins  65981;     Neuromuscular Beena:    20    mins  86748;    Therapeutic Activity:     0     mins  81605;     Gait Trainin     mins  34870;     Ultrasound:     0     mins  68515;    Ionto                               0    mins   22412  Self Care                       0     mins   87310  Canalith Repos    0     mins 68556      Un-Timed:  Electrical Stimulation:    0     mins  47300 ( );  Dry Needling     0     mins self-pay  Traction     0     mins 62871      Timed Treatment:   30   mins   Total Treatment:     30   mins    Inga Morley, PT  [place] License: [id number]                  " Problem: Pain:  Goal: Pain level will decrease  Description: Pain level will decrease  3/15/2021 1440 by Kimmy Yang RN  Outcome: Ongoing  3/15/2021 0234 by Belkis Ledesma RN  Outcome: Ongoing  Goal: Control of acute pain  Description: Control of acute pain  3/15/2021 1440 by Kimmy Yang RN  Outcome: Ongoing  3/15/2021 0234 by Belkis Ledesma RN  Outcome: Ongoing  Goal: Control of chronic pain  Description: Control of chronic pain  3/15/2021 1440 by Kimmy Yang RN  Outcome: Ongoing  3/15/2021 0234 by Belkis Ledesma RN  Outcome: Ongoing     Problem: Discharge Planning:  Goal: Participates in care planning  Description: Participates in care planning  3/15/2021 1440 by Kimmy Yang RN  Outcome: Ongoing  3/15/2021 0234 by Belkis Ledesma RN  Outcome: Ongoing  Goal: Discharged to appropriate level of care  Description: Discharged to appropriate level of care  3/15/2021 1440 by Kimmy Yang RN  Outcome: Ongoing  3/15/2021 0234 by Belkis Ledesma RN  Outcome: Ongoing     Problem: Anxiety/Stress:  Goal: Level of anxiety will decrease  Description: Level of anxiety will decrease  3/15/2021 1440 by Kimmy Yang RN  Outcome: Ongoing  3/15/2021 0234 by Belkis Ledesma RN  Outcome: Ongoing     Problem: Aspiration:  Goal: Absence of aspiration  Description: Absence of aspiration  3/15/2021 1440 by Kimmy Yang RN  Outcome: Ongoing  3/15/2021 0234 by Belkis Ledesma RN  Outcome: Ongoing     Problem:  Bowel Function - Altered:  Goal: Bowel elimination is within specified parameters  Description: Bowel elimination is within specified parameters  3/15/2021 1440 by Kimmy Yang RN  Outcome: Ongoing  3/15/2021 0234 by Belkis Ledesma RN  Outcome: Ongoing     Problem: Cardiac Output - Decreased:  Goal: Hemodynamic stability will improve  Description: Hemodynamic stability will improve  3/15/2021 1440 by Kimmy Yang RN  Outcome: Ongoing  3/15/2021 0234 by Belkis Ledesma RN  Outcome: Ongoing     Problem: Fluid Volume - Imbalance:  Goal: Absence of imbalanced fluid volume signs and symptoms  Description: Absence of imbalanced fluid volume signs and symptoms  3/15/2021 1440 by Minor Gordon RN  Outcome: Ongoing  3/15/2021 0234 by Kinga Santamaria RN  Outcome: Ongoing     Problem: Nutrition Deficit:  Goal: Ability to achieve adequate nutritional intake will improve  Description: Ability to achieve adequate nutritional intake will improve  3/15/2021 1440 by Minor Gordon RN  Outcome: Ongoing  3/15/2021 0234 by Kinga Santamaria RN  Outcome: Ongoing     Problem: Pain:  Goal: Pain level will decrease  Description: Pain level will decrease  3/15/2021 1440 by Minor Gordon RN  Outcome: Ongoing  3/15/2021 0234 by Kinga Santamaria RN  Outcome: Ongoing  Goal: Recognizes and communicates pain  Description: Recognizes and communicates pain  3/15/2021 1440 by Minor Gordon RN  Outcome: Ongoing  3/15/2021 0234 by Kinga Santamaria RN  Outcome: Ongoing  Goal: Control of acute pain  Description: Control of acute pain  3/15/2021 1440 by Minor Gordon RN  Outcome: Ongoing  3/15/2021 0234 by Kinga Santamaria RN  Outcome: Ongoing  Goal: Control of chronic pain  Description: Control of chronic pain  3/15/2021 1440 by Minor Gordon RN  Outcome: Ongoing  3/15/2021 0234 by Kinga Santamaria RN  Outcome: Ongoing     Problem: Serum Glucose Level - Abnormal:  Goal: Ability to maintain appropriate glucose levels will improve to within specified parameters  Description: Ability to maintain appropriate glucose levels will improve to within specified parameters  3/15/2021 1440 by Minor Gordon RN  Outcome: Ongoing  3/15/2021 0234 by Kinga Santamaria RN  Outcome: Ongoing     Problem: Skin Integrity - Impaired:  Goal: Will show no infection signs and symptoms  Description: Will show no infection signs and symptoms  3/15/2021 1440 by Minor Gordon RN  Outcome: Ongoing  3/15/2021 0234 by Kinga Santamaria RN  Outcome: Ongoing  Goal: Absence of new skin breakdown  Description: Absence of new skin breakdown  3/15/2021 1440 by Enrico Gr RN  Outcome: Ongoing  3/15/2021 0234 by Marisol Olguin RN  Outcome: Ongoing     Problem: Sleep Pattern Disturbance:  Goal: Appears well-rested  Description: Appears well-rested  3/15/2021 1440 by Enrico Gr RN  Outcome: Ongoing  3/15/2021 0234 by Marisol Olguin RN  Outcome: Ongoing     Problem: Tissue Perfusion, Altered:  Goal: Circulatory function within specified parameters  Description: Circulatory function within specified parameters  3/15/2021 1440 by Enrico Gr RN  Outcome: Ongoing  3/15/2021 0234 by Marisol Olguin RN  Outcome: Ongoing     Problem: Nutrition  Goal: Optimal nutrition therapy  Description: Nutrition Problem #1: Inadequate oral intake  Intervention: Food and/or Nutrient Delivery: Continue NPO, Start Tube Feeding  Nutritional Goals: Pt to meet % of est'd needs via EN     3/15/2021 1440 by Enrico Gr RN  Outcome: Ongoing  3/15/2021 0234 by Marisol Olguin RN  Outcome: Ongoing     Problem: Skin Integrity:  Goal: Will show no infection signs and symptoms  Description: Will show no infection signs and symptoms  3/15/2021 1440 by Enrico Gr RN  Outcome: Ongoing  3/15/2021 0234 by Marisol Olguin RN  Outcome: Ongoing  Goal: Absence of new skin breakdown  Description: Absence of new skin breakdown  3/15/2021 1440 by Enrico Gr RN  Outcome: Ongoing  3/15/2021 0234 by Marisol Olguin RN  Outcome: Ongoing     Problem: Falls - Risk of:  Goal: Will remain free from falls  Description: Will remain free from falls  3/15/2021 1440 by Enrico Gr RN  Outcome: Ongoing  3/15/2021 0234 by Marisol Olguin RN  Outcome: Ongoing  Goal: Absence of physical injury  Description: Absence of physical injury  3/15/2021 1440 by Enrico Gr RN  Outcome: Ongoing  3/15/2021 0234 by Marisol Olguin RN  Outcome: Ongoing     Problem: OXYGENATION/RESPIRATORY FUNCTION  Goal: Patient will achieve/maintain normal respiratory rate/effort  Description: Respiratory rate and effort will be within normal limits for the patient  3/15/2021 1440 by Minor Gordon RN  Outcome: Ongoing  3/15/2021 0234 by Kinga Santamaria RN  Outcome: Ongoing

## 2023-12-01 RX ORDER — PROMETHAZINE HYDROCHLORIDE 25 MG/1
TABLET ORAL
Qty: 20 TABLET | Refills: 0 | Status: SHIPPED | OUTPATIENT
Start: 2023-12-01

## 2023-12-08 ENCOUNTER — HOSPITAL ENCOUNTER (OUTPATIENT)
Age: 53
Discharge: HOME | End: 2023-12-08
Attending: INTERNAL MEDICINE
Payer: COMMERCIAL

## 2023-12-08 ENCOUNTER — HOSPITAL ENCOUNTER (OUTPATIENT)
Dept: ULTRASOUND IMAGING | Age: 53
End: 2023-12-08
Attending: INTERNAL MEDICINE
Payer: COMMERCIAL

## 2023-12-08 DIAGNOSIS — R93.5 ABNORMAL COMPUTED TOMOGRAPHY OF ABDOMEN AND PELVIS: ICD-10-CM

## 2023-12-08 DIAGNOSIS — B18.2 CHRONIC HEPATITIS C WITHOUT HEPATIC COMA (HCC): ICD-10-CM

## 2023-12-08 PROCEDURE — 82105 ALPHA-FETOPROTEIN SERUM: CPT

## 2023-12-08 PROCEDURE — 86225 DNA ANTIBODY NATIVE: CPT

## 2023-12-08 PROCEDURE — 87522 HEPATITIS C REVRS TRNSCRPJ: CPT

## 2023-12-08 PROCEDURE — 83516 IMMUNOASSAY NONANTIBODY: CPT

## 2023-12-08 PROCEDURE — 86038 ANTINUCLEAR ANTIBODIES: CPT

## 2023-12-08 PROCEDURE — 76705 ECHO EXAM OF ABDOMEN: CPT

## 2023-12-08 PROCEDURE — 36415 COLL VENOUS BLD VENIPUNCTURE: CPT

## 2023-12-10 LAB
HCV RNA # SERPL NAA+PROBE: NOT DETECTED {COPIES}/ML
SMOOTH MUSCLE ANTIBODY: 15 UNITS (ref 0–19)
SPECIMEN SOURCE: NORMAL

## 2023-12-11 RX ORDER — PROMETHAZINE HYDROCHLORIDE 25 MG/1
TABLET ORAL
Qty: 20 TABLET | Refills: 0 | Status: SHIPPED | OUTPATIENT
Start: 2023-12-11

## 2023-12-11 NOTE — TELEPHONE ENCOUNTER
Current Outpatient Medications on File Prior to Visit   Medication Sig Dispense Refill    promethazine (PHENERGAN) 25 MG tablet TAKE 1 TABLET BY MOUTH FOUR TIMES DAILY AS NEEDED FOR NAUSEA 20 tablet 0    pantoprazole (PROTONIX) 40 MG tablet TAKE 1 TABLET BY MOUTH TWICE DAILY BEFORE MEALS 30 tablet 3    sucralfate (CARAFATE) 1 GM/10ML suspension Take 10 mLs by mouth 4 times daily (Patient not taking: Reported on 9/13/2023) 1200 mL 3    RABEprazole (ACIPHEX) 20 MG tablet Take 1 tablet by mouth daily 30 tablet 3    ibuprofen (ADVIL;MOTRIN) 800 MG tablet Take 1 tablet by mouth every 8 hours as needed for Pain 30 tablet 0    metoprolol tartrate (LOPRESSOR) 25 MG tablet TAKE 1 TABLET BY MOUTH TWICE DAILY 60 tablet 3    famotidine (PEPCID) 20 MG tablet TAKE 1 TABLET BY MOUTH EVERY NIGHT 30 tablet 3    gabapentin (NEURONTIN) 800 MG tablet TAKE 1 TABLET BY MOUTH THREE TIMES DAILY      DULoxetine (CYMBALTA) 60 MG extended release capsule TAKE 1 CAPSULE BY MOUTH DAILY      methadone 10 MG/5ML solution Take 40 mLs by mouth daily.       QUEtiapine (SEROQUEL) 25 MG tablet       albuterol sulfate HFA (PROVENTIL HFA) 108 (90 BASE) MCG/ACT inhaler Inhale 1-2 puffs into the lungs every 4 hours as needed for Wheezing 1 Inhaler 0     Current Facility-Administered Medications on File Prior to Visit   Medication Dose Route Frequency Provider Last Rate Last Admin    sodium chloride flush 0.9 % injection 10 mL  10 mL IntraVENous PRN Lowell Pacheco MD   10 mL at 07/27/15 0850

## 2023-12-12 LAB
ANA SER QL IA: POSITIVE
DSDNA IGG SER QL IA: 35 IU/ML
MITOCHONDRIA M2 IGG SER-ACNC: 1.4 U/ML (ref 0–4)
NUCLEAR IGG SER IA-RTO: >55 U/ML

## 2023-12-13 LAB — AFP SERPL-MCNC: 2.3 UG/L

## 2024-01-02 RX ORDER — PROMETHAZINE HYDROCHLORIDE 25 MG/1
TABLET ORAL
Qty: 20 TABLET | Refills: 0 | Status: SHIPPED | OUTPATIENT
Start: 2024-01-02

## 2024-01-10 ENCOUNTER — HOSPITAL ENCOUNTER (OUTPATIENT)
Dept: PREADMISSION TESTING | Age: 54
Discharge: HOME | End: 2024-01-14

## 2024-01-10 VITALS — WEIGHT: 135 LBS | BODY MASS INDEX: 21.19 KG/M2 | HEIGHT: 67 IN

## 2024-01-10 NOTE — PROGRESS NOTES
Pre-op Instructions For Out-Patient Endoscopy Surgery    Medication Instructions:  Please stop herbs and any supplements now (includes vitamins and minerals).    Please contact your surgeon and prescribing physician for pre-op instructions for any blood thinners. THIS INCLUDES IBUPROFEN/MOTRIN    If you have inhalers/aerosol treatments at home, please use them the morning of your surgery and bring the inhalers with you to the hospital.    Please take the following medications the morning of your surgery with a sip of water:    Methadone and Metoprolol tartrate    Surgery Instructions:  After midnight before surgery:  Do not eat or drink anything, including water, mints, gum, and hard candy.  You may brush your teeth without swallowing.  No smoking, chewing tobacco, or street drugs.    Please shower or bathe before surgery.       Please do not wear any cologne, lotion, powder, jewelry, piercings, perfume, makeup, nail polish, hair accessories, or hair spray on the day of surgery.  Wear loose comfortable clothing.    Leave your valuables at home but bring a payment source for any after-surgery prescriptions you plan to fill at [facility].  Bring a storage case for any glasses/contacts.    An adult who is responsible for you MUST drive you home and should be with you for the first 24 hours after surgery.     The Day of Surgery:  Arrive at [facility] Surgery Entrance at the time directed by your surgeon and check in at the desk.     If you have a living will or healthcare power of , please bring a copy.    You will be taken to the pre-op holding area where you will be prepared for surgery.  A physical assessment will be performed by a nurse practitioner or house officer.  Your IV will be started and you will meet your anesthesiologist.    When you go to surgery, your family will be directed to the surgical waiting room, where the doctor should speak with them after your

## 2024-01-14 RX ORDER — PROMETHAZINE HYDROCHLORIDE 25 MG/1
TABLET ORAL
Qty: 20 TABLET | Refills: 0 | Status: SHIPPED | OUTPATIENT
Start: 2024-01-14

## 2024-01-22 NOTE — PRE-PROCEDURE INSTRUCTIONS
Have you received your Prep? Any questions with prep instructions?  Only Clear Liquid Diet day before.  Nothing to eat after midnight day before procedure.  Are you taking any blood thinners? If so, you need to Stop.  Remove any jewelry and body piercings.  Are you having any Covid symptoms?  Do you have any new rashes, infections, etc. that we should be aware of?  Do you have a ride home the day of surgery? It cannot be a cab or medical transportation.  Verify surgery time/date and what time to arrive at hospital.     NO ANSWER. VOICEMAIL LEFT INCLUDING ARRIVAL TIME, NPO AFTER MIDNIGHT,  NEEDED, AND CALL BACK NUMBER.

## 2024-01-23 ENCOUNTER — ANESTHESIA EVENT (OUTPATIENT)
Dept: ENDOSCOPY | Age: 54
End: 2024-01-23
Payer: COMMERCIAL

## 2024-01-23 NOTE — TELEPHONE ENCOUNTER
Lupe Walton is calling to request a refill on the following medication(s):    Last Visit Date (If Applicable):  9/7/2023    Next Visit Date:    Visit date not found    Medication Request:  Requested Prescriptions     Pending Prescriptions Disp Refills    promethazine (PHENERGAN) 25 MG tablet [Pharmacy Med Name: PROMETHAZINE 25MG TABLETS] 20 tablet 0     Sig: TAKE 1 TABLET BY MOUTH FOUR TIMES DAILY AS NEEDED FOR NAUSEA

## 2024-01-24 ENCOUNTER — ANESTHESIA (OUTPATIENT)
Dept: ENDOSCOPY | Age: 54
End: 2024-01-24
Payer: COMMERCIAL

## 2024-01-24 ENCOUNTER — HOSPITAL ENCOUNTER (OUTPATIENT)
Age: 54
Setting detail: OUTPATIENT SURGERY
Discharge: HOME | End: 2024-01-24
Attending: INTERNAL MEDICINE | Admitting: INTERNAL MEDICINE
Payer: COMMERCIAL

## 2024-01-24 VITALS
BODY MASS INDEX: 21.97 KG/M2 | HEIGHT: 67 IN | WEIGHT: 140 LBS | RESPIRATION RATE: 10 BRPM | TEMPERATURE: 98 F | SYSTOLIC BLOOD PRESSURE: 122 MMHG | OXYGEN SATURATION: 92 % | HEART RATE: 73 BPM | DIASTOLIC BLOOD PRESSURE: 78 MMHG

## 2024-01-24 DIAGNOSIS — R11.0 CHRONIC NAUSEA: ICD-10-CM

## 2024-01-24 DIAGNOSIS — K21.00 GASTROESOPHAGEAL REFLUX DISEASE WITH ESOPHAGITIS, UNSPECIFIED WHETHER HEMORRHAGE: ICD-10-CM

## 2024-01-24 DIAGNOSIS — D64.9 ANEMIA, UNSPECIFIED TYPE: ICD-10-CM

## 2024-01-24 PROCEDURE — 7100000011 HC PHASE II RECOVERY - ADDTL 15 MIN: Performed by: INTERNAL MEDICINE

## 2024-01-24 PROCEDURE — 2580000003 HC RX 258: Performed by: ANESTHESIOLOGY

## 2024-01-24 PROCEDURE — 6360000002 HC RX W HCPCS: Performed by: NURSE ANESTHETIST, CERTIFIED REGISTERED

## 2024-01-24 PROCEDURE — 3700000000 HC ANESTHESIA ATTENDED CARE: Performed by: INTERNAL MEDICINE

## 2024-01-24 PROCEDURE — 2500000003 HC RX 250 WO HCPCS: Performed by: NURSE ANESTHETIST, CERTIFIED REGISTERED

## 2024-01-24 PROCEDURE — 3609012400 HC EGD TRANSORAL BIOPSY SINGLE/MULTIPLE: Performed by: INTERNAL MEDICINE

## 2024-01-24 PROCEDURE — 6370000000 HC RX 637 (ALT 250 FOR IP): Performed by: ANESTHESIOLOGY

## 2024-01-24 PROCEDURE — 7100000010 HC PHASE II RECOVERY - FIRST 15 MIN: Performed by: INTERNAL MEDICINE

## 2024-01-24 PROCEDURE — 2709999900 HC NON-CHARGEABLE SUPPLY: Performed by: INTERNAL MEDICINE

## 2024-01-24 RX ORDER — PROPOFOL 10 MG/ML
INJECTION, EMULSION INTRAVENOUS PRN
Status: DISCONTINUED | OUTPATIENT
Start: 2024-01-24 | End: 2024-01-24 | Stop reason: SDUPTHER

## 2024-01-24 RX ORDER — SODIUM CHLORIDE 0.9 % (FLUSH) 0.9 %
5-40 SYRINGE (ML) INJECTION EVERY 12 HOURS SCHEDULED
Status: DISCONTINUED | OUTPATIENT
Start: 2024-01-24 | End: 2024-01-24 | Stop reason: HOSPADM

## 2024-01-24 RX ORDER — LIDOCAINE HYDROCHLORIDE 10 MG/ML
INJECTION, SOLUTION EPIDURAL; INFILTRATION; INTRACAUDAL; PERINEURAL PRN
Status: DISCONTINUED | OUTPATIENT
Start: 2024-01-24 | End: 2024-01-24 | Stop reason: SDUPTHER

## 2024-01-24 RX ORDER — SODIUM CHLORIDE 9 MG/ML
INJECTION, SOLUTION INTRAVENOUS PRN
Status: DISCONTINUED | OUTPATIENT
Start: 2024-01-24 | End: 2024-01-24 | Stop reason: HOSPADM

## 2024-01-24 RX ORDER — LIDOCAINE HYDROCHLORIDE 10 MG/ML
1 INJECTION, SOLUTION EPIDURAL; INFILTRATION; INTRACAUDAL; PERINEURAL
Status: DISCONTINUED | OUTPATIENT
Start: 2024-01-24 | End: 2024-01-24 | Stop reason: HOSPADM

## 2024-01-24 RX ORDER — PROMETHAZINE HYDROCHLORIDE 25 MG/1
TABLET ORAL
Qty: 20 TABLET | Refills: 0 | Status: SHIPPED | OUTPATIENT
Start: 2024-01-24

## 2024-01-24 RX ORDER — NALOXONE HYDROCHLORIDE 4 MG/.1ML
1 SPRAY NASAL PRN
COMMUNITY
Start: 2023-10-24

## 2024-01-24 RX ORDER — ACETAMINOPHEN 500 MG
500 TABLET ORAL
Status: COMPLETED | OUTPATIENT
Start: 2024-01-24 | End: 2024-01-24

## 2024-01-24 RX ORDER — SODIUM CHLORIDE 0.9 % (FLUSH) 0.9 %
5-40 SYRINGE (ML) INJECTION PRN
Status: DISCONTINUED | OUTPATIENT
Start: 2024-01-24 | End: 2024-01-24 | Stop reason: HOSPADM

## 2024-01-24 RX ORDER — SODIUM CHLORIDE, SODIUM LACTATE, POTASSIUM CHLORIDE, CALCIUM CHLORIDE 600; 310; 30; 20 MG/100ML; MG/100ML; MG/100ML; MG/100ML
INJECTION, SOLUTION INTRAVENOUS CONTINUOUS
Status: DISCONTINUED | OUTPATIENT
Start: 2024-01-24 | End: 2024-01-24 | Stop reason: HOSPADM

## 2024-01-24 RX ORDER — MIDAZOLAM HYDROCHLORIDE 1 MG/ML
INJECTION INTRAMUSCULAR; INTRAVENOUS PRN
Status: DISCONTINUED | OUTPATIENT
Start: 2024-01-24 | End: 2024-01-24 | Stop reason: SDUPTHER

## 2024-01-24 RX ADMIN — PROPOFOL 110 MG: 10 INJECTION, EMULSION INTRAVENOUS at 10:48

## 2024-01-24 RX ADMIN — ACETAMINOPHEN 500 MG: 500 TABLET ORAL at 11:32

## 2024-01-24 RX ADMIN — MIDAZOLAM 1 MG: 1 INJECTION INTRAMUSCULAR; INTRAVENOUS at 10:46

## 2024-01-24 RX ADMIN — SODIUM CHLORIDE, POTASSIUM CHLORIDE, SODIUM LACTATE AND CALCIUM CHLORIDE: 600; 310; 30; 20 INJECTION, SOLUTION INTRAVENOUS at 09:46

## 2024-01-24 RX ADMIN — LIDOCAINE HYDROCHLORIDE 50 MG: 10 INJECTION, SOLUTION EPIDURAL; INFILTRATION; INTRACAUDAL; PERINEURAL at 10:48

## 2024-01-24 ASSESSMENT — PAIN - FUNCTIONAL ASSESSMENT
PAIN_FUNCTIONAL_ASSESSMENT: NONE - DENIES PAIN
PAIN_FUNCTIONAL_ASSESSMENT: 0-10

## 2024-01-24 ASSESSMENT — PAIN DESCRIPTION - DESCRIPTORS: DESCRIPTORS: ACHING

## 2024-01-24 ASSESSMENT — PAIN SCALES - GENERAL: PAINLEVEL_OUTOF10: 4

## 2024-01-24 ASSESSMENT — PAIN DESCRIPTION - LOCATION: LOCATION: HEAD

## 2024-01-24 NOTE — H&P
ABDOMEN:  Obese.  Soft on palpation.  No dysphagia, No localized tenderness.  No guarding or rigidity.               LYMPHATICS:  No palpable cervical lymphadenopathy.     LOCOMOTOR, BACK AND SPINE:  No tenderness or deformities.                 EXTREMITIES:  Symmetrical, no pretibial edema.  No discoloration or ulcerations.    NEUROLOGIC:  The patient is conscious, alert, oriented,Cranial nerve II-XII intact, taste and smell were not examined. No apparent focal sensory or motor deficits.             PROVISIONAL DIAGNOSES / SURGERY:      EGD BIOPSY    Pre-Op Diagnosis Codes:     * Gastroesophageal reflux disease with esophagitis, unspecified whether hemorrhage [K21.00]     * Chronic nausea [R11.0]     * Anemia, unspecified type [D64.9]       Patient Active Problem List    Diagnosis Date Noted    Right wrist drop 11/05/2022    Right foot drop 11/05/2022    Weakness 11/04/2022    Toxic dilatation of colon (HCC)     Colonic ischemia (HCC)     Intra-abdominal abscess (HCC) 03/05/2021    Closed nondisplaced fracture of fifth metatarsal bone of right foot 09/08/2016    Lumbar disc disease 07/27/2015    Lumbar radicular pain 02/11/2015    Migraine 07/22/2014    Opiate dependence (HCC) 07/22/2014    Chronic pain 07/22/2014    Obesity 07/22/2014    Patient overweight 07/22/2014           Note marked for cosign by provider      ABRAHAM SCHUSTER CNP on 1/24/2024 at 9:08 AM

## 2024-01-24 NOTE — OP NOTE
PROCEDURE NOTE    DATE OF PROCEDURE: 1/24/2024     SURGEON: Patricia Walter MD  Facility: \"[facility]  ASSISTANT: None  Anesthesia: MAC  PREOPERATIVE DIAGNOSIS:   Reflux  Nausea  Anemia    Diagnosis:  Status post gastrectomy with what seems to be Billroth II type anastomosis  The anastomosis remnant is mildly inflamed biopsies were taken    The 2 limbs of the small bowel loop with normal mucosa    POSTOPERATIVE DIAGNOSIS: As described below    OPERATION: Upper GI endoscopy with Biopsy    ANESTHESIA: Moderate Sedation     ESTIMATED BLOOD LOSS: Less than 50 ml    COMPLICATIONS: None.     SPECIMENS:  Was Obtained: As above    HISTORY: The patient is a 53 y.o. year old female with history of above preop diagnosis.  I recommended esophagogastroduodenoscopy with possible biopsy and I explained the risk, benefits, expected outcome, and alternatives to the procedure.  Risks included but are not limited to bleeding, infection, respiratory distress, hypotension, and perforation of the esophagus, stomach, or duodenum.  Patient understands and is in agreement.      The patient was counseled at length about the risks of sherrill Covid-19 during their perioperative period and any recovery window from their procedure.  The patient was made aware that sherrill Covid-19  may worsen their prognosis for recovering from their procedure  and lend to a higher morbidity and/or mortality risk.  All material risks, benefits, and reasonable alternatives including postponing the procedure were discussed. The patient does wish to proceed with the procedure at this time.         PROCEDURE: The patient was given IV conscious sedation.  The patient's SPO2 remained above 90% throughout the procedure.The gastroscope was inserted orally and advanced under direct vision through the esophagus, through the stomach, through the pylorus, and into the descending duodenum.      Post sedation note :The patient's SPO2 remained above 90%

## 2024-01-24 NOTE — ANESTHESIA PRE PROCEDURE
Dr. Gagnon   • Mitral valve prolapse 1978    Dr. Washburn   • Opiate addiction (HCC) 01/02/2013    Dr. Babb / on 10/3/17 pt states she completed tx 2016       Past Surgical History:        Procedure Laterality Date   • ABDOMEN SURGERY     • COLONOSCOPY N/A 03/17/2021    COLONOSCOPY FLEXIBLE W/ DECOMPRESSION performed by Deborah Azevedo MD at [facility] Endoscopy   • COLONOSCOPY  03/21/2021   • COLONOSCOPY N/A 3/21/2021    COLONOSCOPY DIAGNOSTIC performed by Yannick Barrett MD at [facility] OR   • COLONOSCOPY N/A 8/19/2021    COLONOSCOPY DIAGNOSTIC performed by Patricia Walter MD at [facility] ENDO   • CT ABSCESS DRAIN SUBCUTANEOUS  03/04/2021    CT ABSCESS DRAIN SUBCUTANEOUS 3/4/2021 [facility] CT SCAN   • GASTRECTOMY  03/05/2021    \"partial\"   • HERNIA REPAIR  1972    as child   • HYSTERECTOMY (CERVIX STATUS UNKNOWN)      2007   • LAPAROTOMY N/A 03/05/2021    EXPLORATORY LAPAROTOMY, PARTIAL GASTRECTOMY performed by Virginia Schroeder MD at [facility] OR   • LAPAROTOMY N/A 03/11/2021    LAPAROTOMY EXPLORATORY, ABDOMINAL WASH OUT, FASCIAL CLOSURE  performed by Garcia Mcmullen MD at [facility] OR   • NERVE BLOCK Right 07/27/2015    right lumbar JHONY   • NOSE SURGERY  1989    After nose was broken. Dr. Robertson   • OVARIAN CYST REMOVAL Left 2007    Dr. Ardon   • JAIMEE AND BSO (CERVIX REMOVED)  2007    Dr. Ardon   • TUBAL LIGATION  1997    Dr. Smart   • UPPER GASTROINTESTINAL ENDOSCOPY N/A 8/19/2021    EGD BIOPSY performed by Patricia Walter MD at [facility]       Social History:    Social History     Tobacco Use   • Smoking status: Every Day     Current packs/day: 1.00     Average packs/day: 1 pack/day for 40.0 years (40.0 ttl pk-yrs)     Types: Cigarettes   • Smokeless tobacco: Never   • Tobacco comments:     Menthol ulisses   Substance Use Topics   • Alcohol use: No     Alcohol/week: 0.0 standard drinks of alcohol                                Ready to quit: Not Answered  Counseling given: Not Answered  Tobacco comments: Menthol

## 2024-01-24 NOTE — ANESTHESIA POSTPROCEDURE EVALUATION
Department of Anesthesiology  Postprocedure Note    Patient: Lupe Walton  MRN: [id number]  YOB: 1970  Date of evaluation: 1/24/2024    Procedure Summary       Date: 01/24/24 Room / Location: [place] [address] / [facility]    Anesthesia Start: 1046 Anesthesia Stop: 1104    Procedure: EGD BIOPSY (Esophagus) Diagnosis:       Gastroesophageal reflux disease with esophagitis, unspecified whether hemorrhage      Chronic nausea      Anemia, unspecified type      (Gastroesophageal reflux disease with esophagitis, unspecified whether hemorrhage [K21.00])      (Chronic nausea [R11.0])      (Anemia, unspecified type [D64.9])    Surgeons: Patricia Walter MD Responsible Provider: Rehan Morillo MD    Anesthesia Type: general ASA Status: 3            Anesthesia Type: No value filed.    Jamarcus Phase I:      Jamarcus Phase II: Jamarcus Score: 10    Anesthesia Post Evaluation    Comments: POST- ANESTHESIA EVALUATION       Pt Name: Lupe Walton  MRN: [id number]  YOB: 1970  Date of evaluation: 1/24/2024  Time:  1:01 PM      /78   Pulse 73   Temp 98 °F (36.7 °C)   Resp 10   Ht 1.702 m (5' 7\")   Wt 63.5 kg (140 lb)   SpO2 92%   BMI 21.93 kg/m²      Consciousness Level  Awake  Cardiopulmonary Status  Stable  Pain Adequately Treated YES  Nausea / Vomiting  NO  Adequate Hydration  YES  Anesthesia Related Complications NONE      Electronically signed by Rehan Morillo MD on 1/24/2024 at 1:01 PM           No notable events documented.

## 2024-01-27 LAB — SURGICAL PATHOLOGY REPORT: NORMAL

## 2024-02-05 RX ORDER — PROMETHAZINE HYDROCHLORIDE 25 MG/1
TABLET ORAL
Qty: 20 TABLET | Refills: 0 | Status: SHIPPED | OUTPATIENT
Start: 2024-02-05

## 2024-02-08 ENCOUNTER — OFFICE VISIT (OUTPATIENT)
Dept: GASTROENTEROLOGY | Age: 54
End: 2024-02-08
Payer: COMMERCIAL

## 2024-02-08 VITALS
HEART RATE: 81 BPM | OXYGEN SATURATION: 97 % | WEIGHT: 138.4 LBS | DIASTOLIC BLOOD PRESSURE: 85 MMHG | BODY MASS INDEX: 21.68 KG/M2 | SYSTOLIC BLOOD PRESSURE: 138 MMHG

## 2024-02-08 DIAGNOSIS — R79.89 ELEVATED LFTS: ICD-10-CM

## 2024-02-08 DIAGNOSIS — D64.9 ANEMIA, UNSPECIFIED TYPE: ICD-10-CM

## 2024-02-08 DIAGNOSIS — K21.9 GASTROESOPHAGEAL REFLUX DISEASE, UNSPECIFIED WHETHER ESOPHAGITIS PRESENT: ICD-10-CM

## 2024-02-08 DIAGNOSIS — R11.0 NAUSEA: ICD-10-CM

## 2024-02-08 DIAGNOSIS — R11.0 CHRONIC NAUSEA: ICD-10-CM

## 2024-02-08 DIAGNOSIS — R76.8 ELEVATED ANTINUCLEAR ANTIBODY (ANA) LEVEL: ICD-10-CM

## 2024-02-08 DIAGNOSIS — B18.2 CHRONIC HEPATITIS C WITHOUT HEPATIC COMA (HCC): ICD-10-CM

## 2024-02-08 DIAGNOSIS — R53.1 WEAKNESS: Primary | ICD-10-CM

## 2024-02-08 DIAGNOSIS — K57.90 DIVERTICULOSIS: ICD-10-CM

## 2024-02-08 DIAGNOSIS — K27.9 PUD (PEPTIC ULCER DISEASE): ICD-10-CM

## 2024-02-08 DIAGNOSIS — Z98.0 H/O BILLROTH II OPERATION: ICD-10-CM

## 2024-02-08 PROCEDURE — 3017F COLORECTAL CA SCREEN DOC REV: CPT | Performed by: INTERNAL MEDICINE

## 2024-02-08 PROCEDURE — 99214 OFFICE O/P EST MOD 30 MIN: CPT | Performed by: INTERNAL MEDICINE

## 2024-02-08 PROCEDURE — G8420 CALC BMI NORM PARAMETERS: HCPCS | Performed by: INTERNAL MEDICINE

## 2024-02-08 PROCEDURE — G8484 FLU IMMUNIZE NO ADMIN: HCPCS | Performed by: INTERNAL MEDICINE

## 2024-02-08 PROCEDURE — 4004F PT TOBACCO SCREEN RCVD TLK: CPT | Performed by: INTERNAL MEDICINE

## 2024-02-08 PROCEDURE — G8427 DOCREV CUR MEDS BY ELIG CLIN: HCPCS | Performed by: INTERNAL MEDICINE

## 2024-02-08 ASSESSMENT — ENCOUNTER SYMPTOMS
CONSTIPATION: 0
RECTAL PAIN: 0
VOMITING: 0
ABDOMINAL PAIN: 0
TROUBLE SWALLOWING: 0
BLOOD IN STOOL: 0
ABDOMINAL DISTENTION: 0
NAUSEA: 1
ANAL BLEEDING: 0
DIARRHEA: 0

## 2024-02-08 NOTE — PROGRESS NOTES
GI CLINIC FOLLOW UP    INTERVAL HISTORY:   No referring provider defined for this encounter.    Chief Complaint   Patient presents with    Follow-up     EGD 1/24/24    Nausea     Patient c/o nausea and belching     Gastroesophageal Reflux     Patient c/o gerd            HISTORY OF PRESENT ILLNESS: Ms.Inez ALEJANDRINA Walton is a 53 y.o. female , referred for evaluation of  Hep C  nausea,    constipation, anemia, diverticulosis, hemorrhoids, gastritis, GERD   S/o B2 gastrectomy, maijuana      Patient is here for follow-up  Patient has a history of   perforated gastric ulcers with exploratory lap, and Billroth II surgery, colon distention with possible ischemic changes and stricture.GERD, Nausea, abnormal ct abd      On Protonix/ zofran/pheregan   Methadone and Marijuana    Liver dx markers, and US were done before   RAHEL very high   Hep C ab positive    RNA negative ( 12/8/23)  AFP normal, US no mass  LFT in 8/23 normalized   In 12/20/23: slight elevation ALK p  RAHEL positive , ASMA negative   AMA , equivocal    Last visit kept on Protonix   And egd was done   As below  US liver and GB as below     Dos smoke [place]   Labs : elevated Alk P    And mild anemia   On Pepcid /Pantoprazole     Ultrasound  IMPRESSION:  Coarse heterogeneous echotexture liver with nodular contour could indicate  cirrhosis.  No focal lesion.  No ascites.     Mild nonspecific gallbladder distension.  No acute findings.              Specimen Collected: 12/08/23 11:20 EST Last Resulted: 12/08/23 11:21 EST    Ben as an Unsuccessful Attempt                   EGD:  PROCEDURE NOTE     DATE OF PROCEDURE: 1/24/2024      SURGEON: Patricia Walter MD  Facility: \"[facility]  ASSISTANT: None  Anesthesia: MAC  PREOPERATIVE DIAGNOSIS:   Reflux  Nausea  Anemia     Diagnosis:  Status post gastrectomy with what seems to be Billroth II type anastomosis  The anastomosis remnant is mildly inflamed biopsies were taken     The 2 limbs of the small bowel loop with

## 2024-02-13 RX ORDER — PROMETHAZINE HYDROCHLORIDE 25 MG/1
TABLET ORAL
Qty: 20 TABLET | Refills: 0 | Status: SHIPPED | OUTPATIENT
Start: 2024-02-13

## 2024-02-19 DIAGNOSIS — R10.84 ABDOMINAL PAIN, GENERALIZED: ICD-10-CM

## 2024-02-19 RX ORDER — FAMOTIDINE 20 MG/1
TABLET, FILM COATED ORAL
Qty: 90 TABLET | Refills: 2 | Status: SHIPPED | OUTPATIENT
Start: 2024-02-19

## 2024-02-19 RX ORDER — PROMETHAZINE HYDROCHLORIDE 25 MG/1
TABLET ORAL
Qty: 60 TABLET | Refills: 0 | Status: SHIPPED | OUTPATIENT
Start: 2024-02-19

## 2024-02-19 RX ORDER — PANTOPRAZOLE SODIUM 40 MG/1
TABLET, DELAYED RELEASE ORAL
Qty: 180 TABLET | Refills: 1 | Status: SHIPPED | OUTPATIENT
Start: 2024-02-19

## 2024-03-13 RX ORDER — PROMETHAZINE HYDROCHLORIDE 25 MG/1
TABLET ORAL
Qty: 60 TABLET | Refills: 0 | Status: SHIPPED | OUTPATIENT
Start: 2024-03-13

## 2024-03-13 NOTE — TELEPHONE ENCOUNTER
Current Outpatient Medications on File Prior to Visit   Medication Sig Dispense Refill    pantoprazole (PROTONIX) 40 MG tablet TAKE 1 TABLET BY MOUTH TWICE DAILY BEFORE MEALS 180 tablet 1    promethazine (PHENERGAN) 25 MG tablet TAKE 1 TABLET BY MOUTH FOUR TIMES DAILY AS NEEDED FOR NAUSEA 60 tablet 0    famotidine (PEPCID) 20 MG tablet TAKE 1 TABLET BY MOUTH EVERY NIGHT 90 tablet 2    DOXEPIN HCL PO Take 1 capsule by mouth daily      naloxone 4 MG/0.1ML LIQD nasal spray 1 spray by Nasal route as needed      metoprolol tartrate (LOPRESSOR) 25 MG tablet TAKE 1 TABLET BY MOUTH TWICE DAILY 60 tablet 3    sucralfate (CARAFATE) 1 GM/10ML suspension Take 10 mLs by mouth 4 times daily 1200 mL 3    RABEprazole (ACIPHEX) 20 MG tablet Take 1 tablet by mouth daily 30 tablet 3    ibuprofen (ADVIL;MOTRIN) 800 MG tablet Take 1 tablet by mouth every 8 hours as needed for Pain 30 tablet 0    gabapentin (NEURONTIN) 800 MG tablet TAKE 1 TABLET BY MOUTH THREE TIMES DAILY      DULoxetine (CYMBALTA) 60 MG extended release capsule TAKE 1 CAPSULE BY MOUTH DAILY      methadone 10 MG/5ML solution Take 40 mLs by mouth daily. Patient takes 110mg daily      QUEtiapine (SEROQUEL) 25 MG tablet       albuterol sulfate HFA (PROVENTIL HFA) 108 (90 BASE) MCG/ACT inhaler Inhale 1-2 puffs into the lungs every 4 hours as needed for Wheezing 1 Inhaler 0     Current Facility-Administered Medications on File Prior to Visit   Medication Dose Route Frequency Provider Last Rate Last Admin    sodium chloride flush 0.9 % injection 10 mL  10 mL IntraVENous PRN Nehemiah Hernandez MD   10 mL at 07/27/15 0809

## 2024-04-02 RX ORDER — PROMETHAZINE HYDROCHLORIDE 25 MG/1
TABLET ORAL
Qty: 60 TABLET | Refills: 0 | Status: SHIPPED | OUTPATIENT
Start: 2024-04-02

## 2024-04-22 RX ORDER — PROMETHAZINE HYDROCHLORIDE 25 MG/1
TABLET ORAL
Qty: 60 TABLET | Refills: 0 | Status: SHIPPED | OUTPATIENT
Start: 2024-04-22

## 2024-05-13 RX ORDER — PROMETHAZINE HYDROCHLORIDE 25 MG/1
TABLET ORAL
Qty: 60 TABLET | Refills: 0 | Status: SHIPPED | OUTPATIENT
Start: 2024-05-13

## 2024-05-13 NOTE — TELEPHONE ENCOUNTER
Lupe Walton is calling to request a refill on the following medication(s):    Last Visit Date (If Applicable):  9/7/2023    Next Visit Date:    Visit date not found    Medication Request:  Requested Prescriptions     Pending Prescriptions Disp Refills    promethazine (PHENERGAN) 25 MG tablet [Pharmacy Med Name: PROMETHAZINE 25MG TABLETS] 60 tablet 0     Sig: TAKE 1 TABLET BY MOUTH FOUR TIMES DAILY AS NEEDED FOR NAUSEA

## 2024-06-05 RX ORDER — PROMETHAZINE HYDROCHLORIDE 25 MG/1
TABLET ORAL
Qty: 60 TABLET | Refills: 0 | Status: SHIPPED | OUTPATIENT
Start: 2024-06-05

## 2024-06-20 RX ORDER — PROMETHAZINE HYDROCHLORIDE 25 MG/1
TABLET ORAL
Qty: 60 TABLET | Refills: 0 | Status: SHIPPED | OUTPATIENT
Start: 2024-06-20

## 2024-06-26 ENCOUNTER — HOSPITAL ENCOUNTER (OUTPATIENT)
Age: 54
Setting detail: SPECIMEN
Discharge: HOME | End: 2024-06-26

## 2024-06-26 ENCOUNTER — OFFICE VISIT (OUTPATIENT)
Dept: FAMILY MEDICINE CLINIC | Age: 54
End: 2024-06-26
Payer: COMMERCIAL

## 2024-06-26 VITALS
SYSTOLIC BLOOD PRESSURE: 145 MMHG | DIASTOLIC BLOOD PRESSURE: 93 MMHG | WEIGHT: 140.6 LBS | TEMPERATURE: 97.2 F | HEART RATE: 73 BPM | HEIGHT: 67 IN | OXYGEN SATURATION: 97 % | BODY MASS INDEX: 22.07 KG/M2

## 2024-06-26 DIAGNOSIS — Z13.1 DIABETES MELLITUS SCREENING: ICD-10-CM

## 2024-06-26 DIAGNOSIS — D17.0 LIPOMA OF NECK: ICD-10-CM

## 2024-06-26 DIAGNOSIS — Z71.89 ACP (ADVANCE CARE PLANNING): ICD-10-CM

## 2024-06-26 DIAGNOSIS — Z13.220 ENCOUNTER FOR LIPID SCREENING FOR CARDIOVASCULAR DISEASE: ICD-10-CM

## 2024-06-26 DIAGNOSIS — Z13.6 ENCOUNTER FOR LIPID SCREENING FOR CARDIOVASCULAR DISEASE: ICD-10-CM

## 2024-06-26 DIAGNOSIS — Z87.891 PERSONAL HISTORY OF TOBACCO USE: ICD-10-CM

## 2024-06-26 DIAGNOSIS — I10 PRIMARY HYPERTENSION: ICD-10-CM

## 2024-06-26 DIAGNOSIS — F11.21 OPIOID DEPENDENCE IN REMISSION (HCC): ICD-10-CM

## 2024-06-26 DIAGNOSIS — Z78.0 ASYMPTOMATIC MENOPAUSE: ICD-10-CM

## 2024-06-26 DIAGNOSIS — Z00.01 ENCOUNTER FOR WELL ADULT EXAM WITH ABNORMAL FINDINGS: Primary | ICD-10-CM

## 2024-06-26 LAB
ALBUMIN SERPL-MCNC: 4 G/DL (ref 3.5–5.2)
ALBUMIN/GLOB SERPL: 1 {RATIO} (ref 1–2.5)
ALP SERPL-CCNC: 130 U/L (ref 35–104)
ALT SERPL-CCNC: 12 U/L (ref 10–35)
ANION GAP SERPL CALCULATED.3IONS-SCNC: 9 MMOL/L (ref 9–16)
AST SERPL-CCNC: 27 U/L (ref 10–35)
BASOPHILS # BLD: 0.04 K/UL (ref 0–0.2)
BASOPHILS NFR BLD: 1 % (ref 0–2)
BILIRUB SERPL-MCNC: 0.2 MG/DL (ref 0–1.2)
BUN SERPL-MCNC: 8 MG/DL (ref 6–20)
CALCIUM SERPL-MCNC: 8.2 MG/DL (ref 8.6–10.4)
CHLORIDE SERPL-SCNC: 103 MMOL/L (ref 98–107)
CHOLEST SERPL-MCNC: 117 MG/DL (ref 0–199)
CHOLESTEROL/HDL RATIO: 3
CO2 SERPL-SCNC: 29 MMOL/L (ref 20–31)
CREAT SERPL-MCNC: 0.9 MG/DL (ref 0.5–0.9)
EOSINOPHIL # BLD: 0.24 K/UL (ref 0–0.44)
EOSINOPHILS RELATIVE PERCENT: 6 % (ref 1–4)
ERYTHROCYTE [DISTWIDTH] IN BLOOD BY AUTOMATED COUNT: 13.5 % (ref 11.8–14.4)
GFR, ESTIMATED: 72 ML/MIN/1.73M2
GLUCOSE SERPL-MCNC: 66 MG/DL (ref 74–99)
HCT VFR BLD AUTO: 37.3 % (ref 36.3–47.1)
HDLC SERPL-MCNC: 40 MG/DL
HGB BLD-MCNC: 11.6 G/DL (ref 11.9–15.1)
IMM GRANULOCYTES # BLD AUTO: <0.03 K/UL (ref 0–0.3)
IMM GRANULOCYTES NFR BLD: 0 %
LDLC SERPL CALC-MCNC: 61 MG/DL (ref 0–100)
LYMPHOCYTES NFR BLD: 1.47 K/UL (ref 1.1–3.7)
LYMPHOCYTES RELATIVE PERCENT: 34 % (ref 24–43)
MCH RBC QN AUTO: 27.7 PG (ref 25.2–33.5)
MCHC RBC AUTO-ENTMCNC: 31.1 G/DL (ref 28.4–34.8)
MCV RBC AUTO: 89 FL (ref 82.6–102.9)
MONOCYTES NFR BLD: 0.54 K/UL (ref 0.1–1.2)
MONOCYTES NFR BLD: 13 % (ref 3–12)
NEUTROPHILS NFR BLD: 46 % (ref 36–65)
NEUTS SEG NFR BLD: 2.03 K/UL (ref 1.5–8.1)
NRBC BLD-RTO: 0 PER 100 WBC
PLATELET # BLD AUTO: 237 K/UL (ref 138–453)
PMV BLD AUTO: 10.3 FL (ref 8.1–13.5)
POTASSIUM SERPL-SCNC: 4.1 MMOL/L (ref 3.7–5.3)
PROT SERPL-MCNC: 7 G/DL (ref 6.6–8.7)
RBC # BLD AUTO: 4.19 M/UL (ref 3.95–5.11)
SODIUM SERPL-SCNC: 141 MMOL/L (ref 136–145)
TRIGL SERPL-MCNC: 80 MG/DL
TSH SERPL DL<=0.05 MIU/L-ACNC: 1.31 UIU/ML (ref 0.27–4.2)
VLDLC SERPL CALC-MCNC: 16 MG/DL
WBC OTHER # BLD: 4.3 K/UL (ref 3.5–11.3)

## 2024-06-26 PROCEDURE — G8420 CALC BMI NORM PARAMETERS: HCPCS | Performed by: FAMILY MEDICINE

## 2024-06-26 PROCEDURE — 3077F SYST BP >= 140 MM HG: CPT | Performed by: FAMILY MEDICINE

## 2024-06-26 PROCEDURE — 99396 PREV VISIT EST AGE 40-64: CPT | Performed by: FAMILY MEDICINE

## 2024-06-26 PROCEDURE — G8427 DOCREV CUR MEDS BY ELIG CLIN: HCPCS | Performed by: FAMILY MEDICINE

## 2024-06-26 PROCEDURE — G0296 VISIT TO DETERM LDCT ELIG: HCPCS | Performed by: FAMILY MEDICINE

## 2024-06-26 PROCEDURE — 99214 OFFICE O/P EST MOD 30 MIN: CPT | Performed by: FAMILY MEDICINE

## 2024-06-26 PROCEDURE — 3080F DIAST BP >= 90 MM HG: CPT | Performed by: FAMILY MEDICINE

## 2024-06-26 PROCEDURE — 3017F COLORECTAL CA SCREEN DOC REV: CPT | Performed by: FAMILY MEDICINE

## 2024-06-26 PROCEDURE — 4004F PT TOBACCO SCREEN RCVD TLK: CPT | Performed by: FAMILY MEDICINE

## 2024-06-26 RX ORDER — METOPROLOL TARTRATE 50 MG/1
50 TABLET, FILM COATED ORAL 2 TIMES DAILY
Qty: 60 TABLET | Refills: 3 | Status: SHIPPED | OUTPATIENT
Start: 2024-06-26

## 2024-06-26 ASSESSMENT — PATIENT HEALTH QUESTIONNAIRE - PHQ9
SUM OF ALL RESPONSES TO PHQ QUESTIONS 1-9: 0
SUM OF ALL RESPONSES TO PHQ9 QUESTIONS 1 & 2: 0
1. LITTLE INTEREST OR PLEASURE IN DOING THINGS: NOT AT ALL
SUM OF ALL RESPONSES TO PHQ QUESTIONS 1-9: 0
2. FEELING DOWN, DEPRESSED OR HOPELESS: NOT AT ALL

## 2024-06-26 NOTE — PROGRESS NOTES
Well Adult Note  Name: Lupe Walton Today’s Date: 2024   MRN: [id number] Sex: Female   Age: 54 y.o. Ethnicity: Non- / Non    : 1970 Race: White (non-)      Lupe Walton is here for well adult exam.    History:    The patient is coming in today for physical.  She has followed up extensively with a gastroenterologist due to history of a acute gastric ulcer with perforation in 2021.  The patient also has a history of drug abuse.  Has a history of hep C.  The patient states that she is in the methadone clinic goes there every 4 weeks.  And she has been doing well she feels.  She has been having discomfort at her neck area.  She states that as this swelling at her left upper neck which she has been having for a long time but it is getting larger.    Patient also feels that her blood pressure is always elevated when going to a specialist.  She would like to have the blood pressure medication increase.    Review of Systems  Pertinent items are noted in HPI.    Allergies   Allergen Reactions    Compazine [Prochlorperazine Maleate] Other (See Comments)     Seizure like syndrome, eyes roll to back of head    Imitrex [Sumatriptan] Other (See Comments)     TIA    Thorazine [Chlorpromazine] Other (See Comments)     Seizure like syndrome    Cardizem [Diltiazem] Rash    Pcn [Penicillins] Rash         Prior to Visit Medications    Medication Sig Taking? Authorizing Provider   metoprolol tartrate (LOPRESSOR) 50 MG tablet Take 1 tablet by mouth 2 times daily Yes Angélica Collins MD   promethazine (PHENERGAN) 25 MG tablet TAKE 1 TABLET BY MOUTH FOUR TIMES DAILY AS NEEDED FOR NAUSEA Yes Guadalupe Holden, APRN - CNP   pantoprazole (PROTONIX) 40 MG tablet TAKE 1 TABLET BY MOUTH TWICE DAILY BEFORE MEALS Yes Angélica Collins MD   famotidine (PEPCID) 20 MG tablet TAKE 1 TABLET BY MOUTH EVERY NIGHT Yes Patricia Walter MD   DOXEPIN HCL PO Take 1 capsule by mouth daily Yes ProviderShabbir MD

## 2024-06-26 NOTE — PATIENT INSTRUCTIONS
Thank you for choosing [facility].  We know you have options when it comes to your healthcare; we appreciate that you chose us. Our goal is to provide exceptional  service and world class care to every patient.  You will be receiving a survey via email or text message asking for your feedback.  Please take a few minutes to share your thoughts about your recent visit. Your comments helps us understand what we do well and ways we can improve.  Thank you in advance for your valuable feedback.      Advance Care Planning     Advance Care Planning opens a door to talk about and write down your wishes before a sudden accident or illness.  Make your goals, values, and preferences known.     This puts you in the ’s seat and helps others know what matters most to you so they won’t have to guess.      Where can you learn more?    Go to [email or web address]   to learn how to:    Name someone you trust to make healthcare decisions for you, only if you can’t. (Healthcare Power of )    Document your wishes for care if you were seriously ill and not expected to recover or are approaching end of life. (Advance Directive or Living Will)    The same page can be found using the QR code below.                Learning About Lung Cancer Screening  What is screening for lung cancer?     Lung cancer screening is a way to find some lung cancers early, before a person has any symptoms of the cancer.  Lung cancer screening may help those who have the highest risk for lung cancer--people age 50 and older who are or were heavy smokers. For most people, who aren't at increased risk, screening for lung cancer probably isn't helpful.  Screening won't prevent cancer. And it may not find all lung cancers. Lung cancer screening may lower the risk of dying from lung cancer in a small number of people.  How is it done?  Lung cancer screening is done with a low-dose CT (computed tomography) scan. A

## 2024-06-27 DIAGNOSIS — E83.51 LOW CALCIUM LEVELS: Primary | ICD-10-CM

## 2024-07-12 RX ORDER — PROMETHAZINE HYDROCHLORIDE 25 MG/1
TABLET ORAL
Qty: 60 TABLET | Refills: 0 | Status: SHIPPED | OUTPATIENT
Start: 2024-07-12

## 2024-07-22 RX ORDER — PANTOPRAZOLE SODIUM 40 MG/1
TABLET, DELAYED RELEASE ORAL
Qty: 180 TABLET | Refills: 1 | Status: SHIPPED | OUTPATIENT
Start: 2024-07-22

## 2024-07-30 RX ORDER — PROMETHAZINE HYDROCHLORIDE 25 MG/1
TABLET ORAL
Qty: 60 TABLET | Refills: 0 | Status: SHIPPED | OUTPATIENT
Start: 2024-07-30

## 2024-08-20 RX ORDER — PROMETHAZINE HYDROCHLORIDE 25 MG/1
TABLET ORAL
Qty: 60 TABLET | Refills: 0 | Status: SHIPPED | OUTPATIENT
Start: 2024-08-20

## 2024-09-09 RX ORDER — PROMETHAZINE HYDROCHLORIDE 25 MG/1
TABLET ORAL
Qty: 60 TABLET | Refills: 0 | Status: SHIPPED | OUTPATIENT
Start: 2024-09-09

## 2024-09-29 RX ORDER — PROMETHAZINE HYDROCHLORIDE 25 MG/1
TABLET ORAL
Qty: 60 TABLET | Refills: 0 | Status: SHIPPED | OUTPATIENT
Start: 2024-09-29

## 2024-10-21 RX ORDER — PROMETHAZINE HYDROCHLORIDE 25 MG/1
TABLET ORAL
Qty: 60 TABLET | Refills: 0 | Status: SHIPPED | OUTPATIENT
Start: 2024-10-21

## 2024-10-31 ENCOUNTER — HOSPITAL ENCOUNTER (OUTPATIENT)
Age: 54
Setting detail: SPECIMEN
Discharge: HOME | End: 2024-10-31
Payer: COMMERCIAL

## 2024-10-31 DIAGNOSIS — R79.89 ELEVATED LFTS: ICD-10-CM

## 2024-10-31 DIAGNOSIS — E83.51 LOW CALCIUM LEVELS: ICD-10-CM

## 2024-10-31 LAB
25(OH)D3 SERPL-MCNC: 10.4 NG/ML (ref 30–100)
AFP SERPL-MCNC: <1.8 UG/L
CA-I BLD-SCNC: 1.15 MMOL/L (ref 1.13–1.33)
MAGNESIUM SERPL-MCNC: 2.4 MG/DL (ref 1.6–2.6)
PTH-INTACT SERPL-MCNC: 59 PG/ML (ref 15–65)

## 2024-10-31 PROCEDURE — 82104 ALPHA-1-ANTITRYPSIN PHENO: CPT

## 2024-10-31 PROCEDURE — 82103 ALPHA-1-ANTITRYPSIN TOTAL: CPT

## 2024-10-31 PROCEDURE — 82330 ASSAY OF CALCIUM: CPT

## 2024-10-31 PROCEDURE — 82105 ALPHA-FETOPROTEIN SERUM: CPT

## 2024-10-31 PROCEDURE — 83516 IMMUNOASSAY NONANTIBODY: CPT

## 2024-10-31 PROCEDURE — 36415 COLL VENOUS BLD VENIPUNCTURE: CPT

## 2024-10-31 PROCEDURE — 83970 ASSAY OF PARATHORMONE: CPT

## 2024-10-31 PROCEDURE — 82306 VITAMIN D 25 HYDROXY: CPT

## 2024-10-31 PROCEDURE — 83735 ASSAY OF MAGNESIUM: CPT

## 2024-11-04 DIAGNOSIS — E55.9 VITAMIN D DEFICIENCY: Primary | ICD-10-CM

## 2024-11-04 LAB
ALPHA-1 ANTITRYPSIN PHENOTYPE: NORMAL
ALPHA-1 ANTITRYPSIN: 161 MG/DL (ref 90–200)

## 2024-11-04 RX ORDER — ERGOCALCIFEROL 1.25 MG/1
50000 CAPSULE ORAL WEEKLY
Qty: 4 CAPSULE | Refills: 3 | Status: SHIPPED | OUTPATIENT
Start: 2024-11-04

## 2024-11-05 LAB — MITOCHONDRIA M2 IGG SER-ACNC: 1.5 U/ML (ref 0–4)

## 2024-11-11 RX ORDER — PROMETHAZINE HYDROCHLORIDE 25 MG/1
TABLET ORAL
Qty: 60 TABLET | Refills: 0 | Status: SHIPPED | OUTPATIENT
Start: 2024-11-11

## 2024-11-26 ENCOUNTER — TELEPHONE (OUTPATIENT)
Dept: FAMILY MEDICINE CLINIC | Age: 54
End: 2024-11-26

## 2024-11-26 NOTE — TELEPHONE ENCOUNTER
----- Message from Ma. R sent at 11/25/2024  2:55 PM EST -----  Regarding: ECC Escalation To Practice  ECC Escalation To Practice      Type of Escalation: Red Flag Symptom  --------------------------------------------------------------------------------------------------------------------------    Information for Provider: Patient is experiencing numbness and tingling. Her fingers are numb and her legs feels like a jello. She's repeatedly losing her balance as well.   Patient is looking for appointment for: Symptom: Numbness and tingling, losing balance  Reasons for Message: Unable to reach practice     Additional Information: Patient is experiencing numbness and tingling. Her fingers are numb and her legs feels like a jello. She's repeatedly losing her balance as well. Please reach out to patient urgently. Thank you very much!  --------------------------------------------------------------------------------------------------------------------------    Relationship to Patient: Self     Call Back Info: OK to leave message on voicemail  Preferred Call Back Number: Phone [phone] (home)

## 2024-11-29 ENCOUNTER — OFFICE VISIT (OUTPATIENT)
Dept: FAMILY MEDICINE CLINIC | Age: 54
End: 2024-11-29

## 2024-11-29 VITALS
HEART RATE: 76 BPM | HEIGHT: 67 IN | DIASTOLIC BLOOD PRESSURE: 89 MMHG | OXYGEN SATURATION: 98 % | BODY MASS INDEX: 20.4 KG/M2 | SYSTOLIC BLOOD PRESSURE: 130 MMHG | WEIGHT: 130 LBS

## 2024-11-29 DIAGNOSIS — R29.6 FREQUENT FALLS: ICD-10-CM

## 2024-11-29 DIAGNOSIS — M54.16 RADICULOPATHY, LUMBAR REGION: Primary | ICD-10-CM

## 2024-11-29 DIAGNOSIS — F41.9 ANXIETY: ICD-10-CM

## 2024-11-29 DIAGNOSIS — R26.2 DIFFICULTY IN WALKING: ICD-10-CM

## 2024-11-29 RX ORDER — DIAZEPAM 10 MG/1
TABLET ORAL
Qty: 2 TABLET | Refills: 0 | Status: SHIPPED | OUTPATIENT
Start: 2024-11-29 | End: 2024-11-29

## 2024-11-29 NOTE — PROGRESS NOTES
[facility]  [facility] MEDICINE  [address]  [address]  [place] [address]  Dept: [phone]      Lupe Walton is a 54 y.o. female who presents today for follow up on her  medical conditions as noted below.      Chief Complaint   Patient presents with    Extremity Weakness     Bilateral     Numbness     In hands and feet        Patient Active Problem List:     Migraine     Opiate dependence (HCC)     Chronic pain     Obesity     Patient overweight     Lumbar radicular pain     Lumbar disc disease     Closed nondisplaced fracture of fifth metatarsal bone of right foot     Intra-abdominal abscess (HCC)     Toxic dilatation of colon (HCC)     Colonic ischemia (HCC)     Weakness     Right wrist drop     Right foot drop     Past Medical History:   Diagnosis Date    Acute gastric ulcer with perforation (HCC)     hx. of    Asthma     Connective tissue disease, undifferentiated (HCC) 1987    Dr. Kendrick (Rheum.)    DDD (degenerative disc disease) 2000    Dr. Álvarez    Depression     Lumbar radicular pain     Migraine 1988    Dr. Gagnon    Mitral valve prolapse 1978    Dr. Washburn    Opiate addiction (HCC) 01/02/2013    Dr. Babb / on 10/3/17 pt states she completed tx 2016      Past Surgical History:   Procedure Laterality Date    ABDOMEN SURGERY      COLONOSCOPY N/A 03/17/2021    COLONOSCOPY FLEXIBLE W/ DECOMPRESSION performed by Deborah Azevedo MD at [facility] Endoscopy    COLONOSCOPY  03/21/2021    COLONOSCOPY N/A 3/21/2021    COLONOSCOPY DIAGNOSTIC performed by Yannick Barrett MD at [facility] OR    COLONOSCOPY N/A 8/19/2021    COLONOSCOPY DIAGNOSTIC performed by Patricia Walter MD at [facility] ENDO    CT ABSCESS DRAIN SUBCUTANEOUS  03/04/2021    CT ABSCESS DRAIN SUBCUTANEOUS 3/4/2021 [facility] CT SCAN    GASTRECTOMY  03/05/2021    \"partial\"    HERNIA REPAIR  1972    as child    HYSTERECTOMY (CERVIX STATUS UNKNOWN)      2007    LAPAROTOMY N/A 03/05/2021    EXPLORATORY LAPAROTOMY, PARTIAL

## 2024-12-02 RX ORDER — PROMETHAZINE HYDROCHLORIDE 25 MG/1
TABLET ORAL
Qty: 60 TABLET | Refills: 0 | Status: SHIPPED | OUTPATIENT
Start: 2024-12-02

## 2024-12-05 ENCOUNTER — HOSPITAL ENCOUNTER (OUTPATIENT)
Age: 54
Discharge: HOME | End: 2024-12-05
Payer: COMMERCIAL

## 2024-12-05 LAB
ALBUMIN SERPL-MCNC: 3.9 G/DL (ref 3.5–5.2)
ALP SERPL-CCNC: 125 U/L (ref 35–104)
ALT SERPL-CCNC: 19 U/L (ref 10–35)
ANION GAP SERPL CALCULATED.3IONS-SCNC: 10 MMOL/L (ref 9–16)
AST SERPL-CCNC: 25 U/L (ref 10–35)
BASOPHILS # BLD: 0.1 K/UL (ref 0–0.2)
BASOPHILS NFR BLD: 1 % (ref 0–2)
BILIRUB SERPL-MCNC: 0.3 MG/DL (ref 0–1.2)
BUN SERPL-MCNC: 12 MG/DL (ref 6–20)
CALCIUM SERPL-MCNC: 8.6 MG/DL (ref 8.6–10.4)
CHLORIDE SERPL-SCNC: 102 MMOL/L (ref 98–107)
CO2 SERPL-SCNC: 29 MMOL/L (ref 20–31)
CREAT SERPL-MCNC: 1 MG/DL (ref 0.7–1.2)
EOSINOPHIL # BLD: 0.1 K/UL (ref 0–0.4)
EOSINOPHILS RELATIVE PERCENT: 1 % (ref 0–4)
ERYTHROCYTE [DISTWIDTH] IN BLOOD BY AUTOMATED COUNT: 13.7 % (ref 11.5–14.9)
GFR, ESTIMATED: 67 ML/MIN/1.73M2
GLUCOSE SERPL-MCNC: 90 MG/DL (ref 74–99)
HAV IGM SERPL QL IA: NONREACTIVE
HBV CORE IGM SERPL QL IA: NONREACTIVE
HBV SURFACE AB SERPL IA-ACNC: <3.5 MIU/ML
HBV SURFACE AG SERPL QL IA: NONREACTIVE
HCT VFR BLD AUTO: 37.8 % (ref 36–46)
HCV AB SERPL QL IA: REACTIVE
HGB BLD-MCNC: 12.5 G/DL (ref 12–16)
HIV 1+2 AB+HIV1 P24 AG SERPL QL IA: NONREACTIVE
LYMPHOCYTES NFR BLD: 1.9 K/UL (ref 1–4.8)
LYMPHOCYTES RELATIVE PERCENT: 20 % (ref 24–44)
MCH RBC QN AUTO: 28.7 PG (ref 26–34)
MCHC RBC AUTO-ENTMCNC: 33 G/DL (ref 31–37)
MCV RBC AUTO: 86.9 FL (ref 80–100)
MONOCYTES NFR BLD: 0.8 K/UL (ref 0.1–1.3)
MONOCYTES NFR BLD: 9 % (ref 1–7)
NEUTROPHILS NFR BLD: 69 % (ref 36–66)
NEUTS SEG NFR BLD: 6.5 K/UL (ref 1.3–9.1)
PLATELET # BLD AUTO: 239 K/UL (ref 150–450)
PMV BLD AUTO: 7.9 FL (ref 6–12)
POTASSIUM SERPL-SCNC: 3.6 MMOL/L (ref 3.7–5.3)
PROT SERPL-MCNC: 7.3 G/DL (ref 6.6–8.7)
RBC # BLD AUTO: 4.35 M/UL (ref 4–5.2)
SODIUM SERPL-SCNC: 141 MMOL/L (ref 136–145)
T PALLIDUM AB SER QL IA: NONREACTIVE
T4 FREE SERPL-MCNC: 1.3 NG/DL (ref 0.9–1.7)
TSH SERPL DL<=0.05 MIU/L-ACNC: 4.02 UIU/ML (ref 0.27–4.2)
WBC OTHER # BLD: 9.4 K/UL (ref 3.5–11)

## 2024-12-05 PROCEDURE — 86480 TB TEST CELL IMMUN MEASURE: CPT

## 2024-12-05 PROCEDURE — 80074 ACUTE HEPATITIS PANEL: CPT

## 2024-12-05 PROCEDURE — 84439 ASSAY OF FREE THYROXINE: CPT

## 2024-12-05 PROCEDURE — 86780 TREPONEMA PALLIDUM: CPT

## 2024-12-05 PROCEDURE — 87389 HIV-1 AG W/HIV-1&-2 AB AG IA: CPT

## 2024-12-05 PROCEDURE — 36415 COLL VENOUS BLD VENIPUNCTURE: CPT

## 2024-12-05 PROCEDURE — 85025 COMPLETE CBC W/AUTO DIFF WBC: CPT

## 2024-12-05 PROCEDURE — 80053 COMPREHEN METABOLIC PANEL: CPT

## 2024-12-05 PROCEDURE — 84443 ASSAY THYROID STIM HORMONE: CPT

## 2024-12-05 PROCEDURE — 86317 IMMUNOASSAY INFECTIOUS AGENT: CPT

## 2024-12-07 LAB
QUANTI TB GOLD PLUS: NEGATIVE
QUANTI TB1 MINUS NIL: 0.04 IU/ML
QUANTI TB2 MINUS NIL: 0 IU/ML
QUANTIFERON MITOGEN: 9.09 IU/ML
QUANTIFERON NIL: 0.91 IU/ML

## 2024-12-11 DIAGNOSIS — I10 PRIMARY HYPERTENSION: ICD-10-CM

## 2024-12-12 RX ORDER — METOPROLOL TARTRATE 50 MG
50 TABLET ORAL 2 TIMES DAILY
Qty: 60 TABLET | Refills: 3 | Status: ON HOLD | OUTPATIENT
Start: 2024-12-12

## 2024-12-15 ENCOUNTER — APPOINTMENT (OUTPATIENT)
Dept: MRI IMAGING | Age: 54
DRG: 552 | End: 2024-12-15
Payer: COMMERCIAL

## 2024-12-15 ENCOUNTER — APPOINTMENT (OUTPATIENT)
Dept: GENERAL RADIOLOGY | Age: 54
DRG: 552 | End: 2024-12-15
Payer: COMMERCIAL

## 2024-12-15 ENCOUNTER — APPOINTMENT (OUTPATIENT)
Dept: CT IMAGING | Age: 54
DRG: 552 | End: 2024-12-15
Payer: COMMERCIAL

## 2024-12-15 ENCOUNTER — HOSPITAL ENCOUNTER (INPATIENT)
Age: 54
LOS: 9 days | Discharge: NURSING, REHAB OR LONG-TERM CARE | DRG: 552 | End: 2024-12-24
Attending: EMERGENCY MEDICINE | Admitting: SURGERY
Payer: COMMERCIAL

## 2024-12-15 DIAGNOSIS — S14.129A CENTRAL CORD SYNDROME, INITIAL ENCOUNTER (HCC): ICD-10-CM
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
## 2024-12-15 DIAGNOSIS — R20.2 PARESTHESIAS: ICD-10-CM

## 2024-12-15 DIAGNOSIS — S22.21XA CLOSED FRACTURE OF MANUBRIUM, INITIAL ENCOUNTER: Primary | ICD-10-CM

## 2024-12-15 LAB
ANION GAP SERPL CALCULATED.3IONS-SCNC: 9 MMOL/L (ref 9–16)
BODY TEMPERATURE: 37
BUN SERPL-MCNC: 9 MG/DL (ref 6–20)
CHLORIDE SERPL-SCNC: 100 MMOL/L (ref 98–107)
CK SERPL-CCNC: 132 U/L (ref 26–192)
CO2 SERPL-SCNC: 30 MMOL/L (ref 20–31)
COHGB MFR BLD: 4.3 % (ref 0–5)
CREAT SERPL-MCNC: 0.8 MG/DL (ref 0.6–0.9)
ERYTHROCYTE [DISTWIDTH] IN BLOOD BY AUTOMATED COUNT: 12.7 % (ref 11.8–14.4)
ETHANOL PERCENT: <0.01 %
ETHANOLAMINE SERPL-MCNC: <10 MG/DL (ref 0–0.08)
FIO2 ON VENT: ABNORMAL %
GFR, ESTIMATED: 88 ML/MIN/1.73M2
GLUCOSE SERPL-MCNC: 75 MG/DL (ref 74–99)
HCG SERPL QL: NEGATIVE
HCO3 VENOUS: 32.3 MMOL/L (ref 24–30)
HCT VFR BLD AUTO: 45 % (ref 36.3–47.1)
HGB BLD-MCNC: 13.9 G/DL (ref 11.9–15.1)
INR PPP: 1
MCH RBC QN AUTO: 27.5 PG (ref 25.2–33.5)
MCHC RBC AUTO-ENTMCNC: 30.9 G/DL (ref 28.4–34.8)
MCV RBC AUTO: 88.9 FL (ref 82.6–102.9)
MYOGLOBIN SERPL-MCNC: 81 NG/ML (ref 25–58)
NRBC BLD-RTO: 0 PER 100 WBC
O2 SAT, VEN: 73.6 % (ref 60–85)
PARTIAL THROMBOPLASTIN TIME: 27.9 SEC (ref 23–36.5)
PCO2 VENOUS: 59.7 MM HG (ref 39–55)
PH VENOUS: 7.35 (ref 7.32–7.42)
PLATELET # BLD AUTO: 272 K/UL (ref 138–453)
PMV BLD AUTO: 9.5 FL (ref 8.1–13.5)
PO2 VENOUS: 38.6 MM HG (ref 30–50)
POSITIVE BASE EXCESS, VEN: 5.3 MMOL/L (ref 0–2)
POTASSIUM SERPL-SCNC: 4.1 MMOL/L (ref 3.7–5.3)
PROTHROMBIN TIME: 12.9 SEC (ref 11.7–14.9)
RBC # BLD AUTO: 5.06 M/UL (ref 3.95–5.11)
SODIUM SERPL-SCNC: 139 MMOL/L (ref 136–145)
TROPONIN I SERPL HS-MCNC: 15 NG/L (ref 0–14)
WBC OTHER # BLD: 5.8 K/UL (ref 3.5–11.3)

## 2024-12-15 PROCEDURE — 2060000002 HC BURN ICU INTERMEDIATE R&B

## 2024-12-15 PROCEDURE — 82550 ASSAY OF CK (CPK): CPT

## 2024-12-15 PROCEDURE — 73562 X-RAY EXAM OF KNEE 3: CPT

## 2024-12-15 PROCEDURE — 84703 CHORIONIC GONADOTROPIN ASSAY: CPT

## 2024-12-15 PROCEDURE — 73030 X-RAY EXAM OF SHOULDER: CPT

## 2024-12-15 PROCEDURE — 82947 ASSAY GLUCOSE BLOOD QUANT: CPT

## 2024-12-15 PROCEDURE — 99223 1ST HOSP IP/OBS HIGH 75: CPT | Performed by: SURGERY

## 2024-12-15 PROCEDURE — 96374 THER/PROPH/DIAG INJ IV PUSH: CPT

## 2024-12-15 PROCEDURE — 93005 ELECTROCARDIOGRAM TRACING: CPT

## 2024-12-15 PROCEDURE — 83874 ASSAY OF MYOGLOBIN: CPT

## 2024-12-15 PROCEDURE — 85027 COMPLETE CBC AUTOMATED: CPT

## 2024-12-15 PROCEDURE — 84484 ASSAY OF TROPONIN QUANT: CPT

## 2024-12-15 PROCEDURE — 70450 CT HEAD/BRAIN W/O DYE: CPT

## 2024-12-15 PROCEDURE — G0480 DRUG TEST DEF 1-7 CLASSES: HCPCS

## 2024-12-15 PROCEDURE — 71260 CT THORAX DX C+: CPT

## 2024-12-15 PROCEDURE — 84520 ASSAY OF UREA NITROGEN: CPT

## 2024-12-15 PROCEDURE — 71045 X-RAY EXAM CHEST 1 VIEW: CPT

## 2024-12-15 PROCEDURE — 82805 BLOOD GASES W/O2 SATURATION: CPT

## 2024-12-15 PROCEDURE — 6370000000 HC RX 637 (ALT 250 FOR IP)

## 2024-12-15 PROCEDURE — 82565 ASSAY OF CREATININE: CPT

## 2024-12-15 PROCEDURE — 85730 THROMBOPLASTIN TIME PARTIAL: CPT

## 2024-12-15 PROCEDURE — 6360000004 HC RX CONTRAST MEDICATION

## 2024-12-15 PROCEDURE — 2580000003 HC RX 258

## 2024-12-15 PROCEDURE — 72125 CT NECK SPINE W/O DYE: CPT

## 2024-12-15 PROCEDURE — 6360000002 HC RX W HCPCS

## 2024-12-15 PROCEDURE — 85610 PROTHROMBIN TIME: CPT

## 2024-12-15 PROCEDURE — 72141 MRI NECK SPINE W/O DYE: CPT

## 2024-12-15 PROCEDURE — 72040 X-RAY EXAM NECK SPINE 2-3 VW: CPT

## 2024-12-15 PROCEDURE — 80051 ELECTROLYTE PANEL: CPT

## 2024-12-15 PROCEDURE — 99285 EMERGENCY DEPT VISIT HI MDM: CPT

## 2024-12-15 RX ORDER — IBUPROFEN 400 MG/1
800 TABLET, FILM COATED ORAL EVERY 8 HOURS PRN
Status: DISCONTINUED | OUTPATIENT
Start: 2024-12-15 | End: 2024-12-17

## 2024-12-15 RX ORDER — ERGOCALCIFEROL 1.25 MG/1
50000 CAPSULE ORAL WEEKLY
Status: DISCONTINUED | OUTPATIENT
Start: 2024-12-18 | End: 2024-12-24 | Stop reason: HOSPADM

## 2024-12-15 RX ORDER — IOPAMIDOL 755 MG/ML
75 INJECTION, SOLUTION INTRAVASCULAR
Status: COMPLETED | OUTPATIENT
Start: 2024-12-15 | End: 2024-12-15

## 2024-12-15 RX ORDER — METOPROLOL TARTRATE 50 MG
50 TABLET ORAL 2 TIMES DAILY
Status: DISCONTINUED | OUTPATIENT
Start: 2024-12-15 | End: 2024-12-24 | Stop reason: HOSPADM

## 2024-12-15 RX ORDER — GABAPENTIN 800 MG/1
800 TABLET ORAL ONCE
Status: COMPLETED | OUTPATIENT
Start: 2024-12-15 | End: 2024-12-15

## 2024-12-15 RX ORDER — GABAPENTIN 800 MG/1
800 TABLET ORAL 3 TIMES DAILY
Status: DISCONTINUED | OUTPATIENT
Start: 2024-12-15 | End: 2024-12-24 | Stop reason: HOSPADM

## 2024-12-15 RX ORDER — PANTOPRAZOLE SODIUM 40 MG/1
40 TABLET, DELAYED RELEASE ORAL
Status: DISCONTINUED | OUTPATIENT
Start: 2024-12-16 | End: 2024-12-24 | Stop reason: HOSPADM

## 2024-12-15 RX ORDER — NOREPINEPHRINE BITARTRATE 0.06 MG/ML
1-100 INJECTION, SOLUTION INTRAVENOUS CONTINUOUS
Status: DISCONTINUED | OUTPATIENT
Start: 2024-12-15 | End: 2024-12-18

## 2024-12-15 RX ORDER — ALBUTEROL SULFATE 90 UG/1
2 INHALANT RESPIRATORY (INHALATION) EVERY 4 HOURS PRN
Status: DISCONTINUED | OUTPATIENT
Start: 2024-12-15 | End: 2024-12-24 | Stop reason: HOSPADM

## 2024-12-15 RX ORDER — ACETAMINOPHEN 500 MG
1000 TABLET ORAL EVERY 8 HOURS
Status: DISCONTINUED | OUTPATIENT
Start: 2024-12-15 | End: 2024-12-17

## 2024-12-15 RX ORDER — SODIUM CHLORIDE 9 MG/ML
INJECTION, SOLUTION INTRAVENOUS PRN
Status: DISCONTINUED | OUTPATIENT
Start: 2024-12-15 | End: 2024-12-20

## 2024-12-15 RX ORDER — OXYCODONE HYDROCHLORIDE 5 MG/1
5 TABLET ORAL EVERY 6 HOURS PRN
Status: DISCONTINUED | OUTPATIENT
Start: 2024-12-15 | End: 2024-12-17

## 2024-12-15 RX ORDER — CYCLOBENZAPRINE HCL 10 MG
10 TABLET ORAL ONCE
Status: COMPLETED | OUTPATIENT
Start: 2024-12-15 | End: 2024-12-15

## 2024-12-15 RX ORDER — SODIUM CHLORIDE 0.9 % (FLUSH) 0.9 %
5-40 SYRINGE (ML) INJECTION EVERY 12 HOURS SCHEDULED
Status: DISCONTINUED | OUTPATIENT
Start: 2024-12-15 | End: 2024-12-20

## 2024-12-15 RX ORDER — POLYETHYLENE GLYCOL 3350 17 G/17G
17 POWDER, FOR SOLUTION ORAL DAILY
Status: DISCONTINUED | OUTPATIENT
Start: 2024-12-15 | End: 2024-12-17

## 2024-12-15 RX ORDER — SODIUM CHLORIDE, SODIUM LACTATE, POTASSIUM CHLORIDE, CALCIUM CHLORIDE 600; 310; 30; 20 MG/100ML; MG/100ML; MG/100ML; MG/100ML
INJECTION, SOLUTION INTRAVENOUS CONTINUOUS
Status: DISCONTINUED | OUTPATIENT
Start: 2024-12-15 | End: 2024-12-16

## 2024-12-15 RX ORDER — FAMOTIDINE 20 MG/1
20 TABLET, FILM COATED ORAL NIGHTLY
Status: DISCONTINUED | OUTPATIENT
Start: 2024-12-15 | End: 2024-12-20

## 2024-12-15 RX ORDER — SUCRALFATE 1 G/1
1 TABLET ORAL
Status: DISCONTINUED | OUTPATIENT
Start: 2024-12-15 | End: 2024-12-24 | Stop reason: HOSPADM

## 2024-12-15 RX ORDER — METHOCARBAMOL 750 MG/1
750 TABLET, FILM COATED ORAL EVERY 6 HOURS
Status: DISCONTINUED | OUTPATIENT
Start: 2024-12-15 | End: 2024-12-24 | Stop reason: HOSPADM

## 2024-12-15 RX ORDER — ONDANSETRON 2 MG/ML
4 INJECTION INTRAMUSCULAR; INTRAVENOUS EVERY 6 HOURS PRN
Status: DISCONTINUED | OUTPATIENT
Start: 2024-12-15 | End: 2024-12-24 | Stop reason: HOSPADM

## 2024-12-15 RX ORDER — DULOXETIN HYDROCHLORIDE 30 MG/1
60 CAPSULE, DELAYED RELEASE ORAL DAILY
Status: DISCONTINUED | OUTPATIENT
Start: 2024-12-15 | End: 2024-12-24 | Stop reason: HOSPADM

## 2024-12-15 RX ORDER — OXYCODONE AND ACETAMINOPHEN 5; 325 MG/1; MG/1
2 TABLET ORAL ONCE
Status: DISCONTINUED | OUTPATIENT
Start: 2024-12-15 | End: 2024-12-15

## 2024-12-15 RX ORDER — SODIUM CHLORIDE 0.9 % (FLUSH) 0.9 %
5-40 SYRINGE (ML) INJECTION PRN
Status: DISCONTINUED | OUTPATIENT
Start: 2024-12-15 | End: 2024-12-24 | Stop reason: HOSPADM

## 2024-12-15 RX ORDER — ONDANSETRON 4 MG/1
4 TABLET, ORALLY DISINTEGRATING ORAL EVERY 8 HOURS PRN
Status: DISCONTINUED | OUTPATIENT
Start: 2024-12-15 | End: 2024-12-24 | Stop reason: HOSPADM

## 2024-12-15 RX ORDER — KETOROLAC TROMETHAMINE 30 MG/ML
30 INJECTION, SOLUTION INTRAMUSCULAR; INTRAVENOUS ONCE
Status: COMPLETED | OUTPATIENT
Start: 2024-12-15 | End: 2024-12-15

## 2024-12-15 RX ORDER — METHADONE HYDROCHLORIDE 10 MG/ML
145 CONCENTRATE ORAL DAILY
Status: DISCONTINUED | OUTPATIENT
Start: 2024-12-15 | End: 2024-12-16

## 2024-12-15 RX ADMIN — GABAPENTIN 800 MG: 800 TABLET, FILM COATED ORAL at 14:05

## 2024-12-15 RX ADMIN — CYCLOBENZAPRINE 10 MG: 10 TABLET, FILM COATED ORAL at 18:07

## 2024-12-15 RX ADMIN — SODIUM CHLORIDE, POTASSIUM CHLORIDE, SODIUM LACTATE AND CALCIUM CHLORIDE: 600; 310; 30; 20 INJECTION, SOLUTION INTRAVENOUS at 22:46

## 2024-12-15 RX ADMIN — DULOXETINE HYDROCHLORIDE 60 MG: 30 CAPSULE, DELAYED RELEASE ORAL at 23:23

## 2024-12-15 RX ADMIN — GABAPENTIN 800 MG: 800 TABLET ORAL at 22:30

## 2024-12-15 RX ADMIN — METOPROLOL TARTRATE 50 MG: 50 TABLET, FILM COATED ORAL at 22:34

## 2024-12-15 RX ADMIN — KETOROLAC TROMETHAMINE 30 MG: 30 INJECTION, SOLUTION INTRAMUSCULAR; INTRAVENOUS at 18:06

## 2024-12-15 RX ADMIN — SUCRALFATE 1 G: 1 TABLET ORAL at 22:34

## 2024-12-15 RX ADMIN — METHOCARBAMOL 750 MG: 750 TABLET ORAL at 23:22

## 2024-12-15 RX ADMIN — IOPAMIDOL 75 ML: 755 INJECTION, SOLUTION INTRAVENOUS at 13:28

## 2024-12-15 RX ADMIN — SODIUM CHLORIDE, PRESERVATIVE FREE 10 ML: 5 INJECTION INTRAVENOUS at 22:00

## 2024-12-15 RX ADMIN — FAMOTIDINE 20 MG: 20 TABLET, FILM COATED ORAL at 22:33

## 2024-12-15 RX ADMIN — ACETAMINOPHEN 1000 MG: 500 TABLET ORAL at 23:27

## 2024-12-15 ASSESSMENT — PAIN - FUNCTIONAL ASSESSMENT
PAIN_FUNCTIONAL_ASSESSMENT: 0-10
PAIN_FUNCTIONAL_ASSESSMENT_SITE2: PREVENTS OR INTERFERES SOME ACTIVE ACTIVITIES AND ADLS
PAIN_FUNCTIONAL_ASSESSMENT: ACTIVITIES ARE NOT PREVENTED
PAIN_FUNCTIONAL_ASSESSMENT: ACTIVITIES ARE NOT PREVENTED

## 2024-12-15 ASSESSMENT — PAIN DESCRIPTION - PAIN TYPE
TYPE: ACUTE PAIN
TYPE: ACUTE PAIN;CHRONIC PAIN

## 2024-12-15 ASSESSMENT — PAIN SCALES - GENERAL
PAINLEVEL_OUTOF10: 8
PAINLEVEL_OUTOF10: 9

## 2024-12-15 ASSESSMENT — PAIN DESCRIPTION - ONSET
ONSET: ON-GOING
ONSET: ON-GOING

## 2024-12-15 ASSESSMENT — PAIN DESCRIPTION - FREQUENCY
FREQUENCY: CONTINUOUS
FREQUENCY: CONTINUOUS

## 2024-12-15 ASSESSMENT — PAIN DESCRIPTION - ORIENTATION
ORIENTATION: POSTERIOR
ORIENTATION: MID;POSTERIOR
ORIENTATION_2: MID;POSTERIOR

## 2024-12-15 ASSESSMENT — PAIN DESCRIPTION - LOCATION
LOCATION: NECK;OTHER (COMMENT)
LOCATION_2: BACK
LOCATION: NECK;SHOULDER

## 2024-12-15 ASSESSMENT — PAIN DESCRIPTION - INTENSITY: RATING_2: 8

## 2024-12-15 ASSESSMENT — PAIN DESCRIPTION - DESCRIPTORS
DESCRIPTORS: ACHING
DESCRIPTORS: ACHING;DISCOMFORT
DESCRIPTORS_2: ACHING;DISCOMFORT

## 2024-12-15 NOTE — ED PROVIDER NOTES
[facility] EMERGENCY DEPARTMENT  Emergency Department Encounter  Emergency Medicine Resident     Pt Name:Lupe Walton  MRN: [id number]  Birthdate 1970  Date of evaluation: 12/15/24  PCP:  Angélica Collins MD  Note Started: 11:25 AM EST      CHIEF COMPLAINT       Chief Complaint   Patient presents with    Fall       HISTORY OF PRESENT ILLNESS  (Location/Symptom, Timing/Onset, Context/Setting, Quality, Duration, Modifying Factors, Severity.)      Lupe Walton is a 54 y.o. female who presents with medical history of lupus, ambulates with a walker, sometimes utilizes a wheelchair.  For the past week she has been having multiple recent falls.  The most recent was last night.  Patient states that she attempted to ambulate to her walker, but her leg gave out and she landed onto her left side, and did hit her head on the ground.  No loss of conscious.  She is not on anticoagulation.  Patient is complaining of some neck pain and bilateral shoulder pain since her fall.  She is also complaining of some paresthesias in her bilateral hands as well, which is baseline for her, but she states it is mildly worse since her fall.  She does not have any chest pain or shortness of breath, no abdominal pain, nausea or vomiting.  Denies any other injuries.    PAST MEDICAL / SURGICAL / SOCIAL / FAMILY HISTORY      has a past medical history of Acute gastric ulcer with perforation ([facility]), Asthma, Connective tissue disease, undifferentiated (HCC), DDD (degenerative disc disease), Depression, Lumbar radicular pain, Migraine, Mitral valve prolapse, and Opiate addiction ([facility]).     has a past surgical history that includes Total abdominal hysterectomy w/ bilateral salpingoophorectomy (2007); hernia repair (1972); Nose surgery (1989); Tubal ligation (1997); ovarian cyst removal (Left, 2007); Nerve Block (Right, 07/27/2015); Hysterectomy; CT ABSCESS DRAINAGE (03/04/2021); laparotomy (N/A, 03/05/2021); laparotomy (N/A,  03/11/2021); Colonoscopy (N/A, 03/17/2021); Colonoscopy (03/21/2021); Colonoscopy (N/A, 3/21/2021); Abdomen surgery; gastrectomy (03/05/2021); Upper gastrointestinal endoscopy (N/A, 8/19/2021); Colonoscopy (N/A, 8/19/2021); and Upper gastrointestinal endoscopy (N/A, 1/24/2024).    Social History     Socioeconomic History    Marital status:      Spouse name: Not on file    Number of children: Not on file    Years of education: Not on file    Highest education level: Not on file   Occupational History    Not on file   Tobacco Use    Smoking status: Every Day     Current packs/day: 1.00     Average packs/day: 1 pack/day for 40.0 years (40.0 ttl pk-yrs)     Types: Cigarettes    Smokeless tobacco: Never    Tobacco comments:     Menthol ciggaretts   Vaping Use    Vaping status: Some Days    Substances: Nicotine   Substance and Sexual Activity    Alcohol use: No     Alcohol/week: 0.0 standard drinks of alcohol    Drug use: Not Currently     Types: Marijuana (Weed), IV, Opiates      Comment: 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     Lack of Transportation (Medical): Not on file     Lack of Transportation (Non-Medical): No   Physical Activity: Not on file   Stress: Not on file   Social Connections: Not on file   Intimate Partner Violence: Not on file   Housing Stability: Unknown (9/7/2023)    Housing Stability Vital Sign     Unable to Pay for Housing in the Last Year: Not on file     Number of Places Lived in the Last Year: Not on file     Unstable Housing in the Last Year:

## 2024-12-15 NOTE — ED TRIAGE NOTES
Pt states that while standing to transfer to her wheel chair yesterday she slipped and fell.  She laid on the floor for about 15 min before family found her and helped her back in to her wheelchair.  Pt denies LOC and states that she has chronic pain and weakness to her shoulders and neck but believes that this has worsened.  Pt refused C-collar from ems and is GCS 15 upon arrival.

## 2024-12-15 NOTE — PROGRESS NOTES
[facility] [place]    PATIENT NAME: Lupe Walton    Shift date: 12/15/2024  Shift day: Sunday   Shift # 1    Room # 29/29     Name: Lupe Walton            Age: 54 y.o.  Gender: female          Orthodox: Jew   Place of Mormonism:     Trauma/Incident type: Adult Trauma Consult  Admit Date & Time: 12/15/2024 11:21 AM  TRAUMA NAME: None    ADVANCE DIRECTIVES IN CHART?  No    NAME OF DECISION MAKER:     RELATIONSHIP OF DECISION MAKER TO PATIENT:     PATIENT/EVENT DESCRIPTION:  Lupe Walton is a 54 y.o. female who arrived ER as adult trauma consult. Per report, patient took a fall yesterday. Patient to be admitted to 29/29.       SPIRITUAL ASSESSMENT-INTERVENTION-OUTCOME:  No spiritual assessment was carried out. However, patient was receptive to spiritual care and open to prayer. When asked how she was feeling, patient responded, \"not good.\" Family was present at the time.  provided ministry of presence, offered support and prayed with patient and family. Patient and family expressed gratitude for the spiritual and emotional support they received.      PATIENT BELONGINGS:  No belongings noted    ANY BELONGINGS OF SIGNIFICANT VALUE NOTED:  Unknown    REGISTRATION STAFF NOTIFIED?  Yes    WHAT IS YOUR SPIRITUAL CARE PLAN FOR THIS PATIENT?:  Follow up visits recommended for ongoing assessment of patient's condition and for more prayers and support.     Electronically signed by Chaplain Margaret, on 12/15/2024 at 4:05 PM.  [facility]  [phone]

## 2024-12-15 NOTE — ED PROVIDER NOTES
[facility]  Emergency Department  Emergency Medicine Resident Turn-Over     Note Started: 4:46 PM EST    Care of Lupe Walton was assumed from Dr. Parikh and is being seen for Fall  .  The patient's initial evaluation and plan have been discussed with the prior provider who initially evaluated the patient.     EMERGENCY DEPARTMENT COURSE / MEDICAL DECISION MAKING:       MEDICATIONS GIVEN:  Orders Placed This Encounter   Medications    iopamidol (ISOVUE-370) 76 % injection 75 mL    gabapentin (NEURONTIN) tablet 800 mg    cyclobenzaprine (FLEXERIL) tablet 10 mg    ketorolac (TORADOL) injection 30 mg    DISCONTD: oxyCODONE-acetaminophen (PERCOCET) 5-325 MG per tablet 2 tablet    albuterol sulfate HFA (PROVENTIL;VENTOLIN;PROAIR) 108 (90 Base) MCG/ACT inhaler 2 puff     Order Specific Question:   Initiate RT Bronchodilator Protocol     Answer:   Yes - Inpatient Protocol    methadone 10 MG/5ML solution 145 mg    DULoxetine (CYMBALTA) extended release capsule 60 mg    gabapentin (NEURONTIN) tablet 800 mg    ibuprofen (ADVIL;MOTRIN) tablet 800 mg    famotidine (PEPCID) tablet 20 mg    pantoprazole (PROTONIX) tablet 40 mg    ergocalciferol capsule 50,000 Units    metoprolol tartrate (LOPRESSOR) tablet 50 mg    sucralfate (CARAFATE) tablet 1 g    sodium chloride flush 0.9 % injection 5-40 mL    sodium chloride flush 0.9 % injection 5-40 mL    0.9 % sodium chloride infusion    OR Linked Order Group     ondansetron (ZOFRAN-ODT) disintegrating tablet 4 mg     ondansetron (ZOFRAN) injection 4 mg    polyethylene glycol (GLYCOLAX) packet 17 g    lactated ringers infusion    oxyCODONE (ROXICODONE) immediate release tablet 5 mg    acetaminophen (TYLENOL) tablet 1,000 mg    methocarbamol (ROBAXIN) tablet 750 mg       LABS / RADIOLOGY:     Labs Reviewed   TRAUMA PANEL - Abnormal; Notable for the following components:       Result Value    pCO2, Ananda 59.7 (*)     HCO3, Venous 32.3 (*)     Positive Base Excess, Ananda 5.3  performed without the administration of intravenous contrast. Automated exposure control, iterative reconstruction, and/or weight based adjustment of the mA/kV was utilized to reduce the radiation dose to as low as reasonably achievable.; CT of the thoracic spine was performed without the administration of intravenous contrast. Multiplanar reformatted images are provided for review. Automated exposure control, iterative reconstruction, and/or weight based adjustment of the mA/kV was utilized to reduce the radiation dose to as low as reasonably achievable.; CT of the lumbar spine was performed without the administration of intravenous contrast. Multiplanar reformatted images are provided for review.  Adjustment of mA and/or kV according to patient size was utilized. Automated exposure control, iterative reconstruction, and/or weight based adjustment of the mA/kV was utilized to reduce the radiation dose to as low as reasonably achievable. COMPARISON: None. HISTORY: ORDERING SYSTEM PROVIDED HISTORY: Trauma TECHNOLOGIST PROVIDED HISTORY: Trauma Is the patient pregnant?->No Reason for Exam: fall FINDINGS: HEAD: BRAIN/VENTRICLES: There is no acute intracranial hemorrhage, mass effect or midline shift. No abnormal extra-axial fluid collection.  The gray-white differentiation is maintained.  There is no evidence of hydrocephalus. ORBITS: The visualized portion of the orbits demonstrate no acute abnormality. SINUSES: The visualized paranasal sinuses and mastoid air cells demonstrate no acute abnormality. SOFT TISSUES/SKULL: No acute abnormality of the visualized skull or soft tissues. CERVICAL SPINE: BONES/ALIGNMENT: There is reversal cervical lordosis similar to prior.  There is 4 mm grade 1 C3-C4 anterolisthesis which has developed since the prior study. Best seen on coronal images there is vertical lucency with corticated margins through the left C3 facet, coronal series 604, image 26,27, compatible with nonacute  abnormality.       RECENT VITALS:     Temp: 98.4 °F (36.9 °C),  Pulse: 81, Respirations: 16, BP: (!) 135/92, SpO2: 97 %    This patient is a 54 y.o. Female with PMH lupus.  Coming in after multiple mechanical falls, most recently last night.  Tripped over her walker and fell face down onto the ground.  Paresthesias in bilateral hands but worse from baseline of lupus.  Midline cervical and lumbar tenderness on exam.  On pan scans there are old cervical fractures but she continues to have pain, concern for ligamentous injury.  Remains in c-collar, awaiting flex ex.  Also with acute sternal fracture and elevated troponin from baseline, no EKG changes.  Plan for admission for PT/OT, pain control.    Of note patient is also on methadone for history of opiate addiction.  She is requesting no opiates at this time unless \"absolutely necessary\".  She has received gabapentin, Flexeril, Toradol.  Will reevaluate.    ED Course as of 12/15/24 2207   Sun Dec 15, 2024   1242 XR KNEE LEFT (3 VIEWS)  IMPRESSION:  1. No fracture or dislocation.  2. Mild medial compartment osteoarthritis.   [MO]   1242 XR SHOULDER RIGHT (MIN 2 VIEWS)  IMPRESSION:  Normal x-rays of the right shoulder.   [MO]   1242 XR SHOULDER LEFT (MIN 2 VIEWS)  IMPRESSION:  No acute abnormality.      [MO]   1242 XR CHEST PORTABLE    IMPRESSION:  No radiographic evidence of acute cardiopulmonary process.      [MO]   1307 Hemoglobin Quant: 13.9 [MO]   1307 WBC: 5.8 [MO]   1307 RBC: 5.06 [MO]   1430 Patient with traumatic sternal fracture.  Trauma consult. [MO]   1442 CT CHEST ABDOMEN PELVIS W CONTRAST Additional Contrast? None  IMPRESSION:  1. Transverse fracture at the superior aspect of the sternal body with  anterior displacement of the inferior fracture fragment.  There are also mild  patchy opacities in the left upper lobe anteriorly that could represent mild  lung contusion.  2. No CT evidence of acute injury elsewhere in the chest, abdomen or pelvis.  3.

## 2024-12-15 NOTE — H&P
TRAUMA H&P/CONSULT    PATIENT NAME: Lupe Walton  YOB: 1970  MEDICAL RECORD NO. [id number]   DATE: 12/15/2024  PRIMARY CARE PHYSICIAN: Angélica Collins MD  PATIENT EVALUATED AT THE REQUEST OF : Tristian Winchester    ACTIVATION   Trauma Consult-Time at bedside 3:00 PM      IMPRESSION AND PLAN:       Diagnosis: Multiple falls at home, FFSH, -LOC, -AC,   Transverse fracture of Sternal body with anterior displacement, possible Left lung contusion   Plan:   Pan scans  Trauma labs  EKG, Trop   Maintain C-collar until cleared  Disgnosis: Grade 1 C3-C4 anterolisthesis, chronic BL limb weakness and numbness  Plan:   Flexion/Extension Cervical x-ray  F/u Imaging  Neurosurgery consult  Admit to step down      CONSULT SERVICES        HISTORY:     Chief Complaint:  \"Neck pain and weakness\"    GENERAL DATA  Patient information was obtained from patient, spouse/SO, and relative(s).  History/Exam limitations: none.  Injury Date: 12/14/2024   Approximate Injury Time: midday        Transport mode: Private Auto  Referring [facility]    SETTING OF TRAUMATIC EVENT   Location : Home  Specific Details of Location: Living Room    MECHANISM OF INJURY    Fall From standing    HISTORY:     Lupe Walton is a female that presented to the Emergency Department following multiple falls at home. Patient usually uses a walker for ambulation and fell from standing height and hit her chest area as well as back and neck. Patient says she is unsteady on her feet and falls often. Patient has a chronic history of steroid usage for her Rheumatoid and Lupus, and is also on methadone. Patient denies any significant chest pain, SOB, abdominal pain, nausea and vomiting at this time. Patient denies any LOC. Patient denies any chronic alcohol usage and history of withdrawal.   Patient is not on any oral anticoagulants.  On Chart review patient has been seen multiple times for upper limb weakness and numbness.     Traumatic loss of    Genitourinary: Negative.    Musculoskeletal:  Positive for back pain and neck pain.   Neurological:  Positive for weakness.   Psychiatric/Behavioral: Negative.       PHYSICAL EXAMINATION:     VITAL SIGNS:   Vitals:    12/15/24 1815   BP:    Pulse:    Resp:    Temp:    SpO2: 96%       Physical Exam  HENT:      Head: Normocephalic and atraumatic.      Mouth/Throat:      Mouth: Mucous membranes are moist.      Pharynx: Oropharynx is clear.   Eyes:      Extraocular Movements: Extraocular movements intact.      Conjunctiva/sclera: Conjunctivae normal.      Pupils: Pupils are equal, round, and reactive to light.   Cardiovascular:      Rate and Rhythm: Normal rate and regular rhythm.      Pulses: Normal pulses.      Heart sounds: Normal heart sounds.   Pulmonary:      Effort: Pulmonary effort is normal.      Breath sounds: Normal breath sounds.   Abdominal:      General: Bowel sounds are normal.      Palpations: Abdomen is soft.   Musculoskeletal:      Cervical back: Normal and neck supple. No tenderness.      Thoracic back: Normal.      Lumbar back: Normal.      Comments: No CTLS midline tenderness or bony step-offs. No abrasion to her anterior or posterior trunk. Patient is complaining of new onset weakness and numbness to her BL UE, distal pulses 2+ throughout   Skin:     General: Skin is warm.      Capillary Refill: Capillary refill takes less than 2 seconds.   Neurological:      Mental Status: She is alert and oriented to person, place, and time. Mental status is at baseline.      GCS: GCS eye subscore is 4. GCS verbal subscore is 5. GCS motor subscore is 6.      Sensory: Sensory deficit present.      Motor: Weakness present.      Comments: Patient has decreased sensation to touch to both UE along dermatome C6-C8. She has decreased  strength BL.      Psychiatric:         Mood and Affect: Mood normal.        CT ABD was already performed prior to the consult        RADIOLOGY  XR CERVICAL SPINE FLEXION AND

## 2024-12-15 NOTE — ED PROVIDER NOTES
[facility]     Emergency Department     Faculty Attestation    I performed a history and physical examination of the patient and discussed management with the resident. I reviewed the resident's note and agree with the documented findings and plan of care. Any areas of disagreement are noted on the chart. I was personally present for the key portions of any procedures. I have documented in the chart those procedures where I was not present during the key portions. I have reviewed the emergency nurses triage note. I agree with the chief complaint, past medical history, past surgical history, allergies, medications, social and family history as documented unless otherwise noted below. For Physician Assistant/ Nurse Practitioner cases/documentation I have personally evaluated this patient and have completed at least one if not all key elements of the E/M (history, physical exam, and MDM). Additional findings are as noted.    History of lupus and multiple falls recently, symmetrical bilateral weakness, good airway.  Vitals reviewed.     Tristian Winchester MD  12/15/24 1201       EKG Interpretation    Interpreted by emergency department physician    Rhythm: normal sinus   Rate: normal/88  Axis: normal -21  Ectopy: none  Conduction: normal  ST Segments: no acute change  T Waves: no acute change  Q Waves: Septal with poor R wave progression    Clinical Impression: Abnormal EKG    Tristian Winchester, III     Tristian Winchester MD  12/15/24 9494

## 2024-12-16 ENCOUNTER — ANESTHESIA EVENT (OUTPATIENT)
Dept: OPERATING ROOM | Age: 54
End: 2024-12-16
Payer: COMMERCIAL

## 2024-12-16 ENCOUNTER — APPOINTMENT (OUTPATIENT)
Dept: CT IMAGING | Age: 54
DRG: 552 | End: 2024-12-16
Payer: COMMERCIAL

## 2024-12-16 PROBLEM — E44.0 MODERATE MALNUTRITION (HCC): Status: ACTIVE | Noted: 2024-12-16

## 2024-12-16 LAB
ABO + RH BLD: NORMAL
ANION GAP SERPL CALCULATED.3IONS-SCNC: 11 MMOL/L (ref 9–16)
ARM BAND NUMBER: NORMAL
BACTERIA URNS QL MICRO: ABNORMAL
BASOPHILS # BLD: <0.03 K/UL (ref 0–0.2)
BASOPHILS NFR BLD: 0 % (ref 0–2)
BILIRUB UR QL STRIP: NEGATIVE
BLOOD BANK SAMPLE EXPIRATION: NORMAL
BLOOD GROUP ANTIBODIES SERPL: NEGATIVE
BUN SERPL-MCNC: 7 MG/DL (ref 6–20)
CA-I BLD-SCNC: 1.1 MMOL/L (ref 1.13–1.33)
CALCIUM SERPL-MCNC: 8.6 MG/DL (ref 8.6–10.4)
CASTS #/AREA URNS LPF: ABNORMAL /LPF (ref 0–8)
CHLORIDE SERPL-SCNC: 96 MMOL/L (ref 98–107)
CLARITY UR: CLEAR
CO2 SERPL-SCNC: 29 MMOL/L (ref 20–31)
COLOR UR: YELLOW
CREAT SERPL-MCNC: 0.7 MG/DL (ref 0.6–0.9)
EKG ATRIAL RATE: 88 BPM
EKG P AXIS: 39 DEGREES
EKG P-R INTERVAL: 144 MS
EKG Q-T INTERVAL: 400 MS
EKG QRS DURATION: 78 MS
EKG QTC CALCULATION (BAZETT): 484 MS
EKG R AXIS: -21 DEGREES
EKG T AXIS: 48 DEGREES
EKG VENTRICULAR RATE: 88 BPM
EOSINOPHIL # BLD: 0.08 K/UL (ref 0–0.44)
EOSINOPHILS RELATIVE PERCENT: 1 % (ref 1–4)
EPI CELLS #/AREA URNS HPF: ABNORMAL /HPF (ref 0–5)
ERYTHROCYTE [DISTWIDTH] IN BLOOD BY AUTOMATED COUNT: 12.7 % (ref 11.8–14.4)
GFR, ESTIMATED: >90 ML/MIN/1.73M2
GLUCOSE SERPL-MCNC: 100 MG/DL (ref 74–99)
GLUCOSE UR STRIP-MCNC: NEGATIVE MG/DL
HCT VFR BLD AUTO: 40.7 % (ref 36.3–47.1)
HGB BLD-MCNC: 12.9 G/DL (ref 11.9–15.1)
HGB UR QL STRIP.AUTO: NEGATIVE
IMM GRANULOCYTES # BLD AUTO: <0.03 K/UL (ref 0–0.3)
IMM GRANULOCYTES NFR BLD: 0 %
KETONES UR STRIP-MCNC: ABNORMAL MG/DL
LEUKOCYTE ESTERASE UR QL STRIP: NEGATIVE
LYMPHOCYTES NFR BLD: 0.73 K/UL (ref 1.1–3.7)
LYMPHOCYTES RELATIVE PERCENT: 13 % (ref 24–43)
MAGNESIUM SERPL-MCNC: 2.1 MG/DL (ref 1.6–2.6)
MCH RBC QN AUTO: 27.4 PG (ref 25.2–33.5)
MCHC RBC AUTO-ENTMCNC: 31.7 G/DL (ref 28.4–34.8)
MCV RBC AUTO: 86.6 FL (ref 82.6–102.9)
MONOCYTES NFR BLD: 0.47 K/UL (ref 0.1–1.2)
MONOCYTES NFR BLD: 9 % (ref 3–12)
NEUTROPHILS NFR BLD: 77 % (ref 36–65)
NEUTS SEG NFR BLD: 4.22 K/UL (ref 1.5–8.1)
NITRITE UR QL STRIP: NEGATIVE
NRBC BLD-RTO: 0 PER 100 WBC
PH UR STRIP: 7 [PH] (ref 5–8)
PHOSPHATE SERPL-MCNC: 4.2 MG/DL (ref 2.5–4.5)
PLATELET # BLD AUTO: 246 K/UL (ref 138–453)
PMV BLD AUTO: 9.8 FL (ref 8.1–13.5)
POTASSIUM SERPL-SCNC: 3.4 MMOL/L (ref 3.7–5.3)
PROT UR STRIP-MCNC: NEGATIVE MG/DL
RBC # BLD AUTO: 4.7 M/UL (ref 3.95–5.11)
RBC #/AREA URNS HPF: ABNORMAL /HPF (ref 0–4)
SODIUM SERPL-SCNC: 136 MMOL/L (ref 136–145)
SP GR UR STRIP: 1.01 (ref 1–1.03)
TROPONIN I SERPL HS-MCNC: 12 NG/L (ref 0–14)
TROPONIN I SERPL HS-MCNC: 13 NG/L (ref 0–14)
UROBILINOGEN UR STRIP-ACNC: NORMAL EU/DL (ref 0–1)
WBC #/AREA URNS HPF: ABNORMAL /HPF (ref 0–5)
WBC OTHER # BLD: 5.5 K/UL (ref 3.5–11.3)

## 2024-12-16 PROCEDURE — 76376 3D RENDER W/INTRP POSTPROCES: CPT

## 2024-12-16 PROCEDURE — 6360000002 HC RX W HCPCS

## 2024-12-16 PROCEDURE — 82330 ASSAY OF CALCIUM: CPT

## 2024-12-16 PROCEDURE — 86900 BLOOD TYPING SEROLOGIC ABO: CPT

## 2024-12-16 PROCEDURE — 84484 ASSAY OF TROPONIN QUANT: CPT

## 2024-12-16 PROCEDURE — 83735 ASSAY OF MAGNESIUM: CPT

## 2024-12-16 PROCEDURE — 85025 COMPLETE CBC W/AUTO DIFF WBC: CPT

## 2024-12-16 PROCEDURE — 2500000003 HC RX 250 WO HCPCS

## 2024-12-16 PROCEDURE — 6370000000 HC RX 637 (ALT 250 FOR IP)

## 2024-12-16 PROCEDURE — 2000000000 HC ICU R&B

## 2024-12-16 PROCEDURE — 81001 URINALYSIS AUTO W/SCOPE: CPT

## 2024-12-16 PROCEDURE — 80048 BASIC METABOLIC PNL TOTAL CA: CPT

## 2024-12-16 PROCEDURE — 99232 SBSQ HOSP IP/OBS MODERATE 35: CPT | Performed by: SURGERY

## 2024-12-16 PROCEDURE — 86901 BLOOD TYPING SEROLOGIC RH(D): CPT

## 2024-12-16 PROCEDURE — 03HY32Z INSERTION OF MONITORING DEVICE INTO UPPER ARTERY, PERCUTANEOUS APPROACH: ICD-10-PCS | Performed by: SURGERY

## 2024-12-16 PROCEDURE — 84100 ASSAY OF PHOSPHORUS: CPT

## 2024-12-16 PROCEDURE — 2580000003 HC RX 258: Performed by: NURSE PRACTITIONER

## 2024-12-16 PROCEDURE — 2700000000 HC OXYGEN THERAPY PER DAY

## 2024-12-16 PROCEDURE — 94761 N-INVAS EAR/PLS OXIMETRY MLT: CPT

## 2024-12-16 PROCEDURE — 86850 RBC ANTIBODY SCREEN: CPT

## 2024-12-16 PROCEDURE — 93010 ELECTROCARDIOGRAM REPORT: CPT | Performed by: INTERNAL MEDICINE

## 2024-12-16 PROCEDURE — 2580000003 HC RX 258

## 2024-12-16 RX ORDER — SODIUM CHLORIDE, SODIUM LACTATE, POTASSIUM CHLORIDE, CALCIUM CHLORIDE 600; 310; 30; 20 MG/100ML; MG/100ML; MG/100ML; MG/100ML
INJECTION, SOLUTION INTRAVENOUS CONTINUOUS
Status: DISCONTINUED | OUTPATIENT
Start: 2024-12-17 | End: 2024-12-18

## 2024-12-16 RX ORDER — SODIUM CHLORIDE, SODIUM LACTATE, POTASSIUM CHLORIDE, CALCIUM CHLORIDE 600; 310; 30; 20 MG/100ML; MG/100ML; MG/100ML; MG/100ML
INJECTION, SOLUTION INTRAVENOUS CONTINUOUS
Status: DISCONTINUED | OUTPATIENT
Start: 2024-12-17 | End: 2024-12-16

## 2024-12-16 RX ORDER — HYDRALAZINE HYDROCHLORIDE 20 MG/ML
INJECTION INTRAMUSCULAR; INTRAVENOUS
Status: COMPLETED
Start: 2024-12-16 | End: 2024-12-16

## 2024-12-16 RX ORDER — METHADONE HYDROCHLORIDE 10 MG/ML
50 CONCENTRATE ORAL EVERY 8 HOURS
Status: DISCONTINUED | OUTPATIENT
Start: 2024-12-17 | End: 2024-12-24 | Stop reason: HOSPADM

## 2024-12-16 RX ORDER — MELOXICAM 15 MG/1
15 TABLET ORAL DAILY
Status: ON HOLD | COMMUNITY
End: 2024-12-23 | Stop reason: HOSPADM

## 2024-12-16 RX ORDER — LABETALOL HYDROCHLORIDE 5 MG/ML
5 INJECTION, SOLUTION INTRAVENOUS EVERY 6 HOURS PRN
Status: DISCONTINUED | OUTPATIENT
Start: 2024-12-16 | End: 2024-12-18

## 2024-12-16 RX ORDER — POTASSIUM CHLORIDE 1500 MG/1
40 TABLET, EXTENDED RELEASE ORAL ONCE
Status: COMPLETED | OUTPATIENT
Start: 2024-12-16 | End: 2024-12-16

## 2024-12-16 RX ORDER — FENTANYL CITRATE 50 UG/ML
25 INJECTION, SOLUTION INTRAMUSCULAR; INTRAVENOUS ONCE
Status: COMPLETED | OUTPATIENT
Start: 2024-12-16 | End: 2024-12-16

## 2024-12-16 RX ORDER — CALCIUM GLUCONATE 20 MG/ML
2000 INJECTION, SOLUTION INTRAVENOUS ONCE
Status: COMPLETED | OUTPATIENT
Start: 2024-12-16 | End: 2024-12-16

## 2024-12-16 RX ORDER — SCOLOPAMINE TRANSDERMAL SYSTEM 1 MG/1
1 PATCH, EXTENDED RELEASE TRANSDERMAL
Status: DISCONTINUED | OUTPATIENT
Start: 2024-12-16 | End: 2024-12-20

## 2024-12-16 RX ORDER — BUTALBITAL, ACETAMINOPHEN AND CAFFEINE 50; 325; 40 MG/1; MG/1; MG/1
1 TABLET ORAL ONCE
Status: COMPLETED | OUTPATIENT
Start: 2024-12-16 | End: 2024-12-16

## 2024-12-16 RX ORDER — DIAZEPAM 10 MG/1
10 TABLET ORAL EVERY 6 HOURS PRN
Status: ON HOLD | COMMUNITY
End: 2024-12-23 | Stop reason: HOSPADM

## 2024-12-16 RX ORDER — FENTANYL CITRATE 50 UG/ML
INJECTION, SOLUTION INTRAMUSCULAR; INTRAVENOUS
Status: COMPLETED
Start: 2024-12-16 | End: 2024-12-16

## 2024-12-16 RX ORDER — HYDRALAZINE HYDROCHLORIDE 20 MG/ML
5 INJECTION INTRAMUSCULAR; INTRAVENOUS EVERY 6 HOURS PRN
Status: DISCONTINUED | OUTPATIENT
Start: 2024-12-16 | End: 2024-12-18

## 2024-12-16 RX ADMIN — SODIUM CHLORIDE, PRESERVATIVE FREE 10 ML: 5 INJECTION INTRAVENOUS at 21:17

## 2024-12-16 RX ADMIN — BUTALBITAL, ACETAMINOPHEN, AND CAFFEINE 1 TABLET: 50; 325; 40 TABLET ORAL at 07:56

## 2024-12-16 RX ADMIN — METHADONE HYDROCHLORIDE 145 MG: 10 CONCENTRATE ORAL at 08:32

## 2024-12-16 RX ADMIN — SUCRALFATE 1 G: 1 TABLET ORAL at 11:11

## 2024-12-16 RX ADMIN — FENTANYL CITRATE 25 MCG: 50 INJECTION, SOLUTION INTRAMUSCULAR; INTRAVENOUS at 01:20

## 2024-12-16 RX ADMIN — METHOCARBAMOL 750 MG: 750 TABLET ORAL at 01:09

## 2024-12-16 RX ADMIN — DULOXETINE HYDROCHLORIDE 60 MG: 30 CAPSULE, DELAYED RELEASE ORAL at 07:56

## 2024-12-16 RX ADMIN — LABETALOL HYDROCHLORIDE 5 MG: 5 INJECTION, SOLUTION INTRAVENOUS at 04:18

## 2024-12-16 RX ADMIN — CALCIUM GLUCONATE 2000 MG: 20 INJECTION, SOLUTION INTRAVENOUS at 08:02

## 2024-12-16 RX ADMIN — OXYCODONE 5 MG: 5 TABLET ORAL at 04:49

## 2024-12-16 RX ADMIN — SUCRALFATE 1 G: 1 TABLET ORAL at 06:57

## 2024-12-16 RX ADMIN — FAMOTIDINE 20 MG: 20 TABLET, FILM COATED ORAL at 21:17

## 2024-12-16 RX ADMIN — PANTOPRAZOLE SODIUM 40 MG: 40 TABLET, DELAYED RELEASE ORAL at 16:53

## 2024-12-16 RX ADMIN — Medication 0.2 MG/KG/HR: at 07:52

## 2024-12-16 RX ADMIN — IBUPROFEN 800 MG: 400 TABLET, FILM COATED ORAL at 05:04

## 2024-12-16 RX ADMIN — METHOCARBAMOL 750 MG: 750 TABLET ORAL at 21:17

## 2024-12-16 RX ADMIN — SUCRALFATE 1 G: 1 TABLET ORAL at 16:53

## 2024-12-16 RX ADMIN — ONDANSETRON 4 MG: 2 INJECTION INTRAMUSCULAR; INTRAVENOUS at 04:17

## 2024-12-16 RX ADMIN — SODIUM CHLORIDE: 9 INJECTION, SOLUTION INTRAVENOUS at 07:49

## 2024-12-16 RX ADMIN — GABAPENTIN 800 MG: 800 TABLET ORAL at 08:32

## 2024-12-16 RX ADMIN — METHOCARBAMOL 750 MG: 750 TABLET ORAL at 12:50

## 2024-12-16 RX ADMIN — HYDRALAZINE HYDROCHLORIDE 5 MG: 20 INJECTION INTRAMUSCULAR; INTRAVENOUS at 09:04

## 2024-12-16 RX ADMIN — POTASSIUM CHLORIDE 40 MEQ: 1500 TABLET, EXTENDED RELEASE ORAL at 07:56

## 2024-12-16 RX ADMIN — SUCRALFATE 1 G: 1 TABLET ORAL at 21:17

## 2024-12-16 RX ADMIN — HYDRALAZINE HYDROCHLORIDE 5 MG: 20 INJECTION INTRAMUSCULAR; INTRAVENOUS at 03:23

## 2024-12-16 RX ADMIN — POLYETHYLENE GLYCOL 3350 17 G: 17 POWDER, FOR SOLUTION ORAL at 07:42

## 2024-12-16 RX ADMIN — Medication 5 MCG/MIN: at 11:10

## 2024-12-16 RX ADMIN — ACETAMINOPHEN 1000 MG: 500 TABLET ORAL at 15:07

## 2024-12-16 RX ADMIN — OXYCODONE 5 MG: 5 TABLET ORAL at 21:39

## 2024-12-16 RX ADMIN — METHOCARBAMOL 750 MG: 750 TABLET ORAL at 06:57

## 2024-12-16 RX ADMIN — ONDANSETRON 4 MG: 2 INJECTION INTRAMUSCULAR; INTRAVENOUS at 21:39

## 2024-12-16 RX ADMIN — GABAPENTIN 800 MG: 800 TABLET ORAL at 21:17

## 2024-12-16 RX ADMIN — FENTANYL CITRATE 25 MCG: 50 INJECTION INTRAMUSCULAR; INTRAVENOUS at 01:20

## 2024-12-16 RX ADMIN — PANTOPRAZOLE SODIUM 40 MG: 40 TABLET, DELAYED RELEASE ORAL at 05:55

## 2024-12-16 RX ADMIN — ACETAMINOPHEN 1000 MG: 500 TABLET ORAL at 06:57

## 2024-12-16 RX ADMIN — GABAPENTIN 800 MG: 800 TABLET ORAL at 15:07

## 2024-12-16 RX ADMIN — METOPROLOL TARTRATE 50 MG: 50 TABLET, FILM COATED ORAL at 07:57

## 2024-12-16 RX ADMIN — SODIUM CHLORIDE, POTASSIUM CHLORIDE, SODIUM LACTATE AND CALCIUM CHLORIDE: 600; 310; 30; 20 INJECTION, SOLUTION INTRAVENOUS at 23:55

## 2024-12-16 ASSESSMENT — PAIN DESCRIPTION - FREQUENCY: FREQUENCY: CONTINUOUS

## 2024-12-16 ASSESSMENT — PAIN DESCRIPTION - PAIN TYPE
TYPE: ACUTE PAIN
TYPE: ACUTE PAIN;CHRONIC PAIN

## 2024-12-16 ASSESSMENT — PAIN DESCRIPTION - ORIENTATION
ORIENTATION: MID;POSTERIOR

## 2024-12-16 ASSESSMENT — PAIN SCALES - GENERAL
PAINLEVEL_OUTOF10: 9
PAINLEVEL_OUTOF10: 10
PAINLEVEL_OUTOF10: 8
PAINLEVEL_OUTOF10: 8
PAINLEVEL_OUTOF10: 10

## 2024-12-16 ASSESSMENT — PAIN DESCRIPTION - DESCRIPTORS
DESCRIPTORS: CRAMPING;SHOOTING
DESCRIPTORS: SHOOTING;SPASM;SHARP
DESCRIPTORS: SPASM;SHARP

## 2024-12-16 ASSESSMENT — PAIN - FUNCTIONAL ASSESSMENT
PAIN_FUNCTIONAL_ASSESSMENT: PREVENTS OR INTERFERES SOME ACTIVE ACTIVITIES AND ADLS
PAIN_FUNCTIONAL_ASSESSMENT: PREVENTS OR INTERFERES SOME ACTIVE ACTIVITIES AND ADLS

## 2024-12-16 ASSESSMENT — LIFESTYLE VARIABLES: SMOKING_STATUS: 1

## 2024-12-16 NOTE — CARE COORDINATION
Trauma Coordinator Rounding Note  Daily check in:  I met with Lupe Walton  at bedside to review their plan of care. I allowed opportunity for Lupe Walton to ask questions regarding their injuries, expected length of stay and disposition plan. All questions answered to verbal satisfaction.   []Wounds  []PT/OT/speech  [x]Incentive spirometer  [x]Diet (currently regular, NPO @ [facility] for surgery tomorrow)  [x]Activity (bedrest, elevate HOB)  [x]Preferred pharmacy ([facility])  [x] PCP Confirmed (Angélica Collins)  [x] Insurance Confirmed (Medicaid)    I spoke with Trauma team for all clinical updates. CM requested DME order for rollator which was ordered.   DC Expectation:  Patient expects to be discharged to  [facility] at this time.   Discharge Info:  I confirmed follow up information was placed in the discharge instructions for Neurosurgery and Trauma surgery .   Discharge instructions reviewed and updated in patient's chart.   :  I provided a clinical update to the unit  and confirmed the disposition plan. Current barriers to discharge include: not medically stable for discharge due to Planned Surgical procedure. Dispo depends on pt progress.   PIC Score  IS Goal Volume (15ml/kg IBW): 924   Pain  Patient reported 1-10 scale Inspiration  Incentive Spirometer    Volume obtained: 1000 ml Cough  Assessed by nurse     3  Mild  Pain intensity scale 0-4    [x] 4  Above goal volume  []   3  Strong    []    3  Goal to alert volume  [x]    2  Moderate  Pain intensity scale 5-7  [] 2  Below alert volume    [] 2   Weak    [x]   1  Severe   Pain intensity scale 8-10  [] 1  Unable to perform incentive spirometry    [] 1  Absent      []         Total: 8        Electronically signed by Yin Wheeler RN on 12/16/2024 at 12:16 PM

## 2024-12-16 NOTE — PROGRESS NOTES
0100: pt to CAR 1. RN received hand off by Doris KING.     0130: Trauma team at bedside to insert arterial line. RN notified trauma team of elevated MAPs of 130s. Trauma team stated for RN to reach out to neurosurgery team for further orders.     0209: Writer reached out to neurosurgery team notifying them about holding levophed and requesting interventions for elevated MAPs. No new orders at this time.     0239: Writer reached out to Trauma team, Dr. Denny, about pt MAP sustaining in the 130s. Also, writer requested a nicotine patch for pt hx of smoking. Writer also stated pt hx of caffeine addiction and if there was anything they can order. PRN hydralazine and labetalol ordered. Per Dr. Denny, caffeine and nicotine patches will be held until neurosurgery procedure.      0502: Writer messaged Dr. Denny of administering the prn hydralazine and labetalol with MAPs still sustaining in the 130s. See MAR for details. Writer also stated how pt was anxious and wanted to avoid narcotics for pain management. No new orders at this time.     0557: Writer reached out to Dr. Denny about MAPs maintaining in the 130s. Per Dr. Denny, he is signing out to day team and they will decide. No new orders at this time.      0611: Writer reached out to trauma day team resident Dr. Hamilton about MAPs maintaining in the 130s and her uncontrolled pain. Writer stated that prn fentanyl, hydralazine, labetalol, and oxycodone had been given overnight without any effect on the pt BP. See MAR for details. No new orders at this time.     Electronically signed by VIVIEN DANIELS RN on 12/16/24 at 7:53 AM EST

## 2024-12-16 NOTE — PLAN OF CARE
Problem: Pain  Goal: Verbalizes/displays adequate comfort level or baseline comfort level  12/16/2024 0225 by Yin Smallwood RN  Outcome: Progressing  12/16/2024 0150 by Doris Cosme RN  Outcome: Progressing     Problem: Safety - Adult  Goal: Free from fall injury  12/16/2024 0225 by Yin Smallwood RN  Outcome: Progressing  12/16/2024 0150 by Doris Cosme RN  Outcome: Progressing     Problem: Discharge Planning  Goal: Discharge to home or other facility with appropriate resources  12/16/2024 0225 by Yin Smallwood RN  Outcome: Progressing  12/16/2024 0150 by Doris Cosme RN  Outcome: Progressing     Problem: Skin/Tissue Integrity  Goal: Absence of new skin breakdown  Description: 1.  Monitor for areas of redness and/or skin breakdown  2.  Assess vascular access sites hourly  3.  Every 4-6 hours minimum:  Change oxygen saturation probe site  4.  Every 4-6 hours:  If on nasal continuous positive airway pressure, respiratory therapy assess nares and determine need for appliance change or resting period.  Outcome: Progressing     Problem: ABCDS Injury Assessment  Goal: Absence of physical injury  Outcome: Progressing

## 2024-12-16 NOTE — PROGRESS NOTES
Occupational Therapy    [facility]  Occupational Therapy Not Seen Note    DATE: 2024    NAME: Lupe Walton  MRN: [id number]   : 1970      Patient not seen this date for Occupational Therapy due to:    Surgery/Procedure: OR tomorrow with neurosurgery. OT will attempt evaluation post op    Next Scheduled Treatment: 2024    Electronically signed by EMILY Butler on 2024 at 8:23 AM

## 2024-12-16 NOTE — DISCHARGE INSTRUCTIONS
12/16/24    Lupe Walton    A CT scan was performed during your stay in the hospital.  A radiologist reviewed these images and has encountered a finding that may be important to you and your primary care provider.      The findings of your CT scan that may require further testing include:  Left renal pole cyst. The radiologist does not recommend any further imaging.     Lupe Hamilton, DO

## 2024-12-16 NOTE — PLAN OF CARE
Problem: Pain  Goal: Verbalizes/displays adequate comfort level or baseline comfort level  12/16/2024 1813 by Ivonne Bonilla RN  Outcome: Progressing  12/16/2024 0951 by Brigida Montiel RN  Outcome: Progressing  Flowsheets (Taken 12/16/2024 0800)  Verbalizes/displays adequate comfort level or baseline comfort level:   Encourage patient to monitor pain and request assistance   Assess pain using appropriate pain scale     Problem: Safety - Adult  Goal: Free from fall injury  12/16/2024 1813 by Ivonne Bonilla RN  Outcome: Progressing  Flowsheets (Taken 12/16/2024 0952 by Brigida Montiel RN)  Free From Fall Injury: Instruct family/caregiver on patient safety  12/16/2024 0951 by Brigida Montiel RN  Outcome: Progressing     Problem: Discharge Planning  Goal: Discharge to home or other facility with appropriate resources  12/16/2024 1813 by Ivonne Bonilla RN  Outcome: Progressing  Flowsheets (Taken 12/16/2024 1230)  Discharge to home or other facility with appropriate resources: Identify barriers to discharge with patient and caregiver  12/16/2024 0951 by Brigida Montiel RN  Outcome: Progressing  Flowsheets (Taken 12/16/2024 0800)  Discharge to home or other facility with appropriate resources:   Identify barriers to discharge with patient and caregiver   Arrange for needed discharge resources and transportation as appropriate     Problem: Skin/Tissue Integrity  Goal: Absence of new skin breakdown  Description: 1.  Monitor for areas of redness and/or skin breakdown  2.  Assess vascular access sites hourly  3.  Every 4-6 hours minimum:  Change oxygen saturation probe site  4.  Every 4-6 hours:  If on nasal continuous positive airway pressure, respiratory therapy assess nares and determine need for appliance change or resting period.  12/16/2024 1813 by Ivonne Bonilla RN  Outcome: Progressing  12/16/2024 0951 by Brigida Montiel RN  Outcome: Progressing     Problem: ABCDS Injury Assessment  Goal:

## 2024-12-16 NOTE — CARE COORDINATION
Case Management Assessment  Initial Evaluation    Date/Time of Evaluation: 12/16/2024 10:02 AM  Assessment Completed by: Trevor Bullard RN    If patient is discharged prior to next notation, then this note serves as note for discharge by case management.    Patient Name: Lupe Walton                   YOB: 1970  Diagnosis: Closed fracture of manubrium, initial encounter [S22.21XA]  Fall from standing, initial encounter [W19.XXXA]                   Date / Time: 12/15/2024 11:21 AM    Patient Admission Status: Inpatient   Readmission Risk (Low < 19, Mod (19-27), High > 27): Readmission Risk Score: 12.5    Current PCP: Angélica Collins MD  PCP verified by CM? (P) Yes    Chart Reviewed: Yes      History Provided by: (P) Patient  Patient Orientation: (P) Alert and Oriented    Patient Cognition: (P) Alert    Hospitalization in the last 30 days (Readmission):  No    If yes, Readmission Assessment in  Navigator will be completed.    Advance Directives:      Code Status: Full Code   Patient's Primary Decision Maker is:      Primary Decision Maker (Active): Cayden Walton - Spouse - [phone]    Discharge Planning:    Patient lives with: (P) Spouse/Significant Other, Children Type of Home: (P) Acute Rehab, Skilled Nursing Facility, Other (Comment) (Possible HC if able)  Primary Care Giver: (P) Self  Patient Support Systems include: (P) Spouse/Significant Other, Children   Current Financial resources: (P) Medicaid  Current community resources:    Current services prior to admission: (P) None            Current DME:              Type of Home Care services:  (P) OT, PT, Nursing Services, Skilled Therapy, Aide Services    ADLS  Prior functional level: (P) Independent in ADLs/IADLs  Current functional level: (P) Assistance with the following:    PT AM-PAC:   /24  OT AM-PAC:   /24    Family can provide assistance at DC: (P) Yes  Would you like Case Management to discuss the discharge plan with any other  family members/significant others, and if so, who? (P) Yes (Spouse, Adult children)  Plans to Return to Present Housing: (P) Yes  Other Identified Issues/Barriers to RETURNING to current housing: Steps  Potential Assistance needed at discharge: (P) Durable Medical Equipment, Skilled Nursing Facility, Home Care            Potential DME: (P) Other (Comment) (Rollator)  Patient expects to discharge to: (P) Skilled nursing facility  Plan for transportation at discharge:      Financial    Payor: [employer] / Plan: [employer] [place] [employer] / Product Type: *No Product type* /     Does insurance require precert for SNF: Yes    Potential assistance Purchasing Medications: (P) No  Meds-to-Beds request: Yes      [id number] - [place], [place] - [address] - P [phone] - F [phone]  [address] [place] [id number]  Phone: [phone] Fax: [phone]      Notes:    Factors facilitating achievement of predicted outcomes: Family support, Caregiver support, Motivated, Cooperative, and Pleasant    Barriers to discharge: Pain, Decreased endurance, and Upper extremity weakness    Additional Case Management Notes: Spoke with patient, explained role. Copy of CM information given. Reviewed address, phone, PCP and insurance with patient. Monitor for discharge needs, patient agreeable to whatever therapy outlet she may need for safety. KOFFI WALLER for Rollator order with [facility].    The Plan for Transition of Care is related to the following treatment goals of Closed fracture of manubrium, initial encounter [S22.21XA]  Fall from standing, initial encounter [W19.XXXA]    IF APPLICABLE: The Patient and/or patient representative Lupe and her family were provided with a choice of provider and agrees with the discharge plan. Freedom of choice list with basic dialogue that supports the patient's individualized plan of care/goals and shares the quality data associated with the providers was provided to: (P) Patient    Patient Representative Name:       The Patient and/or Patient Representative Agree with the Discharge Plan? (P) Yes    Trevor Bullard RN  Case Management Department  Ph: [phone]

## 2024-12-16 NOTE — ED NOTES
ED to inpatient nurses report      Chief Complaint:  Chief Complaint   Patient presents with    Fall     Present to ED from: home    MOA:     LOC: alert and orientated to name, place, date  Mobility: Requires assistance * 1  Oxygen Baseline: room air     Current needs required: room air   Pending ED orders: matheus  Present condition: stable    Why did the patient come to the ED? falls  What is the plan? admit  Any procedures or intervention occur? none  Any safety concerns??    Mental Status:  Level of Consciousness: Alert (0)    Psych Assessment:   Psychosocial  Psychosocial (WDL): Within Defined Limits  Vital signs   Vitals:    12/15/24 1513 12/15/24 1628 12/15/24 1700 12/15/24 1815   BP:  (!) 171/155 (!) 164/102    Pulse:       Resp:       Temp:       TempSrc:       SpO2: 95% 90% 92% 96%   Weight:       Height:            Vitals:  Patient Vitals for the past 24 hrs:   BP Temp Temp src Pulse Resp SpO2 Height Weight   12/15/24 1815 -- -- -- -- -- 96 % -- --   12/15/24 1700 (!) 164/102 -- -- -- -- 92 % -- --   12/15/24 1628 (!) 171/155 -- -- -- -- 90 % -- --   12/15/24 1513 -- -- -- -- -- 95 % -- --   12/15/24 1123 (!) 179/118 98.4 °F (36.9 °C) Oral 81 16 97 % 1.702 m (5' 7\") 59 kg (130 lb)      Visit Vitals  BP (!) 164/102   Pulse 81   Temp 98.4 °F (36.9 °C) (Oral)   Resp 16   Ht 1.702 m (5' 7\")   Wt 59 kg (130 lb)   SpO2 96%   BMI 20.36 kg/m²        LDAs:   Peripheral IV 12/15/24 Right Forearm (Active)       Ambulatory Status:  Presents to emergency department  because of falls (Syncope, seizure, or loss of consciousness): No, Age > 70: No, Altered Mental Status, Intoxication with alcohol or substance confusion (Disorientation, impaired judgment, poor safety awaremess, or inability to follow instructions): No, Impaired Mobility: Ambulates or transfers with assistive devices or assistance; Unable to ambulate or transer.: Yes, Nursing Judgement: Yes    Diagnosis:  DISPOSITION Admitted 12/15/2024 06:50:43 PM   Final  MD at [facility] OR    LAPAROTOMY N/A 03/11/2021    LAPAROTOMY EXPLORATORY, ABDOMINAL WASH OUT, FASCIAL CLOSURE  performed by Garcia Mcmullen MD at [facility] OR    NERVE BLOCK Right 07/27/2015    right lumbar JHONY    NOSE SURGERY  1989    After nose was broken. Dr. Robertson    OVARIAN CYST REMOVAL Left 2007    Dr. Duglas HERMOSILLO AND BSO (CERVIX REMOVED)  2007    Dr. Ardon    TUBAL LIGATION  1997    Dr. Smart    UPPER GASTROINTESTINAL ENDOSCOPY N/A 8/19/2021    EGD BIOPSY performed by Patricia Walter MD at [facility]    UPPER GASTROINTESTINAL ENDOSCOPY N/A 1/24/2024    EGD BIOPSY performed by Patricia Walter MD at [facility]       PAST MEDICAL HISTORY       Past Medical History:   Diagnosis Date    Acute gastric ulcer with perforation (HCC)     hx. of    Asthma     Connective tissue disease, undifferentiated (HCC) 1987    Dr. Kendrick ([facility].)    DDD (degenerative disc disease) 2000    Dr. Álvarez    Depression     Lumbar radicular pain     Migraine 1988    Dr. Gagnon    Mitral valve prolapse 1978    Dr. Washburn    Opiate addiction (HCC) 01/02/2013    Dr. Babb / on 10/3/17 pt states she completed tx 2016       Labs:  Labs Reviewed   TRAUMA PANEL - Abnormal; Notable for the following components:       Result Value    pCO2, Ananda 59.7 (*)     HCO3, Venous 32.3 (*)     Positive Base Excess, Ananda 5.3 (*)     All other components within normal limits   MYOGLOBIN, BLOOD - Abnormal; Notable for the following components:    Myoglobin 81 (*)     All other components within normal limits   TROPONIN - Abnormal; Notable for the following components:    Troponin, High Sensitivity 15 (*)     All other components within normal limits   CK       Electronically signed by Clarita Dacosta RN on 12/15/2024 at 7:10 PM

## 2024-12-16 NOTE — PROGRESS NOTES
Pt transferred via bed with portable monitor accompanied by RN & PCT to room 1010. Pt aware of why she's being transferred to SICU. C-Spine precautions maintained with Aspen collar in place. All personal belongings sent with pt. Her home medications are in her black bag. After Arriving to room & doing bedside handoff, & transferring her into the ICU bed, I notified her  Cayden, & informed him the room number. Pt also aware that I spoke to her  & he plans on coming to hospital after he gets off work, before 3 pm.

## 2024-12-16 NOTE — PROGRESS NOTES
12/16/24 0951   Care Plan - Respiratory Goals   Achieves optimal ventilation and oxygenation (S)  Assess for changes in respiratory status;Assess for changes in mentation and behavior;Position to facilitate oxygenation and minimize respiratory effort;Oxygen supplementation based on oxygen saturation or arterial blood gases;Initiate smoking cessation protocol as indicated;Assess the need for suctioning and aspirate as needed;Assess and instruct to report shortness of breath or any respiratory difficulty;Respiratory therapy support as indicated

## 2024-12-16 NOTE — PLAN OF CARE
Rounded with Dr. Downs    Motor:  Bilateral deltoids 2/5  Biceps/triceps 3/5  Wrist extension 2/5  Intrinsics 3/5  Hip flexion 3/5  Knee extension 3/5  DF/PF 4/5  EHL 3/5    Maintain MAP > 85  Plan for OR tomorrow  NPO at midnight  Will need adjustment of pain control due to patient being on methadone  Trauma cleared as a low risk for surgery    Electronically signed by ARBAHAM Avilez CNP on 12/16/2024 at 8:09 AM

## 2024-12-16 NOTE — PROGRESS NOTES
Spiritual Health History and Assessment/Progress Note  [facility]    Loneliness/Social Isolation, Trauma,    Name: Lupe Walton MRN: [id number]    Age: 54 y.o.     Sex: female   Language: English   Taoist: Pentecostal   Fall from standing, initial encounter     Date: 12/16/2024            Total Time Calculated: 20 min              Spiritual Assessment continued in [facility]- SICU        Referral/Consult From: Multi-disciplinary team (Spiritual Care Consult; Referral List)   Encounter Overview/Reason: Loneliness/Social Isolation  Service Provided For: Patient    Narrative:  visited patient per Spiritual Care Consult for \"distress\" and per referral list for \"loneliness.\"  introduced herself at bedside and learned about patient's reason for arrival. Per patient, she had been diagnosed with a \"combination of arthritis and lupus\" at [age] years old. Patient had a recent fall and feels that she \"broke something in her neck,\" as everything below her shoulders is numb. Patient also complains of pain in her neck and shoulders. Patient was tearful and expressed feelings of overwhelm and fear throughout encounter. Patient also shared about the death of her father two years ago in January. Patient has support in her spouse, children and grandchildren.  provided an active listening presence, support and offered prayer at bedside.     Melissa, Belief, Meaning:   Patient has beliefs or practices that help with coping during difficult times  Family/Friends No family/friends present      Importance and Influence:  Patient has spiritual/personal beliefs that influence decisions regarding their health  Family/Friends No family/friends present    Community:  Patient feels well-supported. Support system includes: Spouse/Partner, Children, and Extended family  Family/Friends No family/friends present    Assessment and Plan of Care:     Patient Interventions include: Facilitated expression of

## 2024-12-16 NOTE — H&P
ICU HISTORY & PHYSICAL        PATIENT NAME: Lupe Walton  MEDICAL RECORD NO. [id number]  DATE: 12/16/2024    HD: # 1      ACUTE DIAGNOSES/PLAN  Neuro:  GCS 15  Unstable C3-C4 injury with concern for central cord  Neurosurgery consulted  C-spine precautions  MAP pushes to maintain 85-95  Hold all antiplatelets/anticoagulants  OR 12/17  H/o opiate dependence  Methadone 145mg QD  Pain control: Tylenol 1g q8 hrs, Robaxin 750mg, Roxicodone 5mg q6 hrs, Ketamine gtt  Home meds: Ibuprofen 800mg q8 hrs, Gabapentin 800mg TID, Duloxetine 60mg QD  CV  Hypertensive without vasopressor support  Troponin 13 (15)  Home med: Lopressor 25mg BID  Pulm  Superior sternal body fracture with displacement with possible pulmonary contusion  PIC 6, IS 500cc  HFNC  40%/40L/min  Encourage incentive spirometry  Pain control  GI/Nutrition  H/o gastric ulcer perforation  Home meds: Pepcid QHS, Protonix BID  Diet: regular diet, NPO at midnight  Renal/lytes  Monitor UOP  1.3cc/kg/hr + unmeasured  Daily BMP  Na/K 136/3.4  BUN/Cr 7/0.7  Mg/Phos 2.1/ pending this AM  Heme  Hgb 12.9 (13.9), Platelets 246 (272)  DVT prophylaxis-holding  Endocrine  Blood sugar 100, has not required coverage  Musculoskeletal  PT/OT when cleared by neurosurgery  Skin  Monitor for changes  Micro  Tmax 37.4C  WBC 5.5 (5.8)  UA pending  Family/dispo  Continue ICU care  Lines  PIV x 2 (12/15, 12/16)  External catheter (12/16)  L radial art line (12/16)     CHECKLIST    CAM-ICU RASS: 0  RESTRAINTS: none  IVF: none  NUTRITION: regular diet  ANTIBIOTICS: none  GI: Protonix, Pepcid  DVT: Holding  GLYCEMIC CONTROL: has not required  HOB >45: yes  MOBILITY: bedrest, spinal precautions  SBT: n/a  IS: 500cc  Wound care: n/a    Chief Complaint: \"my neck hurts\"    SUBJECTIVE    Lupe Walton  is a 53 yo female that presented to the Emergency Department following multiple falls at home. Patient usually uses a walker for ambulation and fell from standing height and hit her chest area  changes most pronounced at C2-C3 and C3-C4 on the left where there are signs of nonacute C3 facet and C4 lateral mass nondisplaced fractures as noted above. There is grade 1 C3-C4 anterolisthesis which has developed since the prior study of 2022.  If there is concern for cervical instability flexion/extension radiographs may be obtained. CT THORACIC SPINE: No acute osseous abnormality. CT LUMBAR SPINE: No acute osseous abnormality. Grade 1 L5-S1 spondylolisthesis degenerative changes at this level.     XR KNEE LEFT (3 VIEWS)    Result Date: 12/15/2024  1. No fracture or dislocation. 2. Mild medial compartment osteoarthritis.     XR SHOULDER RIGHT (MIN 2 VIEWS)    Result Date: 12/15/2024  Normal x-rays of the right shoulder.     XR CHEST PORTABLE    Result Date: 12/15/2024  No radiographic evidence of acute cardiopulmonary process.     XR SHOULDER LEFT (MIN 2 VIEWS)    Result Date: 12/15/2024  No acute abnormality.            Lupe Hamilton DO  12/16/2024, 7:09 AM

## 2024-12-16 NOTE — PLAN OF CARE
Problem: Pain  Goal: Verbalizes/displays adequate comfort level or baseline comfort level  12/16/2024 0951 by Brigida Montiel RN  Outcome: Progressing  Flowsheets (Taken 12/16/2024 0800)  Verbalizes/displays adequate comfort level or baseline comfort level:   Encourage patient to monitor pain and request assistance   Assess pain using appropriate pain scale  12/16/2024 0225 by Yin Smallwood RN  Outcome: Progressing  12/16/2024 0150 by Doris Cosme RN  Outcome: Progressing     Problem: Safety - Adult  Goal: Free from fall injury  12/16/2024 0951 by Brigida Montiel RN  Outcome: Progressing  12/16/2024 0225 by Yin Smallwood RN  Outcome: Progressing  12/16/2024 0150 by Doris Cosme RN  Outcome: Progressing     Problem: Discharge Planning  Goal: Discharge to home or other facility with appropriate resources  12/16/2024 0951 by Brigida Montiel RN  Outcome: Progressing  Flowsheets (Taken 12/16/2024 0800)  Discharge to home or other facility with appropriate resources:   Identify barriers to discharge with patient and caregiver   Arrange for needed discharge resources and transportation as appropriate  12/16/2024 0225 by Yin Smallwood RN  Outcome: Progressing  12/16/2024 0150 by Doris Cosme RN  Outcome: Progressing     Problem: Skin/Tissue Integrity  Goal: Absence of new skin breakdown  Description: 1.  Monitor for areas of redness and/or skin breakdown  2.  Assess vascular access sites hourly  3.  Every 4-6 hours minimum:  Change oxygen saturation probe site  4.  Every 4-6 hours:  If on nasal continuous positive airway pressure, respiratory therapy assess nares and determine need for appliance change or resting period.  12/16/2024 0951 by Brigida Montiel RN  Outcome: Progressing  12/16/2024 0225 by Yin Smallwood RN  Outcome: Progressing     Problem: ABCDS Injury Assessment  Goal: Absence of physical injury  12/16/2024 0951 by Brigida Montiel RN  Outcome:

## 2024-12-16 NOTE — PROGRESS NOTES
Comprehensive Nutrition Assessment    Type and Reason for Visit:  Initial, Positive nutrition screen    Nutrition Recommendations/Plan:   Continue diet as tolerated. Provide assistance at meals  Start high kcal, high protein ONS once a day  Monitor intakes, ONS, weight, labs, GI status, fluid status, feeding assistance.     Malnutrition Assessment:  Malnutrition Status:  Moderate malnutrition (12/16/24 1419)    Context:  Chronic Illness     Findings of the 6 clinical characteristics of malnutrition:  Energy Intake:  75% or less estimated energy requirements for 1 month or longer (predicted)  Weight Loss:  Greater than 10% over 6 months     Body Fat Loss:  Unable to assess     Muscle Mass Loss:  Mild muscle mass loss (to moderate (visually)) Clavicles (pectoralis & deltoids), Temples (temporalis), Hand (interosseous)  Fluid Accumulation:  Mild (trace) Extremities, Generalized   Strength:  Not Performed    Nutrition Assessment:    RNC for weight loss, poor appetite. 53 yo F adm s/p fall. PMHx of frequent falls, opiate dependence, R wrist drop, weakness, chronic pain. Significant 12.8% weight loss over 6 months per EMR. Unable to wake pt at visit (r/t ketamine per RN) however, visually noted moderate muscle wasting. Per RN, pt requires assistance at meals, unable to lift arms, hand to mouth. With assistance at breakfast, pt able to consume eggs, 1 toast, 2 nye. Drinks pepsi frequently. Per RN report, pt lives with family who provide some assistance.    Nutrition Related Findings:    Meds/labs reviewed. LR at 100 mL/hr Wound Type: None       Current Nutrition Intake & Therapies:    Average Meal Intake: %  Average Supplements Intake: None Ordered  ADULT DIET; Regular  Diet NPO  Additional Calorie Sources:  None    Anthropometric Measures:  Height: 170.2 cm (5' 7.01\")  Ideal Body Weight (IBW): 135 lbs (61 kg)    Admission Body Weight: 56 kg (123 lb 7.3 oz)  Current Body Weight: 56 kg (123 lb 7.3 oz), 91.5 %

## 2024-12-16 NOTE — PROGRESS NOTES
MRI results concerning for C3 level cord contusion. Due to symptomatology and imaging findings, will transfer patient to TICU and institute MAP pushes with levophed to maintain a MAP Goal of greater than 85, per Neurosurgery team. Arterial line will be placed when pt arrives to TICU. Plan for surgical intervention tomorrow with order for patient to be NPO at midnight. Discussed plan with patient who is agreeable.      Electronically signed by Micah Nguyen DO on 12/15/2024 at 11:11 PM

## 2024-12-16 NOTE — CONSULTS
Department of Neurosurgery                                            Nurse Practitioner Consult Note      Reason for Consult:  Cervical collar clearance,neck pain post fall with new upper extremity weakness   Requesting Physician:  Wendy  Neurosurgeon:   [] Dr. Garcia  [x] Dr. Downs  [] Dr. Lorenzo  [] Dr. Driver    Neurosurgery notified of consult at:1900   Neurosurgery evaluation begun: 1910    History Obtained From:  patient, electronic medical record    CHIEF COMPLAINT:         Chief Complaint   Patient presents with    Fall       HISTORY OF PRESENT ILLNESS:       The patient is a 54 y.o. female who presents to the emergency department after a fall yesterday with reports of new upper extremity weakness. Patient reports that since Thanksgiving she has had an  increase in number of falls. She report that she had a fall yesterday when she ambulating with her walker and her legs gave out causing her to land on her left side she reports she did hit her head and that her neck was twisted to the right she states that at the time she felt a pop in her neck. She reported that she had to have assistance getting up and that she immediately felt upper extremity weakness she did not seek treatment at that time she states that she continued to have neck pain as well as the weakness which made her seek medical attention.   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     PAST MEDICAL HISTORY :       Past Medical History:        Diagnosis Date    Acute gastric ulcer with perforation ([facility])     hx. of    Asthma     Connective tissue disease, undifferentiated ([facility]) 1987    Dr. Kendrick ([facility]he[facility].)    DDD (degenerative disc disease) 2000    Dr. Álvarez    Depression     Lumbar radicular pain     Migraine 1988    Dr. Gagnon    Mitral valve prolapse 1978    Dr. Washburn    Opiate addiction ([facility]) 01/02/2013    Dr. Babb / on 10/3/17 pt states she completed tx 2016       Past Surgical History:         Procedure Laterality Date    ABDOMEN SURGERY      COLONOSCOPY N/A 03/17/2021    COLONOSCOPY FLEXIBLE W/ DECOMPRESSION performed by Deborah Azevedo MD at [facility] Endoscopy    COLONOSCOPY  03/21/2021    COLONOSCOPY N/A 3/21/2021    COLONOSCOPY DIAGNOSTIC performed by Yannick Barrett MD at [facility] OR    COLONOSCOPY N/A 8/19/2021    COLONOSCOPY DIAGNOSTIC [facility]    CT ABSCESS DRAIN SUBCUTANEOUS  03/04/2021    CT ABSCESS DRAIN SUBCUTANEOUS 3/4/2021 [facility] CT SCAN    GASTRECTOMY  03/05/2021    \"partial\"    HERNIA REPAIR  1972    as child    HYSTERECTOMY (CERVIX STATUS UNKNOWN)      2007    LAPAROTOMY N/A 03/05/2021    EXPLORATORY LAPAROTOMY, PARTIAL GASTRECTOMY performed by Virginia Schroeder MD at [facility] OR    LAPAROTOMY N/A 03/11/2021    LAPAROTOMY EXPLORATORY, ABDOMINAL WASH OUT, FASCIAL CLOSURE  performed by Garcia Mcmullen MD at [facility] OR    NERVE BLOCK Right 07/27/2015    right lumbar JHONY    NOSE SURGERY  1989    After nose was broken. Dr. Robertson    OVARIAN CYST REMOVAL Left 2007    Dr. Ardon    JAIMEE AND BSO (CERVIX REMOVED)  2007    Dr. Ardon    TUBAL LIGATION  1997    Dr. Smart    UPPER GASTROINTESTINAL ENDOSCOPY N/A 8/19/2021    EGD BIOPSY performed by [facility]    UPPER GASTROINTESTINAL ENDOSCOPY N/A 1/24/2024    EGD BIOPSY performed by [facility] at [facility]       Social History:   Social History     Socioeconomic History    Marital status:      Spouse name: Not on file    Number of children: Not on file    Years of education: Not on file    Highest education level: Not on file   Occupational History    Not on file   Tobacco Use    Smoking status: Every Day     Current packs/day: 1.00     Average packs/day: 1 pack/day for 40.0 years (40.0 ttl pk-yrs)     Types: Cigarettes    Smokeless tobacco: Never    Tobacco comments:     Menthol ciggaretts   Vaping Use    Vaping status: Some Days    Substances: Nicotine   Substance and Sexual Activity    Alcohol

## 2024-12-16 NOTE — PROGRESS NOTES
Trauma Tertiary Survey    Admit Date: 12/15/2024  Hospital day 1      Subjective:     Pt presents after multiple falls. She has a sternal fracture and unstable C3-C4 fractures. Endorsing numbness, paresthesias, and fatigue of her bilateral upper and lower extremities below the shoulders.     Objective:   PHYSICAL EXAM:     Physical Exam  Constitutional:       General: She is awake.      Interventions: Cervical collar in place.   HENT:      Head: Normocephalic and atraumatic.   Cardiovascular:      Rate and Rhythm: Normal rate and regular rhythm.   Pulmonary:      Effort: Pulmonary effort is normal. No accessory muscle usage.   Chest:      Chest wall: Tenderness present. No crepitus.   Abdominal:      General: Abdomen is flat.      Palpations: Abdomen is soft.      Tenderness: There is no abdominal tenderness.      Comments: Well-healed midline laparotomy scar   Musculoskeletal:      Cervical back: Spinous process tenderness present.      Thoracic back: Bony tenderness present. No deformity.      Lumbar back: Bony tenderness present. No deformity.   Skin:     General: Skin is warm and dry.   Neurological:      Mental Status: She is alert.      GCS: GCS eye subscore is 4. GCS verbal subscore is 5. GCS motor subscore is 6.      Sensory: Sensory deficit (numbness of bilateral upper and lower extremities) present.      Motor: Weakness (diminished range of motion of shoulders and hips) present.   Psychiatric:         Attention and Perception: Attention normal.     Spine:     Spine Tenderness ROM   Cervical 10 /10 Not Indicated - c spine precautions   Thoracic 8 /10 Not Indicated - c spine precautions   Lumbar 8 /10 Not Indicated - c spine precautions     Musculoskeletal    Joint Tenderness Swelling ROM   Right shoulder absent absent diminished   Left shoulder absent absent diminished   Right elbow absent absent normal   Left elbow absent absent normal   Right wrist absent absent normal   Left wrist absent absent normal    Right hand grasp absent absent normal   Left hand grasp absent absent normal   Right hip absent absent diminished   Left hip absent absent diminished   Right knee absent absent diminished   Left knee absent absent diminished   Right ankle absent absent normal   Left ankle absent absent normal   Right foot absent absent normal   Left foot absent absent normal       CONSULTS: Neurosurgery    PROCEDURES: OR tomorrow 12/17     [] Reviewed radiology reports  (All radiology findings correlate to diagnoses/problem list)  [x] Incidental findings: left renal pole cyst, no additional imaging   [x] Patient/family notified and letter given    Assessment/Plan:     No additional imaging needed based on tertiary exam.

## 2024-12-16 NOTE — PROGRESS NOTES
[facility]  Speech Language Pathology    SPEECH/COGNITIVE ASSESSMENT    NO LOC,CHI OR CVA/TIA - ST TO DEFER AT THIS TIME      Date: 12/16/2024  Patient Name: Lupe Walton  YOB: 1970   AGE: 54 y.o.  MRN: [id number]        PT NOT SEEN FOR SPEECH OR COGNITIVE ASSESSMENT AT THIS TIME AS NO LOC, CHI OR CVA/TIA IS DOCUMENTED.  ST TO DEFER AT THIS TIME.  PLEASE RE-COSULT AS NEEDED.      VIGNESH Lu  12/16/2024  7:10 AM

## 2024-12-16 NOTE — PROCEDURES
PROCEDURE NOTE - ARTERIAL LINE INSERTION    PATIENT NAME: Lupe Walton  MEDICAL RECORD #: [id number]  DATE: 12/16/2024  SURGEON: Dr. Gigi Salguero MD / Micah Nguyen DO  PREOPERATIVE DIAGNOSIS:  Need for invasive BP monitoring  POSTOPERATIVE DIAGNOSIS:  Same  PROCEDURE PERFORMED:  Left radial arterial line placement  ANESTHESIA:  Local utilizing 1% lidocaine  ESTIMATED BLOOD LOSS:  Less than 10 ml  COMPLICATIONS:  None immediately appreciated.  OPERATIVE NOTE PREPARED BY: Micah Nguyen DO    DISCUSSION:  Lupe Walton who requires invasive arterial BP monitoring. The history and physical examination were reviewed and confirmed. Consent was implied as the patient required the procedure emergently. The patient was then prepared for the procedure.    PROCEDURE:  A timeout was initiated by the bedside nurse and was confirmed by those present.  The patient was placed in a supine position. An Dashawn test was performed and adequate collateral circulation was noted. The skin overlying the left radial artery was prepped with chlorhexadine and draped in sterile fashion. The skin was infiltrated with local anesthetic. Through the anesthetized region, the introducer needle was inserted into the artery returning pulsatile blood. A guidewire was placed through the center of the needle with no resistance. The catheter was inserted over the guide wire. The guidewire was then removed. The line was connected to the transducer. The catheter then secured using silk suture and a temporary sterile dressing was applied.  No immediate complication was evident.  All sponge, instrument and needle counts were correct at the completion of the procedure.     Micah Nguyen DO  12/16/2024  1:59 AM

## 2024-12-17 ENCOUNTER — APPOINTMENT (OUTPATIENT)
Dept: GENERAL RADIOLOGY | Age: 54
DRG: 552 | End: 2024-12-17
Payer: COMMERCIAL

## 2024-12-17 ENCOUNTER — ANESTHESIA (OUTPATIENT)
Dept: OPERATING ROOM | Age: 54
End: 2024-12-17
Payer: COMMERCIAL

## 2024-12-17 LAB
ANION GAP SERPL CALCULATED.3IONS-SCNC: 7 MMOL/L (ref 9–16)
ANION GAP SERPL CALCULATED.3IONS-SCNC: 8 MMOL/L (ref 9–16)
ARTERIAL PATENCY WRIST A: ABNORMAL
BASOPHILS # BLD: 0 K/UL (ref 0–0.2)
BASOPHILS # BLD: <0.03 K/UL (ref 0–0.2)
BASOPHILS NFR BLD: 0 % (ref 0–2)
BASOPHILS NFR BLD: 0 % (ref 0–2)
BODY TEMPERATURE: 36.5
BUN SERPL-MCNC: 6 MG/DL (ref 6–20)
BUN SERPL-MCNC: 7 MG/DL (ref 6–20)
CA-I BLD-SCNC: 0.97 MMOL/L (ref 1.13–1.33)
CA-I BLD-SCNC: 1.07 MMOL/L (ref 1.13–1.33)
CA-I BLD-SCNC: 1.08 MMOL/L (ref 1.13–1.33)
CA-I BLD-SCNC: 1.3 MMOL/L (ref 1.13–1.33)
CALCIUM SERPL-MCNC: 6.9 MG/DL (ref 8.6–10.4)
CALCIUM SERPL-MCNC: 8.6 MG/DL (ref 8.6–10.4)
CHLORIDE SERPL-SCNC: 105 MMOL/L (ref 98–107)
CHLORIDE SERPL-SCNC: 97 MMOL/L (ref 98–107)
CHLORIDE, WHOLE BLOOD: 99 MMOL/L (ref 98–110)
CO2 SERPL-SCNC: 26 MMOL/L (ref 20–31)
CO2 SERPL-SCNC: 30 MMOL/L (ref 20–31)
COHGB MFR BLD: 1 % (ref 0–5)
CREAT SERPL-MCNC: 0.6 MG/DL (ref 0.6–0.9)
CREAT SERPL-MCNC: 0.7 MG/DL (ref 0.6–0.9)
EOSINOPHIL # BLD: 0 K/UL (ref 0–0.44)
EOSINOPHIL # BLD: 0.14 K/UL (ref 0–0.44)
EOSINOPHILS RELATIVE PERCENT: 0 % (ref 1–4)
EOSINOPHILS RELATIVE PERCENT: 2 % (ref 1–4)
ERYTHROCYTE [DISTWIDTH] IN BLOOD BY AUTOMATED COUNT: 12.8 % (ref 11.8–14.4)
ERYTHROCYTE [DISTWIDTH] IN BLOOD BY AUTOMATED COUNT: 12.8 % (ref 11.8–14.4)
FIO2 ON VENT: ABNORMAL %
GFR, ESTIMATED: >90 ML/MIN/1.73M2
GFR, ESTIMATED: >90 ML/MIN/1.73M2
GLUCOSE BLD-MCNC: 117 MG/DL (ref 65–105)
GLUCOSE SERPL-MCNC: 106 MG/DL (ref 74–99)
GLUCOSE SERPL-MCNC: 135 MG/DL (ref 74–99)
HCO3 ARTERIAL: 29.1 MMOL/L (ref 22–27)
HCT VFR BLD AUTO: 39.4 % (ref 36.3–47.1)
HCT VFR BLD AUTO: 39.6 % (ref 36.3–47.1)
HCT VFR BLD CALC: 37.2 % (ref 36.3–47.1)
HEMOGLOBIN: 12.1 GM/DL (ref 11.9–15.1)
HGB BLD-MCNC: 12.3 G/DL (ref 11.9–15.1)
HGB BLD-MCNC: 12.5 G/DL (ref 11.9–15.1)
IMM GRANULOCYTES # BLD AUTO: 0 K/UL (ref 0–0.3)
IMM GRANULOCYTES # BLD AUTO: <0.03 K/UL (ref 0–0.3)
IMM GRANULOCYTES NFR BLD: 0 %
IMM GRANULOCYTES NFR BLD: 0 %
LACTIC ACID, WHOLE BLOOD: 2.2 MMOL/L (ref 0.7–2.1)
LYMPHOCYTES NFR BLD: 0.53 K/UL (ref 1.1–3.7)
LYMPHOCYTES NFR BLD: 0.97 K/UL (ref 1.1–3.7)
LYMPHOCYTES RELATIVE PERCENT: 11 % (ref 24–43)
LYMPHOCYTES RELATIVE PERCENT: 8 % (ref 24–43)
MAGNESIUM SERPL-MCNC: 1.6 MG/DL (ref 1.6–2.6)
MAGNESIUM SERPL-MCNC: 2.7 MG/DL (ref 1.6–2.6)
MCH RBC QN AUTO: 27.6 PG (ref 25.2–33.5)
MCH RBC QN AUTO: 27.7 PG (ref 25.2–33.5)
MCHC RBC AUTO-ENTMCNC: 31.2 G/DL (ref 28.4–34.8)
MCHC RBC AUTO-ENTMCNC: 31.6 G/DL (ref 28.4–34.8)
MCV RBC AUTO: 87.6 FL (ref 82.6–102.9)
MCV RBC AUTO: 88.5 FL (ref 82.6–102.9)
MONOCYTES NFR BLD: 0.13 K/UL (ref 0.1–1.2)
MONOCYTES NFR BLD: 0.48 K/UL (ref 0.1–1.2)
MONOCYTES NFR BLD: 2 % (ref 3–12)
MONOCYTES NFR BLD: 6 % (ref 3–12)
MORPHOLOGY: NORMAL
NEUTROPHILS NFR BLD: 81 % (ref 36–65)
NEUTROPHILS NFR BLD: 90 % (ref 36–65)
NEUTS SEG NFR BLD: 5.94 K/UL (ref 1.5–8.1)
NEUTS SEG NFR BLD: 6.87 K/UL (ref 1.5–8.1)
NRBC BLD-RTO: 0 PER 100 WBC
NRBC BLD-RTO: 0 PER 100 WBC
O2 SAT, ARTERIAL: 99.2 % (ref 94–100)
PCO2 ARTERIAL: 41.5 MMHG (ref 32–45)
PH ARTERIAL: 7.46 (ref 7.35–7.45)
PHOSPHATE SERPL-MCNC: 2.6 MG/DL (ref 2.5–4.5)
PHOSPHATE SERPL-MCNC: 4 MG/DL (ref 2.5–4.5)
PLATELET # BLD AUTO: 223 K/UL (ref 138–453)
PLATELET # BLD AUTO: 248 K/UL (ref 138–453)
PMV BLD AUTO: 9.2 FL (ref 8.1–13.5)
PMV BLD AUTO: 9.3 FL (ref 8.1–13.5)
PO2 ARTERIAL: 180 MMHG (ref 75–95)
POSITIVE BASE EXCESS, ART: 5.2 MMOL/L (ref 0–2)
POTASSIUM SERPL-SCNC: 3.3 MMOL/L (ref 3.7–5.3)
POTASSIUM SERPL-SCNC: 4.3 MMOL/L (ref 3.7–5.3)
POTASSIUM, WHOLE BLOOD: 4.5 MMOL/L (ref 3.6–5)
RBC # BLD AUTO: 4.45 M/UL (ref 3.95–5.11)
RBC # BLD AUTO: 4.52 M/UL (ref 3.95–5.11)
SODIUM SERPL-SCNC: 134 MMOL/L (ref 136–145)
SODIUM SERPL-SCNC: 139 MMOL/L (ref 136–145)
SODIUM, WHOLE BLOOD: 135 MMOL/L (ref 136–145)
WBC OTHER # BLD: 6.6 K/UL (ref 3.5–11.3)
WBC OTHER # BLD: 8.5 K/UL (ref 3.5–11.3)

## 2024-12-17 PROCEDURE — 22842 INSERT SPINE FIXATION DEVICE: CPT | Performed by: PHYSICIAN ASSISTANT

## 2024-12-17 PROCEDURE — 2709999900 HC NON-CHARGEABLE SUPPLY: Performed by: NEUROLOGICAL SURGERY

## 2024-12-17 PROCEDURE — 6360000002 HC RX W HCPCS: Performed by: ANESTHESIOLOGY

## 2024-12-17 PROCEDURE — 2580000003 HC RX 258: Performed by: PHYSICIAN ASSISTANT

## 2024-12-17 PROCEDURE — 82330 ASSAY OF CALCIUM: CPT

## 2024-12-17 PROCEDURE — 22614 ARTHRD PST TQ 1NTRSPC EA ADD: CPT | Performed by: NEUROLOGICAL SURGERY

## 2024-12-17 PROCEDURE — 63015 REMOVE SPINE LAMINA >2 CRVCL: CPT | Performed by: PHYSICIAN ASSISTANT

## 2024-12-17 PROCEDURE — NBSRV NON-BILLABLE SERVICE: Performed by: NEUROLOGICAL SURGERY

## 2024-12-17 PROCEDURE — 3600000005 HC SURGERY LEVEL 5 BASE: Performed by: NEUROLOGICAL SURGERY

## 2024-12-17 PROCEDURE — 2500000003 HC RX 250 WO HCPCS: Performed by: PHYSICIAN ASSISTANT

## 2024-12-17 PROCEDURE — 6370000000 HC RX 637 (ALT 250 FOR IP): Performed by: NEUROLOGICAL SURGERY

## 2024-12-17 PROCEDURE — 63015 REMOVE SPINE LAMINA >2 CRVCL: CPT | Performed by: NEUROLOGICAL SURGERY

## 2024-12-17 PROCEDURE — 2580000003 HC RX 258

## 2024-12-17 PROCEDURE — 99232 SBSQ HOSP IP/OBS MODERATE 35: CPT | Performed by: SURGERY

## 2024-12-17 PROCEDURE — 0RG2071 FUSION OF 2 OR MORE CERVICAL VERTEBRAL JOINTS WITH AUTOLOGOUS TISSUE SUBSTITUTE, POSTERIOR APPROACH, POSTERIOR COLUMN, OPEN APPROACH: ICD-10-PCS | Performed by: NEUROLOGICAL SURGERY

## 2024-12-17 PROCEDURE — 7100000001 HC PACU RECOVERY - ADDTL 15 MIN: Performed by: NEUROLOGICAL SURGERY

## 2024-12-17 PROCEDURE — 6360000002 HC RX W HCPCS

## 2024-12-17 PROCEDURE — 2580000003 HC RX 258: Performed by: NEUROLOGICAL SURGERY

## 2024-12-17 PROCEDURE — 82805 BLOOD GASES W/O2 SATURATION: CPT

## 2024-12-17 PROCEDURE — 2500000003 HC RX 250 WO HCPCS: Performed by: NEUROLOGICAL SURGERY

## 2024-12-17 PROCEDURE — 84100 ASSAY OF PHOSPHORUS: CPT

## 2024-12-17 PROCEDURE — C9290 INJ, BUPIVACAINE LIPOSOME: HCPCS | Performed by: NEUROLOGICAL SURGERY

## 2024-12-17 PROCEDURE — 3600000015 HC SURGERY LEVEL 5 ADDTL 15MIN: Performed by: NEUROLOGICAL SURGERY

## 2024-12-17 PROCEDURE — 6360000002 HC RX W HCPCS: Performed by: NEUROLOGICAL SURGERY

## 2024-12-17 PROCEDURE — 6360000002 HC RX W HCPCS: Performed by: PHYSICIAN ASSISTANT

## 2024-12-17 PROCEDURE — 85025 COMPLETE CBC W/AUTO DIFF WBC: CPT

## 2024-12-17 PROCEDURE — 84132 ASSAY OF SERUM POTASSIUM: CPT

## 2024-12-17 PROCEDURE — 3700000001 HC ADD 15 MINUTES (ANESTHESIA): Performed by: NEUROLOGICAL SURGERY

## 2024-12-17 PROCEDURE — 85018 HEMOGLOBIN: CPT

## 2024-12-17 PROCEDURE — 22614 ARTHRD PST TQ 1NTRSPC EA ADD: CPT | Performed by: PHYSICIAN ASSISTANT

## 2024-12-17 PROCEDURE — 2720000010 HC SURG SUPPLY STERILE: Performed by: NEUROLOGICAL SURGERY

## 2024-12-17 PROCEDURE — 7100000000 HC PACU RECOVERY - FIRST 15 MIN: Performed by: NEUROLOGICAL SURGERY

## 2024-12-17 PROCEDURE — 2500000003 HC RX 250 WO HCPCS

## 2024-12-17 PROCEDURE — 85014 HEMATOCRIT: CPT

## 2024-12-17 PROCEDURE — 80048 BASIC METABOLIC PNL TOTAL CA: CPT

## 2024-12-17 PROCEDURE — 83605 ASSAY OF LACTIC ACID: CPT

## 2024-12-17 PROCEDURE — 2000000000 HC ICU R&B

## 2024-12-17 PROCEDURE — 3700000000 HC ANESTHESIA ATTENDED CARE: Performed by: NEUROLOGICAL SURGERY

## 2024-12-17 PROCEDURE — 6370000000 HC RX 637 (ALT 250 FOR IP): Performed by: PHYSICIAN ASSISTANT

## 2024-12-17 PROCEDURE — 6370000000 HC RX 637 (ALT 250 FOR IP)

## 2024-12-17 PROCEDURE — 22842 INSERT SPINE FIXATION DEVICE: CPT | Performed by: NEUROLOGICAL SURGERY

## 2024-12-17 PROCEDURE — 22600 ARTHRD PST TQ 1NTRSPC CRV: CPT | Performed by: NEUROLOGICAL SURGERY

## 2024-12-17 PROCEDURE — 00NW0ZZ RELEASE CERVICAL SPINAL CORD, OPEN APPROACH: ICD-10-PCS | Performed by: NEUROLOGICAL SURGERY

## 2024-12-17 PROCEDURE — 22600 ARTHRD PST TQ 1NTRSPC CRV: CPT | Performed by: PHYSICIAN ASSISTANT

## 2024-12-17 PROCEDURE — C1713 ANCHOR/SCREW BN/BN,TIS/BN: HCPCS | Performed by: NEUROLOGICAL SURGERY

## 2024-12-17 PROCEDURE — 83735 ASSAY OF MAGNESIUM: CPT

## 2024-12-17 DEVICE — ROD 3603740 PRE-CUT 3.5MM X 40MM
Type: IMPLANTABLE DEVICE | Site: SPINE CERVICAL | Status: FUNCTIONAL
Brand: INFINITY™ OCCIPITOCERVICAL UPPER THORACIC SYSTEM

## 2024-12-17 DEVICE — GRAFT CELLR BNE MATRX INFLUX SPARC 5CC: Type: IMPLANTABLE DEVICE | Site: SPINE CERVICAL | Status: FUNCTIONAL

## 2024-12-17 RX ORDER — PROPOFOL 10 MG/ML
INJECTION, EMULSION INTRAVENOUS
Status: DISCONTINUED | OUTPATIENT
Start: 2024-12-17 | End: 2024-12-17 | Stop reason: SDUPTHER

## 2024-12-17 RX ORDER — SODIUM CHLORIDE 0.9 % (FLUSH) 0.9 %
5-40 SYRINGE (ML) INJECTION PRN
Status: DISCONTINUED | OUTPATIENT
Start: 2024-12-17 | End: 2024-12-17 | Stop reason: HOSPADM

## 2024-12-17 RX ORDER — SODIUM CHLORIDE, SODIUM LACTATE, POTASSIUM CHLORIDE, CALCIUM CHLORIDE 600; 310; 30; 20 MG/100ML; MG/100ML; MG/100ML; MG/100ML
INJECTION, SOLUTION INTRAVENOUS
Status: DISCONTINUED | OUTPATIENT
Start: 2024-12-17 | End: 2024-12-17 | Stop reason: SDUPTHER

## 2024-12-17 RX ORDER — MIDAZOLAM HYDROCHLORIDE 1 MG/ML
INJECTION, SOLUTION INTRAMUSCULAR; INTRAVENOUS
Status: DISCONTINUED | OUTPATIENT
Start: 2024-12-17 | End: 2024-12-17 | Stop reason: SDUPTHER

## 2024-12-17 RX ORDER — NALOXONE HYDROCHLORIDE 0.4 MG/ML
INJECTION, SOLUTION INTRAMUSCULAR; INTRAVENOUS; SUBCUTANEOUS PRN
Status: DISCONTINUED | OUTPATIENT
Start: 2024-12-17 | End: 2024-12-17 | Stop reason: HOSPADM

## 2024-12-17 RX ORDER — ONDANSETRON 2 MG/ML
2 INJECTION INTRAMUSCULAR; INTRAVENOUS ONCE
Status: COMPLETED | OUTPATIENT
Start: 2024-12-17 | End: 2024-12-17

## 2024-12-17 RX ORDER — SODIUM CHLORIDE 0.9 % (FLUSH) 0.9 %
5-40 SYRINGE (ML) INJECTION PRN
Status: DISCONTINUED | OUTPATIENT
Start: 2024-12-17 | End: 2024-12-19

## 2024-12-17 RX ORDER — BUTALBITAL, ACETAMINOPHEN AND CAFFEINE 50; 325; 40 MG/1; MG/1; MG/1
1 TABLET ORAL DAILY PRN
Status: DISCONTINUED | OUTPATIENT
Start: 2024-12-17 | End: 2024-12-24 | Stop reason: HOSPADM

## 2024-12-17 RX ORDER — LIDOCAINE HYDROCHLORIDE AND EPINEPHRINE 10; 10 MG/ML; UG/ML
INJECTION, SOLUTION INFILTRATION; PERINEURAL PRN
Status: DISCONTINUED | OUTPATIENT
Start: 2024-12-17 | End: 2024-12-17 | Stop reason: HOSPADM

## 2024-12-17 RX ORDER — SODIUM CHLORIDE 9 MG/ML
INJECTION, SOLUTION INTRAVENOUS PRN
Status: DISCONTINUED | OUTPATIENT
Start: 2024-12-17 | End: 2024-12-20

## 2024-12-17 RX ORDER — ROCURONIUM BROMIDE 10 MG/ML
INJECTION, SOLUTION INTRAVENOUS
Status: DISCONTINUED | OUTPATIENT
Start: 2024-12-17 | End: 2024-12-17 | Stop reason: SDUPTHER

## 2024-12-17 RX ORDER — POLYETHYLENE GLYCOL 3350 17 G/17G
17 POWDER, FOR SOLUTION ORAL DAILY
Status: DISCONTINUED | OUTPATIENT
Start: 2024-12-17 | End: 2024-12-24 | Stop reason: HOSPADM

## 2024-12-17 RX ORDER — GINSENG 100 MG
CAPSULE ORAL PRN
Status: DISCONTINUED | OUTPATIENT
Start: 2024-12-17 | End: 2024-12-17 | Stop reason: HOSPADM

## 2024-12-17 RX ORDER — OXYCODONE HYDROCHLORIDE 5 MG/1
5 TABLET ORAL EVERY 4 HOURS PRN
Status: DISCONTINUED | OUTPATIENT
Start: 2024-12-17 | End: 2024-12-18

## 2024-12-17 RX ORDER — BISACODYL 5 MG/1
5 TABLET, DELAYED RELEASE ORAL DAILY PRN
Status: DISCONTINUED | OUTPATIENT
Start: 2024-12-17 | End: 2024-12-22

## 2024-12-17 RX ORDER — CALCIUM CHLORIDE 100 MG/ML
INJECTION INTRAVENOUS; INTRAVENTRICULAR
Status: DISCONTINUED | OUTPATIENT
Start: 2024-12-17 | End: 2024-12-17 | Stop reason: SDUPTHER

## 2024-12-17 RX ORDER — METOPROLOL TARTRATE 25 MG/1
25 TABLET, FILM COATED ORAL ONCE
Status: COMPLETED | OUTPATIENT
Start: 2024-12-17 | End: 2024-12-17

## 2024-12-17 RX ORDER — SODIUM CHLORIDE 9 MG/ML
INJECTION, SOLUTION INTRAVENOUS PRN
Status: DISCONTINUED | OUTPATIENT
Start: 2024-12-17 | End: 2024-12-17 | Stop reason: HOSPADM

## 2024-12-17 RX ORDER — MAGNESIUM SULFATE IN WATER 40 MG/ML
2000 INJECTION, SOLUTION INTRAVENOUS
Status: DISPENSED | OUTPATIENT
Start: 2024-12-17 | End: 2024-12-17

## 2024-12-17 RX ORDER — NICOTINE 21 MG/24HR
1 PATCH, TRANSDERMAL 24 HOURS TRANSDERMAL DAILY
Status: DISCONTINUED | OUTPATIENT
Start: 2024-12-17 | End: 2024-12-24 | Stop reason: HOSPADM

## 2024-12-17 RX ORDER — FENTANYL CITRATE 50 UG/ML
INJECTION, SOLUTION INTRAMUSCULAR; INTRAVENOUS
Status: DISCONTINUED | OUTPATIENT
Start: 2024-12-17 | End: 2024-12-17 | Stop reason: SDUPTHER

## 2024-12-17 RX ORDER — SODIUM CHLORIDE 0.9 % (FLUSH) 0.9 %
5-40 SYRINGE (ML) INJECTION EVERY 12 HOURS SCHEDULED
Status: DISCONTINUED | OUTPATIENT
Start: 2024-12-17 | End: 2024-12-17 | Stop reason: HOSPADM

## 2024-12-17 RX ORDER — HYDRALAZINE HYDROCHLORIDE 20 MG/ML
10 INJECTION INTRAMUSCULAR; INTRAVENOUS
Status: DISCONTINUED | OUTPATIENT
Start: 2024-12-17 | End: 2024-12-17 | Stop reason: HOSPADM

## 2024-12-17 RX ORDER — VANCOMYCIN HYDROCHLORIDE 1 G/20ML
INJECTION, POWDER, LYOPHILIZED, FOR SOLUTION INTRAVENOUS PRN
Status: DISCONTINUED | OUTPATIENT
Start: 2024-12-17 | End: 2024-12-17 | Stop reason: HOSPADM

## 2024-12-17 RX ORDER — OXYCODONE HYDROCHLORIDE 5 MG/1
5 TABLET ORAL EVERY 4 HOURS PRN
Status: DISCONTINUED | OUTPATIENT
Start: 2024-12-17 | End: 2024-12-20

## 2024-12-17 RX ORDER — LIDOCAINE HYDROCHLORIDE 10 MG/ML
INJECTION, SOLUTION EPIDURAL; INFILTRATION; INTRACAUDAL; PERINEURAL
Status: DISCONTINUED | OUTPATIENT
Start: 2024-12-17 | End: 2024-12-17 | Stop reason: SDUPTHER

## 2024-12-17 RX ORDER — SENNOSIDES A AND B 8.6 MG/1
1 TABLET, FILM COATED ORAL DAILY PRN
Status: DISCONTINUED | OUTPATIENT
Start: 2024-12-17 | End: 2024-12-24 | Stop reason: HOSPADM

## 2024-12-17 RX ORDER — OXYCODONE HYDROCHLORIDE 5 MG/1
10 TABLET ORAL EVERY 4 HOURS PRN
Status: DISCONTINUED | OUTPATIENT
Start: 2024-12-17 | End: 2024-12-20

## 2024-12-17 RX ORDER — ACETAMINOPHEN 325 MG/1
650 TABLET ORAL EVERY 6 HOURS
Status: DISCONTINUED | OUTPATIENT
Start: 2024-12-17 | End: 2024-12-18

## 2024-12-17 RX ORDER — MAGNESIUM HYDROXIDE 1200 MG/15ML
LIQUID ORAL CONTINUOUS PRN
Status: DISCONTINUED | OUTPATIENT
Start: 2024-12-17 | End: 2024-12-17 | Stop reason: HOSPADM

## 2024-12-17 RX ORDER — HYDROCORTISONE SODIUM SUCCINATE 100 MG/2ML
INJECTION INTRAMUSCULAR; INTRAVENOUS
Status: DISCONTINUED | OUTPATIENT
Start: 2024-12-17 | End: 2024-12-17 | Stop reason: SDUPTHER

## 2024-12-17 RX ORDER — CEFAZOLIN SODIUM 1 G/3ML
INJECTION, POWDER, FOR SOLUTION INTRAMUSCULAR; INTRAVENOUS
Status: DISCONTINUED | OUTPATIENT
Start: 2024-12-17 | End: 2024-12-17 | Stop reason: SDUPTHER

## 2024-12-17 RX ORDER — ONDANSETRON 2 MG/ML
4 INJECTION INTRAMUSCULAR; INTRAVENOUS
Status: DISCONTINUED | OUTPATIENT
Start: 2024-12-17 | End: 2024-12-17 | Stop reason: HOSPADM

## 2024-12-17 RX ORDER — SODIUM CHLORIDE 0.9 % (FLUSH) 0.9 %
5-40 SYRINGE (ML) INJECTION EVERY 12 HOURS SCHEDULED
Status: DISCONTINUED | OUTPATIENT
Start: 2024-12-17 | End: 2024-12-20

## 2024-12-17 RX ORDER — PHENYLEPHRINE HCL IN 0.9% NACL 1 MG/10 ML
SYRINGE (ML) INTRAVENOUS
Status: DISCONTINUED | OUTPATIENT
Start: 2024-12-17 | End: 2024-12-17 | Stop reason: SDUPTHER

## 2024-12-17 RX ORDER — ENOXAPARIN SODIUM 100 MG/ML
40 INJECTION SUBCUTANEOUS DAILY
Status: DISCONTINUED | OUTPATIENT
Start: 2024-12-19 | End: 2024-12-18

## 2024-12-17 RX ORDER — LABETALOL HYDROCHLORIDE 5 MG/ML
10 INJECTION, SOLUTION INTRAVENOUS
Status: DISCONTINUED | OUTPATIENT
Start: 2024-12-17 | End: 2024-12-17 | Stop reason: HOSPADM

## 2024-12-17 RX ORDER — SUCCINYLCHOLINE CHLORIDE 20 MG/ML
INJECTION INTRAMUSCULAR; INTRAVENOUS
Status: DISCONTINUED | OUTPATIENT
Start: 2024-12-17 | End: 2024-12-17 | Stop reason: SDUPTHER

## 2024-12-17 RX ADMIN — Medication 200 MCG: at 11:53

## 2024-12-17 RX ADMIN — PROPOFOL 130 MG: 10 INJECTION, EMULSION INTRAVENOUS at 11:09

## 2024-12-17 RX ADMIN — OXYCODONE 10 MG: 5 TABLET ORAL at 21:43

## 2024-12-17 RX ADMIN — ROCURONIUM BROMIDE 50 MG: 10 INJECTION, SOLUTION INTRAVENOUS at 11:10

## 2024-12-17 RX ADMIN — ROCURONIUM BROMIDE 10 MG: 10 INJECTION, SOLUTION INTRAVENOUS at 13:13

## 2024-12-17 RX ADMIN — METHOCARBAMOL 750 MG: 750 TABLET ORAL at 19:31

## 2024-12-17 RX ADMIN — GABAPENTIN 800 MG: 800 TABLET ORAL at 07:42

## 2024-12-17 RX ADMIN — GABAPENTIN 800 MG: 800 TABLET ORAL at 20:49

## 2024-12-17 RX ADMIN — FAMOTIDINE 20 MG: 20 TABLET, FILM COATED ORAL at 20:49

## 2024-12-17 RX ADMIN — POLYETHYLENE GLYCOL 3350 17 G: 17 POWDER, FOR SOLUTION ORAL at 07:42

## 2024-12-17 RX ADMIN — Medication 200 MCG: at 12:30

## 2024-12-17 RX ADMIN — Medication 100 MCG: at 11:15

## 2024-12-17 RX ADMIN — ACETAMINOPHEN 650 MG: 325 TABLET ORAL at 23:29

## 2024-12-17 RX ADMIN — METHOCARBAMOL 750 MG: 750 TABLET ORAL at 01:49

## 2024-12-17 RX ADMIN — Medication 200 MCG: at 11:18

## 2024-12-17 RX ADMIN — POTASSIUM PHOSPHATE, MONOBASIC AND POTASSIUM PHOSPHATE, DIBASIC 15 MMOL: 224; 236 INJECTION, SOLUTION, CONCENTRATE INTRAVENOUS at 16:36

## 2024-12-17 RX ADMIN — ACETAMINOPHEN 650 MG: 325 TABLET ORAL at 16:58

## 2024-12-17 RX ADMIN — PHENYLEPHRINE HYDROCHLORIDE 100 MCG/MIN: 10 INJECTION INTRAVENOUS at 11:49

## 2024-12-17 RX ADMIN — SUCRALFATE 1 G: 1 TABLET ORAL at 20:49

## 2024-12-17 RX ADMIN — HYDRALAZINE HYDROCHLORIDE 5 MG: 20 INJECTION INTRAMUSCULAR; INTRAVENOUS at 01:53

## 2024-12-17 RX ADMIN — DULOXETINE HYDROCHLORIDE 60 MG: 30 CAPSULE, DELAYED RELEASE ORAL at 07:52

## 2024-12-17 RX ADMIN — METOPROLOL TARTRATE 25 MG: 25 TABLET, FILM COATED ORAL at 08:54

## 2024-12-17 RX ADMIN — SUCRALFATE 1 G: 1 TABLET ORAL at 07:42

## 2024-12-17 RX ADMIN — PANTOPRAZOLE SODIUM 40 MG: 40 TABLET, DELAYED RELEASE ORAL at 07:42

## 2024-12-17 RX ADMIN — SUGAMMADEX 200 MG: 100 INJECTION, SOLUTION INTRAVENOUS at 14:38

## 2024-12-17 RX ADMIN — OXYCODONE 10 MG: 5 TABLET ORAL at 17:06

## 2024-12-17 RX ADMIN — Medication 200 MCG: at 11:50

## 2024-12-17 RX ADMIN — ACETAMINOPHEN 1000 MG: 500 TABLET ORAL at 07:42

## 2024-12-17 RX ADMIN — ONDANSETRON 2 MG: 2 INJECTION INTRAMUSCULAR; INTRAVENOUS at 02:02

## 2024-12-17 RX ADMIN — SODIUM CHLORIDE, SODIUM LACTATE, POTASSIUM CHLORIDE, CALCIUM CHLORIDE: 600; 310; 30; 20 INJECTION, SOLUTION INTRAVENOUS at 11:30

## 2024-12-17 RX ADMIN — CALCIUM CHLORIDE 0.5 G: 100 INJECTION INTRAVENOUS; INTRAVENTRICULAR at 11:53

## 2024-12-17 RX ADMIN — CALCIUM CHLORIDE 0.5 G: 100 INJECTION INTRAVENOUS; INTRAVENTRICULAR at 11:14

## 2024-12-17 RX ADMIN — Medication 200 MCG: at 11:22

## 2024-12-17 RX ADMIN — FENTANYL CITRATE 50 MCG: 50 INJECTION, SOLUTION INTRAMUSCULAR; INTRAVENOUS at 11:09

## 2024-12-17 RX ADMIN — Medication 10 MG: at 11:45

## 2024-12-17 RX ADMIN — LIDOCAINE HYDROCHLORIDE 50 MG: 10 INJECTION, SOLUTION EPIDURAL; INFILTRATION; INTRACAUDAL; PERINEURAL at 11:09

## 2024-12-17 RX ADMIN — CEFAZOLIN 2 G: 1 INJECTION, POWDER, FOR SOLUTION INTRAMUSCULAR; INTRAVENOUS at 11:19

## 2024-12-17 RX ADMIN — ROCURONIUM BROMIDE 10 MG: 10 INJECTION, SOLUTION INTRAVENOUS at 13:52

## 2024-12-17 RX ADMIN — Medication 10 MG: at 11:15

## 2024-12-17 RX ADMIN — SUCRALFATE 1 G: 1 TABLET ORAL at 16:58

## 2024-12-17 RX ADMIN — Medication 2000 MG: at 19:45

## 2024-12-17 RX ADMIN — OXYCODONE 5 MG: 5 TABLET ORAL at 04:38

## 2024-12-17 RX ADMIN — SODIUM CHLORIDE, POTASSIUM CHLORIDE, SODIUM LACTATE AND CALCIUM CHLORIDE: 600; 310; 30; 20 INJECTION, SOLUTION INTRAVENOUS at 17:12

## 2024-12-17 RX ADMIN — SODIUM CHLORIDE, POTASSIUM CHLORIDE, SODIUM LACTATE AND CALCIUM CHLORIDE: 600; 310; 30; 20 INJECTION, SOLUTION INTRAVENOUS at 10:56

## 2024-12-17 RX ADMIN — SODIUM CHLORIDE, PRESERVATIVE FREE 10 ML: 5 INJECTION INTRAVENOUS at 19:32

## 2024-12-17 RX ADMIN — Medication 100 MCG: at 11:10

## 2024-12-17 RX ADMIN — OXYCODONE 5 MG: 5 TABLET ORAL at 08:58

## 2024-12-17 RX ADMIN — Medication 60 MG: at 11:09

## 2024-12-17 RX ADMIN — Medication 5 MCG/MIN: at 22:26

## 2024-12-17 RX ADMIN — METHOCARBAMOL 750 MG: 750 TABLET ORAL at 07:41

## 2024-12-17 RX ADMIN — SODIUM CHLORIDE, PRESERVATIVE FREE 10 ML: 5 INJECTION INTRAVENOUS at 07:43

## 2024-12-17 RX ADMIN — ROCURONIUM BROMIDE 20 MG: 10 INJECTION, SOLUTION INTRAVENOUS at 12:06

## 2024-12-17 RX ADMIN — MIDAZOLAM 2 MG: 1 INJECTION INTRAMUSCULAR; INTRAVENOUS at 10:57

## 2024-12-17 RX ADMIN — Medication 200 MCG: at 11:48

## 2024-12-17 RX ADMIN — MAGNESIUM SULFATE HEPTAHYDRATE 2000 MG: 40 INJECTION, SOLUTION INTRAVENOUS at 09:11

## 2024-12-17 RX ADMIN — HYDROCORTISONE SODIUM SUCCINATE 100 MG: 100 INJECTION, POWDER, FOR SOLUTION INTRAMUSCULAR; INTRAVENOUS at 11:57

## 2024-12-17 RX ADMIN — HYDROMORPHONE HYDROCHLORIDE 0.25 MG: 1 INJECTION, SOLUTION INTRAMUSCULAR; INTRAVENOUS; SUBCUTANEOUS at 15:28

## 2024-12-17 RX ADMIN — Medication 50 MG: at 08:54

## 2024-12-17 RX ADMIN — Medication 30 MG: at 11:21

## 2024-12-17 RX ADMIN — LABETALOL HYDROCHLORIDE 5 MG: 5 INJECTION, SOLUTION INTRAVENOUS at 04:45

## 2024-12-17 RX ADMIN — POTASSIUM BICARBONATE 40 MEQ: 782 TABLET, EFFERVESCENT ORAL at 07:45

## 2024-12-17 ASSESSMENT — PAIN SCALES - GENERAL
PAINLEVEL_OUTOF10: 9
PAINLEVEL_OUTOF10: 8
PAINLEVEL_OUTOF10: 9
PAINLEVEL_OUTOF10: 7
PAINLEVEL_OUTOF10: 2
PAINLEVEL_OUTOF10: 8
PAINLEVEL_OUTOF10: 6
PAINLEVEL_OUTOF10: 7
PAINLEVEL_OUTOF10: 7
PAINLEVEL_OUTOF10: 8
PAINLEVEL_OUTOF10: 7
PAINLEVEL_OUTOF10: 7

## 2024-12-17 ASSESSMENT — PAIN DESCRIPTION - LOCATION
LOCATION: HEAD;NECK;SHOULDER
LOCATION: NECK
LOCATION: BACK;NECK
LOCATION: NECK
LOCATION: NECK

## 2024-12-17 ASSESSMENT — PAIN DESCRIPTION - DESCRIPTORS
DESCRIPTORS: ACHING;DISCOMFORT
DESCRIPTORS: DISCOMFORT

## 2024-12-17 ASSESSMENT — PAIN DESCRIPTION - ORIENTATION: ORIENTATION: OTHER (COMMENT)

## 2024-12-17 NOTE — CARE COORDINATION
Pt in OR at time of visit. I received all clinical updates from trauma NP. I confirmed dispo with CM. Plan is ARU vs. SNF depending on pt progress.

## 2024-12-17 NOTE — ANESTHESIA PRE PROCEDURE
Department of Anesthesiology  Preprocedure Note       Name:  Lupe Walton   Age:  54 y.o.  :  1970                                          MRN:  [id number]         Date:  2024      Surgeon: Surgeon(s):  Yareli Downs DO    Procedure: Procedure(s):  POSTERIOR C2-C4 LAMINECTOMY WITH FUSION  POSS FUSION C5(PRONE, MIZUHO TABLE WITH MADRIGAL, MEDTRONIC, C-ARM)    Medications prior to admission:   Prior to Admission medications    Medication Sig Start Date End Date Taking? Authorizing Provider   meloxicam (MOBIC) 15 MG tablet Take 1 tablet by mouth daily   Yes Shabbir Vaughan MD   diazePAM (VALIUM) 10 MG tablet Take 1 tablet by mouth every 6 hours as needed for Anxiety (For MRI). Max Daily Amount: 40 mg   Yes Shabbir Vaughan MD   metoprolol tartrate (LOPRESSOR) 50 MG tablet TAKE 1 TABLET BY MOUTH TWICE DAILY 24  Yes Angélica Collins MD   promethazine (PHENERGAN) 25 MG tablet TAKE 1 TABLET BY MOUTH FOUR TIMES DAILY AS NEEDED FOR NAUSEA 24  Yes Angélica Collins MD   ergocalciferol (ERGOCALCIFEROL) 1.25 MG (44158 UT) capsule Take 1 capsule by mouth once a week 24  Yes Angélica Collins MD   pantoprazole (PROTONIX) 40 MG tablet TAKE 1 TABLET BY MOUTH TWICE DAILY BEFORE MEALS 24  Yes Angélica Collins MD   famotidine (PEPCID) 20 MG tablet TAKE 1 TABLET BY MOUTH EVERY NIGHT 24  Yes Patricia Walter MD   gabapentin (NEURONTIN) 800 MG tablet TAKE 1 TABLET BY MOUTH THREE TIMES DAILY 22  Yes Shabbir Vaughan MD   DULoxetine (CYMBALTA) 60 MG extended release capsule TAKE 1 CAPSULE BY MOUTH DAILY 21  Yes Shabbir Vaughan MD   DOXEPIN HCL PO Take 1 capsule by mouth daily    Shabbir Vaughan MD   sucralfate (CARAFATE) 1 GM/10ML suspension Take 10 mLs by mouth 4 times daily 23   Angélica Collins MD   RABEprazole (ACIPHEX) 20 MG tablet Take 1 tablet by mouth daily 23   Angélica Collins MD   ibuprofen (ADVIL;MOTRIN) 800 MG tablet Take 1 tablet by

## 2024-12-17 NOTE — PLAN OF CARE
Problem: Pain  Goal: Verbalizes/displays adequate comfort level or baseline comfort level  Outcome: Progressing     Problem: Safety - Adult  Goal: Free from fall injury  Outcome: Progressing     Problem: Discharge Planning  Goal: Discharge to home or other facility with appropriate resources  Outcome: Progressing     Problem: Skin/Tissue Integrity  Goal: Absence of new skin breakdown  Description: 1.  Monitor for areas of redness and/or skin breakdown  2.  Assess vascular access sites hourly  3.  Every 4-6 hours minimum:  Change oxygen saturation probe site  4.  Every 4-6 hours:  If on nasal continuous positive airway pressure, respiratory therapy assess nares and determine need for appliance change or resting period.  Outcome: Progressing     Problem: ABCDS Injury Assessment  Goal: Absence of physical injury  Outcome: Progressing     Problem: Respiratory - Adult  Goal: Achieves optimal ventilation and oxygenation  Outcome: Progressing     Problem: Nutrition Deficit:  Goal: Optimize nutritional status  Outcome: Progressing

## 2024-12-17 NOTE — PROGRESS NOTES
Neurosurgery FARSHAD/Resident    Daily Progress Note   Chief Complaint   Patient presents with    Fall     12/17/2024  9:22 AM    Chart reviewed.  No acute events overnight.  No new complaints. Patient started on Ketamine infusion for pain. Levophed started as well to maintain MAP > 85. Patient states that she is in a lot of pain especially in her shoulders. Does not feel as if she has gotten any weaker however.     Vitals:    12/17/24 0400 12/17/24 0500 12/17/24 0508 12/17/24 0600   BP: (!) 149/60      Pulse: (!) 123 (!) 111  (!) 109   Resp: 14 26 16 15   Temp: 99.7 °F (37.6 °C)      TempSrc: Oral      SpO2: 97% 96%  97%   Weight:       Height:             PE:   AOx3   CNII-XII intact   PERRL, EOMI   Motor   L deltoid 2/5; R deltoid 2/5  L biceps 3/5; R biceps 3/5  L triceps 3/5; R triceps 3/5  L wrist extension 3/5; R wrist extension 3/5  L intrinsics 2/5; R intrinsics 2/5      L iliopsoas 4/5 , R iliopsoas 4/5  L quadriceps 3/5; R quadriceps 3/5  L Dorsiflexion 4/5; R dorsiflexion 4/5  L Plantarflexion 4/5; R plantarflexion 4/5  L EHL 5/5; R EHL 5/5    Sensation: intact    Lab Results   Component Value Date    WBC 8.5 12/17/2024    HGB 12.3 12/17/2024    HCT 39.4 12/17/2024     12/17/2024    CHOL 117 06/26/2024    TRIG 80 06/26/2024    HDL 40 (L) 06/26/2024    ALT 19 12/05/2024    AST 25 12/05/2024     12/17/2024    K 3.3 (L) 12/17/2024     12/17/2024    CREATININE 0.6 12/17/2024    BUN 6 12/17/2024    CO2 26 12/17/2024    TSH 4.02 12/05/2024    INR 1.0 12/15/2024    CRP 3.99 (H) 11/20/2024    SEDRATE 21 11/20/2024       A/P  54 y.o. female who presents with frequent falls since Thanskgiving most recently the day prior to arrival with MRI cervical MRI demonstrating signal change in th cord at C3 level.    Maintain MAP > 85  Maintain C-collar  OR today for C2-4 fusion and laminectomy with possible extension to C5  Pain management per primary   Neuro checks per floor protocol      Please contact

## 2024-12-17 NOTE — PROGRESS NOTES
POST OP NOTE    SUBJECTIVE  Pt s/p Posterior C2-C4 laminectomy with fusion C5. .     OBJECTIVE  VITALS:  BP (!) 165/107   Pulse (!) 103   Temp 98.8 °F (37.1 °C) (Temporal)   Resp 19   Ht 1.702 m (5' 7.01\")   Wt 56 kg (123 lb 7.3 oz)   SpO2 98%   BMI 19.33 kg/m²         GENERAL:  awake and alert.  no apparent distress, No acute distress  CARDIOVASCULAR:  tachycardic with regular rhythm   LUNGS:  CTA Bilaterally  ABDOMEN:   Abdomen soft, non-tender, non-distended  INCISION: Incision clean/dry/intact    ASSESSMENT  1. POD# 0 s/p Posterior C2-C4 laminectomy with fusion of C5    PLAN  1. Pain management-  2. DVT prophylaxis to be started on the 19th as per neurosurgery  3. GI prophylaxis      Yannick Ayers MD  Trauma/Surgery Service  12/17/2024 at 4:37 PM

## 2024-12-17 NOTE — PROGRESS NOTES
Labs     12/15/24  1237 12/16/24  0614 12/17/24  0551    136 139   K 4.1 3.4* 3.3*    96* 105   CO2 30 29 26   BUN 9 7 6   CREATININE 0.8 0.7 0.6   GLUCOSE 75 100* 106*         RADIOLOGY:  MRI CERVICAL SPINE WO CONTRAST    Result Date: 12/15/2024  1. Grade 1 anterolisthesis of C3 over C4.  Widening at the C3-C4 facet joint space with joint effusion and increased T2 signal in the inter spinous ligaments posteriorly. Prevertebral fluid is seen between C3 and C6 levels. Findings are concerning for instability, with or without ligamentous injury. 2. Focal T2 high signal changes are seen in the spinal cord at C3 level, concerning for cord contusion. 3. Multilevel degenerative changes of the cervical spine, most pronounced at C3-C4 level where there is moderate canal stenosis and severe left-sided foraminal narrowing.     XR CERVICAL SPINE FLEXION AND EXTENSION    Result Date: 12/15/2024  No acute osseous abnormality identified in the cervical spine.  Multiple anterolistheses unchanged on flexion and extension.     CT CHEST ABDOMEN PELVIS W CONTRAST Additional Contrast? None    Result Date: 12/15/2024  1. Transverse fracture at the superior aspect of the sternal body with anterior displacement of the inferior fracture fragment.  There are also mild patchy opacities in the left upper lobe anteriorly that could represent mild lung contusion. 2. No CT evidence of acute injury elsewhere in the chest, abdomen or pelvis. 3. Moderate amount of stool and air in the colon. 4. Left renal atrophy and simple cysts at the upper pole of the left kidney measuring up to 3.6 cm.  No follow-up imaging is recommended.     CT CERVICAL SPINE WO CONTRAST    Result Date: 12/15/2024  CT HEAD: No CT evidence for acute intracranial abnormality.. CT CERVICAL SPINE: Degenerative changes most pronounced at C2-C3 and C3-C4 on the left where there are signs of nonacute C3 facet and C4 lateral mass nondisplaced fractures as noted above.  There is grade 1 C3-C4 anterolisthesis which has developed since the prior study of 2022.  If there is concern for cervical instability flexion/extension radiographs may be obtained. CT THORACIC SPINE: No acute osseous abnormality. CT LUMBAR SPINE: No acute osseous abnormality. Grade 1 L5-S1 spondylolisthesis degenerative changes at this level.     CT HEAD WO CONTRAST    Result Date: 12/15/2024  CT HEAD: No CT evidence for acute intracranial abnormality.. CT CERVICAL SPINE: Degenerative changes most pronounced at C2-C3 and C3-C4 on the left where there are signs of nonacute C3 facet and C4 lateral mass nondisplaced fractures as noted above. There is grade 1 C3-C4 anterolisthesis which has developed since the prior study of 2022.  If there is concern for cervical instability flexion/extension radiographs may be obtained. CT THORACIC SPINE: No acute osseous abnormality. CT LUMBAR SPINE: No acute osseous abnormality. Grade 1 L5-S1 spondylolisthesis degenerative changes at this level.     CT LUMBAR SPINE BONY RECONSTRUCTION    Result Date: 12/15/2024  CT HEAD: No CT evidence for acute intracranial abnormality.. CT CERVICAL SPINE: Degenerative changes most pronounced at C2-C3 and C3-C4 on the left where there are signs of nonacute C3 facet and C4 lateral mass nondisplaced fractures as noted above. There is grade 1 C3-C4 anterolisthesis which has developed since the prior study of 2022.  If there is concern for cervical instability flexion/extension radiographs may be obtained. CT THORACIC SPINE: No acute osseous abnormality. CT LUMBAR SPINE: No acute osseous abnormality. Grade 1 L5-S1 spondylolisthesis degenerative changes at this level.     CT THORACIC SPINE BONY RECONSTRUCTION    Result Date: 12/15/2024  CT HEAD: No CT evidence for acute intracranial abnormality.. CT CERVICAL SPINE: Degenerative changes most pronounced at C2-C3 and C3-C4 on the left where there are signs of nonacute C3 facet and C4 lateral mass  nondisplaced fractures as noted above. There is grade 1 C3-C4 anterolisthesis which has developed since the prior study of 2022.  If there is concern for cervical instability flexion/extension radiographs may be obtained. CT THORACIC SPINE: No acute osseous abnormality. CT LUMBAR SPINE: No acute osseous abnormality. Grade 1 L5-S1 spondylolisthesis degenerative changes at this level.     XR KNEE LEFT (3 VIEWS)    Result Date: 12/15/2024  1. No fracture or dislocation. 2. Mild medial compartment osteoarthritis.     XR SHOULDER RIGHT (MIN 2 VIEWS)    Result Date: 12/15/2024  Normal x-rays of the right shoulder.     XR CHEST PORTABLE    Result Date: 12/15/2024  No radiographic evidence of acute cardiopulmonary process.     XR SHOULDER LEFT (MIN 2 VIEWS)    Result Date: 12/15/2024  No acute abnormality.            Lupe Hamilton DO  12/17/2024, 7:12 AM

## 2024-12-18 ENCOUNTER — APPOINTMENT (OUTPATIENT)
Age: 54
DRG: 552 | End: 2024-12-18
Payer: COMMERCIAL

## 2024-12-18 ENCOUNTER — APPOINTMENT (OUTPATIENT)
Dept: GENERAL RADIOLOGY | Age: 54
DRG: 552 | End: 2024-12-18
Payer: COMMERCIAL

## 2024-12-18 LAB
ANION GAP SERPL CALCULATED.3IONS-SCNC: 5 MMOL/L (ref 9–16)
BASOPHILS # BLD: <0.03 K/UL (ref 0–0.2)
BASOPHILS NFR BLD: 0 % (ref 0–2)
BUN SERPL-MCNC: 8 MG/DL (ref 6–20)
CALCIUM SERPL-MCNC: 8 MG/DL (ref 8.6–10.4)
CHLORIDE SERPL-SCNC: 99 MMOL/L (ref 98–107)
CO2 SERPL-SCNC: 33 MMOL/L (ref 20–31)
CREAT SERPL-MCNC: 0.8 MG/DL (ref 0.6–0.9)
EOSINOPHIL # BLD: 0.13 K/UL (ref 0–0.44)
EOSINOPHILS RELATIVE PERCENT: 2 % (ref 1–4)
ERYTHROCYTE [DISTWIDTH] IN BLOOD BY AUTOMATED COUNT: 12.9 % (ref 11.8–14.4)
GFR, ESTIMATED: 88 ML/MIN/1.73M2
GLUCOSE SERPL-MCNC: 86 MG/DL (ref 74–99)
HCT VFR BLD AUTO: 35.3 % (ref 36.3–47.1)
HGB BLD-MCNC: 10.8 G/DL (ref 11.9–15.1)
IMM GRANULOCYTES # BLD AUTO: <0.03 K/UL (ref 0–0.3)
IMM GRANULOCYTES NFR BLD: 0 %
LYMPHOCYTES NFR BLD: 1.68 K/UL (ref 1.1–3.7)
LYMPHOCYTES RELATIVE PERCENT: 20 % (ref 24–43)
MAGNESIUM SERPL-MCNC: 2.2 MG/DL (ref 1.6–2.6)
MCH RBC QN AUTO: 27.1 PG (ref 25.2–33.5)
MCHC RBC AUTO-ENTMCNC: 30.6 G/DL (ref 28.4–34.8)
MCV RBC AUTO: 88.7 FL (ref 82.6–102.9)
MONOCYTES NFR BLD: 0.68 K/UL (ref 0.1–1.2)
MONOCYTES NFR BLD: 8 % (ref 3–12)
NEUTROPHILS NFR BLD: 70 % (ref 36–65)
NEUTS SEG NFR BLD: 5.79 K/UL (ref 1.5–8.1)
NRBC BLD-RTO: 0 PER 100 WBC
PHOSPHATE SERPL-MCNC: 3.7 MG/DL (ref 2.5–4.5)
PLATELET # BLD AUTO: 216 K/UL (ref 138–453)
PMV BLD AUTO: 9.3 FL (ref 8.1–13.5)
POTASSIUM SERPL-SCNC: 3.7 MMOL/L (ref 3.7–5.3)
RBC # BLD AUTO: 3.98 M/UL (ref 3.95–5.11)
SODIUM SERPL-SCNC: 137 MMOL/L (ref 136–145)
WBC OTHER # BLD: 8.3 K/UL (ref 3.5–11.3)

## 2024-12-18 PROCEDURE — 83735 ASSAY OF MAGNESIUM: CPT

## 2024-12-18 PROCEDURE — 97530 THERAPEUTIC ACTIVITIES: CPT

## 2024-12-18 PROCEDURE — 72125 CT NECK SPINE W/O DYE: CPT

## 2024-12-18 PROCEDURE — 6370000000 HC RX 637 (ALT 250 FOR IP): Performed by: PHYSICIAN ASSISTANT

## 2024-12-18 PROCEDURE — 6360000002 HC RX W HCPCS

## 2024-12-18 PROCEDURE — 97162 PT EVAL MOD COMPLEX 30 MIN: CPT

## 2024-12-18 PROCEDURE — 6360000002 HC RX W HCPCS: Performed by: NURSE PRACTITIONER

## 2024-12-18 PROCEDURE — 6370000000 HC RX 637 (ALT 250 FOR IP): Performed by: NURSE PRACTITIONER

## 2024-12-18 PROCEDURE — 2500000003 HC RX 250 WO HCPCS: Performed by: PHYSICIAN ASSISTANT

## 2024-12-18 PROCEDURE — 97116 GAIT TRAINING THERAPY: CPT

## 2024-12-18 PROCEDURE — 85025 COMPLETE CBC W/AUTO DIFF WBC: CPT

## 2024-12-18 PROCEDURE — 6360000002 HC RX W HCPCS: Performed by: PHYSICIAN ASSISTANT

## 2024-12-18 PROCEDURE — 80048 BASIC METABOLIC PNL TOTAL CA: CPT

## 2024-12-18 PROCEDURE — 97166 OT EVAL MOD COMPLEX 45 MIN: CPT

## 2024-12-18 PROCEDURE — 97535 SELF CARE MNGMENT TRAINING: CPT

## 2024-12-18 PROCEDURE — 2060000000 HC ICU INTERMEDIATE R&B

## 2024-12-18 PROCEDURE — 6370000000 HC RX 637 (ALT 250 FOR IP)

## 2024-12-18 PROCEDURE — 99232 SBSQ HOSP IP/OBS MODERATE 35: CPT | Performed by: SURGERY

## 2024-12-18 PROCEDURE — 84100 ASSAY OF PHOSPHORUS: CPT

## 2024-12-18 PROCEDURE — 72040 X-RAY EXAM NECK SPINE 2-3 VW: CPT

## 2024-12-18 RX ORDER — ACETAMINOPHEN 500 MG
1000 TABLET ORAL EVERY 6 HOURS
Status: DISCONTINUED | OUTPATIENT
Start: 2024-12-18 | End: 2024-12-24 | Stop reason: HOSPADM

## 2024-12-18 RX ORDER — HYDRALAZINE HYDROCHLORIDE 20 MG/ML
10 INJECTION INTRAMUSCULAR; INTRAVENOUS EVERY 6 HOURS PRN
Status: DISCONTINUED | OUTPATIENT
Start: 2024-12-18 | End: 2024-12-20

## 2024-12-18 RX ORDER — FENTANYL CITRATE 50 UG/ML
50 INJECTION, SOLUTION INTRAMUSCULAR; INTRAVENOUS EVERY 4 HOURS PRN
Status: DISCONTINUED | OUTPATIENT
Start: 2024-12-18 | End: 2024-12-20

## 2024-12-18 RX ORDER — ENOXAPARIN SODIUM 100 MG/ML
40 INJECTION SUBCUTANEOUS DAILY
Status: DISCONTINUED | OUTPATIENT
Start: 2024-12-18 | End: 2024-12-20

## 2024-12-18 RX ORDER — LABETALOL HYDROCHLORIDE 5 MG/ML
10 INJECTION, SOLUTION INTRAVENOUS EVERY 6 HOURS PRN
Status: DISCONTINUED | OUTPATIENT
Start: 2024-12-18 | End: 2024-12-20

## 2024-12-18 RX ORDER — CALCIUM GLUCONATE 20 MG/ML
2000 INJECTION, SOLUTION INTRAVENOUS ONCE
Status: COMPLETED | OUTPATIENT
Start: 2024-12-18 | End: 2024-12-18

## 2024-12-18 RX ADMIN — SODIUM CHLORIDE, PRESERVATIVE FREE 10 ML: 5 INJECTION INTRAVENOUS at 07:39

## 2024-12-18 RX ADMIN — POLYETHYLENE GLYCOL 3350 17 G: 17 POWDER, FOR SOLUTION ORAL at 07:38

## 2024-12-18 RX ADMIN — Medication 50 MG: at 08:34

## 2024-12-18 RX ADMIN — SUCRALFATE 1 G: 1 TABLET ORAL at 17:15

## 2024-12-18 RX ADMIN — Medication 2000 MG: at 12:21

## 2024-12-18 RX ADMIN — FENTANYL CITRATE 50 MCG: 50 INJECTION INTRAMUSCULAR; INTRAVENOUS at 14:35

## 2024-12-18 RX ADMIN — METHOCARBAMOL 750 MG: 750 TABLET ORAL at 12:16

## 2024-12-18 RX ADMIN — ACETAMINOPHEN 650 MG: 325 TABLET ORAL at 05:04

## 2024-12-18 RX ADMIN — CALCIUM GLUCONATE 2000 MG: 20 INJECTION, SOLUTION INTRAVENOUS at 05:31

## 2024-12-18 RX ADMIN — GABAPENTIN 800 MG: 800 TABLET ORAL at 21:13

## 2024-12-18 RX ADMIN — Medication 2000 MG: at 02:43

## 2024-12-18 RX ADMIN — PANTOPRAZOLE SODIUM 40 MG: 40 TABLET, DELAYED RELEASE ORAL at 17:15

## 2024-12-18 RX ADMIN — GABAPENTIN 800 MG: 800 TABLET ORAL at 14:35

## 2024-12-18 RX ADMIN — OXYCODONE 10 MG: 5 TABLET ORAL at 23:12

## 2024-12-18 RX ADMIN — METHOCARBAMOL 750 MG: 750 TABLET ORAL at 21:13

## 2024-12-18 RX ADMIN — SUCRALFATE 1 G: 1 TABLET ORAL at 07:38

## 2024-12-18 RX ADMIN — ERGOCALCIFEROL 50000 UNITS: 1.25 CAPSULE ORAL at 08:37

## 2024-12-18 RX ADMIN — METOPROLOL TARTRATE 50 MG: 50 TABLET, FILM COATED ORAL at 12:30

## 2024-12-18 RX ADMIN — ENOXAPARIN SODIUM 40 MG: 100 INJECTION SUBCUTANEOUS at 07:37

## 2024-12-18 RX ADMIN — SODIUM CHLORIDE, PRESERVATIVE FREE 10 ML: 5 INJECTION INTRAVENOUS at 07:40

## 2024-12-18 RX ADMIN — FENTANYL CITRATE 50 MCG: 50 INJECTION INTRAMUSCULAR; INTRAVENOUS at 18:37

## 2024-12-18 RX ADMIN — ACETAMINOPHEN 650 MG: 325 TABLET ORAL at 12:16

## 2024-12-18 RX ADMIN — Medication 50 MG: at 18:20

## 2024-12-18 RX ADMIN — OXYCODONE 10 MG: 5 TABLET ORAL at 17:16

## 2024-12-18 RX ADMIN — PANTOPRAZOLE SODIUM 40 MG: 40 TABLET, DELAYED RELEASE ORAL at 07:38

## 2024-12-18 RX ADMIN — ACETAMINOPHEN 1000 MG: 500 TABLET ORAL at 21:13

## 2024-12-18 RX ADMIN — LABETALOL HYDROCHLORIDE 5 MG: 5 INJECTION, SOLUTION INTRAVENOUS at 08:08

## 2024-12-18 RX ADMIN — METOPROLOL TARTRATE 50 MG: 50 TABLET, FILM COATED ORAL at 21:13

## 2024-12-18 RX ADMIN — Medication 0.2 MG/KG/HR: at 04:40

## 2024-12-18 RX ADMIN — OXYCODONE 10 MG: 5 TABLET ORAL at 07:38

## 2024-12-18 RX ADMIN — FAMOTIDINE 20 MG: 20 TABLET, FILM COATED ORAL at 21:13

## 2024-12-18 RX ADMIN — LABETALOL HYDROCHLORIDE 10 MG: 5 INJECTION, SOLUTION INTRAVENOUS at 23:10

## 2024-12-18 RX ADMIN — SUCRALFATE 1 G: 1 TABLET ORAL at 12:16

## 2024-12-18 RX ADMIN — GABAPENTIN 800 MG: 800 TABLET ORAL at 07:39

## 2024-12-18 RX ADMIN — SUCRALFATE 1 G: 1 TABLET ORAL at 21:13

## 2024-12-18 RX ADMIN — ACETAMINOPHEN 650 MG: 325 TABLET ORAL at 17:16

## 2024-12-18 RX ADMIN — OXYCODONE 5 MG: 5 TABLET ORAL at 02:48

## 2024-12-18 RX ADMIN — OXYCODONE 10 MG: 5 TABLET ORAL at 12:17

## 2024-12-18 RX ADMIN — METHOCARBAMOL 750 MG: 750 TABLET ORAL at 02:43

## 2024-12-18 RX ADMIN — SODIUM CHLORIDE, PRESERVATIVE FREE 10 ML: 5 INJECTION INTRAVENOUS at 21:13

## 2024-12-18 RX ADMIN — DULOXETINE HYDROCHLORIDE 60 MG: 30 CAPSULE, DELAYED RELEASE ORAL at 08:05

## 2024-12-18 RX ADMIN — METHOCARBAMOL 750 MG: 750 TABLET ORAL at 07:39

## 2024-12-18 RX ADMIN — Medication 50 MG: at 00:12

## 2024-12-18 ASSESSMENT — PAIN DESCRIPTION - LOCATION
LOCATION: BACK
LOCATION: BACK;NECK
LOCATION: BACK;NECK
LOCATION: BACK
LOCATION: NECK;BACK

## 2024-12-18 ASSESSMENT — PAIN SCALES - GENERAL
PAINLEVEL_OUTOF10: 9
PAINLEVEL_OUTOF10: 2
PAINLEVEL_OUTOF10: 7
PAINLEVEL_OUTOF10: 6
PAINLEVEL_OUTOF10: 5
PAINLEVEL_OUTOF10: 9
PAINLEVEL_OUTOF10: 10
PAINLEVEL_OUTOF10: 2
PAINLEVEL_OUTOF10: 10
PAINLEVEL_OUTOF10: 5

## 2024-12-18 ASSESSMENT — PAIN DESCRIPTION - DESCRIPTORS
DESCRIPTORS: DISCOMFORT
DESCRIPTORS: DISCOMFORT;SORE;TENDER

## 2024-12-18 NOTE — PROGRESS NOTES
to limited coordination, balance, strength and activity tolerance. Patient would benefit from continued therapy during stay and after discharge from acute setting. Patient is not expected to safely return to prior living arrangements. Demonstrates need for 24/7 skilled assistance to safely perform personal ADLs and mobility.  Prognosis: Good  Decision Making: Medium Complexity  REQUIRES OT FOLLOW-UP: Yes  Activity Tolerance  Activity Tolerance: Patient limited by fatigue;Patient limited by pain  Safety Devices  Type of Devices: Left in chair;Call light within reach;Gait belt;Nurse notified;Patient at risk for falls;All fall risk precautions in place;Chair alarm in place  Restraints  Restraints Initially in Place: No    AM-PAC  AM-[facility] Daily Activity - Inpatient   How much help is needed for putting on and taking off regular lower body clothing?: A Lot  How much help is needed for bathing (which includes washing, rinsing, drying)?: A Lot  How much help is needed for toileting (which includes using toilet, bedpan, or urinal)?: A Lot  How much help is needed for putting on and taking off regular upper body clothing?: A Lot  How much help is needed for taking care of personal grooming?: A Little  How much help for eating meals?: A Little  AM-[facility] Inpatient Daily Activity Raw Score: 14  AM-PAC Inpatient ADL T-Scale Score : 33.39  ADL Inpatient CMS 0-100% Score: 59.67  ADL Inpatient CMS G-Code Modifier : CK    Restrictions/Precautions  Restrictions/Precautions  Restrictions/Precautions: Surgical Protocols  Activity Level: Other (Comment) (Ambulate patient; Activity as tolerated)  Required Braces or Orthoses?: Yes (C-collar when OOB)  Required Braces or Orthoses  Cervical: c-collar  Spinal Other: When out of bed, okay to doff in bed or when eating  Position Activity Restriction  Spinal Precautions: No Bending;No Lifting;No Twisting (Neck)  Other Position/Activity Restrictions: ambulate patient, activity as tolerated, s/p:  (moderately mpaired); opposition digits severe to moderate impairment; finger to nose task moderately accurate with slow movements)  Tone: Normal  Sensation: Impaired (Subjective report of numbness B hand and feet)  Hand Dominance: Right     Objective  Orientation  Overall Orientation Status: Within Normal Limits  Cognition  Overall Cognitive Status: Exceptions  Following Commands: Follows multistep commands with increased time;Follows multistep commands with repitition  Attention Span: Attends with cues to redirect  Safety Judgement: Decreased awareness of need for assistance;Decreased awareness of need for safety  Problem Solving: Assistance required to correct errors made;Assistance required to identify errors made  Sequencing: Requires cues for some    Activities of Daily Living  Feeding: Minimal assistance  Feeding Skilled Clinical Factors: Requires set up of finger food, unable to open packages, once set up able to hold burger and eat fries, left to complete meal indep  Grooming: Moderate assistance;Based on clinical judgement  Grooming Skilled Clinical Factors: Anticipate can perform simple tasks seated but would need assit with 2 handed tasks such as hair care (two hand to put hair in ponytail) and oral care (two hand to put toothpast on tooth brush)  UE Bathing: Based on clinical judgement;Moderate assistance  LE Bathing: Maximum assistance;Based on clinical judgement  UE Dressing: Moderate assistance;Based on clinical judgement  LE Dressing: Maximum assistance;Based on clinical judgement  Toileting: Maximum assistance;Based on clinical judgement    Balance  Balance  Sitting: Without support  Standing: With support    Transfers/Mobility  Bed mobility  Supine to Sit: Minimal assistance;2 Person assistance  Sit to Supine:  (pt retired up to a chair.)  Scooting: Minimal assistance  Bed Mobility Comments: HOB elevated ~40 degrees, increased time/effort to perform.  Trunk instability upon reaching

## 2024-12-18 NOTE — PROGRESS NOTES
Keep majano in 24 hrs post op for strict I&Os per trauma.     Electronically signed by Mel Paula RN on 12/17/2024 at 9:58 PM

## 2024-12-18 NOTE — OP NOTE
Operative Note      Patient: Lupe Walton  YOB: 1970  MRN: [id number]    Date of Procedure: 12/17/2024    Pre-Op Diagnosis Codes:      * Stenosis of cervical spine with myelopathy (HCC) [M48.02, G99.2]    Post-Op Diagnosis: {MH OR SAME:[id number]}       Procedure(s):  CERVICAL TWO THROUGH CERVICAL FOUR LAMINECTOMY AND FUSION    Surgeon(s):  Yareli Downs DO    Assistant:   First Assistant: Tiana Barbosa RN  Physician Assistant: Garcia Mejia PA-C    Anesthesia: General    Estimated Blood Loss (mL): {NUMBERS; EBL:19703}    Complications: {Symptoms; Intra-op complications:14744}    Specimens:   * No specimens in log *    Implants:  Implant Name Type Inv. Item Serial No.  Lot No. LRB No. Used Action   GRAFT CELLR BNE MATRX INFLUX SPARC 5CC - [id number]  GRAFT CELLR BNE MATRX INFLUX SPARC 5CC [id number] Ankota [id number] N/A 1 Implanted   GRAFT CELLR BNE MATRX INFLUX SPARC 5CC - [id number]  GRAFT CELLR BNE MATRX INFLUX SPARC 5CC [id number] [id number] [id number] N/A 1 Implanted   GRAFT CELLR BNE MATRX INFLUX SPARC 5CC - [id number]  GRAFT CELLR BNE MATRX INFLUX SPARC 5CC [id number] [employer] [id number] N/A 1 Implanted   GRAFT CELLR BNE MATRX INFLUX SPARC 5CC - [id number]  GRAFT CELLR BNE MATRX INFLUX SPARC 5CC [id number] Ankota [id number] N/A 1 Implanted   SCREW SPNL L14MM DIA3.5MM OCCIPITOCERVICAL UP THOR - [id number]  SCREW SPNL L14MM DIA3.5MM OCCIPITOCERVICAL UP THOR  MEDTRONIC SOFAMOR DANEK-WD  N/A 4 Implanted   SCREW SPNL L16MM DIA3.5MM OCCIPITOCERVICAL UP THOR - [id number]  SCREW SPNL L16MM DIA3.5MM OCCIPITOCERVICAL UP THOR  MED[employer]ONIC SOFAMOR DANEK-WD  N/A 2 Implanted   ADAM SPNL L40MM DIA3.5MM OCCIPITOCERVICAL UP THOR PRECUT - [id number]  ADAM SPNL L40MM DIA3.5MM OCCIPITOCERVICAL UP THOR PRECUT  [facility] SO[facility] DANE[facility]-WD  N/A 2 Implanted   SET SCREW SPINAL M6 - [id number]  SET SCREW

## 2024-12-18 NOTE — CONSULTS
[facility] Inpatient Brief Diagnostic Assessment Note  INHARIKA uEgene   12/18/2024    Plato R Anton  1970  [id number]      Time Spent with Patient: 15 minutes or less (Brief Diagnostic Assessment) 86671    Pt was provided informed consent for the [facility]. Discussed with patient model of service to include the limits of confidentiality (i.e. abuse reporting, suicide intervention, etc.) and short-term intervention focused approach.  Pt indicated understanding.    [facility] Inpatient or Virtual Question: This was not a virtual session    Is consult a Victim of Crime?: No    Presenting Patient Report:    Patient presents as a 54 y.o. female subsequent to hospital admission following fall at home resulting in injury.     Patient was able to complete the following screens: ITSS          MSE:     Appearance:   Hospital Gown  Speech    spontaneous, normal rate, and normal volume  Affect Observed   Appropriate to Context  Thought Content    intact  Thought Process    linear, goal directed, and coherent  Associations    logical connections  Insight    Good  Judgment    Intact  Orientation    oriented to person, place, time, and general circumstances      Patient reports and/or exhibits the following symptoms:    Mood: Appropriate to Context    Cognitive symptoms: Appropriate to Context    Behaviors: Appropriate to Context    Somatic: None Reported        Assessment:    Patient denies any previous or current symptoms for depression or anxiety.  Patient scored low on Injured Trauma Survivor Screen (ITSS).  Patient does not meet criteria for depression or anxiety diagnosis at this time.     Screening Scale Results (If Any):    ITSS:  Date Screen Completed: 12/18/2024  Patient's score= 1 for PTSD risk. ( >= 2 is positive for PTSD risk. )  Patient's score= 0 for depression risk.  ( >= 2 is positive for Depression risk )          Chief Complaint / Diagnoses: The following Chief Complaint or  Diagnoses are based on currently available information and may change as additional information becomes available.  Observation for suspected mental condition, no diagnosis Z03.89        Patient Interventions:  [facility] Trauma Information Packet Provided and Brief Diagnostic Assessment       Recommendations:  No outpatient mental health services recommended    Plan:  No plan for bedside follow-up during hospital admission

## 2024-12-18 NOTE — PLAN OF CARE
Problem: Pain  Goal: Verbalizes/displays adequate comfort level or baseline comfort level  12/18/2024 1241 by Tiana Montesinos RN  Outcome: Progressing     Problem: Safety - Adult  Goal: Free from fall injury  12/18/2024 1241 by Tiana Montesinos RN  Outcome: Progressing     Problem: Discharge Planning  Goal: Discharge to home or other facility with appropriate resources  12/18/2024 1241 by Tiana Montesinos RN  Outcome: Progressing     Problem: Skin/Tissue Integrity  Goal: Absence of new skin breakdown  Description: 1.  Monitor for areas of redness and/or skin breakdown  2.  Assess vascular access sites hourly  3.  Every 4-6 hours minimum:  Change oxygen saturation probe site  4.  Every 4-6 hours:  If on nasal continuous positive airway pressure, respiratory therapy assess nares and determine need for appliance change or resting period.  12/18/2024 1241 by Tiana Montesinos, RN  Outcome: Progressing     Problem: ABCDS Injury Assessment  Goal: Absence of physical injury  12/18/2024 1241 by Tiana Montesinos, RN  Outcome: Progressing     Problem: Nutrition Deficit:  Goal: Optimize nutritional status  12/18/2024 1241 by Tiana Montesinos, RN  Outcome: Progressing

## 2024-12-18 NOTE — CARE COORDINATION
Transition Planning    Post Acute Facility/Agency List     Provided patient with the following list, the list includes the overall star ratings obtained from CMS per the Medicare Web site (www.Medicare.gov):     [] Long Term Acute Care Facilities  [x] Acute Inpatient Rehabilitation Facilities  [x] Skilled Nursing Facilities  [] Home Care  [] Patient's Insurance specific coverage list for SNF    Provided verbal instructions on how to utilize the QR Code to obtain additional detailed star ratings from www.Medicare.gov    Pt choices are [facility], [facility], and [facility]. Referrals sent.     Call to Ivonne with [facility].     Call to Moraima from [facility]. [facility] able to accept. Will need rx Methodone to state for pain management.     Call to Alana with [facility]. Left HIPAA compliant voice mail.     1635 Ivonne from [facility] able to accept.     1650 Pt prefers to discharge to [facility]. Call to Anastasiya with [facility] and left HIPAA compliant voice mail.

## 2024-12-18 NOTE — BRIEF OP NOTE
Brief Postoperative Note      Patient: Lupe Walton  YOB: 1970  MRN: [id number]    Date of Procedure: 12/17/2024    Pre-Op Diagnosis Codes:      * Stenosis of cervical spine with myelopathy (HCC) [M48.02, G99.2]    Post-Op Diagnosis: Same       Procedure(s):  CERVICAL TWO THROUGH CERVICAL FOUR LAMINECTOMY AND FUSION    Surgeon(s):  Yareli Downs DO    Assistant:   Assistant: Tiana Barbosa RN  Physician Assistant: Garcia Mejia PA-C    Anesthesia: General    Estimated Blood Loss (mL): less than 50     Complications: None    Specimens:   * No specimens in log *    Implants:  Implant Name Type Inv. Item Serial No.  Lot No. LRB No. Used Action   GRAFT CELLR BNE MATRX INFLUX SPARC 5CC - [id number]  GRAFT CELLR BNE MATRX INFLUX SPARC 5CC [id number] [employer] [id number] N/A 1 Implanted   GRAFT CELLR BNE MATRX INFLUX SPARC 5CC - [id number]  GRAFT CELLR BNE MATRX INFLUX SPARC 5CC [id number] ISMainOne-WD [id number] N/A 1 Implanted   GRAFT CELLR BNE MATRX INFLUX SPARC 5CC - [id number]  GRAFT CELLR BNE MATRX INFLUX SPARC 5CC [id number] ISVoltaixWD [id number] N/A 1 Implanted   GRAFT CELLR BNE MATRX INFLUX SPARC 5CC - [id number]  GRAFT CELLR BNE MATRX INFLUX SPARC 5CC [id number] Dub[employer] [id number] N/A 1 Implanted   SCREW SPNL L14MM DIA3.5MM OCCIPITOCERVICAL UP THOR - [id number]  SCREW SPNL L14MM DIA3.5MM OCCIPITOCERVICAL UP THOR  MEDTRONIC SOFAMOR DANEK-WD  N/A 4 Implanted   SCREW SPNL L16MM DIA3.5MM OCCIPITOCERVICAL UP THOR - [id number]  SCREW SPNL L16MM DIA3.5MM OCCIPITOCERVICAL UP THOR  MEDTRONIC SOFAMOR DANEK-WD  N/A 2 Implanted   ADAM SPNL L40MM DIA3.5MM OCCIPITOCERVICAL UP THOR PRECUT - [id number]  ADAM SPNL L40MM DIA3.5MM OCCIPITOCERVICAL UP THOR PRECUT  MEDTRONIC SOFAMOR DANEK-WD  N/A 2 Implanted   SET SCREW SPINAL M6 - [id number]  SET SCREW SPINAL M6  MEDTRONIC SOFAMOR DANEK-  N/A 6  Implanted         Drains:   Closed/Suction Drain Posterior Neck (Active)   Site Description Clean;Dry;Intact 12/17/24 1753   Dressing Status Clean, dry & intact 12/17/24 1753   Drainage Appearance Bright red 12/17/24 1753   Drain Status Compressed 12/17/24 1753   Output (ml) 0 ml 12/17/24 1753       Urinary Catheter 12/17/24 2 Way (Active)   Catheter Indications Perioperative use for selected surgical procedures 12/17/24 1755   Site Assessment Pink 12/17/24 1755   Urine Color Yellow 12/17/24 1755   Urine Appearance Clear 12/17/24 1755   Collection Container Standard 12/17/24 1755   Securement Method Leg strap 12/17/24 1755   Catheter Best Practices  Drainage tube clipped to bed;Catheter secured to thigh;Tamper seal intact;Bag below bladder;Bag not on floor;Lack of dependent loop in tubing;Drainage bag less than half full 12/17/24 1755   Status Draining 12/17/24 1755   Output (mL) 225 mL 12/17/24 1753       [REMOVED] External Urinary Catheter (Removed)   Site Assessment Clean,dry & intact 12/17/24 0750   Placement Replaced 12/17/24 0750   Securement Method Securing device (Describe) 12/17/24 0750   Catheter Care Catheter/Wick replaced 12/17/24 0750   Perineal Care Yes 12/17/24 0750   Suction 40 mmgHg continuous 12/17/24 0750   Urine Color Yellow 12/17/24 0750   Urine Appearance Clear 12/17/24 0750   Output (mL) 50 mL 12/17/24 0750       Findings:  Infection Present At Time Of Surgery (PATOS) (choose all levels that have infection present):  No infection present  Other Findings:     Electronically signed by Yareli Downs DO on 12/17/2024 at 7:08 PM

## 2024-12-18 NOTE — PROGRESS NOTES
Physical Therapy  Facility/Department: [facility] CAR 1- SICU  Physical Therapy Initial Assessment    Name: Lupe Walton  : 1970  MRN: [id number]  Date of Service: 2024  Chief Complaint   Patient presents with    Fall      Discharge Recommendations:  Patient would benefit from continued therapy after discharge   PT Equipment Recommendations  Equipment Needed: No (Pt reports owning a RW)      Patient Diagnosis(es): The primary encounter diagnosis was Closed fracture of manubrium, initial encounter. A diagnosis of Fall from standing, initial encounter was also pertinent to this visit.  Past Medical History:  has a past medical history of Acute gastric ulcer with perforation (HCC), Asthma, Connective tissue disease, undifferentiated (HCC), DDD (degenerative disc disease), Depression, Lumbar radicular pain, Migraine, Mitral valve prolapse, and Opiate addiction ([facility]).  Past Surgical History:  has a past surgical history that includes Total abdominal hysterectomy w/ bilateral salpingoophorectomy (); hernia repair (); Nose surgery (); Tubal ligation (); ovarian cyst removal (Left, ); Nerve Block (Right, 2015); Hysterectomy; CT ABSCESS DRAINAGE (2021); laparotomy (N/A, 2021); laparotomy (N/A, 2021); Colonoscopy (N/A, 2021); Colonoscopy (2021); Colonoscopy (N/A, 2021); Abdomen surgery; gastrectomy (2021); Upper gastrointestinal endoscopy (N/A, 2021); Colonoscopy (N/A, 2021); Upper gastrointestinal endoscopy (N/A, 2024); cervical laminectomy (2024); and cervical fusion (N/A, 2024).    Assessment  Body Structures, Functions, Activity Limitations Requiring Skilled Therapeutic Intervention: Decreased functional mobility ;Decreased endurance;Decreased balance;Decreased strength;Decreased safe awareness;Decreased coordination  Assessment: Pt with mobility deficits requiring min-A x2 to ambulate 6 feet with a RW.  Pt is grossly

## 2024-12-18 NOTE — ANESTHESIA POSTPROCEDURE EVALUATION
Department of Anesthesiology  Postprocedure Note    Patient: Lupe Watlon  MRN: [id number]  YOB: 1970  Date of evaluation: 12/17/2024    Procedure Summary       Date: 12/17/24 Room / Location: [address]    Anesthesia Start: 1056 Anesthesia Stop: 1453    Procedure: CERVICAL TWO THROUGH CERVICAL FOUR LAMINECTOMY AND FUSION Diagnosis:       Stenosis of cervical spine with myelopathy (HCC)      (Stenosis of cervical spine with myelopathy (HCC) [M48.02, G99.2])    Surgeons: Yareli Downs DO Responsible Provider: Jaquan Diaz MD    Anesthesia Type: general ASA Status: 3            Anesthesia Type: No value filed.    Jamarcus Phase I: Jamarcus Score: 7    Jamarcus Phase II:      Anesthesia Post Evaluation    Patient location during evaluation: PACU  Patient participation: complete - patient participated  Level of consciousness: awake  Pain score: 0  Airway patency: patent  Nausea & Vomiting: no nausea and no vomiting  Cardiovascular status: blood pressure returned to baseline  Respiratory status: acceptable  Hydration status: euvolemic  Comments: BP (!) 165/107   Pulse (!) 112   Temp 98 °F (36.7 °C) (Oral)   Resp 27   Ht 1.702 m (5' 7.01\")   Wt 56 kg (123 lb 7.3 oz)   SpO2 96%   BMI 19.33 kg/m²   Pain management: adequate      No notable events documented.

## 2024-12-18 NOTE — PROGRESS NOTES
Neurosurgery FARSHAD/Resident    Daily Progress Note   Chief Complaint   Patient presents with    Fall     12/18/2024  6:31 AM    Chart reviewed.  No acute events overnight.  No new complaints. Drain with 125mL/12 hours. Patient state she was able to feed herself dinner last night and was able to get herself something to drink as well. States that her pain was tolerable until she had to put her cervical collar back on to get up to the chair and then her shoulders started hurting. Strength appears improved.     Vitals:    12/18/24 0530 12/18/24 0545 12/18/24 0600 12/18/24 0601   BP:       Pulse: (!) 115 (!) 110 (!) 111    Resp: 15 14 11    Temp:       TempSrc:       SpO2: 96% 94% 96%    Weight:    57.6 kg (126 lb 15.8 oz)   Height:             PE:   AOx3   CNII-XII intact   PERRL, EOMI   Motor   L deltoid 4/5; R deltoid 4/5  L biceps 4/5; R biceps 4/5  L triceps 4/5; R triceps 4/5  L wrist extension 3/5; R wrist extension 3/5  L intrinsics 3/5; R intrinsics 3/5   Does have some clawing of her fingers     L iliopsoas 4/5 , R iliopsoas 4/5  L quadriceps 4/5; R quadriceps 4/5  L Dorsiflexion 4/5; R dorsiflexion 4/5  L Plantarflexion 4/5; R plantarflexion 4/5  L EHL 4/5; R EHL 4/5    Sensation: intact    Drain output: 125mL/12 hours  Incision: CDI      Lab Results   Component Value Date    WBC 8.3 12/18/2024    HGB 10.8 (L) 12/18/2024    HCT 35.3 (L) 12/18/2024     12/18/2024    CHOL 117 06/26/2024    TRIG 80 06/26/2024    HDL 40 (L) 06/26/2024    ALT 19 12/05/2024    AST 25 12/05/2024     12/18/2024    K 3.7 12/18/2024    CL 99 12/18/2024    CREATININE 0.8 12/18/2024    BUN 8 12/18/2024    CO2 33 (H) 12/18/2024    TSH 4.02 12/05/2024    INR 1.0 12/15/2024    CRP 3.99 (H) 11/20/2024    SEDRATE 21 11/20/2024       A/P  54 y.o. female who presents with stenosis of cervical spine with myelopathy.    POD #1 S/P: C2-C4 posterior laminectomy and fusion    Maintain drain and document output  Lovenox for DVT

## 2024-12-18 NOTE — CARE COORDINATION
Trauma Coordinator Rounding Note  Daily check in:  I met with Lupe Walton  at bedside to review their plan of care. I allowed opportunity for Lupe Walton to ask questions regarding their injuries, expected length of stay and disposition plan. All questions answered to verbal satisfaction.   [x]Wounds  [x]PT/OT/speech  [x]Incentive spirometer  [x]Diet  [x]Activity  Bedside Nurse:  I spoke with the patient's assigned nurse to review the plan of care for today and provide an opportunity to ask questions or relay any concerns to the Trauma service.    :  I provided a clinical update to the unit  and confirmed the disposition plan. Current barriers to discharge include: facility acceptance and not medically stable for discharge due to continued medical monitoring indicated.   PIC Score  IS Goal Volume (15ml/kg IBW): 924   Pain  Patient reported 1-10 scale Inspiration  Incentive Spirometer    Volume obtained: 1500 ml Cough  Assessed by nurse     3  Mild  Pain intensity scale 0-4    [] 4  Above goal volume  [x]   3  Strong    [x]    3  Goal to alert volume  []    2  Moderate  Pain intensity scale 5-7  [x] 2  Below alert volume    [] 2   Weak    []   1  Severe   Pain intensity scale 8-10  [] 1  Unable to perform incentive spirometry    [] 1  Absent      []         Total: 9         Electronically signed by Yin Wheeler RN on 12/18/2024 at 3:30 PM

## 2024-12-18 NOTE — PROGRESS NOTES
ICU PROGRESS NOTE        PATIENT NAME: Lupe Walton  MEDICAL RECORD NO. [id number]  DATE: 12/18/2024    HD: # 3      ACUTE DIAGNOSES/PLAN  Neuro:  GCS 15  POD #1 C2-C4 Laminectomy and fusion  H/o opiate dependence  Methadone 145mg Qd, split into 50mg every 8 hours to help with pain control  Pain control: Tylenol 1g q8 hrs, Robaxin 750mg, Roxicodone 5mg q6 hrs or 10 mg for severe pain, Ketamine gtt, Fioricet 1 tabet PRN  Home meds: Ibuprofen 800mg q8 hrs, Gabapentin 800mg TID, Duloxetine 60mg QD  CV  Hypertensive without vasopressor support  HR   MAP . Aim MAP > 85   Home med: Lopressor 50mg BID, held   Pulm  Superior sternal body fracture with displacement with possible pulmonary contusion  PIC 8, IS 1500 cc  Nasal cannula  Encourage incentive spirometry  Pain control  GI/Nutrition  H/o gastric ulcer perforation  Home meds: Pepcid QHS, Protonix BID  Diet: Regular   Renal/lytes  Monitor UOP  2.2 cc/kg/hr   Daily BMP  Na/K 137/3.7  BUN/Cr 8/0.8  Mg/Phos 2.2/3.7  Heme  Hgb 10.8 (12.5), Platelets 216 (223)  DVT prophylaxis-holding  Endocrine  Blood sugar , has not required coverage   Musculoskeletal  PT/OT when cleared by neurosurgery   Skin  Monitor for changes  Micro  T 37.2C  WBC 8.3 (6.6)  UA wnl (12/16)  Family/dispo  Continue ICU care  Lines  PIV x 2 (12/15, 12/16)  External catheter (12/16)  L radial art line (12/16)      CHECKLIST    CAM-ICU RASS: 0  RESTRAINTS: none  IVF: none  NUTRITION: regular diet  ANTIBIOTICS: none  GI: Protonix, Pepcid  DVT: Holding until 12/19 as per NS  GLYCEMIC CONTROL: has not required  HOB >45: yes  MOBILITY: bedrest, spinal precautions  SBT: n/a  IS: 1500cc  Wound care: Wound is clean, dry, no erythema noted    Chief Complaint: \"my shoulders hurt\"    SUBJECTIVE    Lupe Walton  is a 55 yo female that presented to the ED following multiple falls at home. She states that she has been seen multiple times for upper limb weakness and numbness additionally. Patient  instability flexion/extension radiographs may be obtained. CT THORACIC SPINE: No acute osseous abnormality. CT LUMBAR SPINE: No acute osseous abnormality. Grade 1 L5-S1 spondylolisthesis degenerative changes at this level.     CT THORACIC SPINE BONY RECONSTRUCTION    Result Date: 12/15/2024  CT HEAD: No CT evidence for acute intracranial abnormality.. CT CERVICAL SPINE: Degenerative changes most pronounced at C2-C3 and C3-C4 on the left where there are signs of nonacute C3 facet and C4 lateral mass nondisplaced fractures as noted above. There is grade 1 C3-C4 anterolisthesis which has developed since the prior study of 2022.  If there is concern for cervical instability flexion/extension radiographs may be obtained. CT THORACIC SPINE: No acute osseous abnormality. CT LUMBAR SPINE: No acute osseous abnormality. Grade 1 L5-S1 spondylolisthesis degenerative changes at this level.     XR KNEE LEFT (3 VIEWS)    Result Date: 12/15/2024  1. No fracture or dislocation. 2. Mild medial compartment osteoarthritis.     XR SHOULDER RIGHT (MIN 2 VIEWS)    Result Date: 12/15/2024  Normal x-rays of the right shoulder.     XR CHEST PORTABLE    Result Date: 12/15/2024  No radiographic evidence of acute cardiopulmonary process.     XR SHOULDER LEFT (MIN 2 VIEWS)    Result Date: 12/15/2024  No acute abnormality.            Yannick Ayers MD  12/18/2024, 7:51 AM        Attending Attestation:  I personally evaluated the patient and directed the medical decision making with Resident after the physical/radiologic exam and laboratory values were reviewed and confirmed.    Continue to ensure MAP >85.   Ok for diet.   Pain control.  Doing well overall.     Jo Greene MD

## 2024-12-18 NOTE — PLAN OF CARE
Problem: Pain  Goal: Verbalizes/displays adequate comfort level or baseline comfort level  Outcome: Progressing     Problem: Safety - Adult  Goal: Free from fall injury  Outcome: Progressing  Flowsheets (Taken 12/17/2024 1921)  Free From Fall Injury: Instruct family/caregiver on patient safety     Problem: Discharge Planning  Goal: Discharge to home or other facility with appropriate resources  Outcome: Progressing  Flowsheets (Taken 12/17/2024 2000)  Discharge to home or other facility with appropriate resources: Identify barriers to discharge with patient and caregiver     Problem: Skin/Tissue Integrity  Goal: Absence of new skin breakdown  Description: 1.  Monitor for areas of redness and/or skin breakdown  2.  Assess vascular access sites hourly  3.  Every 4-6 hours minimum:  Change oxygen saturation probe site  4.  Every 4-6 hours:  If on nasal continuous positive airway pressure, respiratory therapy assess nares and determine need for appliance change or resting period.  Outcome: Progressing     Problem: ABCDS Injury Assessment  Goal: Absence of physical injury  Outcome: Progressing  Flowsheets (Taken 12/17/2024 1921)  Absence of Physical Injury: Implement safety measures based on patient assessment     Problem: Respiratory - Adult  Goal: Achieves optimal ventilation and oxygenation  Outcome: Progressing     Problem: Nutrition Deficit:  Goal: Optimize nutritional status  Outcome: Progressing

## 2024-12-19 LAB
ANION GAP SERPL CALCULATED.3IONS-SCNC: 8 MMOL/L (ref 9–16)
BASOPHILS # BLD: <0.03 K/UL (ref 0–0.2)
BASOPHILS NFR BLD: 0 % (ref 0–2)
BUN SERPL-MCNC: 8 MG/DL (ref 6–20)
CA-I BLD-SCNC: 1.1 MMOL/L (ref 1.13–1.33)
CALCIUM SERPL-MCNC: 8.1 MG/DL (ref 8.6–10.4)
CHLORIDE SERPL-SCNC: 98 MMOL/L (ref 98–107)
CO2 SERPL-SCNC: 32 MMOL/L (ref 20–31)
CREAT SERPL-MCNC: 0.7 MG/DL (ref 0.6–0.9)
EOSINOPHIL # BLD: 0.3 K/UL (ref 0–0.44)
EOSINOPHILS RELATIVE PERCENT: 5 % (ref 1–4)
ERYTHROCYTE [DISTWIDTH] IN BLOOD BY AUTOMATED COUNT: 13.2 % (ref 11.8–14.4)
GFR, ESTIMATED: >90 ML/MIN/1.73M2
GLUCOSE BLD-MCNC: 172 MG/DL (ref 65–105)
GLUCOSE SERPL-MCNC: 94 MG/DL (ref 74–99)
HCT VFR BLD AUTO: 37.2 % (ref 36.3–47.1)
HGB BLD-MCNC: 11.6 G/DL (ref 11.9–15.1)
IMM GRANULOCYTES # BLD AUTO: <0.03 K/UL (ref 0–0.3)
IMM GRANULOCYTES NFR BLD: 0 %
LYMPHOCYTES NFR BLD: 1.42 K/UL (ref 1.1–3.7)
LYMPHOCYTES RELATIVE PERCENT: 24 % (ref 24–43)
MAGNESIUM SERPL-MCNC: 2.1 MG/DL (ref 1.6–2.6)
MCH RBC QN AUTO: 27.6 PG (ref 25.2–33.5)
MCHC RBC AUTO-ENTMCNC: 31.2 G/DL (ref 28.4–34.8)
MCV RBC AUTO: 88.6 FL (ref 82.6–102.9)
MONOCYTES NFR BLD: 0.54 K/UL (ref 0.1–1.2)
MONOCYTES NFR BLD: 9 % (ref 3–12)
NEUTROPHILS NFR BLD: 62 % (ref 36–65)
NEUTS SEG NFR BLD: 3.71 K/UL (ref 1.5–8.1)
NRBC BLD-RTO: 0 PER 100 WBC
PHOSPHATE SERPL-MCNC: 4.3 MG/DL (ref 2.5–4.5)
PLATELET # BLD AUTO: 205 K/UL (ref 138–453)
PMV BLD AUTO: 9.4 FL (ref 8.1–13.5)
POTASSIUM SERPL-SCNC: 3.8 MMOL/L (ref 3.7–5.3)
RBC # BLD AUTO: 4.2 M/UL (ref 3.95–5.11)
SODIUM SERPL-SCNC: 138 MMOL/L (ref 136–145)
WBC OTHER # BLD: 6 K/UL (ref 3.5–11.3)

## 2024-12-19 PROCEDURE — 82947 ASSAY GLUCOSE BLOOD QUANT: CPT

## 2024-12-19 PROCEDURE — 6370000000 HC RX 637 (ALT 250 FOR IP)

## 2024-12-19 PROCEDURE — 6370000000 HC RX 637 (ALT 250 FOR IP): Performed by: PHYSICIAN ASSISTANT

## 2024-12-19 PROCEDURE — 84100 ASSAY OF PHOSPHORUS: CPT

## 2024-12-19 PROCEDURE — 2500000003 HC RX 250 WO HCPCS

## 2024-12-19 PROCEDURE — 6360000002 HC RX W HCPCS

## 2024-12-19 PROCEDURE — 99254 IP/OBS CNSLTJ NEW/EST MOD 60: CPT | Performed by: STUDENT IN AN ORGANIZED HEALTH CARE EDUCATION/TRAINING PROGRAM

## 2024-12-19 PROCEDURE — 6360000002 HC RX W HCPCS: Performed by: NURSE PRACTITIONER

## 2024-12-19 PROCEDURE — 2060000000 HC ICU INTERMEDIATE R&B

## 2024-12-19 PROCEDURE — 82330 ASSAY OF CALCIUM: CPT

## 2024-12-19 PROCEDURE — 85025 COMPLETE CBC W/AUTO DIFF WBC: CPT

## 2024-12-19 PROCEDURE — 6370000000 HC RX 637 (ALT 250 FOR IP): Performed by: NURSE PRACTITIONER

## 2024-12-19 PROCEDURE — 80048 BASIC METABOLIC PNL TOTAL CA: CPT

## 2024-12-19 PROCEDURE — 36415 COLL VENOUS BLD VENIPUNCTURE: CPT

## 2024-12-19 PROCEDURE — 97535 SELF CARE MNGMENT TRAINING: CPT

## 2024-12-19 PROCEDURE — 99232 SBSQ HOSP IP/OBS MODERATE 35: CPT | Performed by: SURGERY

## 2024-12-19 PROCEDURE — 83735 ASSAY OF MAGNESIUM: CPT

## 2024-12-19 RX ADMIN — FAMOTIDINE 20 MG: 20 TABLET, FILM COATED ORAL at 21:12

## 2024-12-19 RX ADMIN — POLYETHYLENE GLYCOL 3350 17 G: 17 POWDER, FOR SOLUTION ORAL at 08:06

## 2024-12-19 RX ADMIN — GABAPENTIN 800 MG: 800 TABLET ORAL at 21:12

## 2024-12-19 RX ADMIN — SUCRALFATE 1 G: 1 TABLET ORAL at 12:49

## 2024-12-19 RX ADMIN — Medication 50 MG: at 01:29

## 2024-12-19 RX ADMIN — FENTANYL CITRATE 50 MCG: 50 INJECTION INTRAMUSCULAR; INTRAVENOUS at 21:11

## 2024-12-19 RX ADMIN — FENTANYL CITRATE 50 MCG: 50 INJECTION INTRAMUSCULAR; INTRAVENOUS at 15:57

## 2024-12-19 RX ADMIN — GABAPENTIN 800 MG: 800 TABLET ORAL at 12:49

## 2024-12-19 RX ADMIN — DULOXETINE HYDROCHLORIDE 60 MG: 30 CAPSULE, DELAYED RELEASE ORAL at 08:06

## 2024-12-19 RX ADMIN — Medication 50 MG: at 06:36

## 2024-12-19 RX ADMIN — ACETAMINOPHEN 1000 MG: 500 TABLET ORAL at 23:10

## 2024-12-19 RX ADMIN — FENTANYL CITRATE 50 MCG: 50 INJECTION INTRAMUSCULAR; INTRAVENOUS at 10:14

## 2024-12-19 RX ADMIN — METHOCARBAMOL 750 MG: 750 TABLET ORAL at 06:36

## 2024-12-19 RX ADMIN — HYDRALAZINE HYDROCHLORIDE 10 MG: 20 INJECTION INTRAMUSCULAR; INTRAVENOUS at 09:25

## 2024-12-19 RX ADMIN — METHOCARBAMOL 750 MG: 750 TABLET ORAL at 01:29

## 2024-12-19 RX ADMIN — Medication 50 MG: at 17:35

## 2024-12-19 RX ADMIN — PANTOPRAZOLE SODIUM 40 MG: 40 TABLET, DELAYED RELEASE ORAL at 06:36

## 2024-12-19 RX ADMIN — OXYCODONE 10 MG: 5 TABLET ORAL at 04:45

## 2024-12-19 RX ADMIN — SUCRALFATE 1 G: 1 TABLET ORAL at 21:12

## 2024-12-19 RX ADMIN — SUCRALFATE 1 G: 1 TABLET ORAL at 06:36

## 2024-12-19 RX ADMIN — METOPROLOL TARTRATE 50 MG: 50 TABLET, FILM COATED ORAL at 08:06

## 2024-12-19 RX ADMIN — ACETAMINOPHEN 1000 MG: 500 TABLET ORAL at 17:37

## 2024-12-19 RX ADMIN — OXYCODONE 10 MG: 5 TABLET ORAL at 18:39

## 2024-12-19 RX ADMIN — METOPROLOL TARTRATE 50 MG: 50 TABLET, FILM COATED ORAL at 21:12

## 2024-12-19 RX ADMIN — SUCRALFATE 1 G: 1 TABLET ORAL at 17:38

## 2024-12-19 RX ADMIN — PANTOPRAZOLE SODIUM 40 MG: 40 TABLET, DELAYED RELEASE ORAL at 17:38

## 2024-12-19 RX ADMIN — LABETALOL HYDROCHLORIDE 10 MG: 5 INJECTION, SOLUTION INTRAVENOUS at 10:14

## 2024-12-19 RX ADMIN — GABAPENTIN 800 MG: 800 TABLET ORAL at 08:06

## 2024-12-19 RX ADMIN — ACETAMINOPHEN 1000 MG: 500 TABLET ORAL at 12:50

## 2024-12-19 RX ADMIN — METHOCARBAMOL 750 MG: 750 TABLET ORAL at 17:38

## 2024-12-19 RX ADMIN — SODIUM CHLORIDE, PRESERVATIVE FREE 10 ML: 5 INJECTION INTRAVENOUS at 21:11

## 2024-12-19 RX ADMIN — METHOCARBAMOL 750 MG: 750 TABLET ORAL at 12:49

## 2024-12-19 RX ADMIN — ENOXAPARIN SODIUM 40 MG: 100 INJECTION SUBCUTANEOUS at 08:06

## 2024-12-19 RX ADMIN — OXYCODONE 10 MG: 5 TABLET ORAL at 13:36

## 2024-12-19 RX ADMIN — Medication 0.2 MG/KG/HR: at 23:15

## 2024-12-19 RX ADMIN — ACETAMINOPHEN 1000 MG: 500 TABLET ORAL at 04:45

## 2024-12-19 RX ADMIN — OXYCODONE 10 MG: 5 TABLET ORAL at 09:24

## 2024-12-19 ASSESSMENT — PAIN DESCRIPTION - FREQUENCY
FREQUENCY: CONTINUOUS

## 2024-12-19 ASSESSMENT — PAIN SCALES - GENERAL
PAINLEVEL_OUTOF10: 8
PAINLEVEL_OUTOF10: 7
PAINLEVEL_OUTOF10: 3
PAINLEVEL_OUTOF10: 8
PAINLEVEL_OUTOF10: 9
PAINLEVEL_OUTOF10: 7
PAINLEVEL_OUTOF10: 7
PAINLEVEL_OUTOF10: 8
PAINLEVEL_OUTOF10: 9
PAINLEVEL_OUTOF10: 7
PAINLEVEL_OUTOF10: 4
PAINLEVEL_OUTOF10: 4
PAINLEVEL_OUTOF10: 9
PAINLEVEL_OUTOF10: 8
PAINLEVEL_OUTOF10: 7
PAINLEVEL_OUTOF10: 10
PAINLEVEL_OUTOF10: 8

## 2024-12-19 ASSESSMENT — PAIN DESCRIPTION - ONSET
ONSET: ON-GOING

## 2024-12-19 ASSESSMENT — PAIN DESCRIPTION - LOCATION
LOCATION: NECK;SHOULDER
LOCATION: BACK
LOCATION: NECK;SHOULDER
LOCATION: SHOULDER;NECK
LOCATION: NECK;SHOULDER
LOCATION: BACK
LOCATION: NECK;SHOULDER

## 2024-12-19 ASSESSMENT — PAIN DESCRIPTION - PAIN TYPE
TYPE: ACUTE PAIN;CHRONIC PAIN;SURGICAL PAIN
TYPE: SURGICAL PAIN;ACUTE PAIN;CHRONIC PAIN
TYPE: ACUTE PAIN;CHRONIC PAIN;SURGICAL PAIN

## 2024-12-19 ASSESSMENT — PAIN DESCRIPTION - DESCRIPTORS
DESCRIPTORS: DISCOMFORT;SORE;TENDER
DESCRIPTORS: ACHING
DESCRIPTORS: ACHING;DISCOMFORT
DESCRIPTORS: ACHING
DESCRIPTORS: ACHING

## 2024-12-19 ASSESSMENT — PAIN - FUNCTIONAL ASSESSMENT
PAIN_FUNCTIONAL_ASSESSMENT: ACTIVITIES ARE NOT PREVENTED

## 2024-12-19 ASSESSMENT — PAIN DESCRIPTION - ORIENTATION
ORIENTATION: RIGHT

## 2024-12-19 NOTE — PLAN OF CARE
Problem: Pain  Goal: Verbalizes/displays adequate comfort level or baseline comfort level  12/19/2024 0711 by Tiffani Naylor RN  Outcome: Progressing  12/19/2024 0415 by Zenia Moore RN  Outcome: Progressing     Problem: Safety - Adult  Goal: Free from fall injury  12/19/2024 0711 by Tiffani Naylor RN  Outcome: Progressing  12/19/2024 0415 by Zenia Moore RN  Outcome: Progressing     Problem: Discharge Planning  Goal: Discharge to home or other facility with appropriate resources  12/19/2024 0711 by Tiffani Naylor RN  Outcome: Progressing  12/19/2024 0415 by Zenia Moore RN  Outcome: Progressing     Problem: Skin/Tissue Integrity  Goal: Absence of new skin breakdown  Description: 1.  Monitor for areas of redness and/or skin breakdown  2.  Assess vascular access sites hourly  3.  Every 4-6 hours minimum:  Change oxygen saturation probe site  4.  Every 4-6 hours:  If on nasal continuous positive airway pressure, respiratory therapy assess nares and determine need for appliance change or resting period.  12/19/2024 0711 by Tiffani Naylor RN  Outcome: Progressing  12/19/2024 0415 by Zenia Moore RN  Outcome: Progressing     Problem: ABCDS Injury Assessment  Goal: Absence of physical injury  12/19/2024 0711 by Tiffani Naylor RN  Outcome: Progressing  12/19/2024 0415 by Zenia Moore RN  Outcome: Progressing     Problem: Respiratory - Adult  Goal: Achieves optimal ventilation and oxygenation  12/19/2024 0711 by Tiffani Naylor RN  Outcome: Progressing  12/19/2024 0415 by Zenia Moore RN  Outcome: Progressing     Problem: Nutrition Deficit:  Goal: Optimize nutritional status  12/19/2024 0711 by Tiffani Naylor RN  Outcome: Progressing  12/19/2024 0415 by Zenia Moroe RN  Outcome: Progressing

## 2024-12-19 NOTE — PROGRESS NOTES
ICU PROGRESS NOTE        PATIENT NAME: Lupe Walton  MEDICAL RECORD NO. [id number]  DATE: 12/19/2024    HD: # 4      ACUTE DIAGNOSES/PLAN  Neuro:  GCS 15  POD #2 C2-C4 Laminectomy and fusion  Lower extremity numbness. Followed up with NS. Will consider MRI lumbar.  H/o opiate dependence  Methadone 145mg Qd, split into 50mg every 8 hours to help with pain control  Pain control: Tylenol 1g q8 hrs, Robaxin 750mg, Roxicodone 5mg q6 hrs or 10 mg for severe pain, Ketamine gtt, Fioricet 1 tabet PRN  Home meds: Ibuprofen 800mg q8 hrs, Gabapentin 800mg TID, Duloxetine 60mg QD  CV  Hypertensive without vasopressor support  HR   MAP currently 110. Range: . Aim MAP > 85   Home med: Lopressor 50mg BID, held   Pulm  Superior sternal body fracture with displacement with possible pulmonary contusion  PIC 8, IS 1500 cc  Nasal cannula  Encourage incentive spirometry  Pain control  GI/Nutrition  H/o gastric ulcer perforation  Home meds: Pepcid QHS, Protonix BID  Diet: Regular   Renal/lytes  Monitor UOP  1.2 cc/kg/hr   Daily BMP  Na/K 138/3.8  BUN/Cr 8/0.7  Mg/Phos 2.1/4.3  Heme  Hgb 11.6 (10.8), Platelets 205 (216)  DVT prophylaxi lovenox  Endocrine  Blood sugar 94  (BMP); is not on insulin, is not a diabetic  Musculoskeletal  PT/OT when cleared by neurosurgery   Skin  Monitor for changes  Micro  T 36.7C  WBC 8.3 (6.6)  UA wnl (12/16)  Family/dispo  Continue ICU care  Lines  PIV x 2 (12/15, 12/16)  External catheter (12/16)  L radial art line (12/16)      CHECKLIST    CAM-ICU RASS: 0  RESTRAINTS: none  IVF: none  NUTRITION: regular diet  ANTIBIOTICS: none  GI: Protonix, Pepcid  DVT: Lovenox  GLYCEMIC CONTROL: has not required  HOB >45: yes  MOBILITY: bedrest, spinal precautions  SBT: n/a  IS: 1500cc  Wound care: Wound is clean, dry, no erythema noted    Chief Complaint: \"my shoulders hurt\"    SUBJECTIVE    Lupe Walton  is a 55 yo female that presented to the ED following multiple falls at home. She states that she  has been seen multiple times for upper limb weakness and numbness additionally. Patient normally uses a walker for ambulation and fell from standing height, hitting her chest area as well as back and neck. Patient says she is unsteady on her feet and falls often. Patient has a chronic history of steroid usage for her RA and lupus, and is also on methadone due to her history of opioid usage. Patient denies any significant chest pain, SOB, abdominal pain, nausea and vomiting at this time. Patient denies any LOC. Patient denies any chronic alcohol usage and history of withdrawal.   Patient is not on any oral anticoagulants.    On evaluation today, pt states she is feeling the same as yesterday. Her right shoulder is more painful than her left due to her shoulder brace. She states that her bilateral feet have been numb and tingling for the last month, however she has no significant physical exam findings. She states this is why she had her fall. She is afebrile, denies nausea, vomiting at this time. She states that she is passing flatus however has not had a bowel movement since either 12/14 or 12/15. She still has indwelling urinary catheter and denies pain in that area.     OBJECTIVE  VITALS:   Vitals:    12/19/24 1044   BP:    Pulse:    Resp: 18   Temp:    SpO2:        Physical Exam  Constitutional:       General: She is awake.      Interventions: Cervical collar in place.   HENT:      Head: Normocephalic and atraumatic.   Eyes:      Pupils: Pupils are equal, round, and reactive to light.   Cardiovascular:      Rate and Rhythm: Regular rhythm. Tachycardia present.      Pulses: Normal pulses.   Pulmonary:      Effort: Pulmonary effort is normal.   Chest:      Chest wall: No tenderness or crepitus.   Abdominal:      General: Abdomen is flat.      Palpations: Abdomen is soft.    Musculoskeletal:      Cervical back: Tenderness present. Spinous process tenderness present.      Comments: Paraspinal muscular tenderness  osseous abnormality. CT LUMBAR SPINE: No acute osseous abnormality. Grade 1 L5-S1 spondylolisthesis degenerative changes at this level.     CT THORACIC SPINE BONY RECONSTRUCTION    Result Date: 12/15/2024  CT HEAD: No CT evidence for acute intracranial abnormality.. CT CERVICAL SPINE: Degenerative changes most pronounced at C2-C3 and C3-C4 on the left where there are signs of nonacute C3 facet and C4 lateral mass nondisplaced fractures as noted above. There is grade 1 C3-C4 anterolisthesis which has developed since the prior study of 2022.  If there is concern for cervical instability flexion/extension radiographs may be obtained. CT THORACIC SPINE: No acute osseous abnormality. CT LUMBAR SPINE: No acute osseous abnormality. Grade 1 L5-S1 spondylolisthesis degenerative changes at this level.     XR KNEE LEFT (3 VIEWS)    Result Date: 12/15/2024  1. No fracture or dislocation. 2. Mild medial compartment osteoarthritis.     XR SHOULDER RIGHT (MIN 2 VIEWS)    Result Date: 12/15/2024  Normal x-rays of the right shoulder.     XR CHEST PORTABLE    Result Date: 12/15/2024  No radiographic evidence of acute cardiopulmonary process.     XR SHOULDER LEFT (MIN 2 VIEWS)    Result Date: 12/15/2024  No acute abnormality.            Yannick Ayers MD  12/19/2024, 11:02 AM

## 2024-12-19 NOTE — CONSULTS
Physical Medicine & Rehabilitation  Consult Note      Admitting Physician:  Gigi Salguero MD    Primary Care Provider:  Angélica Collins MD     Reason for Consult:  Acute Inpatient Rehabilitation    Chief Complaint:  Multiple falls    History of Present Illness:  Referring Provider is requesting an evaluation for appropriate placement upon discharge from acute care. History from chart review and patient.    Lpue Walton is a 54 y.o. right-handed female with history of connective tissue disease/lupus, gastric ulcer, asthma, migraines, depression, opioid addiction (on methadone) admitted to [facility] on 12/15/2024.      She initially presented after multiple falls from standing.  Denied loss of consciousness.  CT head showed no acute intracranial abnormality.  She was found to have an unstable C3-C4 injury with concern for central cord and underwent C2-C4 laminectomy and fusion on 12/17/24 (Dr. Downs).  She has a cervical collar when out of bed.  She was also found to have sternum fracture and possible pulmonary contusion.    She reports ongoing neck and shoulder pain.  She also notes weakness but states that the upper limbs feel like they are improving.  She states that she has numbness greater in the feet than in the hands.    Review of Systems:  Review of Systems   Constitutional:  Negative for fever.   Respiratory:  Negative for shortness of breath.    Cardiovascular:  Negative for chest pain.   Gastrointestinal:  Negative for abdominal pain.   Musculoskeletal:  Positive for neck pain.   Neurological:  Positive for sensory change and weakness.      Premorbid function:  Independent    Current function:    Physical Therapy    Restrictions/Precautions: Surgical Protocols  Other Position/Activity Restrictions: ambulate patient, activity as tolerated, s/p: C2-C4 posterior laminectomy and fusion on 12/17  Required Braces or Orthoses  Cervical: c-collar  Spinal Other: When out of bed, okay to doff in bed or when  image 35, sagittal series 605, image 43. No convincing evidence for acute fracture. DEGENERATIVE CHANGES: Multilevel hypertrophic facet arthropathy most pronounced left C2-C3, C3-C4. SOFT TISSUES: There is no prevertebral soft tissue swelling. THORACIC SPINE: BONES/ALIGNMENT: Mild dextrocurvature thoracic spine.  No acute fracture. DEGENERATIVE CHANGES: No significant degenerative changes. SOFT TISSUES: No paraspinal mass. LUMBAR SPINE: BONES/ALIGNMENT: Levocurvature lumbar spine.  No acute fracture.  Grade 1 L5-S1 spondylolisthesis.  No spondylolysis. DEGENERATIVE CHANGES: Moderate L5-S1 disc space narrowing with vacuum disc changes and facet arthropathy. SOFT TISSUES: No paraspinal mass.     CT HEAD: No CT evidence for acute intracranial abnormality.. CT CERVICAL SPINE: Degenerative changes most pronounced at C2-C3 and C3-C4 on the left where there are signs of nonacute C3 facet and C4 lateral mass nondisplaced fractures as noted above. There is grade 1 C3-C4 anterolisthesis which has developed since the prior study of 2022.  If there is concern for cervical instability flexion/extension radiographs may be obtained. CT THORACIC SPINE: No acute osseous abnormality. CT LUMBAR SPINE: No acute osseous abnormality. Grade 1 L5-S1 spondylolisthesis degenerative changes at this level.     CT THORACIC SPINE BONY RECONSTRUCTION    Result Date: 12/15/2024  EXAMINATION: CT OF THE CERVICAL SPINE WITHOUT CONTRAST; CT OF THE HEAD WITHOUT CONTRAST; CT OF THE THORACIC SPINE WITHOUT CONTRAST; CT OF THE LUMBAR SPINE WITHOUT CONTRAST 12/15/2024 1:28 pm TECHNIQUE: CT of the cervical spine was performed without the administration of intravenous contrast. Multiplanar reformatted images are provided for review. Automated exposure control, iterative reconstruction, and/or weight based adjustment of the mA/kV was utilized to reduce the radiation dose to as low as reasonably achievable.; CT of the head was performed without the

## 2024-12-19 NOTE — PROGRESS NOTES
Occupational Therapy  Occupational Therapy Daily Treatment Note  Facility[facility]Department[facility]   Patient Name: Lupe Walton        MRN: [id number]    : 1970    Date of Service: 2024    Chief Complaint   Patient presents with    Fall     Past Medical History:  has a past medical history of Acute gastric ulcer with perforation ([facility]), Asthma, Connective tissue disease, undifferentiated (HCC), DDD (degenerative disc disease), Depression, Lumbar radicular pain, Migraine, Mitral valve prolapse, and Opiate addiction ([facility]).  Past Surgical History:  has a past surgical history that includes Total abdominal hysterectomy w/ bilateral salpingoophorectomy (); hernia repair (); Nose surgery (); Tubal ligation (); ovarian cyst removal (Left, ); Nerve Block (Right, 2015); Hysterectomy; CT ABSCESS DRAINAGE (2021); laparotomy (N/A, 2021); laparotomy (N/A, 2021); Colonoscopy (N/A, 2021); Colonoscopy (2021); Colonoscopy (N/A, 2021); Abdomen surgery; gastrectomy (2021); Upper gastrointestinal endoscopy (N/A, 2021); Colonoscopy (N/A, 2021); Upper gastrointestinal endoscopy (N/A, 2024); cervical laminectomy (2024); and cervical fusion (N/A, 2024).    Discharge Recommendations  Further therapy recommended at discharge.The patient should be able to tolerate at least 3 hours of therapy per day over 5 days or 15 hours over 7 days.   This patient may benefit from a Physical Medicine and Rehab consult.     Assessment  Performance deficits / Impairments: Decreased functional mobility ;Decreased ADL status;Decreased ROM;Decreased strength;Decreased safe awareness;Decreased cognition;Decreased endurance;Decreased balance;Decreased coordination  Assessment: Pt presents with decreased ADL status secondary to above noted deficits requiring min A for functional transfers/functional mobility tasks using RW and min A-max A for ADL  Goals: Prior to discharge  Short Term Goal 1: Patient to demonstrate dyanmic sitting balance with modified indep for increased independence with LB dressing  Short Term Goal 2: Patient to demonstrate static standing balance with cintact gurad assist for 8+ minutes  Short Term Goal 3: Patient to demonstrate dynamic  standing balance with min assist with 0-1 hand support during functional reaching  Short Term Goal 4: Patient to perform functional mobility bathroom distances with LRAD with contact gurad assist  Short Term Goal 5: Patient to demonstarte unsupported sitting/standing UB ADLs with min a  Short Term Goal 6: Patient to demonstarte LB  ADLs/toileting  with min Assist with ae prn  Short Term Goal 7: Patient to demonstrate improved FMC/two handed coordination with Good accuracy    Plan  Occupational Therapy Plan  Times Per Week: 4-6x/wk  Current Treatment Recommendations: Strengthening, Functional mobility training, Neuromuscular re-education, Cognitive reorientation, Pain management, Safety education & training, Patient/Caregiver education & training, Equipment evaluation, education, & procurement, Positioning, Self-Care / ADL, Home management training, Co-Treatment    Minutes  OT Individual Minutes  Time In: 0954  Time Out: 1019  Minutes: 25  Time Code Minutes   Timed Code Treatment Minutes: 25 Minutes    Electronically signed by Shae Chaudhry OT on 12/19/24 at 12:18 PM EST

## 2024-12-19 NOTE — PROGRESS NOTES
Trauma/Surgical Critical Care Sign Out Note:       Code Status: Full Code    Mode of provider to provider communication:        [x] Via telephone   [] In person     Date and time of sign-out: 12/19/2024 1:26 PM     Criteria Met for Transfer:  [x]   Oxygen saturation is > 90% on FiO2< 50% (exceptions may be made for patient pathophysiology)    [x]   Vital signs remain at or near baseline without pharmaceutical adjuncts, blood products, or > 2L fluid bolus in the last 24 hours  [x]   No suspicion or evidence of a new untreated infection (confusion, cool or cyanotic extremities, poor capillary refill, metabolic acidosis, low urine output)  [x]   Stable GCS, seizures controlled, no invasive neurological monitoring   [x]   Altered mental status is stable and able to be safely managed outside the ICU  [x]   No deterioration in renal function in the last 24 hours (creatinine > 50% increase, new onset oliguria)  [x]   Patient care needs do not exceed the capabilities of the unit they are being transferred to (suctioning needs, glucose monitoring, neurological monitoring, neurovascular checks, vital sign monitoring, I&O monitoring, drain management  [x]   No longer requiring mechanical ventilation via endotracheal intubation and/or decreasing O2/CPAP requirements  [x]   No need for medications that cannot be administered outside the ICU  [x]   PIC score >6      Reason for ICU admission:  Posterior laminectomy C2-C4 with C5 fusion requiring MAP pushes     Injuries:  Sternal body fracture with anterior displacement, + Grade 1 C4-C4 anterolisthesis    ICU course summary:  12/17: OR for posterior laminecceomy C2-C4 with C5 fusion. NS recs to resume lovenox on 19th + postop Xrays and CT the following day. MAP > 85 for another 6 days  12/18: MAP pushes discontinued per nsgy, DC art line, lots of pain- add prn fentanyl   12/19: chronic pain, paraesthesias to the BLE,  ok to transfer to SD    Procedures during ICU  stay:  Arterial line  CERVICAL TWO THROUGH CERVICAL FOUR LAMINECTOMY AND FUSION of C5    Current vitals:  Temp: Temp: 100.3 °F (37.9 °C)Temp  Av.5 °F (36.9 °C)  Min: 97.9 °F (36.6 °C)  Max: 100.3 °F (37.9 °C) BP Systolic (24hrs), Av , Min:117 , Max:177   Diastolic (24hrs), Av, Min:63, Max:116   Pulse Pulse  Av.6  Min: 83  Max: 109 Resp Resp  Avg: 15.2  Min: 11  Max: 24 Pulse ox SpO2  Av.9 %  Min: 90 %  Max: 97 %    Consults:  Neurosurgery  -activity as tolerated with aspen cervical collar, PT and OT  - cervical collar on while out of bed  - continue drain ain 50% compression, monitor output if <50 ml later this afternoon will dc  - MAP >85 from surgery       Transfer  Checklist:  [x]   Vital signs changed to q4 hours x 48 hours, continuous telemetry x 48 hours  [x]   Provider assessment BID x 48 hours  [x]   Daily labs x 48 hours  [x]   Tertiary note completed  [x]   Medications/orders reviewed  [x]   Updated photographs of wounds   [x]   Wound dressing orders placed if applicable    Plan and recommended follow-up:    Pain management  DVT prophylaxis lovenox 40 OD  Ulcer prophylaxis Protonix 40 mg BID   F/U lumbar MRI       Electronically signed by Yannick Ayers MD on 2024 at 1:26 PM

## 2024-12-19 NOTE — PROGRESS NOTES
Report called to Markos KING. All pertinent information given & questions answered. Pt transferred to room 424 via wheelchair. All pt belongings were accounted for & transferred with the pt.    .Electronically signed by Tiffani Naylor RN on 12/19/2024 at 2:09 PM

## 2024-12-19 NOTE — PROGRESS NOTES
Neurosurgery FARSHAD/Resident    Daily Progress Note   Chief Complaint   Patient presents with    Fall     12/19/2024  12:41 PM    Chart reviewed.  No acute events overnight.  Reports she has had numbness bilat feet for past month that worsened since fall. Bilat UE numbness and weakness improving. Sitting up in chair with cervical collar intact. Tolerating oral diet. Pain improving. Continues on ketamin drip.     Vitals:    12/19/24 0954 12/19/24 0956 12/19/24 1035 12/19/24 1044   BP:  (!) 165/101 (!) 143/95    Pulse:  87 94    Resp: 17 22 13 18   Temp:       TempSrc:       SpO2:  96% 94%    Weight:       Height:             PE:   AOx3   Motor   L deltoid 5/5; R deltoid 5/5  L biceps 5/5; R biceps 5/5  L triceps 5/5; R triceps 5/5  L wrist extension 5/5; R wrist extension 5/5  L intrinsics 4/5; R intrinsics 4/5      L iliopsoas 5/5 , R iliopsoas 5/5  L quadriceps 5/5; R quadriceps 5/5  L Dorsiflexion 5/5; R dorsiflexion 5/5  L Plantarflexion 5/5; R plantarflexion 5/5  L EHL 5/5; R EHL 5/5    Sensation diminished light touch bilat LE    Drain output 100 ml/12h    Incision posterior cervical site, c/d      Lab Results   Component Value Date    WBC 6.0 12/19/2024    HGB 11.6 (L) 12/19/2024    HCT 37.2 12/19/2024     12/19/2024    CHOL 117 06/26/2024    TRIG 80 06/26/2024    HDL 40 (L) 06/26/2024    ALT 19 12/05/2024    AST 25 12/05/2024     12/19/2024    K 3.8 12/19/2024    CL 98 12/19/2024    CREATININE 0.7 12/19/2024    BUN 8 12/19/2024    CO2 32 (H) 12/19/2024    TSH 4.02 12/05/2024    INR 1.0 12/15/2024    CRP 3.99 (H) 11/20/2024    SEDRATE 21 11/20/2024       Radiology   XR CERVICAL SPINE (2-3 VIEWS)    Result Date: 12/18/2024  EXAMINATION: 2 XRAY VIEWS OF THE CERVICAL SPINE 12/18/2024 9:16 am COMPARISON: 12/15/2024. HISTORY: ORDERING SYSTEM PROVIDED HISTORY: followup postop; UPRIGHT AP AND LATERAL TECHNOLOGIST PROVIDED HISTORY: followup postop; UPRIGHT AP AND LATERAL followup postop; UPRIGHT AP AND  LATERAL FINDINGS: The spine is again kyphotic.  Grade 1 anterolisthesis at C4-C5 and C5-C6 appears similar to the prior study.  There has been posterior metallic fusion from C2 through C4 in the interval.  These levels now appear normally aligned.  The hardware appears intact and normally located.  There is no prevertebral soft tissue swelling.  Degenerative changes appear stable.     Normal baseline study after posterior metallic fusion from C2 through C4. These levels now appear normally aligned.     CT CERVICAL SPINE WO CONTRAST    Result Date: 12/18/2024  EXAMINATION: CT OF THE CERVICAL SPINE WITHOUT CONTRAST 12/18/2024 10:09 am TECHNIQUE: CT of the cervical spine was performed without the administration of intravenous contrast. Multiplanar reformatted images are provided for review. Automated exposure control, iterative reconstruction, and/or weight based adjustment of the mA/kV was utilized to reduce the radiation dose to as low as reasonably achievable. COMPARISON: CT cervical spine dated December 15, 2024 HISTORY: ORDERING SYSTEM PROVIDED HISTORY: Postop C2-4 laminectomy and fusion. TECHNOLOGIST PROVIDED HISTORY: Postop C2-4 laminectomy and fusion. Is the patient pregnant?->No FINDINGS: BONES/ALIGNMENT: The patient is postop from C2 through C4 laminectomy and fusion.  The hardware is intact.  There is good anatomic alignment. DEGENERATIVE CHANGES: Mild disc space narrowing at C5/6 and C6/7 with anterior vertebral body osteophytes.  No significant spinal canal stenosis. SOFT TISSUES: There is subcutaneous air in the posterior aspect of the neck keeping with recent surgical intervention.  No focal fluid collections.     The patient is status post C2 through C4 laminectomy and fusion without hardware complication.     A/P  54 y.o. female who presents with stenosis of cervical spine with myelopathy.  POD 2 s/p C2-C4 posterior laminectomy and fusion    - activity as tolerated with aspen cervical collar, PT and  OT  - cervical collar on while out of bed  - continue drain ain 50% compression, monitor output if <50 ml later this afternoon will dc  - MAP >85 from surgery      Please contact neurosurgery with any changes in patients neurologic status.       Noe Weathers CNP  12/19/24  12:41 PM

## 2024-12-19 NOTE — CARE COORDINATION
Transition Planning    Call from Melonie with [facility].     Call to [facility] [place]. Will start precert.

## 2024-12-19 NOTE — CARE COORDINATION
Trauma Coordinator Rounding Note  Daily check in:  I met with Lupe Walton  at bedside to review their plan of care. I allowed opportunity for Lupe Walton to ask questions regarding their injuries, expected length of stay and disposition plan. All questions answered to verbal satisfaction.   [x]Wounds  [x]PT/OT/speech  [x]Incentive spirometer  [x]Diet  [x]Activity  I spoke with trauma NP for all clinical updates.   :  I provided a clinical update to the unit  and confirmed the disposition plan. Current barriers to discharge include: Pending Insurance authorization,   [facility]   PIC Score  IS Goal Volume (15ml/kg IBW): 924   Pain  Patient reported 1-10 scale Inspiration  Incentive Spirometer    Volume obtained: 1500 ml Cough  Assessed by nurse     3  Mild  Pain intensity scale 0-4    [] 4  Above goal volume  [x]   3  Strong    [x]    3  Goal to alert volume  []    2  Moderate  Pain intensity scale 5-7  [x] 2  Below alert volume    [] 2   Weak    []   1  Severe   Pain intensity scale 8-10  [] 1  Unable to perform incentive spirometry    [] 1  Absent      []         Total: 9        Electronically signed by Yin Wheeler RN on 12/19/2024 at 2:28 PM

## 2024-12-20 PROBLEM — S14.129A CENTRAL CORD SYNDROME (HCC): Status: ACTIVE | Noted: 2024-12-20

## 2024-12-20 LAB
ANION GAP SERPL CALCULATED.3IONS-SCNC: 9 MMOL/L (ref 9–16)
BASOPHILS # BLD: <0.03 K/UL (ref 0–0.2)
BASOPHILS NFR BLD: 0 % (ref 0–2)
BUN SERPL-MCNC: 10 MG/DL (ref 6–20)
CA-I BLD-SCNC: 1.1 MMOL/L (ref 1.13–1.33)
CALCIUM SERPL-MCNC: 8.3 MG/DL (ref 8.6–10.4)
CHLORIDE SERPL-SCNC: 98 MMOL/L (ref 98–107)
CO2 SERPL-SCNC: 30 MMOL/L (ref 20–31)
CREAT SERPL-MCNC: 0.7 MG/DL (ref 0.6–0.9)
EOSINOPHIL # BLD: 0.5 K/UL (ref 0–0.44)
EOSINOPHILS RELATIVE PERCENT: 7 % (ref 1–4)
ERYTHROCYTE [DISTWIDTH] IN BLOOD BY AUTOMATED COUNT: 13.2 % (ref 11.8–14.4)
GFR, ESTIMATED: >90 ML/MIN/1.73M2
GLUCOSE SERPL-MCNC: 97 MG/DL (ref 74–99)
HCT VFR BLD AUTO: 36.4 % (ref 36.3–47.1)
HGB BLD-MCNC: 11.3 G/DL (ref 11.9–15.1)
IMM GRANULOCYTES # BLD AUTO: <0.03 K/UL (ref 0–0.3)
IMM GRANULOCYTES NFR BLD: 0 %
LYMPHOCYTES NFR BLD: 1.23 K/UL (ref 1.1–3.7)
LYMPHOCYTES RELATIVE PERCENT: 17 % (ref 24–43)
MAGNESIUM SERPL-MCNC: 2.1 MG/DL (ref 1.6–2.6)
MCH RBC QN AUTO: 27.8 PG (ref 25.2–33.5)
MCHC RBC AUTO-ENTMCNC: 31 G/DL (ref 28.4–34.8)
MCV RBC AUTO: 89.7 FL (ref 82.6–102.9)
MONOCYTES NFR BLD: 0.61 K/UL (ref 0.1–1.2)
MONOCYTES NFR BLD: 9 % (ref 3–12)
NEUTROPHILS NFR BLD: 67 % (ref 36–65)
NEUTS SEG NFR BLD: 4.79 K/UL (ref 1.5–8.1)
NRBC BLD-RTO: 0 PER 100 WBC
PHOSPHATE SERPL-MCNC: 4.1 MG/DL (ref 2.5–4.5)
PLATELET # BLD AUTO: 231 K/UL (ref 138–453)
PMV BLD AUTO: 9.8 FL (ref 8.1–13.5)
POTASSIUM SERPL-SCNC: 3.7 MMOL/L (ref 3.7–5.3)
RBC # BLD AUTO: 4.06 M/UL (ref 3.95–5.11)
SODIUM SERPL-SCNC: 137 MMOL/L (ref 136–145)
WBC OTHER # BLD: 7.2 K/UL (ref 3.5–11.3)

## 2024-12-20 PROCEDURE — 6360000002 HC RX W HCPCS: Performed by: NURSE PRACTITIONER

## 2024-12-20 PROCEDURE — 6360000002 HC RX W HCPCS

## 2024-12-20 PROCEDURE — 6370000000 HC RX 637 (ALT 250 FOR IP): Performed by: NURSE PRACTITIONER

## 2024-12-20 PROCEDURE — 99231 SBSQ HOSP IP/OBS SF/LOW 25: CPT | Performed by: SURGERY

## 2024-12-20 PROCEDURE — 36415 COLL VENOUS BLD VENIPUNCTURE: CPT

## 2024-12-20 PROCEDURE — 97530 THERAPEUTIC ACTIVITIES: CPT

## 2024-12-20 PROCEDURE — 2500000003 HC RX 250 WO HCPCS

## 2024-12-20 PROCEDURE — 6370000000 HC RX 637 (ALT 250 FOR IP)

## 2024-12-20 PROCEDURE — 82330 ASSAY OF CALCIUM: CPT

## 2024-12-20 PROCEDURE — 97110 THERAPEUTIC EXERCISES: CPT

## 2024-12-20 PROCEDURE — 97535 SELF CARE MNGMENT TRAINING: CPT

## 2024-12-20 PROCEDURE — 80048 BASIC METABOLIC PNL TOTAL CA: CPT

## 2024-12-20 PROCEDURE — 83735 ASSAY OF MAGNESIUM: CPT

## 2024-12-20 PROCEDURE — 2060000000 HC ICU INTERMEDIATE R&B

## 2024-12-20 PROCEDURE — 84100 ASSAY OF PHOSPHORUS: CPT

## 2024-12-20 PROCEDURE — 85025 COMPLETE CBC W/AUTO DIFF WBC: CPT

## 2024-12-20 RX ORDER — OXYCODONE HYDROCHLORIDE 5 MG/1
5 TABLET ORAL EVERY 4 HOURS PRN
Status: COMPLETED | OUTPATIENT
Start: 2024-12-20 | End: 2024-12-21

## 2024-12-20 RX ORDER — FENTANYL CITRATE 50 UG/ML
50 INJECTION, SOLUTION INTRAMUSCULAR; INTRAVENOUS EVERY 6 HOURS PRN
Status: COMPLETED | OUTPATIENT
Start: 2024-12-20 | End: 2024-12-21

## 2024-12-20 RX ORDER — LORAZEPAM 2 MG/ML
1 INJECTION INTRAMUSCULAR ONCE
Status: COMPLETED | OUTPATIENT
Start: 2024-12-20 | End: 2024-12-21

## 2024-12-20 RX ORDER — ENOXAPARIN SODIUM 100 MG/ML
30 INJECTION SUBCUTANEOUS 2 TIMES DAILY
Status: DISCONTINUED | OUTPATIENT
Start: 2024-12-20 | End: 2024-12-24 | Stop reason: HOSPADM

## 2024-12-20 RX ORDER — IBUPROFEN 400 MG/1
400 TABLET, FILM COATED ORAL EVERY 4 HOURS PRN
Status: DISCONTINUED | OUTPATIENT
Start: 2024-12-20 | End: 2024-12-24 | Stop reason: HOSPADM

## 2024-12-20 RX ORDER — FAMOTIDINE 20 MG/1
20 TABLET, FILM COATED ORAL NIGHTLY
Status: DISCONTINUED | OUTPATIENT
Start: 2024-12-20 | End: 2024-12-24 | Stop reason: HOSPADM

## 2024-12-20 RX ORDER — OXYCODONE HYDROCHLORIDE 5 MG/1
10 TABLET ORAL EVERY 6 HOURS PRN
Status: COMPLETED | OUTPATIENT
Start: 2024-12-20 | End: 2024-12-21

## 2024-12-20 RX ADMIN — OXYCODONE 10 MG: 5 TABLET ORAL at 15:16

## 2024-12-20 RX ADMIN — SUCRALFATE 1 G: 1 TABLET ORAL at 06:29

## 2024-12-20 RX ADMIN — SODIUM CHLORIDE, PRESERVATIVE FREE 10 ML: 5 INJECTION INTRAVENOUS at 21:08

## 2024-12-20 RX ADMIN — Medication 50 MG: at 17:47

## 2024-12-20 RX ADMIN — FENTANYL CITRATE 50 MCG: 50 INJECTION INTRAMUSCULAR; INTRAVENOUS at 04:17

## 2024-12-20 RX ADMIN — PANTOPRAZOLE SODIUM 40 MG: 40 TABLET, DELAYED RELEASE ORAL at 17:47

## 2024-12-20 RX ADMIN — GABAPENTIN 800 MG: 800 TABLET ORAL at 10:00

## 2024-12-20 RX ADMIN — METOPROLOL TARTRATE 50 MG: 50 TABLET, FILM COATED ORAL at 21:07

## 2024-12-20 RX ADMIN — ONDANSETRON 4 MG: 2 INJECTION INTRAMUSCULAR; INTRAVENOUS at 09:59

## 2024-12-20 RX ADMIN — SUCRALFATE 1 G: 1 TABLET ORAL at 17:46

## 2024-12-20 RX ADMIN — ACETAMINOPHEN 1000 MG: 500 TABLET ORAL at 06:30

## 2024-12-20 RX ADMIN — GABAPENTIN 800 MG: 800 TABLET ORAL at 12:48

## 2024-12-20 RX ADMIN — FENTANYL CITRATE 50 MCG: 50 INJECTION, SOLUTION INTRAMUSCULAR; INTRAVENOUS at 21:08

## 2024-12-20 RX ADMIN — PANTOPRAZOLE SODIUM 40 MG: 40 TABLET, DELAYED RELEASE ORAL at 06:30

## 2024-12-20 RX ADMIN — ENOXAPARIN SODIUM 30 MG: 100 INJECTION SUBCUTANEOUS at 09:59

## 2024-12-20 RX ADMIN — METHOCARBAMOL 750 MG: 750 TABLET ORAL at 06:30

## 2024-12-20 RX ADMIN — ACETAMINOPHEN 1000 MG: 500 TABLET ORAL at 10:06

## 2024-12-20 RX ADMIN — METHOCARBAMOL 750 MG: 750 TABLET ORAL at 01:09

## 2024-12-20 RX ADMIN — GABAPENTIN 800 MG: 800 TABLET ORAL at 21:08

## 2024-12-20 RX ADMIN — DULOXETINE HYDROCHLORIDE 60 MG: 30 CAPSULE, DELAYED RELEASE ORAL at 09:59

## 2024-12-20 RX ADMIN — METOPROLOL TARTRATE 50 MG: 50 TABLET, FILM COATED ORAL at 09:59

## 2024-12-20 RX ADMIN — Medication 50 MG: at 10:01

## 2024-12-20 RX ADMIN — POLYETHYLENE GLYCOL 3350 17 G: 17 POWDER, FOR SOLUTION ORAL at 10:00

## 2024-12-20 RX ADMIN — SUCRALFATE 1 G: 1 TABLET ORAL at 10:06

## 2024-12-20 RX ADMIN — IBUPROFEN 400 MG: 400 TABLET, FILM COATED ORAL at 09:59

## 2024-12-20 RX ADMIN — METHOCARBAMOL 750 MG: 750 TABLET ORAL at 12:48

## 2024-12-20 RX ADMIN — SUCRALFATE 1 G: 1 TABLET ORAL at 21:08

## 2024-12-20 RX ADMIN — METHOCARBAMOL 750 MG: 750 TABLET ORAL at 17:47

## 2024-12-20 RX ADMIN — FAMOTIDINE 20 MG: 20 TABLET, FILM COATED ORAL at 21:08

## 2024-12-20 RX ADMIN — OXYCODONE 10 MG: 5 TABLET ORAL at 06:52

## 2024-12-20 RX ADMIN — FENTANYL CITRATE 50 MCG: 50 INJECTION, SOLUTION INTRAMUSCULAR; INTRAVENOUS at 12:49

## 2024-12-20 RX ADMIN — ENOXAPARIN SODIUM 30 MG: 100 INJECTION SUBCUTANEOUS at 21:08

## 2024-12-20 RX ADMIN — ACETAMINOPHEN 1000 MG: 500 TABLET ORAL at 17:46

## 2024-12-20 RX ADMIN — Medication 50 MG: at 01:10

## 2024-12-20 ASSESSMENT — PAIN SCALES - GENERAL
PAINLEVEL_OUTOF10: 7
PAINLEVEL_OUTOF10: 7
PAINLEVEL_OUTOF10: 9
PAINLEVEL_OUTOF10: 8
PAINLEVEL_OUTOF10: 7
PAINLEVEL_OUTOF10: 8
PAINLEVEL_OUTOF10: 2
PAINLEVEL_OUTOF10: 0
PAINLEVEL_OUTOF10: 8
PAINLEVEL_OUTOF10: 8
PAINLEVEL_OUTOF10: 4
PAINLEVEL_OUTOF10: 7
PAINLEVEL_OUTOF10: 6
PAINLEVEL_OUTOF10: 3

## 2024-12-20 ASSESSMENT — PAIN SCALES - WONG BAKER
WONGBAKER_NUMERICALRESPONSE: NO HURT
WONGBAKER_NUMERICALRESPONSE: NO HURT

## 2024-12-20 ASSESSMENT — PAIN DESCRIPTION - LOCATION
LOCATION: ABDOMEN
LOCATION: BACK
LOCATION: ABDOMEN
LOCATION: NECK;SHOULDER

## 2024-12-20 ASSESSMENT — PAIN DESCRIPTION - DESCRIPTORS
DESCRIPTORS: ACHING
DESCRIPTORS: ACHING

## 2024-12-20 NOTE — PROGRESS NOTES
Nutrition Assessment     Type and Reason for Visit: Reassess    Nutrition Recommendations/Plan:   Continue current diet + ONS TID as tolerated  Will monitor PO intake, wt, and POC     Malnutrition Assessment:  Malnutrition Status: Moderate malnutrition    Nutrition Assessment:  Pt is on a Reg diet + high kcal/high pro ONS TID. PO intake reported as % of meals. Wt stable x admission, some gain. Will continue w/ current nutrition intervention and monitor per protocol.    Estimated Daily Nutrient Needs:  Energy (kcal):  1266-1780 kcal/d (25-30 kcal/kg) Weight Used for Energy Requirements: Current     Protein (g):  65-95 gm/d (1.2-1.7 g/d) Weight Used for Protein Requirements: Current        Fluid (ml/day):  or per provider Method Used for Fluid Requirements: 1 ml/kcal    Nutrition Related Findings:   Meds/Labs reviewed Wound Type: None    Current Nutrition Therapies:    ADULT DIET; Regular  ADULT ORAL NUTRITION SUPPLEMENT; Breakfast, Lunch, Dinner; Standard High Calorie/High Protein Oral Supplement    Anthropometric Measures:  Height: 170.2 cm (5' 7.01\")  Current Body Wt: 57.6 kg (126 lb 15.8 oz)   BMI: 19.9      Nutrition Diagnosis:   No nutrition diagnosis at this time     Nutrition Interventions:   Food and/or Nutrient Delivery: Continue Current Diet, Continue Oral Nutrition Supplement  Nutrition Education/Counseling: No recommendation at this time  Coordination of Nutrition Care: Continue to monitor while inpatient  Plan of Care discussed with: RN    Goals:  Goals: Meet at least 75% of estimated needs, prior to discharge  Type of Goal: New goal  Previous Goal Met: Goal(s) Achieved    Nutrition Monitoring and Evaluation:   Behavioral-Environmental Outcomes: None Identified  Food/Nutrient Intake Outcomes: Food and Nutrient Intake, Supplement Intake  Physical Signs/Symptoms Outcomes: Biochemical Data, GI Status, Meal Time Behavior, Weight    Discharge Planning:    Continue Oral Nutrition Supplement (as needed)

## 2024-12-20 NOTE — PROGRESS NOTES
POST ICU TRANSFER NOTE    SUBJECTIVE  Pt received to SD from ICU.     Checklist:  [x]   Oxygen saturation is > 90% on FiO2< 50% (exceptions may be made for patient pathophysiology)    [x]   Vital signs remain at or near baseline without pharmaceutical adjuncts, blood products, or > 2L fluid bolus since transfer   [x]   No suspicion or evidence of a new untreated infection (confusion, cool or cyanotic extremities, poor capillary refill, metabolic acidosis, low urine output)  [x]   Stable GCS, seizures controlled, no invasive neurological monitoring   [x]   No alteration in mental status after transfer from ICU  [x]   No deterioration in renal function since transfer  [x]   Patient care needs do not exceed the capabilities of the unit they were transferred to (suctioning needs, glucose monitoring, neurological monitoring, neurovascular checks, vital sign monitoring, I&O monitoring, drain management        Heather Perera DO  Trauma/Surgery Service  12/20/2024 at 1:11 AM

## 2024-12-20 NOTE — PROGRESS NOTES
PROGRESS NOTE          PATIENT NAME: Lupe Bailonford  MEDICAL RECORD NO. [id number]  DATE: 12/20/2024  PRIMARY CARE PHYSICIAN: Angélica Collins MD    HD: # 5    ASSESSMENT    Patient Active Problem List   Diagnosis    Migraine    Opiate dependence (HCC)    Chronic pain    Obesity    Patient overweight    Lumbar radicular pain    Lumbar disc disease    Closed nondisplaced fracture of fifth metatarsal bone of right foot    Intra-abdominal abscess (HCC)    Toxic dilatation of colon (HCC)    Colonic ischemia (HCC)    Weakness    Right wrist drop    Right foot drop    Fall from standing, initial encounter    Closed fracture of manubrium    Moderate malnutrition (HCC)       MEDICAL DECISION MAKING AND PLAN    FF[facility], - LOC, - AC    Superior sternal body fracture with displacement with possible pulmonary contusion:   - PIC 8 - IS 1500, moderate pain control and weak cough.    - O2 sat > 95% on RA   - encouraged IS   - pain control    Grade 1 C3-4 anterolisthesis:   - POD #3 C2-4 Laminectomy and fusion   - GCS 15   - Lower extremity numbness and tingling   - Pt for MRI lumbar spine - follow up on results.     Pain control:   - Tylenol 1g q8 hrs, Robaxin 750mg, Roxicodone 5mg q6 hrs or 10 mg for severe pain, Ketamine gtt, Fioricet 1 tabet PRN    - Home meds: Ibuprofen 800mg q8 hrs, Gabapentin 800mg TID, Duloxetine 60mg QD   - Hx of opiate dependence: Methadone 145mg Qd, split into 50mg every 8 hours to help with pain control    Low urine output:   - increase hydration by encouraging fluid intake   - continue to monitor    Elevated blood glucose:    - pt BG is 172 this morning   - no previous history/diagnosis of diabetes   - not on insulin therapy   - continue to monitor for elevated BG; consider sliding scale if persistently elevated in hospital.    DVT Prophylaxis - Lovenox   Diet: regular  Dispo: pending neurosurgery evaluation and recommendations after MRI lumbar spine.         Chief Complaint: \"my feet are  numb\"    SUBJECTIVE    Pt seen and examined at bedside resting comfortably. Pt states she is doing alright. Pain is down to 7/10. Her main concern is numbness and tingling in her feet. She has been working with PT/OT and got up to the chair yesterday. Walking is difficult with the neuropathy. Pt has been eating well. Has low urine output and has not had a bowel movement yet. PIC 8. Pt has no questions or concerns at this time.       OBJECTIVE  VITALS: Temp: Temp: 98.7 °F (37.1 °C)Temp  Av.9 °F (37.2 °C)  Min: 98.4 °F (36.9 °C)  Max: 100.3 °F (37.9 °C) BP Systolic (24hrs), Av , Min:128 , Max:177   Diastolic (24hrs), Av, Min:79, Max:107   Pulse Pulse  Av.9  Min: 85  Max: 115 Resp Resp  Av.1  Min: 13  Max: 24 Pulse ox SpO2  Av.4 %  Min: 94 %  Max: 97 %  Constitutional:       General: She is awake and oriented. Cooperative to questioning.   HENT:      Head: Normocephalic and atraumatic.   Eyes:      Pupils: Pupils are equal, round, and reactive to light.   Cardiovascular:      Rate and Rhythm: Regular rhythm. Tachycardia present.      Pulses: Normal pulses.   Pulmonary:      Effort: Pulmonary effort is normal.   Chest:      Chest wall: Positive tenderness over sternum.   Abdominal:      General: Abdomen is flat.      Palpations: Abdomen is soft.    Musculoskeletal:      Cervical back: Tenderness present. Spinous process tenderness present.  Accordion drain present at the operative site in the C-spine.     Comments: Paraspinal muscular tenderness   Skin:     General: Skin is warm and dry.   Neurological:      Mental Status: She is alert and oriented to person, place, and time.   Positive neuropathy to B/L LE.  Decreased sensation in bilateral lower extremity.  No weakness noted to B/L UE or B/L LE.     I/O last 3 completed shifts:  In: 1702.9 [P.O.:500; I.V.:1129.4; IV Piggyback:73.5]  Out: 2550 [Urine:2395; Drains:155]    Drain/tube output:  In: 615 [P.O.:500; I.V.:41.5]  Out:

## 2024-12-20 NOTE — CARE COORDINATION
Transitional planning note: plan is [facility]. Spoke with jadyn in admissions at [facility] and auth is still pending.

## 2024-12-20 NOTE — PROGRESS NOTES
Neurosurgery FARSHAD/Resident    Daily Progress Note   Chief Complaint   Patient presents with    Fall     12/20/2024  1:08 PM    Chart reviewed.  No acute events overnight.  No new complaints. Pain improving. Strength improving. Sitting at bedside working with therapy.     Vitals:    12/20/24 0722 12/20/24 1031 12/20/24 1147 12/20/24 1148   BP:    (!) 148/98   Pulse:    97   Resp: 16 16  17   Temp:   98.1 °F (36.7 °C)    TempSrc:   Oral    SpO2:    97%   Weight:       Height:           PE:   AOx3   Motor   L deltoid 5/5; R deltoid 5/5  L biceps 5/5; R biceps 5/5  L triceps 5/5; R triceps 5/5  L wrist extension 5/5; R wrist extension 5/5  L intrinsics 4/5; R intrinsics 4/5      L iliopsoas 5/5 , R iliopsoas 5/5  L quadriceps 5/5; R quadriceps 5/5  L Dorsiflexion 5/5; R dorsiflexion 5/5  L Plantarflexion 5/5; R plantarflexion 5/5  L EHL 5/5; R EHL 5/5     Sensation diminished light touch bilat LE     Drain output 20 ml/12h     Incision posterior cervical site, c/d      Lab Results   Component Value Date    WBC 7.2 12/20/2024    HGB 11.3 (L) 12/20/2024    HCT 36.4 12/20/2024     12/20/2024    CHOL 117 06/26/2024    TRIG 80 06/26/2024    HDL 40 (L) 06/26/2024    ALT 19 12/05/2024    AST 25 12/05/2024     12/20/2024    K 3.7 12/20/2024    CL 98 12/20/2024    CREATININE 0.7 12/20/2024    BUN 10 12/20/2024    CO2 30 12/20/2024    TSH 4.02 12/05/2024    INR 1.0 12/15/2024    CRP 3.99 (H) 11/20/2024    SEDRATE 21 11/20/2024       A/P  54 y.o. female who presents with  stenosis of cervical spine with myelopathy.  POD 3 s/p C2-C4 posterior laminectomy and fusion     - f/u MRI lumbar for acute on chronic pain with bilat LE numbness   - activity as tolerated with aspen cervical collar, PT and OT  - cervical collar on while out of bed  - drain discontinued today       Please contact neurosurgery with any changes in patients neurologic status.       Noe Weathers, KAREN  12/20/24  1:08 PM

## 2024-12-20 NOTE — DISCHARGE INSTR - COC
Continuity of Care Form    Patient Name: Lupe Walton   :  1970  MRN:  [id number]    Admit date:  12/15/2024  Discharge date:  2024    Code Status Order: Full Code   Advance Directives:   Advance Care Flowsheet Documentation        Date/Time Healthcare Directive Type of Healthcare Directive Copy in Chart Healthcare Agent Appointed Healthcare Agent's Name Healthcare Agent's Phone Number    24 [phone] No, patient does not have an advance directive for healthcare treatment  --  --  --  --  --                     Admitting Physician:  Gigi Salguero MD  PCP: Angélica Collins MD    Discharging Nurse: FERNANDO Aguirre  Discharging Hospital Unit/Room#: 0424/0424-01  Discharging Unit Phone Number: [phone]    Emergency Contact:   Extended Emergency Contact Information  Primary Emergency Contact: Cayden Walton  Address: [address] [place] [address] [address]           [address]  Home Phone: [phone]  Relation: Spouse  Secondary Emergency Contact: Valeri Walton  Home Phone: [phone]  Relation: Child   needed? No    Past Surgical History:  Past Surgical History:   Procedure Laterality Date    ABDOMEN SURGERY      CERVICAL FUSION N/A 2024    CERVICAL TWO THROUGH CERVICAL FOUR LAMINECTOMY AND FUSION performed by Yareli Downs DO at [facility] OR    CERVICAL LAMINECTOMY  2024    CERVICAL TWO THROUGH CERVICAL FOUR LAMINECTOMY AND FUSION    COLONOSCOPY N/A 2021    COLONOSCOPY FLEXIBLE W/ DECOMPRESSION performed by Deborah Azevedo MD at [facility] Endoscopy    COLONOSCOPY  2021    COLONOSCOPY N/A 2021    COLONOSCOPY DIAGNOSTIC performed by Yannick Barrett MD at [facility] OR    COLONOSCOPY N/A 2021    COLONOSCOPY DIAGNOSTIC performed by Patricia Walter MD at [facility] ENDO    CT ABSCESS DRAINAGE  2021    CT ABSCESS DRAIN SUBCUTANEOUS 3/4/2021 [facility] CT SCAN    GASTRECTOMY  2021    \"partial\"    HERNIA REPAIR  1972    as child    HYSTERECTOMY (CERVIX

## 2024-12-20 NOTE — PROGRESS NOTES
Occupational Therapy  Occupational Therapy Daily Treatment Note  Facility/Department: [facility] 4B STEPDOWN   Patient Name: Lupe Walton        MRN: [id number]    : 1970    Date of Service: 2024    Chief Complaint   Patient presents with    Fall     Past Medical History:  has a past medical history of Acute gastric ulcer with perforation (HCC), Asthma, Connective tissue disease, undifferentiated (HCC), DDD (degenerative disc disease), Depression, Lumbar radicular pain, Migraine, Mitral valve prolapse, and Opiate addiction ([facility]).  Past Surgical History:  has a past surgical history that includes Total abdominal hysterectomy w/ bilateral salpingoophorectomy (); hernia repair (); Nose surgery (); Tubal ligation (); ovarian cyst removal (Left, ); Nerve Block (Right, 2015); Hysterectomy; CT ABSCESS DRAINAGE (2021); laparotomy (N/A, 2021); laparotomy (N/A, 2021); Colonoscopy (N/A, 2021); Colonoscopy (2021); Colonoscopy (N/A, 2021); Abdomen surgery; gastrectomy (2021); Upper gastrointestinal endoscopy (N/A, 2021); Colonoscopy (N/A, 2021); Upper gastrointestinal endoscopy (N/A, 2024); cervical laminectomy (2024); and cervical fusion (N/A, 2024).    Discharge Recommendations  Discharge Recommendations: Further therapy recommended at discharge.The patient should be able to tolerate at least 3 hours of therapy per day over 5 days or 15 hours over 7 days.   This patient may benefit from a Physical Medicine and Rehab consult    Assessment  Performance deficits / Impairments: Decreased functional mobility ;Decreased ADL status;Decreased ROM;Decreased strength;Decreased safe awareness;Decreased cognition;Decreased endurance;Decreased balance;Decreased coordination  Assessment: Pt limited by above deficits impacting pts functional mobility/ADL performance. Pt is expected to require skilled OT services to increase safety and  Education  Education Given To: Patient  Education Provided: Role of Therapy;Home Exercise Program;Equipment;Precautions;Transfer Training;ADL Adaptive Strategies;Fall Prevention Strategies  Education Provided Comments: OT role, ADLs, transfer safety, AD use, activity promotion, postural exercises, c-collar mgmt, FMC tasks, dynamic standing balance. Good return.  Education Method: Demonstration;Verbal  Barriers to Learning: None  Education Outcome: Verbalized understanding;Continued education needed    Goals  Short Term Goals  Time Frame for Short Term Goals: Prior to discharge  Short Term Goal 1: Patient to demonstrate dyanmic sitting balance with modified indep for increased independence with LB dressing  Short Term Goal 2: Patient to demonstrate static standing balance with cintact gurad assist for 8+ minutes  Short Term Goal 3: Patient to demonstrate dynamic  standing balance with min assist with 0-1 hand support during functional reaching  Short Term Goal 4: Patient to perform functional mobility bathroom distances with LRAD with contact gurad assist  Short Term Goal 5: Patient to demonstarte unsupported sitting/standing UB ADLs with min a  Short Term Goal 6: Patient to demonstarte LB  ADLs/toileting  with min Assist with ae prn  Short Term Goal 7: Patient to demonstrate improved FMC/two handed coordination with Good accuracy    Plan  Occupational Therapy Plan  Times Per Week: 4-6x/wk  Current Treatment Recommendations: Strengthening, Functional mobility training, Neuromuscular re-education, Cognitive reorientation, Pain management, Safety education & training, Patient/Caregiver education & training, Equipment evaluation, education, & procurement, Positioning, Self-Care / ADL, Home management training, Co-Treatment    Minutes  OT Individual Minutes  Time In: 0847  Time Out: 0957  Minutes: 70  Time Code Minutes   Timed Code Treatment Minutes: 68 Minutes    Electronically signed by LINH Carvalho on

## 2024-12-20 NOTE — CARE COORDINATION
Trauma Coordinator Rounding Note  Daily check in:  I met with Lupe Walton  at bedside to review their plan of care. I allowed opportunity for Lupe Walton to ask questions regarding their injuries, expected length of stay and disposition plan. All questions answered to verbal satisfaction.   [x]Wounds  [x]PT/OT/speech  [x]Incentive spirometer  [x]Diet  [x]Activity  Bedside Nurse:  I spoke with the patient's assigned nurse to review the plan of care for today and provide an opportunity to ask questions or relay any concerns to the Trauma service.    :  I provided a clinical update to the unit  and confirmed the disposition plan. Current barriers to discharge include: facility acceptance  PIC Score  IS Goal Volume (15ml/kg IBW): 924   Pain  Patient reported 1-10 scale Inspiration  Incentive Spirometer    Volume obtained: 1500 ml Cough  Assessed by nurse     3  Mild  Pain intensity scale 0-4    [x] 4  Above goal volume  [x]   3  Strong    [x]    3  Goal to alert volume  []    2  Moderate  Pain intensity scale 5-7  [] 2  Below alert volume    [] 2   Weak    []   1  Severe   Pain intensity scale 8-10  [] 1  Unable to perform incentive spirometry    [] 1  Absent      []         Total: 10        Electronically signed by Yin Wheeler RN on 12/20/2024 at 2:23 PM

## 2024-12-21 ENCOUNTER — APPOINTMENT (OUTPATIENT)
Dept: MRI IMAGING | Age: 54
DRG: 552 | End: 2024-12-21
Payer: COMMERCIAL

## 2024-12-21 LAB
ANION GAP SERPL CALCULATED.3IONS-SCNC: 7 MMOL/L (ref 9–16)
BUN SERPL-MCNC: 8 MG/DL (ref 6–20)
CALCIUM SERPL-MCNC: 8.3 MG/DL (ref 8.6–10.4)
CHLORIDE SERPL-SCNC: 96 MMOL/L (ref 98–107)
CO2 SERPL-SCNC: 30 MMOL/L (ref 20–31)
CREAT SERPL-MCNC: 0.6 MG/DL (ref 0.6–0.9)
GFR, ESTIMATED: >90 ML/MIN/1.73M2
GLUCOSE SERPL-MCNC: 122 MG/DL (ref 74–99)
POTASSIUM SERPL-SCNC: 3.7 MMOL/L (ref 3.7–5.3)
SODIUM SERPL-SCNC: 133 MMOL/L (ref 136–145)

## 2024-12-21 PROCEDURE — 72148 MRI LUMBAR SPINE W/O DYE: CPT

## 2024-12-21 PROCEDURE — 6370000000 HC RX 637 (ALT 250 FOR IP)

## 2024-12-21 PROCEDURE — 6370000000 HC RX 637 (ALT 250 FOR IP): Performed by: NURSE PRACTITIONER

## 2024-12-21 PROCEDURE — 97530 THERAPEUTIC ACTIVITIES: CPT

## 2024-12-21 PROCEDURE — 6360000002 HC RX W HCPCS: Performed by: NURSE PRACTITIONER

## 2024-12-21 PROCEDURE — 36415 COLL VENOUS BLD VENIPUNCTURE: CPT

## 2024-12-21 PROCEDURE — 99231 SBSQ HOSP IP/OBS SF/LOW 25: CPT | Performed by: SURGERY

## 2024-12-21 PROCEDURE — 80048 BASIC METABOLIC PNL TOTAL CA: CPT

## 2024-12-21 PROCEDURE — 6360000002 HC RX W HCPCS

## 2024-12-21 PROCEDURE — 2060000000 HC ICU INTERMEDIATE R&B

## 2024-12-21 RX ADMIN — OXYCODONE 10 MG: 5 TABLET ORAL at 02:34

## 2024-12-21 RX ADMIN — IBUPROFEN 400 MG: 400 TABLET, FILM COATED ORAL at 17:14

## 2024-12-21 RX ADMIN — METHOCARBAMOL 750 MG: 750 TABLET ORAL at 11:55

## 2024-12-21 RX ADMIN — Medication 50 MG: at 00:26

## 2024-12-21 RX ADMIN — PANTOPRAZOLE SODIUM 40 MG: 40 TABLET, DELAYED RELEASE ORAL at 06:26

## 2024-12-21 RX ADMIN — METHOCARBAMOL 750 MG: 750 TABLET ORAL at 00:26

## 2024-12-21 RX ADMIN — OXYCODONE 10 MG: 5 TABLET ORAL at 08:40

## 2024-12-21 RX ADMIN — GABAPENTIN 800 MG: 800 TABLET ORAL at 08:41

## 2024-12-21 RX ADMIN — ENOXAPARIN SODIUM 30 MG: 100 INJECTION SUBCUTANEOUS at 20:51

## 2024-12-21 RX ADMIN — ACETAMINOPHEN 1000 MG: 500 TABLET ORAL at 00:26

## 2024-12-21 RX ADMIN — FENTANYL CITRATE 50 MCG: 50 INJECTION, SOLUTION INTRAMUSCULAR; INTRAVENOUS at 06:28

## 2024-12-21 RX ADMIN — LORAZEPAM 1 MG: 2 INJECTION INTRAMUSCULAR; INTRAVENOUS at 11:56

## 2024-12-21 RX ADMIN — FAMOTIDINE 20 MG: 20 TABLET, FILM COATED ORAL at 20:51

## 2024-12-21 RX ADMIN — SUCRALFATE 1 G: 1 TABLET ORAL at 06:26

## 2024-12-21 RX ADMIN — Medication 50 MG: at 17:13

## 2024-12-21 RX ADMIN — ACETAMINOPHEN 1000 MG: 500 TABLET ORAL at 17:14

## 2024-12-21 RX ADMIN — SUCRALFATE 1 G: 1 TABLET ORAL at 17:14

## 2024-12-21 RX ADMIN — METHOCARBAMOL 750 MG: 750 TABLET ORAL at 06:26

## 2024-12-21 RX ADMIN — ONDANSETRON 4 MG: 4 TABLET, ORALLY DISINTEGRATING ORAL at 11:55

## 2024-12-21 RX ADMIN — ENOXAPARIN SODIUM 30 MG: 100 INJECTION SUBCUTANEOUS at 08:40

## 2024-12-21 RX ADMIN — POLYETHYLENE GLYCOL 3350 17 G: 17 POWDER, FOR SOLUTION ORAL at 08:40

## 2024-12-21 RX ADMIN — METHOCARBAMOL 750 MG: 750 TABLET ORAL at 20:42

## 2024-12-21 RX ADMIN — GABAPENTIN 800 MG: 800 TABLET ORAL at 20:51

## 2024-12-21 RX ADMIN — DULOXETINE HYDROCHLORIDE 60 MG: 30 CAPSULE, DELAYED RELEASE ORAL at 08:41

## 2024-12-21 RX ADMIN — SUCRALFATE 1 G: 1 TABLET ORAL at 20:51

## 2024-12-21 RX ADMIN — PANTOPRAZOLE SODIUM 40 MG: 40 TABLET, DELAYED RELEASE ORAL at 17:14

## 2024-12-21 RX ADMIN — ACETAMINOPHEN 1000 MG: 500 TABLET ORAL at 11:55

## 2024-12-21 RX ADMIN — Medication 50 MG: at 08:53

## 2024-12-21 RX ADMIN — SENNOSIDES 8.6 MG: 8.6 TABLET, FILM COATED ORAL at 08:41

## 2024-12-21 RX ADMIN — METOPROLOL TARTRATE 50 MG: 50 TABLET, FILM COATED ORAL at 08:41

## 2024-12-21 ASSESSMENT — PAIN DESCRIPTION - LOCATION
LOCATION: ABDOMEN
LOCATION: BACK
LOCATION: BACK

## 2024-12-21 ASSESSMENT — PAIN SCALES - GENERAL
PAINLEVEL_OUTOF10: 8
PAINLEVEL_OUTOF10: 8
PAINLEVEL_OUTOF10: 6
PAINLEVEL_OUTOF10: 7
PAINLEVEL_OUTOF10: 3
PAINLEVEL_OUTOF10: 8
PAINLEVEL_OUTOF10: 7
PAINLEVEL_OUTOF10: 9
PAINLEVEL_OUTOF10: 4
PAINLEVEL_OUTOF10: 7

## 2024-12-21 ASSESSMENT — PAIN DESCRIPTION - DESCRIPTORS
DESCRIPTORS: ACHING
DESCRIPTORS: ACHING

## 2024-12-21 ASSESSMENT — PAIN - FUNCTIONAL ASSESSMENT: PAIN_FUNCTIONAL_ASSESSMENT: ACTIVITIES ARE NOT PREVENTED

## 2024-12-21 NOTE — PROGRESS NOTES
PIC Score  IS Goal Volume (15ml/kg IBW): 924   Pain  Patient reported 1-10 scale Inspiration  Incentive Spirometer    Volume obtained: 2000 ml Cough  Assessed by nurse     3  Mild  Pain intensity scale 0-4    [] 4  Above goal volume  [x]   3  Strong    [x]    3  Goal to alert volume  []    2  Moderate  Pain intensity scale 5-7  [] 2  Below alert volume    [] 2   Weak    []   1  Severe   Pain intensity scale 8-10  [x] 1  Unable to perform incentive spirometry    [] 1  Absent      []         Total: 8

## 2024-12-21 NOTE — PLAN OF CARE
--  NO ACUTE NEUROSURGICAL INTERVENTION AT THIS TIME    NEUROSURGERY TO SIGN OFF     Please contact Neurosurgery with any questions.    PATIENT TO FOLLOW UP IN CLINIC:  ---  Follow-up with Neurosurgery  [facility]  [address])  [address]  Call [phone] for an appointment.  --

## 2024-12-21 NOTE — PROGRESS NOTES
PROGRESS NOTE    PATIENT NAME: Lupe Bailonford  MEDICAL RECORD NO. [id number]  DATE: 12/21/2024  PRIMARY CARE PHYSICIAN: Angélica Collins MD    HD: # 6    ASSESSMENT    Patient Active Problem List   Diagnosis    Migraine    Opiate dependence (HCC)    Chronic pain    Obesity    Patient overweight    Lumbar radicular pain    Lumbar disc disease    Closed nondisplaced fracture of fifth metatarsal bone of right foot    Intra-abdominal abscess (HCC)    Toxic dilatation of colon (HCC)    Colonic ischemia (HCC)    Weakness    Right wrist drop    Right foot drop    Fall from standing, initial encounter    Closed fracture of manubrium    Moderate malnutrition (HCC)    Central cord syndrome (HCC)     MEDICAL DECISION MAKING AND PLAN    FFSH, - LOC, - AC    Superior sternal body fracture with displacement with possible pulmonary contusion:   - PIC 8 - IS 1500, moderate pain control and weak cough.    - O2 sat > 95% on RA   - Encouraged IS   - pain control    Grade 1 C3-4 anterolisthesis:   - POD #4 C2-4 Laminectomy and fusion   - GCS 15   - Lower extremity numbness and tingling   - Pending MRI lumbar spine - follow up on results.     Pain control:   - Tylenol 1g q8 hrs, Robaxin 750mg, Roxicodone 5mg q6 hrs or 10 mg for severe pain, Fioricet 1 tabet PRN    - Home meds: Ibuprofen 800mg q8 hrs, Gabapentin 800mg TID, Duloxetine 60mg QD   - Hx of opiate dependence: Methadone 145mg Qd, split into 50mg every 8 hours to help with pain control    Low urine output:   - UOP: 1625 (1.2 cc/kg/hr)    Elevated blood glucose:    - pt BG is 122 this morning   - no previous history/diagnosis of diabetes   - not on insulin therapy   - continue to monitor for elevated BG;    DVT Prophylaxis - Lovenox   Diet: regular  Dispo: pending neurosurgery evaluation and recommendations after MRI lumbar spine.    Chief Complaint: \"my feet are numb\"    SUBJECTIVE    Pt seen and examined at bedside resting comfortably. Pt states she is doing alright. Pain is

## 2024-12-21 NOTE — PROGRESS NOTES
are no fractures. Soft tissues are unremarkable.     Normal x-rays of the right shoulder.     XR CHEST PORTABLE    Result Date: 12/15/2024  EXAMINATION: ONE XRAY VIEW OF THE CHEST 12/15/2024 12:15 pm COMPARISON: None. HISTORY: ORDERING SYSTEM PROVIDED HISTORY: fall TECHNOLOGIST PROVIDED HISTORY: fall Reason for Exam: Fall, shoulder/neck pain FINDINGS: The lungs appear clear. The heart and mediastinal structures are unremarkable. Bony thorax appears normal. Visualized upper abdomen is unremarkable.     No radiographic evidence of acute cardiopulmonary process.     XR SHOULDER LEFT (MIN 2 VIEWS)    Result Date: 12/15/2024  EXAMINATION: 3 XRAY VIEWS OF THE LEFT SHOULDER 12/15/2024 12:15 pm COMPARISON: None. HISTORY: ORDERING SYSTEM PROVIDED HISTORY: fall TECHNOLOGIST PROVIDED HISTORY: fall Reason for Exam: Fall, shoulder/neck pain FINDINGS: Glenohumeral joint is normally aligned.  No evidence of acute fracture or dislocation.  No abnormal periarticular calcifications.  The AC joint is unremarkable in appearance. Visualized lung is unremarkable.     No acute abnormality.        A/P  54 y.o. female who presents with  stenosis of cervical spine with myelopathy.  POD 4 s/p C2-C4 posterior laminectomy and fusion     - f/u MRI lumbar for acute on chronic pain with bilat foot numbness   - activity as tolerated with aspen cervical collar, PT and OT  - cervical collar on while out of bed  - Ok for DVT ppx     Please contact neurosurgery with any changes in patients neurologic status.       Nani Menchaca, ABRAAHM - CNP  12/21/24  11:35 AM

## 2024-12-21 NOTE — PROGRESS NOTES
Physical Therapy  Facility/Department: [address] STEPDOWN  Physical Therapy    Name: Lupe Walton  : 1970  MRN: [id number]  Date of Service: 2024    Discharge Recommendations:  Patient would benefit from continued therapy after discharge        Patient Diagnosis(es): The primary encounter diagnosis was Closed fracture of manubrium, initial encounter. A diagnosis of Fall from standing, initial encounter was also pertinent to this visit.  Past Medical History:  has a past medical history of Acute gastric ulcer with perforation ([facility]), Asthma, Connective tissue disease, undifferentiated (HCC), DDD (degenerative disc disease), Depression, Lumbar radicular pain, Migraine, Mitral valve prolapse, and Opiate addiction ([facility]).  Past Surgical History:  has a past surgical history that includes Total abdominal hysterectomy w/ bilateral salpingoophorectomy (); hernia repair (); Nose surgery (); Tubal ligation (); ovarian cyst removal (Left, ); Nerve Block (Right, 2015); Hysterectomy; CT ABSCESS DRAINAGE (2021); laparotomy (N/A, 2021); laparotomy (N/A, 2021); Colonoscopy (N/A, 2021); Colonoscopy (2021); Colonoscopy (N/A, 2021); Abdomen surgery; gastrectomy (2021); Upper gastrointestinal endoscopy (N/A, 2021); Colonoscopy (N/A, 2021); Upper gastrointestinal endoscopy (N/A, 2024); cervical laminectomy (2024); and cervical fusion (N/A, 2024).    Assessment  Body Structures, Functions, Activity Limitations Requiring Skilled Therapeutic Intervention: Decreased functional mobility ;Decreased endurance;Decreased balance;Decreased strength;Decreased safe awareness;Decreased coordination  Assessment: pt adrianna stand pivot from EOB to recliner to EOB with min assist of 1. pt defers amb d/t 9/10 pain in neck & bilat shoulders. pt reports that her pain medication has been lessened. pt could benefit from cont therapy to improve her

## 2024-12-22 LAB
ANION GAP SERPL CALCULATED.3IONS-SCNC: 7 MMOL/L (ref 9–16)
BUN SERPL-MCNC: 12 MG/DL (ref 6–20)
CALCIUM SERPL-MCNC: 8.5 MG/DL (ref 8.6–10.4)
CHLORIDE SERPL-SCNC: 96 MMOL/L (ref 98–107)
CO2 SERPL-SCNC: 32 MMOL/L (ref 20–31)
CREAT SERPL-MCNC: 0.9 MG/DL (ref 0.6–0.9)
GFR, ESTIMATED: 76 ML/MIN/1.73M2
GLUCOSE SERPL-MCNC: 93 MG/DL (ref 74–99)
POTASSIUM SERPL-SCNC: 4.1 MMOL/L (ref 3.7–5.3)
SODIUM SERPL-SCNC: 135 MMOL/L (ref 136–145)

## 2024-12-22 PROCEDURE — 6360000002 HC RX W HCPCS: Performed by: NURSE PRACTITIONER

## 2024-12-22 PROCEDURE — 6370000000 HC RX 637 (ALT 250 FOR IP): Performed by: NURSE PRACTITIONER

## 2024-12-22 PROCEDURE — 6370000000 HC RX 637 (ALT 250 FOR IP)

## 2024-12-22 PROCEDURE — 99231 SBSQ HOSP IP/OBS SF/LOW 25: CPT | Performed by: SURGERY

## 2024-12-22 PROCEDURE — 2500000003 HC RX 250 WO HCPCS

## 2024-12-22 PROCEDURE — 36415 COLL VENOUS BLD VENIPUNCTURE: CPT

## 2024-12-22 PROCEDURE — 2060000000 HC ICU INTERMEDIATE R&B

## 2024-12-22 PROCEDURE — 80048 BASIC METABOLIC PNL TOTAL CA: CPT

## 2024-12-22 RX ORDER — SENNA AND DOCUSATE SODIUM 50; 8.6 MG/1; MG/1
2 TABLET, FILM COATED ORAL 2 TIMES DAILY
Status: DISCONTINUED | OUTPATIENT
Start: 2024-12-22 | End: 2024-12-24 | Stop reason: HOSPADM

## 2024-12-22 RX ADMIN — ENOXAPARIN SODIUM 30 MG: 100 INJECTION SUBCUTANEOUS at 09:12

## 2024-12-22 RX ADMIN — IBUPROFEN 400 MG: 400 TABLET, FILM COATED ORAL at 18:21

## 2024-12-22 RX ADMIN — SENNOSIDES 8.6 MG: 8.6 TABLET, FILM COATED ORAL at 14:58

## 2024-12-22 RX ADMIN — ACETAMINOPHEN 1000 MG: 500 TABLET ORAL at 05:33

## 2024-12-22 RX ADMIN — METHOCARBAMOL 750 MG: 750 TABLET ORAL at 00:08

## 2024-12-22 RX ADMIN — SUCRALFATE 1 G: 1 TABLET ORAL at 20:26

## 2024-12-22 RX ADMIN — ACETAMINOPHEN 1000 MG: 500 TABLET ORAL at 17:04

## 2024-12-22 RX ADMIN — METHOCARBAMOL 750 MG: 750 TABLET ORAL at 17:04

## 2024-12-22 RX ADMIN — GABAPENTIN 800 MG: 800 TABLET ORAL at 20:26

## 2024-12-22 RX ADMIN — SUCRALFATE 1 G: 1 TABLET ORAL at 09:13

## 2024-12-22 RX ADMIN — PANTOPRAZOLE SODIUM 40 MG: 40 TABLET, DELAYED RELEASE ORAL at 17:04

## 2024-12-22 RX ADMIN — GABAPENTIN 800 MG: 800 TABLET ORAL at 09:13

## 2024-12-22 RX ADMIN — SUCRALFATE 1 G: 1 TABLET ORAL at 17:03

## 2024-12-22 RX ADMIN — PANTOPRAZOLE SODIUM 40 MG: 40 TABLET, DELAYED RELEASE ORAL at 05:33

## 2024-12-22 RX ADMIN — SODIUM CHLORIDE, PRESERVATIVE FREE 10 ML: 5 INJECTION INTRAVENOUS at 20:26

## 2024-12-22 RX ADMIN — METOPROLOL TARTRATE 50 MG: 50 TABLET, FILM COATED ORAL at 09:13

## 2024-12-22 RX ADMIN — METOPROLOL TARTRATE 50 MG: 50 TABLET, FILM COATED ORAL at 20:26

## 2024-12-22 RX ADMIN — METHOCARBAMOL 750 MG: 750 TABLET ORAL at 09:13

## 2024-12-22 RX ADMIN — Medication 50 MG: at 00:08

## 2024-12-22 RX ADMIN — ACETAMINOPHEN 1000 MG: 500 TABLET ORAL at 00:07

## 2024-12-22 RX ADMIN — SUCRALFATE 1 G: 1 TABLET ORAL at 05:33

## 2024-12-22 RX ADMIN — METHOCARBAMOL 750 MG: 750 TABLET ORAL at 18:21

## 2024-12-22 RX ADMIN — DULOXETINE HYDROCHLORIDE 60 MG: 30 CAPSULE, DELAYED RELEASE ORAL at 09:12

## 2024-12-22 RX ADMIN — POLYETHYLENE GLYCOL 3350 17 G: 17 POWDER, FOR SOLUTION ORAL at 09:13

## 2024-12-22 RX ADMIN — GABAPENTIN 800 MG: 800 TABLET ORAL at 17:04

## 2024-12-22 RX ADMIN — ENOXAPARIN SODIUM 30 MG: 100 INJECTION SUBCUTANEOUS at 20:26

## 2024-12-22 RX ADMIN — SENNOSIDES AND DOCUSATE SODIUM 2 TABLET: 50; 8.6 TABLET ORAL at 20:26

## 2024-12-22 RX ADMIN — FAMOTIDINE 20 MG: 20 TABLET, FILM COATED ORAL at 20:26

## 2024-12-22 RX ADMIN — SENNOSIDES AND DOCUSATE SODIUM 2 TABLET: 50; 8.6 TABLET ORAL at 09:13

## 2024-12-22 RX ADMIN — Medication 50 MG: at 09:14

## 2024-12-22 RX ADMIN — Medication 50 MG: at 17:04

## 2024-12-22 RX ADMIN — ONDANSETRON 4 MG: 4 TABLET, ORALLY DISINTEGRATING ORAL at 14:56

## 2024-12-22 ASSESSMENT — PAIN SCALES - GENERAL
PAINLEVEL_OUTOF10: 8
PAINLEVEL_OUTOF10: 3
PAINLEVEL_OUTOF10: 3
PAINLEVEL_OUTOF10: 5
PAINLEVEL_OUTOF10: 6

## 2024-12-22 ASSESSMENT — PAIN DESCRIPTION - ORIENTATION
ORIENTATION: LEFT
ORIENTATION: MID;LOWER
ORIENTATION: MID;LOWER

## 2024-12-22 ASSESSMENT — PAIN DESCRIPTION - LOCATION
LOCATION: SHOULDER
LOCATION: ARM
LOCATION: ABDOMEN
LOCATION: BACK
LOCATION: BACK

## 2024-12-22 ASSESSMENT — PAIN DESCRIPTION - DESCRIPTORS
DESCRIPTORS: ACHING
DESCRIPTORS: ACHING
DESCRIPTORS: DISCOMFORT;ACHING

## 2024-12-22 ASSESSMENT — PAIN - FUNCTIONAL ASSESSMENT
PAIN_FUNCTIONAL_ASSESSMENT: ACTIVITIES ARE NOT PREVENTED

## 2024-12-22 NOTE — PLAN OF CARE
Problem: Pain  Goal: Verbalizes/displays adequate comfort level or baseline comfort level  12/22/2024 1101 by Oswaldo Pacheco  Outcome: Progressing  12/22/2024 0135 by Alka Gregory RN  Outcome: Progressing     Problem: Safety - Adult  Goal: Free from fall injury  12/22/2024 1101 by Oswaldo Pacheco  Outcome: Progressing  12/22/2024 0135 by Alka Gregory RN  Outcome: Progressing     Problem: Discharge Planning  Goal: Discharge to home or other facility with appropriate resources  12/22/2024 1101 by Oswaldo Pacheco  Outcome: Progressing  12/22/2024 0135 by Alka Gregory RN  Outcome: Progressing     Problem: Skin/Tissue Integrity  Goal: Absence of new skin breakdown  Description: 1.  Monitor for areas of redness and/or skin breakdown  2.  Assess vascular access sites hourly  3.  Every 4-6 hours minimum:  Change oxygen saturation probe site  4.  Every 4-6 hours:  If on nasal continuous positive airway pressure, respiratory therapy assess nares and determine need for appliance change or resting period.  12/22/2024 1101 by Oswaldo Pacheco  Outcome: Progressing  12/22/2024 0135 by Alka Gregory RN  Outcome: Progressing     Problem: ABCDS Injury Assessment  Goal: Absence of physical injury  12/22/2024 1101 by Oswaldo Pacheco  Outcome: Progressing  12/22/2024 0135 by Alka Gregory RN  Outcome: Progressing

## 2024-12-22 NOTE — PROGRESS NOTES
PROGRESS NOTE    PATIENT NAME: Lupe Bailonford  MEDICAL RECORD NO. [id number]  DATE: 12/22/2024  PRIMARY CARE PHYSICIAN: Angélica Collins MD    HD: # 7    ASSESSMENT    Patient Active Problem List   Diagnosis    Migraine    Opiate dependence (HCC)    Chronic pain    Obesity    Patient overweight    Lumbar radicular pain    Lumbar disc disease    Closed nondisplaced fracture of fifth metatarsal bone of right foot    Intra-abdominal abscess (HCC)    Toxic dilatation of colon (HCC)    Colonic ischemia (HCC)    Weakness    Right wrist drop    Right foot drop    Fall from standing, initial encounter    Closed fracture of manubrium    Moderate malnutrition (HCC)    Central cord syndrome (HCC)     MEDICAL DECISION MAKING AND PLAN    FFSH, - LOC, - AC    Superior sternal body fracture with displacement with possible pulmonary contusion:   - PIC 8 - IS 1500, moderate pain control and weak cough.    - O2 sat > 95% on RA   - Encouraged IS   - pain control    Grade 1 C3-4 anterolisthesis:   - POD #5 C2-4 Laminectomy and fusion   - GCS 15   - Lower extremity numbness and tingling   - Pending MRI lumbar spine - follow up on results.     Pain control:   - Tylenol 1g q8 hrs, Robaxin 750mg, Roxicodone 5mg q6 hrs or 10 mg for severe pain, Fioricet 1 tabet PRN    - Home meds: Ibuprofen 800mg q8 hrs, Gabapentin 800mg TID, Duloxetine 60mg QD   - Hx of opiate dependence: Methadone 145mg Qd, split into 50mg every 8 hours to help with pain control    Low urine output:   - UOP: More than 0.5 cc/kg/h    Elevated blood glucose:    - pt BG is controlled   - no previous history/diagnosis of diabetes   - not on insulin therapy   - continue to monitor for elevated BG;    DVT Prophylaxis - Lovenox   Diet: regular  Dispo: Patient is medically stable for discharge.  Pending approval by insurance and SNF placement.    Chief Complaint: \"my feet are numb\"    SUBJECTIVE    Pt seen and examined at bedside resting comfortably. Pt states she is doing  alright. Pain is down to 7/10. Her main concern is numbness and tingling in her feet. She has been working with PT/OT and got up to the chair yesterday. Walking is difficult with the neuropathy. Pt has been eating well. Better UOP today, still has no BM. Denies any abdominal pain. PIC 8. Pt has no questions or concerns at this time.     OBJECTIVE  VITALS: Temp: Temp: 99 °F (37.2 °C)Temp  Av.4 °F (36.9 °C)  Min: 97.1 °F (36.2 °C)  Max: 99.1 °F (37.3 °C) BP Systolic (24hrs), Av , Min:97 , Max:145   Diastolic (24hrs), Av, Min:73, Max:90   Pulse Pulse  Av.8  Min: 86  Max: 107 Resp Resp  Av.4  Min: 11  Max: 18 Pulse ox SpO2  Av.4 %  Min: 84 %  Max: 95 %  Constitutional:       General: She is awake and oriented. Cooperative to questioning.   HENT:      Head: Normocephalic and atraumatic.   Eyes:      Pupils: Pupils are equal, round, and reactive to light.   Cardiovascular:      Rate and Rhythm: Regular rhythm. Tachycardia present.      Pulses: Normal pulses.   Pulmonary:      Effort: Pulmonary effort is normal.   Chest:      Chest wall: Positive tenderness over sternum.   Abdominal:      General: Abdomen is flat.      Palpations: Abdomen is soft.    Musculoskeletal:      Cervical back: Tenderness present. Spinous process tenderness present.       Comments: Paraspinal muscular tenderness   Skin:     General: Skin is warm and dry.   Neurological:      Mental Status: She is alert and oriented to person, place, and time.   Positive neuropathy to B/L LE.  Decreased sensation in bilateral lower extremity.  No weakness noted to B/L UE or B/L LE.     I/O last 3 completed shifts:  In: 500 [P.O.:500]  Out:  [Urine:]    Drain/tube output:  In: 500 [P.O.:500]  Out: 1180 [Urine:1180]    LAB:  CBC:   Recent Labs     24   WBC 7.2   HGB 11.3*   HCT 36.4   MCV 89.7        BMP:   Recent Labs     24  0318 24  0352    133* 135*   K 3.7 3.7 4.1   CL 98

## 2024-12-22 NOTE — PROGRESS NOTES
PIC Score  IS Goal Volume (15ml/kg IBW): 924   Pain  Patient reported 1-10 scale Inspiration  Incentive Spirometer    Volume obtained: 750 ml Cough  Assessed by nurse     3  Mild  Pain intensity scale 0-4    [x] 4  Above goal volume  []   3  Strong    [x]    3  Goal to alert volume  [x]    2  Moderate  Pain intensity scale 5-7  [] 2  Below alert volume    [] 2   Weak    []   1  Severe   Pain intensity scale 8-10  [] 1  Unable to perform incentive spirometry    [] 1  Absent      []         Total: 9

## 2024-12-23 PROBLEM — R20.2 PARESTHESIAS: Status: ACTIVE | Noted: 2024-12-23

## 2024-12-23 LAB
FOLATE SERPL-MCNC: 13.2 NG/ML (ref 4.8–24.2)
VIT B12 SERPL-MCNC: 312 PG/ML (ref 232–1245)

## 2024-12-23 PROCEDURE — 84166 PROTEIN E-PHORESIS/URINE/CSF: CPT

## 2024-12-23 PROCEDURE — 6360000002 HC RX W HCPCS: Performed by: NURSE PRACTITIONER

## 2024-12-23 PROCEDURE — 99255 IP/OBS CONSLTJ NEW/EST HI 80: CPT | Performed by: PSYCHIATRY & NEUROLOGY

## 2024-12-23 PROCEDURE — 84156 ASSAY OF PROTEIN URINE: CPT

## 2024-12-23 PROCEDURE — 6370000000 HC RX 637 (ALT 250 FOR IP)

## 2024-12-23 PROCEDURE — 97530 THERAPEUTIC ACTIVITIES: CPT

## 2024-12-23 PROCEDURE — 84155 ASSAY OF PROTEIN SERUM: CPT

## 2024-12-23 PROCEDURE — 97535 SELF CARE MNGMENT TRAINING: CPT

## 2024-12-23 PROCEDURE — 99231 SBSQ HOSP IP/OBS SF/LOW 25: CPT | Performed by: SURGERY

## 2024-12-23 PROCEDURE — 6370000000 HC RX 637 (ALT 250 FOR IP): Performed by: NURSE PRACTITIONER

## 2024-12-23 PROCEDURE — 82525 ASSAY OF COPPER: CPT

## 2024-12-23 PROCEDURE — 82607 VITAMIN B-12: CPT

## 2024-12-23 PROCEDURE — 97110 THERAPEUTIC EXERCISES: CPT

## 2024-12-23 PROCEDURE — 84425 ASSAY OF VITAMIN B-1: CPT

## 2024-12-23 PROCEDURE — 84165 PROTEIN E-PHORESIS SERUM: CPT

## 2024-12-23 PROCEDURE — 84207 ASSAY OF VITAMIN B-6: CPT

## 2024-12-23 PROCEDURE — 2060000000 HC ICU INTERMEDIATE R&B

## 2024-12-23 PROCEDURE — 36415 COLL VENOUS BLD VENIPUNCTURE: CPT

## 2024-12-23 PROCEDURE — 82746 ASSAY OF FOLIC ACID SERUM: CPT

## 2024-12-23 RX ORDER — METHOCARBAMOL 750 MG/1
750 TABLET, FILM COATED ORAL 3 TIMES DAILY PRN
DISCHARGE
Start: 2024-12-23 | End: 2025-01-02

## 2024-12-23 RX ADMIN — POLYETHYLENE GLYCOL 3350 17 G: 17 POWDER, FOR SOLUTION ORAL at 08:28

## 2024-12-23 RX ADMIN — SENNOSIDES AND DOCUSATE SODIUM 2 TABLET: 50; 8.6 TABLET ORAL at 21:07

## 2024-12-23 RX ADMIN — ACETAMINOPHEN 1000 MG: 500 TABLET ORAL at 21:07

## 2024-12-23 RX ADMIN — PANTOPRAZOLE SODIUM 40 MG: 40 TABLET, DELAYED RELEASE ORAL at 16:50

## 2024-12-23 RX ADMIN — METOPROLOL TARTRATE 50 MG: 50 TABLET, FILM COATED ORAL at 21:07

## 2024-12-23 RX ADMIN — SUCRALFATE 1 G: 1 TABLET ORAL at 16:50

## 2024-12-23 RX ADMIN — Medication 50 MG: at 08:28

## 2024-12-23 RX ADMIN — DULOXETINE HYDROCHLORIDE 60 MG: 30 CAPSULE, DELAYED RELEASE ORAL at 08:27

## 2024-12-23 RX ADMIN — ACETAMINOPHEN 1000 MG: 500 TABLET ORAL at 05:14

## 2024-12-23 RX ADMIN — METHOCARBAMOL 750 MG: 750 TABLET ORAL at 00:28

## 2024-12-23 RX ADMIN — METOPROLOL TARTRATE 50 MG: 50 TABLET, FILM COATED ORAL at 08:27

## 2024-12-23 RX ADMIN — METHOCARBAMOL 750 MG: 750 TABLET ORAL at 08:27

## 2024-12-23 RX ADMIN — GABAPENTIN 800 MG: 800 TABLET ORAL at 08:27

## 2024-12-23 RX ADMIN — SENNOSIDES AND DOCUSATE SODIUM 2 TABLET: 50; 8.6 TABLET ORAL at 08:27

## 2024-12-23 RX ADMIN — ACETAMINOPHEN 1000 MG: 500 TABLET ORAL at 12:33

## 2024-12-23 RX ADMIN — GABAPENTIN 800 MG: 800 TABLET ORAL at 12:33

## 2024-12-23 RX ADMIN — SUCRALFATE 1 G: 1 TABLET ORAL at 12:33

## 2024-12-23 RX ADMIN — FAMOTIDINE 20 MG: 20 TABLET, FILM COATED ORAL at 21:07

## 2024-12-23 RX ADMIN — METHOCARBAMOL 750 MG: 750 TABLET ORAL at 12:33

## 2024-12-23 RX ADMIN — ENOXAPARIN SODIUM 30 MG: 100 INJECTION SUBCUTANEOUS at 21:07

## 2024-12-23 RX ADMIN — GABAPENTIN 800 MG: 800 TABLET ORAL at 21:07

## 2024-12-23 RX ADMIN — IBUPROFEN 400 MG: 400 TABLET, FILM COATED ORAL at 14:47

## 2024-12-23 RX ADMIN — Medication 50 MG: at 16:51

## 2024-12-23 RX ADMIN — ENOXAPARIN SODIUM 30 MG: 100 INJECTION SUBCUTANEOUS at 08:28

## 2024-12-23 RX ADMIN — SUCRALFATE 1 G: 1 TABLET ORAL at 05:14

## 2024-12-23 RX ADMIN — METHOCARBAMOL 750 MG: 750 TABLET ORAL at 21:07

## 2024-12-23 RX ADMIN — PANTOPRAZOLE SODIUM 40 MG: 40 TABLET, DELAYED RELEASE ORAL at 05:14

## 2024-12-23 RX ADMIN — SUCRALFATE 1 G: 1 TABLET ORAL at 21:07

## 2024-12-23 RX ADMIN — ACETAMINOPHEN 1000 MG: 500 TABLET ORAL at 00:27

## 2024-12-23 RX ADMIN — Medication 50 MG: at 00:28

## 2024-12-23 ASSESSMENT — PAIN DESCRIPTION - LOCATION
LOCATION: BACK
LOCATION: NECK;BACK
LOCATION: NECK;SHOULDER
LOCATION: BACK;NECK
LOCATION: ARM

## 2024-12-23 ASSESSMENT — PAIN SCALES - GENERAL
PAINLEVEL_OUTOF10: 8
PAINLEVEL_OUTOF10: 7
PAINLEVEL_OUTOF10: 8
PAINLEVEL_OUTOF10: 7
PAINLEVEL_OUTOF10: 8
PAINLEVEL_OUTOF10: 3

## 2024-12-23 ASSESSMENT — PAIN DESCRIPTION - DESCRIPTORS
DESCRIPTORS: ACHING
DESCRIPTORS: ACHING;DISCOMFORT
DESCRIPTORS: SHARP

## 2024-12-23 ASSESSMENT — PAIN DESCRIPTION - ORIENTATION
ORIENTATION: LOWER;MID
ORIENTATION: MID;LOWER

## 2024-12-23 NOTE — PROGRESS NOTES
Occupational Therapy  Occupational Therapy Daily Treatment Note  Facility/Department: [facility] 4B STEPDOWN   Patient Name: Lupe Walton        MRN: [id number]    : 1970    Date of Service: 2024    Chief Complaint   Patient presents with    Fall     Past Medical History:  has a past medical history of Acute gastric ulcer with perforation ([facility]), Asthma, Connective tissue disease, undifferentiated (HCC), DDD (degenerative disc disease), Depression, Lumbar radicular pain, Migraine, Mitral valve prolapse, and Opiate addiction ([facility]).  Past Surgical History:  has a past surgical history that includes Total abdominal hysterectomy w/ bilateral salpingoophorectomy (); hernia repair (); Nose surgery (); Tubal ligation (); ovarian cyst removal (Left, ); Nerve Block (Right, 2015); Hysterectomy; CT ABSCESS DRAINAGE (2021); laparotomy (N/A, 2021); laparotomy (N/A, 2021); Colonoscopy (N/A, 2021); Colonoscopy (2021); Colonoscopy (N/A, 2021); Abdomen surgery; gastrectomy (2021); Upper gastrointestinal endoscopy (N/A, 2021); Colonoscopy (N/A, 2021); Upper gastrointestinal endoscopy (N/A, 2024); cervical laminectomy (2024); and cervical fusion (N/A, 2024).    Discharge Recommendations  Discharge Recommendations: Patient would benefit from continued therapy after discharge       Assessment  Performance deficits / Impairments: Decreased functional mobility ;Decreased ADL status;Decreased ROM;Decreased strength;Decreased safe awareness;Decreased cognition;Decreased endurance;Decreased balance;Decreased coordination  Assessment: Pt limited by above deficits impacting pts functional mobility/ADL performance. Pt is expected to require skilled OT services to increase safety and promote increased independence t/o ADL/IADLs and functional mobility tasks.  Prognosis: Good  Activity Tolerance  Activity Tolerance: Patient limited by

## 2024-12-23 NOTE — PLAN OF CARE
Problem: Pain  Goal: Verbalizes/displays adequate comfort level or baseline comfort level  12/23/2024 0950 by Oswaldo Pacheco  Outcome: Progressing  12/22/2024 2125 by Alka Gregory RN  Outcome: Progressing  12/22/2024 2125 by Alka Gregory RN  Outcome: Progressing     Problem: Safety - Adult  Goal: Free from fall injury  12/23/2024 0950 by Oswaldo Pacheco  Outcome: Progressing  12/22/2024 2125 by Alka Gregory RN  Outcome: Progressing  12/22/2024 2125 by Alka Gregory RN  Outcome: Progressing     Problem: Discharge Planning  Goal: Discharge to home or other facility with appropriate resources  12/23/2024 0950 by Oswaldo Pacheco  Outcome: Progressing  12/22/2024 2125 by Alka Gregory RN  Outcome: Progressing  12/22/2024 2125 by Alka Gregory RN  Outcome: Progressing     Problem: Skin/Tissue Integrity  Goal: Absence of new skin breakdown  Description: 1.  Monitor for areas of redness and/or skin breakdown  2.  Assess vascular access sites hourly  3.  Every 4-6 hours minimum:  Change oxygen saturation probe site  4.  Every 4-6 hours:  If on nasal continuous positive airway pressure, respiratory therapy assess nares and determine need for appliance change or resting period.  12/23/2024 0950 by Oswaldo Pacheco  Outcome: Progressing  12/22/2024 2125 by Alka Gregory RN  Outcome: Progressing  12/22/2024 2125 by Alka Gregory RN  Outcome: Progressing     Problem: ABCDS Injury Assessment  Goal: Absence of physical injury  12/23/2024 0950 by Oswaldo Pacheco  Outcome: Progressing  12/22/2024 2125 by Alka Gregory RN  Outcome: Progressing  12/22/2024 2125 by Alka Gregory RN  Outcome: Progressing     Problem: Respiratory - Adult  Goal: Achieves optimal ventilation and oxygenation  12/23/2024 0950 by Oswaldo Pacheco  Outcome: Progressing  12/22/2024 2125 by Alka Gregory RN  Outcome: Progressing  12/22/2024 2125 by Alka Gregory RN  Outcome:  Progressing     Problem: Nutrition Deficit:  Goal: Optimize nutritional status  12/23/2024 0950 by Owsaldo Pacheco  Outcome: Progressing  12/22/2024 2125 by Alka Gregory RN  Outcome: Progressing  12/22/2024 2125 by Alka Gregory, RN  Outcome: Progressing

## 2024-12-23 NOTE — CARE COORDINATION
Info relayed from CM to NP that ANG and med rec needed for DC today. Pharmacy updated for SNF transfer.

## 2024-12-23 NOTE — CARE COORDINATION
Relayed info from CM to Jessa MCKEON and Dr. Salguero that pt requires Rx for methadone upon transfer to SNF. Dr. Salguero to confirm dosage with pt's pain clinic. Dr. Salguero aware that transport scheduled for 4pm today.

## 2024-12-23 NOTE — CARE COORDINATION
Transitional planning note: plan is [facility]. Spoke with Jadyn at [facility] who confirms auth is still pending but she expects to receive response today.     1125: received call from jadyn with [facility] stating that insurance has approved snf admission. Auth is good through 1/2/25. Message to ronaldo with trauma to notify of insurance approval.     1245: discharge orders in place. [facility] .     1255: HENS completed    1445: patient has home med of methadone. No script in chart for facility. Spoke with renata at [facility] who states patient will need script to continue methadone for pain so that their physician at facility can continue pain med for patient. Updated ronaldo with trauma on need for methadone script. She will reach out to NP edwar vela    1515: spoke with ronaldo from trauma who states that NP cannot write for methadone but will dr salguero with complete it after he confirms patient's dose with her prescriber. DEVI advised that patient has transportation scheduled for 4pm and asks if transport should be moved back to accommodate    1545: no script yet received. Called [facility] and spoke with Alana to request transport time be moved back to 6pm. Alana moved transport time back to 6pm    1605: message to ronaldo with trauma to check on status of methadone script    1628: no response received from trauma coordinator. Perfect serve message sent to attending Dr Salguero to inquire regarding status of methadone script.     1636: dr salguero requests CM confirm patient's medication and dosage with her pharmacy and he would be willing to write script for methadone. Patient goes to [facility] for her methadone. CM attempted to call [employer] @ [phone] but they are currently closed. DEVI advised via perfect serve that we can attempt to obtain exact dosage tomorrow and can update provider. Updated Nela with [facility] on above and transportation to facility will need to be canceled  for the evening. Patient and spouse updated

## 2024-12-23 NOTE — PROGRESS NOTES
PROGRESS NOTE    PATIENT NAME: Lupe Bailonford  MEDICAL RECORD NO. [id number]  DATE: 12/23/2024  PRIMARY CARE PHYSICIAN: Angélica Collins MD    HD: # 8    ASSESSMENT    Patient Active Problem List   Diagnosis    Migraine    Opiate dependence (HCC)    Chronic pain    Obesity    Patient overweight    Lumbar radicular pain    Lumbar disc disease    Closed nondisplaced fracture of fifth metatarsal bone of right foot    Intra-abdominal abscess (HCC)    Toxic dilatation of colon (HCC)    Colonic ischemia (HCC)    Weakness    Right wrist drop    Right foot drop    Fall from standing, initial encounter    Closed fracture of manubrium    Moderate malnutrition (HCC)    Central cord syndrome (HCC)     MEDICAL DECISION MAKING AND PLAN    FFSH, - LOC, - AC    Superior sternal body fracture with displacement with possible pulmonary contusion:   - PIC 9 - IS 1500, moderate pain control and strong cough.    - O2 sat > 95% on RA   - Encouraged IS   - pain control    Grade 1 C3-4 anterolisthesis:   - POD #5 C2-4 Laminectomy and fusion   - GCS 15   - Lower extremity numbness and tingling   - Pending MRI lumbar spine - follow up on results.     Pain control:   - Tylenol 1g q8 hrs, Robaxin 750mg, Roxicodone 5mg q6 hrs or 10 mg for severe pain, Fioricet 1 tabet PRN    - Home meds: Ibuprofen 800mg q8 hrs, Gabapentin 800mg TID, Duloxetine 60mg QD   - Hx of opiate dependence: Methadone 145mg Qd, split into 50mg every 8 hours to help with pain control    Low urine output:   - UOP: More than 0.5 cc/kg/h    Elevated blood glucose:    - pt BG is controlled   - no previous history/diagnosis of diabetes   - not on insulin therapy   - continue to monitor for elevated BG;    DVT Prophylaxis - Lovenox   Diet: regular  Dispo: Patient is medically stable for discharge.  Pending approval by insurance and SNF placement.    Chief Complaint: \"my feet are numb\"    SUBJECTIVE    Pt seen and examined at bedside resting comfortably. Pt states she is doing  96* 96*   CO2 30 30 32*   BUN 10 8 12   CREATININE 0.7 0.6 0.9   GLUCOSE 97 122* 93       RADIOLOGY:  XR CERVICAL SPINE (2-3 VIEWS)    Result Date: 12/18/2024  Normal baseline study after posterior metallic fusion from C2 through C4. These levels now appear normally aligned.     CT CERVICAL SPINE WO CONTRAST    Result Date: 12/18/2024  The patient is status post C2 through C4 laminectomy and fusion without hardware complication.       Juan Carlos Leong MD PGY-1  12/23/2024 , 6:22 AM

## 2024-12-23 NOTE — PLAN OF CARE
Problem: Pain  Goal: Verbalizes/displays adequate comfort level or baseline comfort level  12/22/2024 2125 by Alka Gregory RN  Outcome: Progressing  12/22/2024 2125 by Alka Gregory RN  Outcome: Progressing  12/22/2024 1101 by Oswaldo Pacheco  Outcome: Progressing     Problem: Safety - Adult  Goal: Free from fall injury  12/22/2024 2125 by Alka Gregory RN  Outcome: Progressing  12/22/2024 2125 by Alka Gregory RN  Outcome: Progressing  12/22/2024 1101 by Oswaldo Pacheco  Outcome: Progressing     Problem: Discharge Planning  Goal: Discharge to home or other facility with appropriate resources  12/22/2024 2125 by Alka Gregory RN  Outcome: Progressing  12/22/2024 2125 by Alka Gregory RN  Outcome: Progressing  12/22/2024 1101 by Oswaldo Pacheco  Outcome: Progressing     Problem: Skin/Tissue Integrity  Goal: Absence of new skin breakdown  Description: 1.  Monitor for areas of redness and/or skin breakdown  2.  Assess vascular access sites hourly  3.  Every 4-6 hours minimum:  Change oxygen saturation probe site  4.  Every 4-6 hours:  If on nasal continuous positive airway pressure, respiratory therapy assess nares and determine need for appliance change or resting period.  12/22/2024 2125 by Alka Gregory RN  Outcome: Progressing  12/22/2024 2125 by Alka Gregory RN  Outcome: Progressing  12/22/2024 1101 by Oswaldo Pacheco  Outcome: Progressing     Problem: ABCDS Injury Assessment  Goal: Absence of physical injury  12/22/2024 2125 by Alka Gregory RN  Outcome: Progressing  12/22/2024 2125 by Alka Gregory RN  Outcome: Progressing  12/22/2024 1101 by Oswaldo Pacheco  Outcome: Progressing     Problem: Respiratory - Adult  Goal: Achieves optimal ventilation and oxygenation  12/22/2024 2125 by Alka Gregory RN  Outcome: Progressing  12/22/2024 2125 by Alka Grgeory RN  Outcome: Progressing  12/22/2024 1101 by Oswaldo Pacheco  Outcome:  Progressing     Problem: Nutrition Deficit:  Goal: Optimize nutritional status  12/22/2024 2125 by Alka Gregory RN  Outcome: Progressing  12/22/2024 2125 by Alka Gregory RN  Outcome: Progressing  12/22/2024 1101 by Oswaldo Pacheco  Outcome: Progressing

## 2024-12-24 VITALS
SYSTOLIC BLOOD PRESSURE: 143 MMHG | DIASTOLIC BLOOD PRESSURE: 103 MMHG | OXYGEN SATURATION: 96 % | HEIGHT: 67 IN | HEART RATE: 82 BPM | TEMPERATURE: 99 F | BODY MASS INDEX: 19.93 KG/M2 | RESPIRATION RATE: 16 BRPM | WEIGHT: 126.98 LBS

## 2024-12-24 LAB
CERULOPLASMIN SERPL-MCNC: 25 MG/DL (ref 16–45)
ERYTHROCYTE [DISTWIDTH] IN BLOOD BY AUTOMATED COUNT: 12.4 % (ref 11.8–14.4)
HCT VFR BLD AUTO: 36.9 % (ref 36.3–47.1)
HGB BLD-MCNC: 11.2 G/DL (ref 11.9–15.1)
MCH RBC QN AUTO: 27.7 PG (ref 25.2–33.5)
MCHC RBC AUTO-ENTMCNC: 30.4 G/DL (ref 28.4–34.8)
MCV RBC AUTO: 91.1 FL (ref 82.6–102.9)
NRBC BLD-RTO: 0 PER 100 WBC
PLATELET # BLD AUTO: 264 K/UL (ref 138–453)
PMV BLD AUTO: 9.2 FL (ref 8.1–13.5)
RBC # BLD AUTO: 4.05 M/UL (ref 3.95–5.11)
WBC OTHER # BLD: 4.5 K/UL (ref 3.5–11.3)

## 2024-12-24 PROCEDURE — 6370000000 HC RX 637 (ALT 250 FOR IP)

## 2024-12-24 PROCEDURE — 97116 GAIT TRAINING THERAPY: CPT

## 2024-12-24 PROCEDURE — 99232 SBSQ HOSP IP/OBS MODERATE 35: CPT | Performed by: PSYCHIATRY & NEUROLOGY

## 2024-12-24 PROCEDURE — 97530 THERAPEUTIC ACTIVITIES: CPT

## 2024-12-24 PROCEDURE — 36415 COLL VENOUS BLD VENIPUNCTURE: CPT

## 2024-12-24 PROCEDURE — 85027 COMPLETE CBC AUTOMATED: CPT

## 2024-12-24 PROCEDURE — 97110 THERAPEUTIC EXERCISES: CPT

## 2024-12-24 PROCEDURE — 6360000002 HC RX W HCPCS: Performed by: NURSE PRACTITIONER

## 2024-12-24 PROCEDURE — 99231 SBSQ HOSP IP/OBS SF/LOW 25: CPT | Performed by: SURGERY

## 2024-12-24 PROCEDURE — 82390 ASSAY OF CERULOPLASMIN: CPT

## 2024-12-24 RX ORDER — METHADONE HYDROCHLORIDE 10 MG/ML
50 CONCENTRATE ORAL EVERY 8 HOURS
Qty: 150 ML | Refills: 0 | Status: SHIPPED | OUTPATIENT
Start: 2024-12-24 | End: 2025-01-03

## 2024-12-24 RX ADMIN — METHOCARBAMOL 750 MG: 750 TABLET ORAL at 13:06

## 2024-12-24 RX ADMIN — GABAPENTIN 800 MG: 800 TABLET ORAL at 13:06

## 2024-12-24 RX ADMIN — METHOCARBAMOL 750 MG: 750 TABLET ORAL at 01:00

## 2024-12-24 RX ADMIN — SENNOSIDES AND DOCUSATE SODIUM 2 TABLET: 50; 8.6 TABLET ORAL at 08:03

## 2024-12-24 RX ADMIN — Medication 50 MG: at 01:00

## 2024-12-24 RX ADMIN — ACETAMINOPHEN 1000 MG: 500 TABLET ORAL at 05:23

## 2024-12-24 RX ADMIN — DULOXETINE HYDROCHLORIDE 60 MG: 30 CAPSULE, DELAYED RELEASE ORAL at 08:02

## 2024-12-24 RX ADMIN — GABAPENTIN 800 MG: 800 TABLET ORAL at 08:04

## 2024-12-24 RX ADMIN — Medication 50 MG: at 09:52

## 2024-12-24 RX ADMIN — SUCRALFATE 1 G: 1 TABLET ORAL at 05:23

## 2024-12-24 RX ADMIN — METHOCARBAMOL 750 MG: 750 TABLET ORAL at 08:03

## 2024-12-24 RX ADMIN — METOPROLOL TARTRATE 50 MG: 50 TABLET, FILM COATED ORAL at 08:04

## 2024-12-24 RX ADMIN — ENOXAPARIN SODIUM 30 MG: 100 INJECTION SUBCUTANEOUS at 08:02

## 2024-12-24 RX ADMIN — ACETAMINOPHEN 1000 MG: 500 TABLET ORAL at 13:06

## 2024-12-24 RX ADMIN — PANTOPRAZOLE SODIUM 40 MG: 40 TABLET, DELAYED RELEASE ORAL at 05:23

## 2024-12-24 RX ADMIN — POLYETHYLENE GLYCOL 3350 17 G: 17 POWDER, FOR SOLUTION ORAL at 08:02

## 2024-12-24 RX ADMIN — SUCRALFATE 1 G: 1 TABLET ORAL at 13:08

## 2024-12-24 ASSESSMENT — PAIN SCALES - GENERAL
PAINLEVEL_OUTOF10: 8
PAINLEVEL_OUTOF10: 6
PAINLEVEL_OUTOF10: 5

## 2024-12-24 ASSESSMENT — PAIN DESCRIPTION - LOCATION
LOCATION: NECK
LOCATION: NECK
LOCATION: NECK;SHOULDER

## 2024-12-24 ASSESSMENT — PAIN DESCRIPTION - DESCRIPTORS: DESCRIPTORS: ACHING;DISCOMFORT

## 2024-12-24 NOTE — PLAN OF CARE
Problem: Pain  Goal: Verbalizes/displays adequate comfort level or baseline comfort level  12/24/2024 1326 by Mone Carlos RN  Outcome: Adequate for Discharge  12/24/2024 0606 by Mariah Vernon RN  Outcome: Progressing     Problem: Safety - Adult  Goal: Free from fall injury  12/24/2024 1326 by Mone Carlos RN  Outcome: Adequate for Discharge  12/24/2024 0606 by Mariah Vernon RN  Outcome: Progressing     Problem: Discharge Planning  Goal: Discharge to home or other facility with appropriate resources  12/24/2024 1326 by Mone Carlos RN  Outcome: Adequate for Discharge  Flowsheets (Taken 12/24/2024 0800)  Discharge to home or other facility with appropriate resources:   Identify barriers to discharge with patient and caregiver   Arrange for needed discharge resources and transportation as appropriate   Identify discharge learning needs (meds, wound care, etc)  12/24/2024 0606 by Mariah Vernon RN  Outcome: Progressing     Problem: ABCDS Injury Assessment  Goal: Absence of physical injury  12/24/2024 1326 by Mone Carlos RN  Outcome: Adequate for Discharge  12/24/2024 0606 by Mariah Vernon RN  Outcome: Progressing     Problem: Respiratory - Adult  Goal: Achieves optimal ventilation and oxygenation  12/24/2024 1326 by Mone Carlos RN  Outcome: Adequate for Discharge  12/24/2024 0606 by Mariah Vernon RN  Outcome: Progressing     Problem: Nutrition Deficit:  Goal: Optimize nutritional status  12/24/2024 1326 by Mone Carlos RN  Outcome: Adequate for Discharge  12/24/2024 0606 by Mariah Vernon RN  Outcome: Progressing

## 2024-12-24 NOTE — CONSULTS
desiccation, disc space  narrowing and endplate changes, progressed at L5-S1.    SPINAL CORD: The conus terminates normally.    SOFT TISSUES: No paraspinal mass identified.  The left kidney is hypoplastic,  with 2.1 cm cyst.    L1-L2: There is no significant disc herniation, spinal canal stenosis or  neural foraminal narrowing.    L2-L3: There is no significant disc herniation, spinal canal stenosis or  neural foraminal narrowing.    L3-L4: There is left foraminal disc protrusion, with mild left foraminal  narrowing.  No spinal canal narrowing.  Stable.    L4-L5: There is disc bulge, mild bilateral facet arthrosis, mild bilateral  ligamentum flavum hypertrophy, contributing to mild spinal canal narrowing,  without foraminal narrowing.  Mildly progressed.    L5-S1: There is anterolisthesis, disc bulge, moderate to severe bilateral  facet arthrosis, severe left and moderate right ligamentum flavum  hypertrophy, contributing to severe spinal canal narrowing, narrowing of the  left lateral recess, possibly contacting the left descending S1 nerve root.  There is severe bilateral foraminal narrowing.  Progressed.  Impression: Transitional vertebral anatomy with partially lumbarized S1.  The last  well-formed disc space is defined as S1-2.    Grade 1 L5 on S1 anterolisthesis, new since November 6, 2022.    Abnormal bone marrow signal in the bilateral L5 pedicles, likely related to  stress reaction.    Degenerative disc disease as described above.    Spinal canal narrowing, severe at L5-S1, mild at L4-5.    Narrowing of the left lateral recess at L5-S1, possibly contacting the left  descending S1 nerve root.    Foraminal narrowing, severe at bilateral L5-S1.    RECOMMENDATIONS:  Pathology: Left Bosniak I/Bosniak II benign renal cyst measuring 2.1 cm.No  follow-up imaging is recommended.[employer] 2021; [id number], Bosniak  Classification of Cystic Renal Masses, Version 2019.     All of patient's labs were personally  reviewed. All the imaging studies were personally reviewed and discussed with the patient.     Assessment: 54 yr woman with frequent falls and lower extremity worsening numbness and tingling for at least 2 months with no improvement after C3-C4 laminectomy/fusion surgery for myelopathy likely has peripheral length dependant neuropathy and will r/o other etiologies    Acute on chronic illness poses a threat to life and therefore would require hospitalization to prevent further morbidity and mortality.    Recommendations:  Labs: SPEP,UPEP, B12,folate,B1,B6, copper , ceruloplasmin  Outpatient [facility]  PT/OT  On duloxetine and gabapentin for pain relief.    I would like to thank you for the consult. Please do not hesitate if you have any questions about the patient care.     80 min with greater than 50% of time spent face to face, reviewing images, labs, and other clinical notes from different sources, obtaining history, examining patient, discussing results with patient, counseling and coordinating care with other healthcare providers.    Christophe Parsons MD  [facility]  Neurology     This note is created with the assistance of a speech-recognition program. While intending to generate a document that actually reflects the content of the visit, the document can still have some errors including those of syntax and sound a- like substitutions which may escape proofreading.  In such instances, actual meaning can be extrapolated by contextual derivation.

## 2024-12-24 NOTE — CARE COORDINATION
Transitional Planning: Received script for methadone. Informed Anastasiya at [facility], verbalized the patient can arrive anytime today. Transport arranged for 2pm. Anastasiya updated on transport time and Script provided in transport packet.       PT/OT recs:      Continued needs/services: ANG completed by RN.

## 2024-12-24 NOTE — PROGRESS NOTES
PROGRESS NOTE    PATIENT NAME: Lupe Bailonford  MEDICAL RECORD NO. [id number]  DATE: 12/24/2024  PRIMARY CARE PHYSICIAN: Angélica Collins MD    HD: # 9    ASSESSMENT    Patient Active Problem List   Diagnosis    Migraine    Opiate dependence (HCC)    Chronic pain    Obesity    Patient overweight    Lumbar radicular pain    Lumbar disc disease    Closed nondisplaced fracture of fifth metatarsal bone of right foot    Intra-abdominal abscess (HCC)    Toxic dilatation of colon (HCC)    Colonic ischemia (HCC)    Weakness    Right wrist drop    Right foot drop    Fall from standing, initial encounter    Closed fracture of manubrium    Moderate malnutrition (HCC)    Central cord syndrome (HCC)    Paresthesias     MEDICAL DECISION MAKING AND PLAN    FFSH, - LOC, - AC    Superior sternal body fracture with displacement with possible pulmonary contusion:   - PIC 9 - IS 1500, moderate pain control and strong cough.    - O2 sat > 95% on RA   - Encouraged IS   - pain control    Grade 1 C3-4 anterolisthesis:   - POD #7 C2-4 Laminectomy and fusion   - GCS 15   - Lower extremity numbness and tingling   - Pending MRI lumbar spine - follow up on results.     Pain control:   - Tylenol 1g q8 hrs, Robaxin 750mg, Roxicodone 5mg q6 hrs or 10 mg for severe pain, Fioricet 1 tabet PRN    - Home meds: Ibuprofen 800mg q8 hrs, Gabapentin 800mg TID, Duloxetine 60mg QD   - Hx of opiate dependence: Methadone 145mg Qd, split into 50mg every 8 hours to help with pain control    Low urine output:   - UOP: More than 0.5 cc/kg/h    Elevated blood glucose:    - pt BG is controlled   - no previous history/diagnosis of diabetes   - not on insulin therapy   - continue to monitor for elevated BG;    DVT Prophylaxis - Lovenox   Diet: regular  Dispo: Patient is medically stable for discharge.    Chief Complaint: \"my feet are numb\"    SUBJECTIVE    Pt seen and examined at bedside resting comfortably. Pt states she is doing alright. Pain is down to 7/10. Her  main concern is numbness and tingling in her feet. She has been working with PT/OT and got up to the chair yesterday. Walking is difficult with the neuropathy. Pt has been eating well. Better UOP today, still has no BM. Denies any abdominal pain. PIC 8. Pt has no questions or concerns at this time.     OBJECTIVE  VITALS: Temp: Temp: 99 °F (37.2 °C)Temp  Av.3 °F (36.8 °C)  Min: 97.9 °F (36.6 °C)  Max: 99 °F (37.2 °C) BP Systolic (24hrs), Av , Min:125 , Max:148   Diastolic (24hrs), Av, Min:82, Max:103   Pulse Pulse  Av.6  Min: 71  Max: 83 Resp Resp  Av  Min: 12  Max: 18 Pulse ox SpO2  Av.4 %  Min: 95 %  Max: 98 %  Constitutional:       General: She is awake and oriented. Cooperative to questioning.   HENT:      Head: Normocephalic and atraumatic.   Eyes:      Pupils: Pupils are equal, round, and reactive to light.   Cardiovascular:      Rate and Rhythm: Regular rhythm. Tachycardia present.      Pulses: Normal pulses.   Pulmonary:      Effort: Pulmonary effort is normal.   Chest:      Chest wall: Positive tenderness over sternum.   Abdominal:      General: Abdomen is flat.      Palpations: Abdomen is soft.    Musculoskeletal:      Cervical back: Tenderness present. Spinous process tenderness present.       Comments: Paraspinal muscular tenderness   Skin:     General: Skin is warm and dry.   Neurological:      Mental Status: She is alert and oriented to person, place, and time.   Positive neuropathy to B/L LE.  Decreased sensation in bilateral lower extremity.  No weakness noted to B/L UE or B/L LE.     I/O last 3 completed shifts:  In: 500 [P.O.:500]  Out: 1370 [Urine:1370]    Drain/tube output:  In: -   Out: 600 [Urine:600]    LAB:  CBC:   Recent Labs     24  0347   WBC 4.5   HGB 11.2*   HCT 36.9   MCV 91.1        BMP:   Recent Labs     24  0352   *   K 4.1   CL 96*   CO2 32*   BUN 12   CREATININE 0.9   GLUCOSE 93       RADIOLOGY:  XR CERVICAL SPINE (2-3

## 2024-12-24 NOTE — PROGRESS NOTES
Physical Therapy  Facility/Department: [address] STEPDOWN  Physical Therapy Treatment Note    Name: Lupe Walton  : 1970  MRN: [id number]  Date of Service: 2024    Discharge Recommendations:Further therapy recommended at discharge.The patient should be able to tolerate at least 3 hours of therapy per day over 5 days or 15 hours over 7 days.   This patient may benefit from a Physical Medicine and Rehab consult.    Patient would benefit from continued therapy after discharge   PT Equipment Recommendations  Equipment Needed: No (Pt reports owning a RW)      Patient Diagnosis(es): The primary encounter diagnosis was Closed fracture of manubrium, initial encounter. Diagnoses of Fall from standing, initial encounter, Paresthesias, and Central cord syndrome, initial encounter (HCC) were also pertinent to this visit.  Past Medical History:  has a past medical history of Acute gastric ulcer with perforation (HCC), Asthma, Connective tissue disease, undifferentiated (HCC), DDD (degenerative disc disease), Depression, Lumbar radicular pain, Migraine, Mitral valve prolapse, and Opiate addiction (HCC).  Past Surgical History:  has a past surgical history that includes Total abdominal hysterectomy w/ bilateral salpingoophorectomy (); hernia repair (); Nose surgery (); Tubal ligation (); ovarian cyst removal (Left, ); Nerve Block (Right, 2015); Hysterectomy; CT ABSCESS DRAINAGE (2021); laparotomy (N/A, 2021); laparotomy (N/A, 2021); Colonoscopy (N/A, 2021); Colonoscopy (2021); Colonoscopy (N/A, 2021); Abdomen surgery; gastrectomy (2021); Upper gastrointestinal endoscopy (N/A, 2021); Colonoscopy (N/A, 2021); Upper gastrointestinal endoscopy (N/A, 2024); cervical laminectomy (2024); and cervical fusion (N/A, 2024).    Assessment  Body Structures, Functions, Activity Limitations Requiring Skilled Therapeutic Intervention:

## 2024-12-24 NOTE — CARE COORDINATION
Trauma Coordinator Rounding Note  Daily check in:  I met with Lupe Walton  at bedside to review their plan of care. I allowed opportunity for Lupe Walton to ask questions regarding their injuries, expected length of stay and disposition plan. All questions answered to verbal satisfaction.   [x]Wounds  [x]PT/OT/speech  [x]Incentive spirometer  [x]Diet  [x]Activity  Bedside Nurse:  I spoke with the patient's assigned nurse to review the plan of care for today and provide an opportunity to ask questions or relay any concerns to the Trauma service.    :  I provided a clinical update to the unit  and confirmed the disposition plan. Current barriers to discharge include:  none. Methadone e-script sent to appropriate pharmacy for accepting facility.  PIC Score  IS Goal Volume (15ml/kg IBW): 924   Pain  Patient reported 1-10 scale Inspiration  Incentive Spirometer    Volume obtained: 1500 ml Cough  Assessed by nurse     3  Mild  Pain intensity scale 0-4    [x] 4  Above goal volume  [x]   3  Strong    [x]    3  Goal to alert volume  []    2  Moderate  Pain intensity scale 5-7  [] 2  Below alert volume    [] 2   Weak    []   1  Severe   Pain intensity scale 8-10  [] 1  Unable to perform incentive spirometry    [] 1  Absent      []         Total: 10        Electronically signed by Yin Wheeler RN on 12/24/2024 at 10:22 AM

## 2024-12-24 NOTE — DISCHARGE INSTR - DIET

## 2024-12-25 LAB
ALBUMIN PERCENT: 47 % (ref 45–65)
ALBUMIN SERPL-MCNC: 2.8 G/DL (ref 3.2–5.2)
ALPHA 2 PERCENT: 13 % (ref 6–13)
ALPHA1 GLOB SERPL ELPH-MCNC: 0.4 G/DL (ref 0.1–0.4)
ALPHA1 GLOB SERPL ELPH-MCNC: 7 % (ref 3–6)
ALPHA2 GLOB SERPL ELPH-MCNC: 0.8 G/DL (ref 0.5–0.9)
B-GLOBULIN SERPL ELPH-MCNC: 0.8 G/DL (ref 0.5–1.1)
B-GLOBULIN SERPL ELPH-MCNC: 14 % (ref 11–19)
COPPER SERPL-MCNC: 102.2 UG/DL (ref 80–155)
GAMMA GLOB SERPL ELPH-MCNC: 1.2 G/DL (ref 0.5–1.5)
GAMMA GLOBULIN %: 20 % (ref 9–20)
P E INTERPRETATION, U: NORMAL
PATHOLOGIST: ABNORMAL
PATHOLOGIST: NORMAL
PROT PATTERN SERPL ELPH-IMP: ABNORMAL
PROT SERPL-MCNC: 5.9 G/DL (ref 6.6–8.7)
SPECIMEN TYPE: NORMAL
TOTAL PROT. SUM,%: 101 % (ref 98–102)
TOTAL PROT. SUM: 6 G/DL (ref 6.3–8.2)
URINE TOTAL PROTEIN: 95 MG/DL

## 2024-12-25 NOTE — PROGRESS NOTES
[facility]  [address], [address]  [place] [address]  Ph: [phone] or [phone]  FAX: [phone]    Chief Complaint: Bilateral lower extremities numbness and     Interval history: No new complaints    HPI: I had the pleasure of seeing your patient today in neurology consultation for her symptoms. As you would recall Lupe Walton is a 54 y.o. female with frequent falls and lower extremity worsening numbness and tingling for at least 2 months    She has degenerative disc disease and underwent C3-C4 anterolisthesis laminectomy/fusion surgery and complaint of chronic numbness and tingling in the bilateral lower extremities.    Past Medical History:   Diagnosis Date    Acute gastric ulcer with perforation ([facility])     hx. of    Asthma     Connective tissue disease, undifferentiated ([facility]) 1987    Dr. Kendrick (Rheum.)    DDD (degenerative disc disease) 2000    Dr. Álvarez    Depression     Lumbar radicular pain     Migraine 1988    Dr. Gagnon    Mitral valve prolapse 1978    Dr. Washburn    Opiate addiction ([facility]) 01/02/2013    Dr. Babb / on 10/3/17 pt states she completed tx 2016     Past Surgical History:   Procedure Laterality Date    ABDOMEN SURGERY      CERVICAL FUSION N/A 12/17/2024    CERVICAL TWO THROUGH CERVICAL FOUR LAMINECTOMY AND FUSION performed by Yareli Downs DO at [facility] OR    CERVICAL LAMINECTOMY  12/17/2024    CERVICAL TWO THROUGH CERVICAL FOUR LAMINECTOMY AND FUSION    COLONOSCOPY N/A 03/17/2021    COLONOSCOPY FLEXIBLE W/ DECOMPRESSION performed by Deborah Azevedo MD at [facility] Endoscopy    COLONOSCOPY  03/21/2021    COLONOSCOPY N/A 03/21/2021    COLONOSCOPY DIAGNOSTIC performed by Yannick Barrett MD at [facility] OR    COLONOSCOPY N/A 08/19/2021    COLONOSCOPY DIAGNOSTIC performed by Patricia Walter MD at [facility] ENDO    CT ABSCESS DRAINAGE  03/04/2021    CT ABSCESS DRAIN SUBCUTANEOUS 3/4/2021 [facility] CT SCAN    GASTRECTOMY  03/05/2021    \"partial\"    HERNIA  the patient care.      greater than 50% of time spent face to face, reviewing images, labs, and other clinical notes from different sources, obtaining history, examining patient, discussing results with patient, counseling and coordinating care with other healthcare providers.    Christophe Parsons MD  [facility] [place]  Neurology     This note is created with the assistance of a speech-recognition program. While intending to generate a document that actually reflects the content of the visit, the document can still have some errors including those of syntax and sound a- like substitutions which may escape proofreading.  In such instances, actual meaning can be extrapolated by contextual derivation.

## 2024-12-26 ENCOUNTER — TELEPHONE (OUTPATIENT)
Dept: FAMILY MEDICINE CLINIC | Age: 54
End: 2024-12-26

## 2024-12-26 LAB — VIT B1 PYROPHOSHATE BLD-SCNC: 127 NMOL/L (ref 70–180)

## 2024-12-26 NOTE — TELEPHONE ENCOUNTER
Care Transitions Initial Follow Up Call    Outreach made within 2 business days of discharge: Yes    Patient: Lupe Walton Patient : 1970   MRN: [id number]  Reason for Admission: fall from standing  Discharge Date: 24       Spoke with: patient    Discharge department/facility: [facility] Interactive Patient Contact:  Patient was transferred to skilled nursing facility  Additional needs identified to be addressed with provider  No needs identified             Scheduled appointment with PCP within 7-14 days    Follow Up  Future Appointments   Date Time Provider Department Center   2025  1:45 PM Patricia Walter MD [id number]       Milla Resendiz MA

## 2024-12-26 NOTE — PROGRESS NOTES
[facility] ([facility]) Inpatient Discharge Summary  Candido NIHARIKA Lal  12/26/2024        Lupe Walton  1970  [id number]    Date & Time of Discharge: 12/24/2024  2:35 PM     Is consult a Victim of Crime?: No    Services provided:  Screenings: ITSS and Informational Packet Provided    Screen Results (If any):      ITSS:  Date Screen Completed: 12/18/2024  Patient's score= 1 for PTSD risk. ( >= 2 is positive for PTSD risk. )  Patient's score= 0 for depression risk.  ( >= 2 is positive for Depression risk )        Discharge Recommendations:  No outpatient mental health services recommended      The therapist may be reached through the [facility] offices at [phone].

## 2025-01-02 RX ORDER — PROMETHAZINE HYDROCHLORIDE 25 MG/1
TABLET ORAL
Qty: 60 TABLET | Refills: 0 | Status: SHIPPED | OUTPATIENT
Start: 2025-01-02

## 2025-01-03 ENCOUNTER — OFFICE VISIT (OUTPATIENT)
Dept: NEUROSURGERY | Age: 55
End: 2025-01-03

## 2025-01-03 VITALS
SYSTOLIC BLOOD PRESSURE: 145 MMHG | TEMPERATURE: 99 F | DIASTOLIC BLOOD PRESSURE: 99 MMHG | HEIGHT: 67 IN | HEART RATE: 90 BPM | BODY MASS INDEX: 19.15 KG/M2 | WEIGHT: 122 LBS

## 2025-01-03 DIAGNOSIS — Z98.1 S/P CERVICAL SPINAL FUSION: ICD-10-CM

## 2025-01-03 DIAGNOSIS — S14.129D CENTRAL CORD SYNDROME, SUBSEQUENT ENCOUNTER (HCC): Primary | ICD-10-CM

## 2025-01-03 PROCEDURE — 99024 POSTOP FOLLOW-UP VISIT: CPT | Performed by: NURSE PRACTITIONER

## 2025-01-03 RX ORDER — QUETIAPINE FUMARATE 50 MG/1
TABLET, FILM COATED ORAL
COMMUNITY
Start: 2024-11-19

## 2025-01-03 RX ORDER — MELOXICAM 15 MG/1
15 TABLET ORAL DAILY
COMMUNITY
Start: 2025-01-02

## 2025-01-03 NOTE — PROGRESS NOTES
kyphosis    Date of surgery: 12/17/2024    Assessment and Plan:     1. Central cord syndrome, subsequent encounter ([facility])    2. S/P cervical spinal fusion         Plan: Patient educated on proper use of cervical collar, collar fitted.  Continue utilizing walker with ambulation.  Referrals provided for outpatient physical and Occupational Therapy.  Will also refer patient for bone stimulator given setting of autoimmune disorders, smoker, decreased bone density.  Next postop visit in 4 to 6 weeks with Dr. Downs, upright x-rays prior.    Followup: Return in about 6 weeks (around 2/14/2025), or if symptoms worsen or fail to improve.    Prescriptions Ordered:  No orders of the defined types were placed in this encounter.       Orders Placed:  Orders Placed This Encounter   Procedures    XR CERVICAL SPINE (4-5 VIEWS)     Standing Status:   Future     Standing Expiration Date:   1/3/2026     Scheduling Instructions:      Please obtain upright AP; lateral views: neutral/flexion/extension     Order Specific Question:   Reason for exam:     Answer:   s/p fusion    [facility] Physical [facility]'s     Referral Priority:   Routine     Referral Type:   Eval and Treat     Referral Reason:   Specialty Services Required     Requested Specialty:   Physical Therapy     Number of Visits Requested:   1    [facility] Occupational Therapy [facility]     Referral Priority:   Routine     Referral Type:   Eval and Treat     Referral Reason:   Specialty Services Required     Requested Specialty:   Occupational Therapy     Number of Visits Requested:   1        Electronically signed by ABRAHAM Wild CNP on 1/3/2025 at 3:04 PM    Please note that this chart was generated using voice recognition Dragon dictation software.  Although every effort was made to ensure the accuracy of this automated transcription, some errors in transcription may have occurred.

## 2025-01-04 NOTE — DISCHARGE SUMMARY
DISCHARGE SUMMARY:    PATIENT NAME:  Lupe Walton  YOB: 1970  MEDICAL RECORD NO. [id number]  DATE: 01/04/25  PRIMARY CARE PHYSICIAN: Angélica Collins MD  ADMIT DATE: 12/15/2024   DISCHARGE DATE: 12/24/2024   DISPOSITION:  SNF  ADMITTING DIAGNOSIS:   Closed fracture of manubrium, initial encounter [S22.21XA]  Fall from standing, initial encounter [W19.XXXA]     DIAGNOSIS:   Patient Active Problem List   Diagnosis    Migraine    Opiate dependence (HCC)    Chronic pain    Obesity    Patient overweight    Lumbar radicular pain    Lumbar disc disease    Closed nondisplaced fracture of fifth metatarsal bone of right foot    Intra-abdominal abscess (HCC)    Toxic dilatation of colon (HCC)    Colonic ischemia (HCC)    Weakness    Right wrist drop    Right foot drop    Fall from standing, initial encounter    Closed fracture of manubrium    Moderate malnutrition (HCC)    Central cord syndrome (HCC)    Paresthesias       CONSULTANTS:  neurology and neurosurgery    PROCEDURES:   12/17/24- : CERVICAL TWO THROUGH CERVICAL FOUR LAMINECTOMY AND FUSION     HOSPITAL COURSE:   Lupe Walton is a 54 y.o. female who was admitted on 12/15/2024 with multiple falls and underwent CERVICAL TWO THROUGH CERVICAL FOUR LAMINECTOMY AND FUSION on 12/17/24.    2/15: EKG NSR, LVH, Trop 15, Flex ex xray negative  12/16: wean off HFNC to NC, methadone q8 hrs, CT recon  12/17: OR for posterior laminecceomy C2-C4 with C5 fusion. NS recs to resume lovenox on 19th + postop Xrays and CT the following day. MAP > 85 for another 6 days  12/18: MAP pushes discontinued per nsgy, DC art line, lots of pain- add prn fentanyl   12/19: chronic pain, paraesthesias to the BLE,  ok to transfer to [place]  12/21: Drain d/c'd per NS, Lumbar MRI showed severe spinal canal narrowing at L5-S1  12/22: NS s/o    Labs and imaging were followed daily.      At time of discharge, Lupe Walton was tolerating a regular diet, having bowel movements, ambulating  SPINE: BONES/ALIGNMENT: There is reversal cervical lordosis similar to prior.  There is 4 mm grade 1 C3-C4 anterolisthesis which has developed since the prior study. Best seen on coronal images there is vertical lucency with corticated margins through the left C3 facet, coronal series 604, image 26,27, compatible with nonacute fracture.  There is surrounding hypertrophic facet arthropathy. Transverse lucency across the superior wall of left C4 transverse foramen also appears nonacute noting well-corticated margins, axial series 5, image 35, sagittal series 605, image 43. No convincing evidence for acute fracture. DEGENERATIVE CHANGES: Multilevel hypertrophic facet arthropathy most pronounced left C2-C3, C3-C4. SOFT TISSUES: There is no prevertebral soft tissue swelling. THORACIC SPINE: BONES/ALIGNMENT: Mild dextrocurvature thoracic spine.  No acute fracture. DEGENERATIVE CHANGES: No significant degenerative changes. SOFT TISSUES: No paraspinal mass. LUMBAR SPINE: BONES/ALIGNMENT: Levocurvature lumbar spine.  No acute fracture.  Grade 1 L5-S1 spondylolisthesis.  No spondylolysis. DEGENERATIVE CHANGES: Moderate L5-S1 disc space narrowing with vacuum disc changes and facet arthropathy. SOFT TISSUES: No paraspinal mass.     CT HEAD: No CT evidence for acute intracranial abnormality.. CT CERVICAL SPINE: Degenerative changes most pronounced at C2-C3 and C3-C4 on the left where there are signs of nonacute C3 facet and C4 lateral mass nondisplaced fractures as noted above. There is grade 1 C3-C4 anterolisthesis which has developed since the prior study of 2022.  If there is concern for cervical instability flexion/extension radiographs may be obtained. CT THORACIC SPINE: No acute osseous abnormality. CT LUMBAR SPINE: No acute osseous abnormality. Grade 1 L5-S1 spondylolisthesis degenerative changes at this level.     CT HEAD WO CONTRAST    Result Date: 12/15/2024  EXAMINATION: CT OF THE CERVICAL SPINE WITHOUT CONTRAST; CT

## 2025-01-15 ENCOUNTER — HOSPITAL ENCOUNTER (OUTPATIENT)
Age: 55
Setting detail: THERAPIES SERIES
Discharge: HOME | End: 2025-01-15

## 2025-01-15 NOTE — CONSULTS
[x] [facility]  Outpatient Rehabilitation &  Therapy  [address]  [phone]  [phone] [] [facility]  Outpatient Rehabilitation &  Therapy  [address]  P: [phone]  F: [phone] [] [facility]  Outpatient Rehabilitation &  Therapy  [address]  P: [phone]  F: [phone] [] [facility]  Outpatient Rehabilitation &  Therapy  [address]  [phone]  [phone] [] [facility]  Outpatient Rehabilitation &  Therapy  [address]   P: [phone]  F: [phone]  [] [facility]  Outpatient Rehabilitation &  Therapy  [address]  P: [phone]  F: [phone] [] [facility]  Outpatient Rehabilitation &  Therapy  [address]  P: [phone]  F: [phone] [] [facility]  Outpatient Rehabilitation &  Therapy  [address]  P: [phone]  F: [phone] [] [facility]  Outpatient Rehabilitation &  Therapy  [address]  P: [phone]  F: [phone]  [] [facility] Outpatient Rehabilitation &  Therapy  [address]  P: [phone]  F: [phone]     Therapy Cancel/No Show note    Date: 1/15/2025  Patient: Hilmar R Anton  : 1970  MRN: [id number]    Cancels/No Shows to date: 1 cx/ 0 ns    For today's appointment patient:    [x]  Cancelled    [] Rescheduled appointment    [] No-show     Reason given by patient:    []  Patient ill    []  Conflicting appointment    [] No transportation      [] Conflict with work    [] No reason given    [] Weather related    [] COVID-19    [x] Other:      Comments:  \"Will not be coming to appointment\". Pt to call back to schedule if needed.      [] Next appointment was confirmed    Electronically signed by: Susana Love PT

## 2025-01-15 NOTE — FLOWSHEET NOTE
[x] [facility]       Occupational Therapy       [address]       Phone: [phone]       Fax: [phone] [] [facility] Occupational  Therapy at [place]       [address]       Phone: [phone]       Fax: [phone] [] [facility]  Outpatient Rehabilitation &  Therapy  [address]  P: [phone]  F: [phone]           Occupational Therapy Cancel/No Show note    Date: 1/15/2025  Patient: Lupe Walton  : 1970  MRN: [id number]  Cancels/No Shows to date:     For today's appointment patient:  [x]  Cancelled  []  Rescheduled appointment  []  No-show     Reason given by patient:  []  Patient ill  []  Conflicting appointment  []  No transportation    []  Conflict with work  [x]  No reason given  []  Other:     Comments:      Electronically signed by: EFREN Ferraro/L

## 2025-01-16 RX ORDER — PROMETHAZINE HYDROCHLORIDE 25 MG/1
TABLET ORAL
Qty: 60 TABLET | Refills: 0 | OUTPATIENT
Start: 2025-01-16

## 2025-01-21 RX ORDER — PROMETHAZINE HYDROCHLORIDE 25 MG/1
TABLET ORAL
Qty: 60 TABLET | Refills: 0 | Status: SHIPPED | OUTPATIENT
Start: 2025-01-21

## 2025-01-23 ENCOUNTER — HOSPITAL ENCOUNTER (OUTPATIENT)
Age: 55
Setting detail: THERAPIES SERIES
Discharge: HOME | End: 2025-01-23

## 2025-01-23 NOTE — FLOWSHEET NOTE
[x] [facility]       Occupational Therapy       [address]       Phone: [phone]       Fax: [phone] [] [facility] Occupational  Therapy at [place]       [address]       Phone: [phone]       Fax: [phone] [] [facility]  Outpatient Rehabilitation &  Therapy  [address]  P: [phone]  F: [phone]           Occupational Therapy Cancel/No Show note    Date: 2025  Patient: Lupe Walton  : 1970  MRN: [id number]  Cancels/No Shows to date:     For today's appointment patient:  [x]  Cancelled  [x]  Rescheduled appointment  []  No-show     Reason given by patient:  []  Patient ill  []  Conflicting appointment  []  No transportation    []  Conflict with work  []  No reason given  [x]  Other: weather     Comments:      Electronically signed by: EFREN Ferraro/L

## 2025-02-03 ENCOUNTER — HOSPITAL ENCOUNTER (OUTPATIENT)
Age: 55
Setting detail: THERAPIES SERIES
Discharge: HOME | End: 2025-02-03
Payer: COMMERCIAL

## 2025-02-03 PROCEDURE — 97166 OT EVAL MOD COMPLEX 45 MIN: CPT

## 2025-02-03 PROCEDURE — 97162 PT EVAL MOD COMPLEX 30 MIN: CPT

## 2025-02-03 NOTE — FLOWSHEET NOTE
COREY BALANCE SCALE 14-Item Long Form Original Version    Patient Name:  Lupe Walton  Date:  2/3/2025    1. SITTING TO STANDING  INSTRUCTIONS: Please stand up. Try not to use your hands for support.  (4) able to stand without using hands and stabilize independently  (3) able to stand independently using hands  (2) able to stand using hands after several tries  (1) needs minimal aid to stand or to stabilize  (0) needs moderate or maximal assist to stand  Score: 3    2. STANDING UNSUPPORTED  INSTRUCTIONS: Please stand for two minutes without holding.  (4) able to stand safely 2 minutes  (3) able to stand 2 minutes with supervision  (2) able to stand 30 seconds unsupported  (1) needs several tries to stand 30 seconds unsupported  (0) unable to stand 30 seconds unassisted If a subject is able to stand 2  minutes unsupported, score full points for sitting unsupported. Proceed to  item #4.  Score: 2    3. SITTING WITH BACK UNSUPPORTED BUT FEET SUPPORTED  ON FLOOR OR ON A STOOL  INSTRUCTIONS: Please sit with arms folded for 2 minutes.  (4) able to sit safely and securely 2 minutes  (3) able to sit 2 minutes under supervision  (2) able to sit 30 seconds  (1) able to sit 10 seconds  (0) unable to sit without support 10 seconds  Score: 4     4. STANDING TO SITTING  INSTRUCTIONS: Please sit down.  (4) sits safely with minimal use of hands  (3) controls descent by using hands  (2) uses back of legs against chair to control descent  (1) sits independently but has uncontrolled descent  (0) needs assistance to sit  Score: 3    5. TRANSFERS  INSTRUCTIONS: Arrange chairs(s) for a pivot transfer. Ask subject to  transfer one way toward a seat with armrests and one way toward a seat  without armrests. You may use two chairs (one with and one without  armrests) or a bed and a chair.  (4) able to transfer safely with minor use of hands  (3) able to transfer safely definite need of hands  (2) able to transfer with verbal cueing and/or

## 2025-02-03 NOTE — CONSULTS
[x] [facility]  Outpatient Rehabilitation &  Therapy  [address]    [phone]  F: [phone] [] [facility]  Outpatient Rehabilitation &  Therapy  [address]  P: [phone]  F: [phone] [] [facility]  Outpatient Rehabilitation &  Therapy  [address]  P: [phone]  F: [phone] [] [facility]  Outpatient Rehabilitation &  Therapy  [address]   P: [phone]  F: [phone] [] [facility]   Outpatient Rehabilitation & Therapy  [address]  P: [phone]   F: [phone]        Physical Therapy Neurological Evaluation    Date:  2/3/2025  Patient: Lupe Walton  : 1970  MRN: [id number]  Physician:     Jennifer Staley APRN - CNP   Insurance: [employer] (20vs, auth after eval)  Medical Diagnosis:   S14.129D (ICD-10-CM) - Central cord syndrome, subsequent encounter (HCC)   Z98.1 (ICD-10-CM) - S/P cervical spinal fusion   Rehab Codes: M62.81, R29.3, R26.89, M54.2, M25.60  Next 's appt.: 25  Date of symptom onset: 24 DOS    Subjective:   CC: Pt arrives for physical therapy evaluation of balance/gait impairment, LE weakness with history of recent cervical 2-4 laminectomy and fusion and diagnosis of central cord syndrome. Notes she was developing LE weakness and falls around 2024, falling nearly every day, and this ultimately resulted in her fusion surgery d/t damage to the neck. Currently, endorses daily falls, continued UE/LE weakness, incoordination in BUEs, and shoulder/neck pain.     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

## 2025-02-03 NOTE — CONSULTS
[x] [facility]  Outpatient Rehabilitation &  Therapy  [address]  [phone]  [phone] [] [facility]  Outpatient Rehabilitation &  Therapy  [address]  P: [phone]  F: [phone] [] [facility]  Outpatient Rehabilitation &  Therapy  [address]  P: [phone]  F: [phone] [] [facility]  Outpatient Rehabilitation &  Therapy  [address]  [phone]  [phone] [] [facility]  Outpatient Rehabilitation &  Therapy  [address]   P: [phone]  F: [phone]  [] [facility]  Outpatient Rehabilitation &  Therapy  [address]  P: [phone]  F: [phone] [] [facility]  Outpatient Rehabilitation &  Therapy  [address]  P: [phone]  F: [phone] [] [facility]  Outpatient Rehabilitation &  Therapy  [address]  P: [phone]  F: [phone] [] [facility]  Outpatient Rehabilitation &  Therapy  [address]  P: [phone]  F: [phone]  [] [facility] Outpatient Rehabilitation &  Therapy  [address]  P: [phone]  F: [phone]       Occupational Therapy Neurological Evaluation    Date:  2/3/2025  Patient: Lupe Walton  : 1970  MRN: [id number]  Physician: Jennifer Staley CNP   Insurance: [facility] after eval   Medical Diagnosis: central cord syndrome S14.1429, s/p cervical spinal fusion Z98.1   Rehab Codes: lack of coordination R27.8,, fine motor skills loss R29.818,, muscle weakness generalized M62.81,, or history of falls Z91.81,  Next 's appt.: 25  Date of symptom onset: 24    Subjective:   CC: Pt reports that shortly before 2024, she began having numbness in both hands and feet. She states she began falling very

## 2025-02-10 ENCOUNTER — HOSPITAL ENCOUNTER (OUTPATIENT)
Age: 55
Setting detail: THERAPIES SERIES
Discharge: HOME | End: 2025-02-10
Payer: COMMERCIAL

## 2025-02-10 NOTE — FLOWSHEET NOTE
[x] [facility]  Outpatient Rehabilitation &  Therapy  [address] [place] [address]  [phone]  [phone]     Therapy Cancel/No Show note    Date: 2/10/2025  Patient: Lupe Walton  : 1970  MRN: [id number]    Cancels/No Shows to date:      For today's appointment patient:    [x]  Cancelled    [] Rescheduled appointment    [] No-show     Reason given by patient:    []  Patient ill    []  Conflicting appointment    [] No transportation      [] Conflict with work    [x] No reason given    [] Weather related    [] COVID-19    [] Other:      Comments:        [x] Next appointment was confirmed    Electronically signed by: NARGIS CANDELARIA PTA

## 2025-02-13 ENCOUNTER — HOSPITAL ENCOUNTER (OUTPATIENT)
Age: 55
Setting detail: THERAPIES SERIES
Discharge: HOME | End: 2025-02-13
Payer: COMMERCIAL

## 2025-02-13 RX ORDER — PROMETHAZINE HYDROCHLORIDE 25 MG/1
TABLET ORAL
Qty: 60 TABLET | Refills: 0 | Status: SHIPPED | OUTPATIENT
Start: 2025-02-13

## 2025-02-13 RX ORDER — PANTOPRAZOLE SODIUM 40 MG/1
TABLET, DELAYED RELEASE ORAL
Qty: 180 TABLET | Refills: 1 | Status: SHIPPED | OUTPATIENT
Start: 2025-02-13

## 2025-02-13 NOTE — FLOWSHEET NOTE
[x] [facility]  Outpatient Rehabilitation &  Therapy  [address]  [phone]  [phone]     Therapy Cancel/No Show note    Date: 2025  Patient: Lupe Walton  : 1970  MRN: [id number]    Cancels/No Shows to date: 2 cx/ 0  ns    For today's appointment patient:    [x]  Cancelled    [] Rescheduled appointment    [] No-show     Reason given by patient:    []  Patient ill    []  Conflicting appointment    [] No transportation      [] Conflict with work    [] No reason given    [x] Weather related    [] COVID-19    [] Other:      Comments:        [x] Next appointment was confirmed    Electronically signed by: Susana Love PT

## 2025-02-18 ENCOUNTER — HOSPITAL ENCOUNTER (OUTPATIENT)
Age: 55
Setting detail: THERAPIES SERIES
Discharge: HOME | End: 2025-02-18
Payer: COMMERCIAL

## 2025-02-18 PROCEDURE — 97116 GAIT TRAINING THERAPY: CPT

## 2025-02-18 PROCEDURE — 97110 THERAPEUTIC EXERCISES: CPT

## 2025-02-18 NOTE — PRE-CERTIFICATION NOTE
[x] [facility]  Outpatient Rehabilitation &  Therapy  [address]  [phone]  [phone] [] [facility]  Outpatient Rehabilitation &  Therapy  [address]  P: [phone]  F: [phone] [] [facility]  Outpatient Rehabilitation &  Therapy  [address]  P: [phone]  F: [phone] [] [facility]  Outpatient Rehabilitation &  Therapy  [address]  [phone]  [phone] [] [facility]  Outpatient Rehabilitation &  Therapy  [address]   P: [phone]  F: [phone]  [] [facility]  Outpatient Rehabilitation &  Therapy  [address]  P: [phone]  F: [phone] [] [facility]  Outpatient Rehabilitation &  Therapy  [address]  P: [phone]  F: [phone] [] [facility]  Outpatient Rehabilitation &  Therapy  [address]  P: [phone]  F: [phone] [] [facility]  Outpatient Rehabilitation &  Therapy  [address]  P: [phone]  F: [phone]  [] [facility] Outpatient Rehabilitation &  Therapy  [address]  P: [phone]  F: [phone]          Therapy Pre-certification Note      2/18/2025    Lupe Walton  1970   [id number]      Insurance approval was received for Physical Therapy from [facility] on 2/13/2025.  Approval was received from 2/3/2025 to 4/1/2025.  Authorization number [id number].    83703, 48 units  87388, 48 units  03880, 48 units  01560, 48 units  22270, 48 units  00878, 12 units    Patient is scheduled.      Electronically signed by Carola Costa PT on 2/18/2025 at 2:00 PM

## 2025-02-18 NOTE — PRE-CERTIFICATION NOTE
[x] [facility]  Outpatient Rehabilitation &  Therapy  [address]  [phone]  [phone] [] [facility]  Outpatient Rehabilitation &  Therapy  [address]  P: [phone]  F: [phone] [] [facility]  Outpatient Rehabilitation &  Therapy  [address]  P: [phone]  F: [phone] [] [facility]  Outpatient Rehabilitation &  Therapy  [address]  [phone]  [phone] [] [facility]  Outpatient Rehabilitation &  Therapy  [address]   P: [phone]  F: [phone]  [] [facility]  Outpatient Rehabilitation &  Therapy  [address]  P: [phone]  F: [phone] [] [facility]  Outpatient Rehabilitation &  Therapy  [address]  P: [phone]  F: [phone] [] [facility]  Outpatient Rehabilitation &  Therapy  [address]  P: [phone]  F: [phone] [] [facility]  Outpatient Rehabilitation &  Therapy  [address]  P: [phone]  F: [phone]  [] [facility] Outpatient Rehabilitation &  Therapy  [address]  P: [phone]  F: [phone]          Therapy Pre-certification Note      2/18/2025    Lupe Walton  1970   [id number]      Insurance approval was received for Physical Therapy from [facility] on 2/18/2025.  Approval was received from 2/18/2025 to 4/1/2025.  Authorization number [id number].    46993, 48 units        Electronically signed by Carola Cosat PT on 2/18/2025 at 2:31 PM

## 2025-02-18 NOTE — FLOWSHEET NOTE
[x] [facility]  Outpatient Rehabilitation &  Therapy  [address]  [phone]  F: [phone] [] [facility]  Outpatient Rehabilitation &  Therapy  [address]   [address]  P: [phone]  F: [phone] [] [facility]  Outpatient Rehabilitation &  Therapy  [address]  P: [phone]  F: [phone] [] [facility]  Outpatient Rehabilitation &  Therapy  [address]   P: [phone]  F: [phone] [] [facility]   Outpatient Rehabilitation   & Therapy  [address]  P: [phone]   F: [phone]     Occupational Therapy Daily Treatment Note    Date:  2025  Patient Name:  Lupe Walton    :  1970  MRN: [id number]  Physician: Jennifer Staley CNP                                   Insurance: [facility] - DOS: 2/3/20-25, TE, TA, Man    Medical Diagnosis: central cord syndrome S14.1429, s/p cervical spinal fusion Z98.1       Rehab Codes: lack of coordination R27.8,, fine motor skills loss R29.818,, muscle weakness generalized M62.81,, or history of falls Z91.81,  Next 's appt.: 25  Date of symptom onset: 24  Visit# / total visits: ; Progress note for patient due at visit 8    Cancels/No Shows: 1/0      Subjective:    Pain:  [] Yes  [x] No Location:  N/A Pain Rating: (0-10 scale) 0/10  Pain altered Tx:  [x] No  [] Yes  Action: NA  Pt Comments: Pt arrives with complaints of bilateral ankle swelling. Pt reports she has been able to get her hair into a hair tie recently. [facility]     Precautions [] No  [x] Yes: supposed to be wearing Aspen collar  :  Surgery procedure and date: 24    Objective:  Today's Treatment:  Modalities: NA this date  Exercises:  EXERCISE    REPS/     TIME  WEIGHT/    LEVEL COMMENTS   Velcro pegs  15 pegs each hand  Added, completed    Peg flip 15 pegs each

## 2025-02-18 NOTE — FLOWSHEET NOTE
[x] [facility]  Outpatient Rehabilitation &  Therapy  [address]  [phone]  [phone] [] [facility]  Outpatient Rehabilitation &  Therapy  [address]  P: [phone]  F: [phone] [] [facility]  Outpatient Rehabilitation &  Therapy  [address]  P: [phone]  F: [phone] [] [facility]  Outpatient Rehabilitation &  Therapy  [address]  [phone]  [phone] [] [facility]  Outpatient Rehabilitation &  Therapy  [address]   P: [phone]  F: [phone]  [] [facility]  Outpatient Rehabilitation &  Therapy  [address]  P: [phone]  F: [phone] [] [facility]  Outpatient Rehabilitation &  Therapy  [address]  P: [phone]  F: [phone] [] [facility]  Outpatient Rehabilitation &  Therapy  [address]  P: [phone]  F: [phone] [] [facility]  Outpatient Rehabilitation &  Therapy  [address]  P: [phone]  F: [phone]  [] [facility] Outpatient Rehabilitation &  Therapy  [address]  P: [phone]  F: [phone]     Physical Therapy Daily Treatment Note    Date:  2025  Patient Name:  Lupe Walton    :  1970  MRN: [id number]  Physician:   Jennifer Staley APRN - CNP   Insurance: [address]  Approval was received from 2/3/2025 to 2025.  Authorization number [id number]. and [id number]     03850, 48 units  32842, 48 units  37511, 48 units  19742, 48 units  94390, 48 units  75136, 12 units  73848  48 units    Medical Diagnosis:   S14.129D (ICD-10-CM) - Central cord syndrome, subsequent encounter (HCC)   Z98.1 (ICD-10-CM) - S/P cervical spinal fusion   Rehab Codes: M62.81, R29.3, R26.89, M54.2, M25.60  Next 's appt.: 25  Date of

## 2025-02-18 NOTE — PRE-CERTIFICATION NOTE
[x] [facility]  Outpatient Rehabilitation &  Therapy  [address]  [phone]  [phone] [] [facility]  Outpatient Rehabilitation &  Therapy  [address]  P: [phone]  F: [phone] [] [facility]  Outpatient Rehabilitation &  Therapy  [address]  P: [phone]  F: [phone] [] [facility]  Outpatient Rehabilitation &  Therapy  [address]  [phone]  [phone] [] [facility]  Outpatient Rehabilitation &  Therapy  [address]   P: [phone]  F: [phone]  [] [facility]  Outpatient Rehabilitation &  Therapy  [address]  P: [phone]  F: [phone] [] [facility]  Outpatient Rehabilitation &  Therapy  [address]  P: [phone]  F: [phone] [] [facility]  Outpatient Rehabilitation &  Therapy  [address]  P: [phone]  F: [phone] [] [facility]  Outpatient Rehabilitation &  Therapy  [address]  P: [phone]  F: [phone]  [] [facility] Outpatient Rehabilitation &  Therapy  [address]  P: [phone]  F: [phone]          Therapy Pre-certification Note      2/18/2025    Lupe Walton  1970   [id number]      Insurance approval was received for Physical Therapy from [facility] on 2.13.25.  Approval was received for visits, from 2.3.25 to 4.1.25.  Authorization number [id number].  12160 48 units Ther Act  37729 48 units  Gait training  93782 48 units  Neuro  40117 48 units Manual  85689 48 units  Aquatics  74467 12 units Vaso compression     Patient was contacted to be scheduled 2.18.25.      Electronically signed by Michell Hernandez PTA on 2/18/2025 at 1:40 PM

## 2025-02-18 NOTE — PRE-CERTIFICATION NOTE
[x] [facility]  Outpatient Rehabilitation &  Therapy  [address]  [phone]  [phone] [] [facility]  Outpatient Rehabilitation &  Therapy  [address]  P: [phone]  F: [phone] [] [facility]  Outpatient Rehabilitation &  Therapy  [address]  P: [phone]  F: [phone] [] [facility]  Outpatient Rehabilitation &  Therapy  [address]  [phone]  [phone] [] [facility]  Outpatient Rehabilitation &  Therapy  [address]   P: [phone]  F: [phone]  [] [facility]  Outpatient Rehabilitation &  Therapy  [address]  P: [phone]  F: [phone] [] [facility]  Outpatient Rehabilitation &  Therapy  [address]  P: [phone]  F: [phone] [] [facility]  Outpatient Rehabilitation &  Therapy  [address]  P: [phone]  F: [phone] [] [facility]  Outpatient Rehabilitation &  Therapy  [address]  P: [phone]  F: [phone]  [] [facility] Outpatient Rehabilitation &  Therapy  [address]  P: [phone]  F: [phone]          Therapy Pre-certification Note      2/18/2025    Lupe Walton  1970   [id number]      Insurance approval was received for Occupational Therapy from [facility] on 2/13/2025.  Approval was received from 2/3/2025 to 4/1/2025.  Authorization number [id number].    84450, 64 units  13607, 64 units  46140, 64 units    Patient is scheduled.      Electronically signed by Carola Costa PT on 2/18/2025 at 2:02 PM

## 2025-02-20 ENCOUNTER — APPOINTMENT (OUTPATIENT)
Age: 55
End: 2025-02-20
Payer: COMMERCIAL

## 2025-02-25 ENCOUNTER — OFFICE VISIT (OUTPATIENT)
Dept: NEUROSURGERY | Age: 55
End: 2025-02-25
Payer: COMMERCIAL

## 2025-02-25 ENCOUNTER — TELEPHONE (OUTPATIENT)
Dept: FAMILY MEDICINE CLINIC | Age: 55
End: 2025-02-25

## 2025-02-25 ENCOUNTER — HOSPITAL ENCOUNTER (OUTPATIENT)
Dept: GENERAL RADIOLOGY | Age: 55
Discharge: HOME | End: 2025-02-27
Payer: COMMERCIAL

## 2025-02-25 ENCOUNTER — HOSPITAL ENCOUNTER (OUTPATIENT)
Age: 55
Discharge: HOME | End: 2025-02-27
Payer: COMMERCIAL

## 2025-02-25 VITALS
DIASTOLIC BLOOD PRESSURE: 88 MMHG | HEART RATE: 97 BPM | WEIGHT: 115 LBS | SYSTOLIC BLOOD PRESSURE: 124 MMHG | BODY MASS INDEX: 18.01 KG/M2

## 2025-02-25 DIAGNOSIS — Z98.1 S/P CERVICAL SPINAL FUSION: Primary | ICD-10-CM

## 2025-02-25 DIAGNOSIS — M40.203 KYPHOSIS OF CERVICOTHORACIC REGION, UNSPECIFIED KYPHOSIS TYPE: ICD-10-CM

## 2025-02-25 DIAGNOSIS — Z98.1 S/P CERVICAL SPINAL FUSION: ICD-10-CM

## 2025-02-25 PROCEDURE — G8419 CALC BMI OUT NRM PARAM NOF/U: HCPCS | Performed by: NEUROLOGICAL SURGERY

## 2025-02-25 PROCEDURE — 4004F PT TOBACCO SCREEN RCVD TLK: CPT | Performed by: NEUROLOGICAL SURGERY

## 2025-02-25 PROCEDURE — G8427 DOCREV CUR MEDS BY ELIG CLIN: HCPCS | Performed by: NEUROLOGICAL SURGERY

## 2025-02-25 PROCEDURE — 99214 OFFICE O/P EST MOD 30 MIN: CPT | Performed by: NEUROLOGICAL SURGERY

## 2025-02-25 PROCEDURE — 3017F COLORECTAL CA SCREEN DOC REV: CPT | Performed by: NEUROLOGICAL SURGERY

## 2025-02-25 PROCEDURE — 72050 X-RAY EXAM NECK SPINE 4/5VWS: CPT

## 2025-02-25 NOTE — PROGRESS NOTES
2007    LAPAROTOMY N/A 03/05/2021    EXPLORATORY LAPAROTOMY, PARTIAL GASTRECTOMY performed by Virginia Schroeder MD at [facility] OR    LAPAROTOMY N/A 03/11/2021    LAPAROTOMY EXPLORATORY, ABDOMINAL WASH OUT, FASCIAL CLOSURE  performed by Garcia Mcmullen MD at [facility] OR    NERVE BLOCK Right 07/27/2015    right lumbar JHONY    NOSE SURGERY  1989    After nose was broken. Dr. Robertson    OVARIAN CYST REMOVAL Left 2007    Dr. Ardon    JAIMEE AND BSO (CERVIX REMOVED)  2007    Dr. Ardon    TUBAL LIGATION  1997    Dr. Smart    UPPER GASTROINTESTINAL ENDOSCOPY N/A 08/19/2021    EGD BIOPSY performed by Patricia Walter MD at [facility] ENDO    UPPER GASTROINTESTINAL ENDOSCOPY N/A 01/24/2024    EGD BIOPSY performed by Patricia Walter MD at [facility]       Social History:   Social History     Socioeconomic History    Marital status:      Spouse name: Not on file    Number of children: Not on file    Years of education: Not on file    Highest education level: Not on file   Occupational History    Not on file   Tobacco Use    Smoking status: Every Day     Current packs/day: 1.00     Average packs/day: 1 pack/day for 40.0 years (40.0 ttl pk-yrs)     Types: Cigarettes    Smokeless tobacco: Never    Tobacco comments:     Menthol ciggaretts   Vaping Use    Vaping status: Some Days    Substances: Nicotine   Substance and Sexual Activity    Alcohol use: No     Alcohol/week: 0.0 standard drinks of alcohol    Drug use: Yes     Types: Marijuana (Weed)     Comment: 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

## 2025-02-25 NOTE — TELEPHONE ENCOUNTER
Left message to schedule their DEXA scan and ct of lungs. I left the scheduling number and advised to call with questions.

## 2025-02-27 ENCOUNTER — HOSPITAL ENCOUNTER (OUTPATIENT)
Age: 55
Setting detail: THERAPIES SERIES
Discharge: HOME | End: 2025-02-27
Payer: COMMERCIAL

## 2025-02-27 PROCEDURE — 97110 THERAPEUTIC EXERCISES: CPT | Performed by: OCCUPATIONAL THERAPIST

## 2025-02-27 NOTE — FLOWSHEET NOTE
[x] [facility]  Outpatient Rehabilitation &  Therapy  [address]  [phone]  [phone]     Therapy Cancel/No Show note    Date: 2025  Patient: Lupe Walton  : 1970  MRN: [id number]    Cancels/No Shows to date:     For today's appointment patient:    []  Cancelled    [] Rescheduled appointment    [] No-show     Reason given by patient:    []  Patient ill    []  Conflicting appointment    [] No transportation      [] Conflict with work    [] No reason given    [] Weather related    [] COVID-19    [x] Other:      Comments:  Patient arrived to OT 20 min late.  Following OT, ambulated back to PT exercise room and stated she was not feeling well.  Entire house is ill and does not want to spread the germs.  Patient taken down to front lobby via transport chair.  Patient arrives without cervical collar.  Clinician has call into Dr. Yareli Downs office to confirm whether or not he would like her to continue.  Pending another surgery.         [x] Next appointment was confirmed    Electronically signed by: Michell Hernandez PTA

## 2025-02-27 NOTE — FLOWSHEET NOTE
[x] [facility]       Occupational Therapy       [address]       Phone: [phone]       Fax: [phone] [] [facility] Occupational  Therapy at [place]       [address]       Phone: [phone]       Fax: [phone] [] [facility]  Outpatient Rehabilitation &  Therapy  [address]  P: [phone]  F: [phone]           Occupational Therapy Cancel/No Show note    Date: 2025  Patient: Lupe Walton  : 1970  MRN: [id number]  Cancels/No Shows to date:     For today's appointment patient:  []  Cancelled  []  Rescheduled appointment  [x]  No-show     Reason given by patient:  []  Patient ill  []  Conflicting appointment  []  No transportation    []  Conflict with work  [x]  No reason given  []  Other:     Comments:     Electronically signed by: EFREN Ferraro/L

## 2025-02-27 NOTE — FLOWSHEET NOTE
[x] [facility]  Outpatient Rehabilitation &  Therapy  [address]  [phone]  F: [phone] [] [facility]  Outpatient Rehabilitation &  Therapy  [address]  P: [phone]  F: [phone] [] [facility]  Outpatient Rehabilitation &  Therapy  [address]  P: [phone]  F: [phone] [] [facility]  Outpatient Rehabilitation &  Therapy  [address]   P: [phone]  F: [phone] [] [facility]   Outpatient Rehabilitation   & Therapy  [address]  P: [phone]   F: [phone]     Occupational Therapy Daily Treatment Note    Date:  2025  Patient Name:  Lupe Walton    :  1970  MRN: [id number]  Physician: Jennifer Staley CNP                                   Insurance: [facility] - DOS: 2/3/20-25, TE, TA, Man    Medical Diagnosis: central cord syndrome S14.1429, s/p cervical spinal fusion Z98.1       Rehab Codes: lack of coordination R27.8,, fine motor skills loss R29.818,, muscle weakness generalized M62.81,, or history of falls Z91.81,  Next 's appt.: 25  Date of symptom onset: 24  Visit# / total visits: 3/16; Progress note for patient due at visit 8    Cancels/No Shows:       Subjective:    Pain:  [] Yes  [x] No Location:  N/A Pain Rating: (0-10 scale) 0/10  Pain altered Tx:  [x] No  [] Yes  Action: NA  Pt Comments: Reports she saw  yesterday and they are planning on doing another surgery in a couple months. [facility] doctor told her she is still supposed to be wearing cervical collar, however she arrived today without it. States she was in a rush to get out the door and forgot it. Reports she also got a bone stimulator that she's supposed to be wearing 4 hours per day. Arrives saying she doesn't feel well, but wants to continue with session due to missing session earlier this week.     Precautions [] No  [x] Yes:

## 2025-03-04 ENCOUNTER — HOSPITAL ENCOUNTER (OUTPATIENT)
Age: 55
Setting detail: THERAPIES SERIES
Discharge: HOME | End: 2025-03-04

## 2025-03-04 NOTE — FLOWSHEET NOTE
[x] [facility]       Occupational Therapy       [address]       Phone: [phone]       Fax: [phone] [] [facility] Occupational  Therapy at [place]       [address]       Phone: [phone]       Fax: [phone] [] [facility]  Outpatient Rehabilitation &  Therapy  [address]  P: [phone]  F: [phone]           Occupational Therapy Cancel/No Show note    Date: 3/4/2025  Patient: Lupe Walton  : 1970  MRN: [id number]  Cancels/No Shows to date:     For today's appointment patient:  [x]  Cancelled  []  Rescheduled appointment  []  No-show     Reason given by patient:  []  Patient ill  [x]  Conflicting appointment  []  No transportation    []  Conflict with work  []  No reason given  []  Other:     Comments:     Electronically signed by: EFREN Ferraro/L

## 2025-03-04 NOTE — FLOWSHEET NOTE
[x] [facility]  Outpatient Rehabilitation &  Therapy  [address]  [phone]  [phone]     Therapy Cancel/No Show note    Date: 3/4/2025  Patient: Lupe Walton  : 1970  MRN: [id number]    Cancels/No Shows to date:   (not counted against pt)    For today's appointment patient:    [x]  Cancelled    [] Rescheduled appointment    [] No-show     Reason given by patient:    []  Patient ill    []  Conflicting appointment    [] No transportation      [] Conflict with work    [] No reason given    [] Weather related    [] COVID-19    [x] Other:      Comments:  Patient called 24 hrs in advance letting us know she had a pedro luis't conflict      [x] Next appointment was confirmed    Electronically signed by: Michell Hernandez PTA

## 2025-03-06 ENCOUNTER — HOSPITAL ENCOUNTER (OUTPATIENT)
Age: 55
Setting detail: THERAPIES SERIES
Discharge: HOME | End: 2025-03-06

## 2025-03-06 NOTE — FLOWSHEET NOTE
[x] [facility]       Occupational Therapy       [address]       Phone: [phone]       Fax: [phone] [] [facility] Occupational  Therapy at [place]       [address]       Phone: [phone]       Fax: [phone] [] [facility]  Outpatient Rehabilitation &  Therapy  [address]   [address]  P: [phone]  F: [phone]           Occupational Therapy Cancel/No Show note    Date: 3/6/2025  Patient: Lupe Walton  : 1970  MRN: [id number]  Cancels/No Shows to date:     For today's appointment patient:  [x]  Cancelled  []  Rescheduled appointment  []  No-show     Reason given by patient:  [x]  Patient ill  []  Conflicting appointment  []  No transportation    []  Conflict with work  []  No reason given  []  Other:     Comments:     Electronically signed by: EFREN Ferraro/L

## 2025-03-06 NOTE — FLOWSHEET NOTE
[x] [facility]  Outpatient Rehabilitation &  Therapy  [address]  [phone]  [phone]     Therapy Cancel/No Show note    Date: 3/6/2025  Patient: Lupe Walton  : 1970  MRN: [id number]    Cancels/No Shows to date: 3 /1  (not counted against pt)    For today's appointment patient:    [x]  Cancelled    [] Rescheduled appointment    [] No-show     Reason given by patient:    [x]  Patient ill    []  Conflicting appointment    [] No transportation      [] Conflict with work    [] No reason given    [] Weather related    [] COVID-19    [x] Other:      Comments:  Patient called with the flu.  Will call us back to r/s      [] Next appointment was confirmed    Electronically signed by: Michell Hernandez PTA

## 2025-03-10 DIAGNOSIS — R10.84 ABDOMINAL PAIN, GENERALIZED: ICD-10-CM

## 2025-03-10 RX ORDER — PROMETHAZINE HYDROCHLORIDE 25 MG/1
TABLET ORAL
Qty: 60 TABLET | Refills: 0 | Status: SHIPPED | OUTPATIENT
Start: 2025-03-10

## 2025-03-10 RX ORDER — FAMOTIDINE 20 MG/1
20 TABLET, FILM COATED ORAL NIGHTLY
Qty: 90 TABLET | Refills: 2 | Status: SHIPPED | OUTPATIENT
Start: 2025-03-10

## 2025-03-26 ENCOUNTER — HOSPITAL ENCOUNTER (OUTPATIENT)
Age: 55
Setting detail: THERAPIES SERIES
Discharge: HOME | End: 2025-03-26
Payer: COMMERCIAL

## 2025-03-26 PROCEDURE — 97110 THERAPEUTIC EXERCISES: CPT | Performed by: OCCUPATIONAL THERAPIST

## 2025-03-26 PROCEDURE — 97110 THERAPEUTIC EXERCISES: CPT | Performed by: PHYSICAL THERAPIST

## 2025-03-26 NOTE — PROGRESS NOTES
[x] [facility]  Outpatient Rehabilitation &  Therapy  [address]  [phone]  F: [phone] [] [facility]  Outpatient Rehabilitation &  Therapy  [address]  P: [phone]  F: [phone] [] [facility]  Outpatient Rehabilitation &  Therapy  [address]  P: [phone]  F: [phone] [] [facility]  Outpatient Rehabilitation &  Therapy  [address]   P: [phone]  F: [phone] [] [facility]   Outpatient Rehabilitation   & Therapy  [address]  P: [phone]   F: [phone]     Occupational Therapy Progress Note    Date: 3/26/2025      Patient: Lupe Walton  : 1970  MRN: [id number]    Physician: Jennifer Staley CNP                                   [facility] - DOS: 2/3/20-25, TE, TA, Man     Medical Diagnosis: central cord syndrome S14.1429, s/p cervical spinal fusion Z98.1       Rehab Codes: lack of coordination R27.8,, fine motor skills loss R29.818,, muscle weakness generalized M62.81,, or history of falls Z91.81,  Next 's appt.: 25  Date of symptom onset: 24  Visit# / total visits: ; Progress note for patient due at visit 8                  Cancels/No Shows:   Date range of services: 2/3/25 to 3/26/25    Subjective:  Pain:  [] Yes  [x] No  Location:  N/A Pain Rating: (0-10 scale) 0/10 Pain altered Tx:  [x] No  [] Yes  Action: NA  Comments: Reports she is still feeling under the weather. Reports being over tired today. \"Things have gotten a little bit easier, still not 100%. I can pull my hair back now.\" Reports she was running out the door to get the cab so she didn't have time to get her Aspen collar.     Objective:  Tests/Measurements:   Upper Extremity Functional Index  Current Functional Level:  33/80 (increase 17 points) functionally impaired as measured with the Upper Extremity Functional Index

## 2025-03-26 NOTE — FLOWSHEET NOTE
[x] [facility]  Outpatient Rehabilitation &  Therapy  [address]  [phone]  F: [phone] [] [facility]  Outpatient Rehabilitation &  Therapy  [address]   [address]  P: [phone]  F: [phone] [] [facility]  Outpatient Rehabilitation &  Therapy  [address]  P: [phone]  F: [phone] [] [facility]  Outpatient Rehabilitation &  Therapy  [address]   P: [phone]  F: [phone] [] [facility]   Outpatient Rehabilitation   & Therapy  [address]  P: [phone]   F: [phone]     Occupational Therapy Daily Treatment Note    Date:  3/26/2025  Patient Name:  Lupe Walton    :  1970  MRN: [id number]  Physician: Jennifer Staley CNP                                   [facility] - DOS: 2/3/20-25, TE, TA, Man    Medical Diagnosis: central cord syndrome S14.1429, s/p cervical spinal fusion Z98.1       Rehab Codes: lack of coordination R27.8,, fine motor skills loss R29.818,, muscle weakness generalized M62.81,, or history of falls Z91.81,  Next 's appt.: 25  Date of symptom onset: 24  Visit# / total visits: ; Progress note for patient due at visit 8    Cancels/No Shows:       Subjective:    Pain:  [] Yes  [x] No Location:  N/A Pain Rating: (0-10 scale) 0/10  Pain altered Tx:  [x] No  [] Yes  Action: NA  Pt Comments: Reports she is still feeling under the weather. Reports being over tired today. \"Things have gotten a little bit easier, still not 100%. I can pull my hair back now.\" Reports she was running out the door to get the cab so she didn't have time to get her [place] collar.     Precautions [] No  [x] Yes: supposed to be wearing Aspen collar  :  Surgery procedure and date: 24    Objective:  Today's Treatment:  Modalities: NA this date  Exercises:  EXERCISE    REPS/     TIME  WEIGHT/    LEVEL NARESH Gonzalez

## 2025-03-26 NOTE — FLOWSHEET NOTE
upright posture  ? Balance: Kelly to at least 27/56 to indicate slowly improving static/dynamic balance towards reduced fall risk  Strength:  Able to sustain upright head posture for at least 30 sec with min cues from therapist  At least 3/5 B hip ext to improve upright posture, terminal stance with gait for more efficient and faster gait pattern  ? Function:  10MWT to at least .87m/s to indicate slowly progressing gait speed    Able to stand for at least 15 min while completing BUE tasks to improve balance and tolerance to cooking in kitchen  Patient to be independent with home exercise program as demonstrated by performance with correct form without cues.  Demonstrate Knowledge of fall prevention     LTG: (to be met in 12 treatments)  ? Pain: No more than 3/10 max pain in neck/shoulders with all ADL/IADLs  ? ROM: At least 45deg cervical rotation bilat, 5deg cervical extension for improved neck mobility for daily function  ? Balance:   Kelly to at least 31/56 to indicate further improving static/dynamic balance towards reduced fall risk  Able to demonstrate improving reactive balance as evidenced by at least 50% of the time correctly initiating stepping strategy when appropriate to recover balance  Strength:  Able to sustain upright head posture for at least 60 sec with min cues from therapist  At least 4/5 B hip abd for improved pelvic support in single limb stance for walking, balance with stairs  ? Function:  10MWT to at least .93m/s to indicate further progressing gait speed    Able to stand for at least 20 min while completing BUE tasks to improve balance and tolerance to cooking in kitchen  NDI to no more than 58% impaired to indicate subjective improvement in condition since beginning PT        Patient goals: reduce falls, improve balance    Pt. Education:  [] Yes  [] No  [] Reviewed Prior HEP/Ed  Method of Education: [] Verbal  [] Demo  [] Written  Comprehension of Education:  [x] Verbalizes

## 2025-03-31 RX ORDER — PROMETHAZINE HYDROCHLORIDE 25 MG/1
TABLET ORAL
Qty: 60 TABLET | Refills: 0 | Status: SHIPPED | OUTPATIENT
Start: 2025-03-31

## 2025-04-02 DIAGNOSIS — I10 PRIMARY HYPERTENSION: ICD-10-CM

## 2025-04-02 RX ORDER — METOPROLOL TARTRATE 50 MG
50 TABLET ORAL 2 TIMES DAILY
Qty: 60 TABLET | Refills: 3 | Status: SHIPPED | OUTPATIENT
Start: 2025-04-02

## 2025-04-07 ENCOUNTER — HOSPITAL ENCOUNTER (OUTPATIENT)
Age: 55
Setting detail: THERAPIES SERIES
Discharge: HOME | End: 2025-04-07

## 2025-04-07 NOTE — PRE-CERTIFICATION NOTE
[x] [facility]  Outpatient Rehabilitation &  Therapy  [address]  [phone]  [phone] [] [facility]  Outpatient Rehabilitation &  Therapy  [address]  P: [phone]  F: [phone] [] [facility]  Outpatient Rehabilitation &  Therapy  [address]  P: [phone]  F: [phone] [] [facility]  Outpatient Rehabilitation &  Therapy  [address]  [phone]  [phone] [] [facility]  Outpatient Rehabilitation &  Therapy  [address]   P: [phone]  F: [phone]  [] [facility]  Outpatient Rehabilitation &  Therapy  [address]  P: [phone]  F: [phone] [] [facility]  Outpatient Rehabilitation &  Therapy  [address]  P: [phone]  F: [phone] [] [facility]  Outpatient Rehabilitation &  Therapy  [address]  P: [phone]  F: [phone] [] [facility]  Outpatient Rehabilitation &  Therapy  [address]  P: [phone]  F: [phone]  [] [facility] Outpatient Rehabilitation &  Therapy  [address]  P: [phone]  F: [phone]          Therapy Pre-certification Note      4/7/2025    Lupe Walton  1970   [id number]      Insurance approval was received for Occupational Therapy from [facility] on 4/7/25.  Approval was received for 3 visits (12 units), from 3/26/25 to 5/2/25.  Authorization number [id number]    CPT CODES: 91945, 97530 X 3.    Patient was contacted to be scheduled and scheduled for 4/7/25.      Electronically signed by July Trotter on 4/7/2025 at 10:38 AM

## 2025-04-07 NOTE — FLOWSHEET NOTE
[x] [facility]       Occupational Therapy       [address]       Phone: [phone]       Fax: [phone] [] [facility] Occupational  Therapy at [place]       [address]       Phone: [phone]       Fax: [phone] [] [facility]  Outpatient Rehabilitation &  Therapy  [address]   [address]  P: [phone]  F: [phone]           Occupational Therapy Cancel/No Show note    Date: 2025  Patient: Lupe Walton  : 1970  MRN: [id number]  Cancels/No Shows to date: 3/1    For today's appointment patient:  [x]  Cancelled  []  Rescheduled appointment  []  No-show     Reason given by patient:  [x]  Patient ill  []  Conflicting appointment  []  No transportation    []  Conflict with work  []  No reason given  []  Other:     Comments:     Electronically signed by: EFREN Ferraro/L

## 2025-04-07 NOTE — PRE-CERTIFICATION NOTE
[x] [facility]  Outpatient Rehabilitation &  Therapy  [address]  [phone]  [phone] [] [facility]  Outpatient Rehabilitation &  Therapy  [address]  P: [phone]  F: [phone] [] [facility]  Outpatient Rehabilitation &  Therapy  [address]  P: [phone]  F: [phone] [] [facility]  Outpatient Rehabilitation &  Therapy  [address]  [phone]  [phone] [] [facility]  Outpatient Rehabilitation &  Therapy  [address]   P: [phone]  F: [phone]  [] [facility]  Outpatient Rehabilitation &  Therapy  [address]  P: [phone]  F: [phone] [] [facility]  Outpatient Rehabilitation &  Therapy  [address]  P: [phone]  F: [phone] [] [facility]  Outpatient Rehabilitation &  Therapy  [address]  P: [phone]  F: [phone] [] [facility]  Outpatient Rehabilitation &  Therapy  [address]  P: [phone]  F: [phone]  [] [facility] Outpatient Rehabilitation &  Therapy  [address]  P: [phone]  F: [phone]          Therapy Pre-certification Note      4/7/2025    Lupe Walton  1970   [id number]      Insurance approval was received for Physical Therapy from [facility] on 4/7/25.  Approval was received for 3  visits (12 units, from 3/26/25 to 5/2/25.  Authorization number [id number]    CPT CODES: 71760, 97530 X 3.    Patient was contacted to be scheduled and scheduled on 4/7/25.      Electronically signed by July Trotter on 4/7/2025 at 10:45 AM

## 2025-04-11 DIAGNOSIS — M40.203 KYPHOSIS OF CERVICOTHORACIC REGION, UNSPECIFIED KYPHOSIS TYPE: Primary | ICD-10-CM

## 2025-04-21 ENCOUNTER — HOSPITAL ENCOUNTER (OUTPATIENT)
Age: 55
Setting detail: THERAPIES SERIES
Discharge: HOME | End: 2025-04-21
Payer: COMMERCIAL

## 2025-04-21 PROCEDURE — 97110 THERAPEUTIC EXERCISES: CPT | Performed by: OCCUPATIONAL THERAPIST

## 2025-04-21 RX ORDER — PROMETHAZINE HYDROCHLORIDE 25 MG/1
TABLET ORAL
Qty: 60 TABLET | Refills: 0 | Status: SHIPPED | OUTPATIENT
Start: 2025-04-21

## 2025-04-21 NOTE — FLOWSHEET NOTE
[x] [facility]  Outpatient Rehabilitation &  Therapy  [address]  [phone]  F: [phone] [] [facility]  Outpatient Rehabilitation &  Therapy  [address]   [address]  P: [phone]  F: [phone] [] [facility]  Outpatient Rehabilitation &  Therapy  [address]  P: [phone]  F: [phone] [] [facility]  Outpatient Rehabilitation &  Therapy  [address]   P: [phone]  F: [phone] [] [facility]   Outpatient Rehabilitation   & Therapy  [address]  P: [phone]   F: [phone]     Occupational Therapy Daily Treatment Note    Date:  2025  Patient Name:  Lupe Walton    :  1970  MRN: [id number]  Physician: Jennifer Staley CNP                                   Insurance: [facility] [employer] - DOS:  Approval was received for 3 visits (12 units), from 3/26/25 to 25     Medical Diagnosis: central cord syndrome S14.1429, s/p cervical spinal fusion Z98.1       Rehab Codes: lack of coordination R27.8,, fine motor skills loss R29.818,, muscle weakness generalized M62.81,, or history of falls Z91.81,  Next 's appt.: 25  Date of symptom onset: 24  Visit# / total visits: ; Progress note for patient completed at visit 4 (2 visits left after today)    Cancels/No Shows: 3/2      Subjective:    Pain:  [] Yes  [x] No Location:  N/A Pain Rating: (0-10 scale) 0/10  Pain altered Tx:  [x] No  [] Yes  Action: NA  Pt Comments: Reports nothing new or different since last visit. Reports she is still falling a lot. States yesterday she got up and her legs felt like jello.     Precautions [] No  [x] Yes: supposed to be wearing Aspen collar  :  Surgery procedure and date: 24    Objective:  Today's Treatment:  Modalities: NA this date  Exercises:  EXERCISE    REPS/     TIME  WEIGHT/    LEVEL COMMENTS   Velcro pegs  15 pegs each hand  Not Completed

## 2025-04-23 ENCOUNTER — HOSPITAL ENCOUNTER (OUTPATIENT)
Dept: WOMENS IMAGING | Age: 55
Discharge: HOME | End: 2025-04-25
Attending: NEUROLOGICAL SURGERY
Payer: COMMERCIAL

## 2025-04-23 DIAGNOSIS — M40.203 KYPHOSIS OF CERVICOTHORACIC REGION, UNSPECIFIED KYPHOSIS TYPE: ICD-10-CM

## 2025-04-23 PROCEDURE — 77080 DXA BONE DENSITY AXIAL: CPT

## 2025-04-24 ENCOUNTER — TELEPHONE (OUTPATIENT)
Dept: NEUROSURGERY | Age: 55
End: 2025-04-24

## 2025-04-25 ENCOUNTER — HOSPITAL ENCOUNTER (OUTPATIENT)
Age: 55
Setting detail: THERAPIES SERIES
Discharge: HOME | End: 2025-04-25
Payer: COMMERCIAL

## 2025-04-25 PROCEDURE — 97110 THERAPEUTIC EXERCISES: CPT

## 2025-04-25 NOTE — FLOWSHEET NOTE
[x] [facility]  Outpatient Rehabilitation &  Therapy  [address]  [phone]  F: [phone] [] [facility]  Outpatient Rehabilitation &  Therapy  [address]   [address]  P: [phone]  F: [phone] [] [facility]  Outpatient Rehabilitation &  Therapy  [address]  P: [phone]  F: [phone] [] [facility]  Outpatient Rehabilitation &  Therapy  [address]   P: [phone]  F: [phone] [] [facility]   Outpatient Rehabilitation   & Therapy  [address]  P: [phone]   F: [phone]     Occupational Therapy Daily Treatment Note    Date:  2025  Patient Name:  Lupe Walton    :  1970  MRN: [id number]  Physician: Jennifer Staley CNP                                   Insurance: [facility] [employer] - DOS:  Approval was received for 3 visits (12 units), from 3/26/25 to 25     Medical Diagnosis: central cord syndrome S14.1429, s/p cervical spinal fusion Z98.1       Rehab Codes: lack of coordination R27.8,, fine motor skills loss R29.818,, muscle weakness generalized M62.81,, or history of falls Z91.81,  Next 's appt.: 25  Date of symptom onset: 24  Visit# / total visits: ; Progress note for patient completed at visit 4 (2 visits left after today)    Cancels/No Shows: 3/2      Subjective:    Pain:  [] Yes  [x] No  Location:  N/A  Pain Rating: (0-10 scale) 0/10  Pain altered Tx:  [x] No  [] Yes  Action: NA  Pt Comments: No complaints, reports everything is \"getting more easier\"     Precautions [] No  [x] Yes: supposed to be wearing Aspen collar    Surgery procedure and date: 24    Objective:  Today's Treatment:  Modalities: NA this date  Exercises:  EXERCISE    REPS/     TIME  WEIGHT/    LEVEL COMMENTS   Velcro pegs  15 pegs each hand  Completed    Peg flip 15 pegs each hand  Completed    Resistive pinch pin to transfer foam blocks  1

## 2025-04-29 ENCOUNTER — HOSPITAL ENCOUNTER (OUTPATIENT)
Age: 55
Setting detail: THERAPIES SERIES
Discharge: HOME | End: 2025-04-29
Payer: COMMERCIAL

## 2025-04-29 PROCEDURE — 97110 THERAPEUTIC EXERCISES: CPT

## 2025-04-29 NOTE — DISCHARGE SUMMARY
decrease difficulty with meal prep, dressing tasks   All pinches by 5+ pounds to decrease difficulty with meal prep, dressing tasks  ? Coordination:   Decrease bilateral 9 hole peg test by 60 seconds or more to decrease difficulty with grooming,  tasks  Increase reps with B&B by 10 blocks to decrease difficulty with grooming,  tasks   ? Function:  UEFI score by 40+ points to indicate subjective improvement with functional tasks   Pt to report satisfaction with writing abilities      Patient goals: get stronger, writing     Treatment to Date:     [x]  Therapeutic Exercise   22516              []  Iontophoresis: 4 mg/mL Dexamethasone Sodium Phosphate  mAmin  77274    []  Therapeutic Activity  22584  []  Vasopneumatic cold with compression  95255                []  ADL training  06940  []  Ultrasound        41663    []  Neuromuscular Re-education  16093  []  Electrical Stimulation Attended  24534    []  Manual Therapy  82289  Orthotic    []  Fit  06740     []  Train 70711    [x]  Instruction in HEP        Prosthetic    []  Fit 66599      []  Train 39938    []  Cognitive Interventions, (first 15 min 63218, subsequent 15 min 17480)  []  Cold/hotpack      []  Massage   25025              Discharge Status:     [] Pt recovered from conditions. Treatment goals were met.    [x] Pt received maximum benefit. No further therapy indicated at this time.    [x] Pt to continue exercise/home instructions independently.    [] Therapy interrupted due to:    [] Pt has 2 or more no shows/cancels, is discontinued per our policy.    [] Pt has completed prescribed number of treatment sessions.    [x] Other: Primary therapist and patient discussed discharge d/t insurance and possible upcoming surgery.       Time In: 1610             Time Out: 1628                         Treatment Charges: Mins Units   [x]  Ther Exercise 18 1   []  Manual Therapy     []  Ther Activities     Total Billable time 18 1

## 2025-05-01 ENCOUNTER — OFFICE VISIT (OUTPATIENT)
Dept: FAMILY MEDICINE CLINIC | Age: 55
End: 2025-05-01
Payer: COMMERCIAL

## 2025-05-01 ENCOUNTER — HOSPITAL ENCOUNTER (OUTPATIENT)
Age: 55
Setting detail: SPECIMEN
Discharge: HOME | End: 2025-05-01

## 2025-05-01 VITALS
HEART RATE: 81 BPM | SYSTOLIC BLOOD PRESSURE: 137 MMHG | WEIGHT: 102 LBS | BODY MASS INDEX: 16.01 KG/M2 | OXYGEN SATURATION: 97 % | DIASTOLIC BLOOD PRESSURE: 89 MMHG | TEMPERATURE: 99 F | HEIGHT: 67 IN

## 2025-05-01 DIAGNOSIS — F11.90 METHADONE USE: ICD-10-CM

## 2025-05-01 DIAGNOSIS — B18.2 CHRONIC HEPATITIS C WITHOUT HEPATIC COMA (HCC): ICD-10-CM

## 2025-05-01 DIAGNOSIS — N39.3 STRESS INCONTINENCE: ICD-10-CM

## 2025-05-01 DIAGNOSIS — R63.4 ABNORMAL WEIGHT LOSS: ICD-10-CM

## 2025-05-01 DIAGNOSIS — R63.4 ABNORMAL WEIGHT LOSS: Primary | ICD-10-CM

## 2025-05-01 DIAGNOSIS — F11.21 OPIOID DEPENDENCE IN REMISSION (HCC): ICD-10-CM

## 2025-05-01 LAB
25(OH)D3 SERPL-MCNC: 24.1 NG/ML (ref 30–100)
ALBUMIN SERPL-MCNC: 3.2 G/DL (ref 3.5–5.2)
ALBUMIN/GLOB SERPL: 0.9 {RATIO} (ref 1–2.5)
ALP SERPL-CCNC: 148 U/L (ref 35–104)
ALT SERPL-CCNC: 21 U/L (ref 10–35)
ANION GAP SERPL CALCULATED.3IONS-SCNC: 6 MMOL/L (ref 9–16)
AST SERPL-CCNC: 25 U/L (ref 10–35)
BASOPHILS # BLD: 0.04 K/UL (ref 0–0.2)
BASOPHILS NFR BLD: 1 % (ref 0–2)
BILIRUB SERPL-MCNC: 0.2 MG/DL (ref 0–1.2)
BUN SERPL-MCNC: 14 MG/DL (ref 6–20)
CALCIUM SERPL-MCNC: 8.3 MG/DL (ref 8.6–10.4)
CHLORIDE SERPL-SCNC: 100 MMOL/L (ref 98–107)
CHOLEST SERPL-MCNC: 100 MG/DL (ref 0–199)
CHOLESTEROL/HDL RATIO: 1.9
CO2 SERPL-SCNC: 33 MMOL/L (ref 20–31)
CREAT SERPL-MCNC: 0.8 MG/DL (ref 0.6–0.9)
EOSINOPHIL # BLD: 0.15 K/UL (ref 0–0.44)
EOSINOPHILS RELATIVE PERCENT: 3 % (ref 1–4)
ERYTHROCYTE [DISTWIDTH] IN BLOOD BY AUTOMATED COUNT: 16.2 % (ref 11.8–14.4)
EST. AVERAGE GLUCOSE BLD GHB EST-MCNC: 94 MG/DL
FOLATE SERPL-MCNC: 9.4 NG/ML (ref 4.8–24.2)
GFR, ESTIMATED: 87 ML/MIN/1.73M2
GLUCOSE SERPL-MCNC: 71 MG/DL (ref 74–99)
HBA1C MFR BLD: 4.9 % (ref 4–6)
HCT VFR BLD AUTO: 38 % (ref 36.3–47.1)
HDLC SERPL-MCNC: 54 MG/DL
HGB BLD-MCNC: 11.8 G/DL (ref 11.9–15.1)
IMM GRANULOCYTES # BLD AUTO: <0.03 K/UL (ref 0–0.3)
IMM GRANULOCYTES NFR BLD: 0 %
LDLC SERPL CALC-MCNC: 35 MG/DL (ref 0–100)
LYMPHOCYTES NFR BLD: 2.47 K/UL (ref 1.1–3.7)
LYMPHOCYTES RELATIVE PERCENT: 42 % (ref 24–43)
MCH RBC QN AUTO: 27.5 PG (ref 25.2–33.5)
MCHC RBC AUTO-ENTMCNC: 31.1 G/DL (ref 28.4–34.8)
MCV RBC AUTO: 88.6 FL (ref 82.6–102.9)
MONOCYTES NFR BLD: 0.48 K/UL (ref 0.1–1.2)
MONOCYTES NFR BLD: 8 % (ref 3–12)
NEUTROPHILS NFR BLD: 46 % (ref 36–65)
NEUTS SEG NFR BLD: 2.69 K/UL (ref 1.5–8.1)
NRBC BLD-RTO: 0 PER 100 WBC
PLATELET # BLD AUTO: 262 K/UL (ref 138–453)
PMV BLD AUTO: 9.8 FL (ref 8.1–13.5)
POTASSIUM SERPL-SCNC: 4.3 MMOL/L (ref 3.7–5.3)
PROT SERPL-MCNC: 6.8 G/DL (ref 6.6–8.7)
RBC # BLD AUTO: 4.29 M/UL (ref 3.95–5.11)
RBC # BLD: ABNORMAL 10*6/UL
SODIUM SERPL-SCNC: 139 MMOL/L (ref 136–145)
TRIGL SERPL-MCNC: 56 MG/DL
TSH SERPL DL<=0.05 MIU/L-ACNC: 2.69 UIU/ML (ref 0.27–4.2)
VIT B12 SERPL-MCNC: 461 PG/ML (ref 232–1245)
VLDLC SERPL CALC-MCNC: 11 MG/DL (ref 1–30)
WBC OTHER # BLD: 5.8 K/UL (ref 3.5–11.3)

## 2025-05-01 PROCEDURE — 4004F PT TOBACCO SCREEN RCVD TLK: CPT | Performed by: FAMILY MEDICINE

## 2025-05-01 PROCEDURE — G8419 CALC BMI OUT NRM PARAM NOF/U: HCPCS | Performed by: FAMILY MEDICINE

## 2025-05-01 PROCEDURE — 99214 OFFICE O/P EST MOD 30 MIN: CPT | Performed by: FAMILY MEDICINE

## 2025-05-01 PROCEDURE — 3017F COLORECTAL CA SCREEN DOC REV: CPT | Performed by: FAMILY MEDICINE

## 2025-05-01 PROCEDURE — G8427 DOCREV CUR MEDS BY ELIG CLIN: HCPCS | Performed by: FAMILY MEDICINE

## 2025-05-01 RX ORDER — OXYBUTYNIN CHLORIDE 10 MG/1
10 TABLET, EXTENDED RELEASE ORAL DAILY
Qty: 30 TABLET | Refills: 3 | Status: SHIPPED | OUTPATIENT
Start: 2025-05-01

## 2025-05-01 SDOH — ECONOMIC STABILITY: FOOD INSECURITY: WITHIN THE PAST 12 MONTHS, THE FOOD YOU BOUGHT JUST DIDN'T LAST AND YOU DIDN'T HAVE MONEY TO GET MORE.: PATIENT DECLINED

## 2025-05-01 SDOH — ECONOMIC STABILITY: FOOD INSECURITY: WITHIN THE PAST 12 MONTHS, YOU WORRIED THAT YOUR FOOD WOULD RUN OUT BEFORE YOU GOT MONEY TO BUY MORE.: PATIENT DECLINED

## 2025-05-01 ASSESSMENT — PATIENT HEALTH QUESTIONNAIRE - PHQ9
SUM OF ALL RESPONSES TO PHQ QUESTIONS 1-9: 1
2. FEELING DOWN, DEPRESSED OR HOPELESS: SEVERAL DAYS
1. LITTLE INTEREST OR PLEASURE IN DOING THINGS: NOT AT ALL

## 2025-05-01 NOTE — PROGRESS NOTES
PM       (Please note that portions of this note were completed with a voice recognition program. Efforts were made to edit the dictations but occasionally words are mis-transcribed.)    The patient (or guardian, if applicable) and other individuals in attendance with the patient were advised that Artificial Intelligence will be utilized during this visit to record, process the conversation to generate a clinical note, and support improvement of the AI technology. The patient (or guardian, if applicable) and other individuals in attendance at the appointment consented to the use of AI, including the recording.

## 2025-05-05 ENCOUNTER — RESULTS FOLLOW-UP (OUTPATIENT)
Dept: FAMILY MEDICINE CLINIC | Age: 55
End: 2025-05-05

## 2025-05-05 DIAGNOSIS — E55.9 VITAMIN D DEFICIENCY: Primary | ICD-10-CM

## 2025-05-05 RX ORDER — ERGOCALCIFEROL 1.25 MG/1
50000 CAPSULE ORAL WEEKLY
Qty: 4 CAPSULE | Refills: 3 | Status: SHIPPED | OUTPATIENT
Start: 2025-05-05

## 2025-05-12 RX ORDER — PROMETHAZINE HYDROCHLORIDE 25 MG/1
TABLET ORAL
Qty: 60 TABLET | Refills: 0 | Status: SHIPPED | OUTPATIENT
Start: 2025-05-12

## 2025-05-16 ENCOUNTER — HOSPITAL ENCOUNTER (OUTPATIENT)
Dept: CT IMAGING | Age: 55
Discharge: HOME | End: 2025-05-18
Payer: COMMERCIAL

## 2025-05-16 DIAGNOSIS — R63.4 ABNORMAL WEIGHT LOSS: ICD-10-CM

## 2025-05-16 PROCEDURE — 74176 CT ABD & PELVIS W/O CONTRAST: CPT

## 2025-05-23 ENCOUNTER — TRANSCRIBE ORDERS (OUTPATIENT)
Dept: NON INVASIVE DIAGNOSTICS | Age: 55
End: 2025-05-23

## 2025-05-23 ENCOUNTER — HOSPITAL ENCOUNTER (OUTPATIENT)
Dept: NON INVASIVE DIAGNOSTICS | Age: 55
Discharge: HOME | End: 2025-05-23
Payer: COMMERCIAL

## 2025-05-23 DIAGNOSIS — F11.21 OPIOID TYPE DEPENDENCE IN REMISSION (HCC): Primary | ICD-10-CM

## 2025-05-23 DIAGNOSIS — F11.21 OPIOID TYPE DEPENDENCE IN REMISSION (HCC): ICD-10-CM

## 2025-05-23 PROCEDURE — 93005 ELECTROCARDIOGRAM TRACING: CPT

## 2025-05-24 LAB
EKG ATRIAL RATE: 73 BPM
EKG P AXIS: 37 DEGREES
EKG P-R INTERVAL: 142 MS
EKG Q-T INTERVAL: 432 MS
EKG QRS DURATION: 82 MS
EKG QTC CALCULATION (BAZETT): 475 MS
EKG R AXIS: -26 DEGREES
EKG T AXIS: 48 DEGREES
EKG VENTRICULAR RATE: 73 BPM

## 2025-05-27 ENCOUNTER — RESULTS FOLLOW-UP (OUTPATIENT)
Dept: FAMILY MEDICINE CLINIC | Age: 55
End: 2025-05-27

## 2025-06-04 RX ORDER — PROMETHAZINE HYDROCHLORIDE 25 MG/1
TABLET ORAL
Qty: 60 TABLET | Refills: 0 | Status: SHIPPED | OUTPATIENT
Start: 2025-06-04

## 2025-06-04 NOTE — TELEPHONE ENCOUNTER
Lupe Walton is calling to request a refill on the following medication(s):    Last Visit Date (If Applicable):  5/1/2025    Next Visit Date:    8/5/2025    Medication Request:  Requested Prescriptions     Pending Prescriptions Disp Refills    promethazine (PHENERGAN) 25 MG tablet [Pharmacy Med Name: PROMETHAZINE 25MG TABLETS] 60 tablet 0     Sig: TAKE 1 TABLET BY MOUTH FOUR TIMES DAILY AS NEEDED FOR NAUSEA

## 2025-06-19 ENCOUNTER — HOSPITAL ENCOUNTER (OUTPATIENT)
Age: 55
Discharge: HOME | End: 2025-06-19
Payer: COMMERCIAL

## 2025-06-19 PROCEDURE — 93005 ELECTROCARDIOGRAM TRACING: CPT | Performed by: NURSE PRACTITIONER

## 2025-06-20 LAB
EKG ATRIAL RATE: 76 BPM
EKG P AXIS: 55 DEGREES
EKG P-R INTERVAL: 144 MS
EKG Q-T INTERVAL: 424 MS
EKG QRS DURATION: 84 MS
EKG QTC CALCULATION (BAZETT): 477 MS
EKG R AXIS: 3 DEGREES
EKG T AXIS: 40 DEGREES
EKG VENTRICULAR RATE: 76 BPM

## 2025-06-30 RX ORDER — PROMETHAZINE HYDROCHLORIDE 25 MG/1
TABLET ORAL
Qty: 60 TABLET | Refills: 0 | Status: SHIPPED | OUTPATIENT
Start: 2025-06-30

## 2025-07-02 DIAGNOSIS — I10 PRIMARY HYPERTENSION: ICD-10-CM

## 2025-07-02 RX ORDER — METOPROLOL TARTRATE 50 MG
50 TABLET ORAL 2 TIMES DAILY
Qty: 60 TABLET | Refills: 3 | Status: SHIPPED | OUTPATIENT
Start: 2025-07-02

## 2025-07-02 NOTE — TELEPHONE ENCOUNTER
Lupe Walton is calling to request a refill on the following medication(s):    Last Visit Date (If Applicable):  5/1/2025    Next Visit Date:    8/5/2025    Medication Request:  Requested Prescriptions     Pending Prescriptions Disp Refills    metoprolol tartrate (LOPRESSOR) 50 MG tablet 60 tablet 3     Sig: Take 1 tablet by mouth 2 times daily

## 2025-07-03 ENCOUNTER — PREP FOR PROCEDURE (OUTPATIENT)
Dept: GASTROENTEROLOGY | Age: 55
End: 2025-07-03

## 2025-07-03 ENCOUNTER — OFFICE VISIT (OUTPATIENT)
Dept: GASTROENTEROLOGY | Age: 55
End: 2025-07-03

## 2025-07-03 VITALS
HEART RATE: 95 BPM | SYSTOLIC BLOOD PRESSURE: 155 MMHG | BODY MASS INDEX: 18.16 KG/M2 | WEIGHT: 116 LBS | DIASTOLIC BLOOD PRESSURE: 98 MMHG

## 2025-07-03 DIAGNOSIS — R93.3 ABNORMAL CT SCAN, ESOPHAGUS: Primary | ICD-10-CM

## 2025-07-03 DIAGNOSIS — K21.9 GASTROESOPHAGEAL REFLUX DISEASE, UNSPECIFIED WHETHER ESOPHAGITIS PRESENT: ICD-10-CM

## 2025-07-03 DIAGNOSIS — K27.9 PUD (PEPTIC ULCER DISEASE): ICD-10-CM

## 2025-07-03 DIAGNOSIS — B18.2 CHRONIC HEPATITIS C WITHOUT HEPATIC COMA (HCC): ICD-10-CM

## 2025-07-03 DIAGNOSIS — R93.3 ABNORMAL CT SCAN, ESOPHAGUS: ICD-10-CM

## 2025-07-03 DIAGNOSIS — R11.0 CHRONIC NAUSEA: ICD-10-CM

## 2025-07-03 DIAGNOSIS — K64.9 HEMORRHOIDS, UNSPECIFIED HEMORRHOID TYPE: ICD-10-CM

## 2025-07-03 DIAGNOSIS — K21.9 CHRONIC GASTROESOPHAGEAL REFLUX DISEASE WITHOUT ESOPHAGITIS: ICD-10-CM

## 2025-07-03 DIAGNOSIS — Z98.0 H/O BILLROTH II OPERATION: ICD-10-CM

## 2025-07-03 DIAGNOSIS — R79.89 ELEVATED LFTS: ICD-10-CM

## 2025-07-03 DIAGNOSIS — R63.4 UNINTENTIONAL WEIGHT LOSS: ICD-10-CM

## 2025-07-03 DIAGNOSIS — K57.90 DIVERTICULOSIS: ICD-10-CM

## 2025-07-03 DIAGNOSIS — B37.81 CANDIDA ESOPHAGITIS (HCC): ICD-10-CM

## 2025-07-03 DIAGNOSIS — D64.9 ANEMIA, UNSPECIFIED TYPE: ICD-10-CM

## 2025-07-03 PROBLEM — D50.9 IRON DEFICIENCY ANEMIA: Status: ACTIVE | Noted: 2025-07-03

## 2025-07-03 RX ORDER — BISACODYL 5 MG
TABLET, DELAYED RELEASE (ENTERIC COATED) ORAL
Qty: 4 TABLET | Refills: 0 | Status: SHIPPED | OUTPATIENT
Start: 2025-07-03

## 2025-07-03 RX ORDER — NYSTATIN 100000 [USP'U]/ML
500000 SUSPENSION ORAL 4 TIMES DAILY
Qty: 200 ML | Refills: 0 | Status: SHIPPED | OUTPATIENT
Start: 2025-07-03 | End: 2025-07-13

## 2025-07-03 RX ORDER — POLYETHYLENE GLYCOL 3350, SODIUM SULFATE ANHYDROUS, SODIUM BICARBONATE, SODIUM CHLORIDE, POTASSIUM CHLORIDE 236; 22.74; 6.74; 5.86; 2.97 G/4L; G/4L; G/4L; G/4L; G/4L
4 POWDER, FOR SOLUTION ORAL ONCE
Qty: 1 ML | Refills: 0 | Status: SHIPPED | OUTPATIENT
Start: 2025-07-03 | End: 2025-07-03

## 2025-07-03 RX ORDER — METHADONE HYDROCHLORIDE 10 MG/5ML
140 SOLUTION ORAL DAILY
COMMUNITY

## 2025-07-03 ASSESSMENT — ENCOUNTER SYMPTOMS
CONSTIPATION: 0
SORE THROAT: 0
NAUSEA: 1
TROUBLE SWALLOWING: 1
ABDOMINAL PAIN: 0
COUGH: 0
SHORTNESS OF BREATH: 0
DIARRHEA: 0
COLOR CHANGE: 0
VOMITING: 1
BLOOD IN STOOL: 0

## 2025-07-03 NOTE — TELEPHONE ENCOUNTER
Procedure scheduled/Dr Walter  Procedure: colon/egd  Dx:gerd-anemia-hemorrhoids-nausea-h/o of billroth abnormal ct scan esophafus,PUD, diverticulosis  Date: 12/17/25  Time:11:15 am procedure 9:15 am arrival  Hospital: [facility] phone call: TBD  Bowel Prep instructions given: golytely-dulcolax  In office/via phone: office  Clearance needed: none  GLP-1:  none

## 2025-07-03 NOTE — PROGRESS NOTES
for allowing me to participate in the care of Ms. Walton. For any further questions please do not hesitate to contact me.      Electronically signed by Anabel Alarcon PA-C on 7/3/2025 at 11:59 AM  [facility]  P: [phone]  F: [phone]

## 2025-07-08 ENCOUNTER — TELEPHONE (OUTPATIENT)
Dept: GASTROENTEROLOGY | Age: 55
End: 2025-07-08

## 2025-07-08 DIAGNOSIS — B37.81 CANDIDA ESOPHAGITIS (HCC): ICD-10-CM

## 2025-07-08 DIAGNOSIS — R93.3 ABNORMAL CT SCAN, ESOPHAGUS: ICD-10-CM

## 2025-07-08 RX ORDER — NYSTATIN 100000 [USP'U]/ML
500000 SUSPENSION ORAL 4 TIMES DAILY
Qty: 200 ML | Refills: 0 | Status: SHIPPED | OUTPATIENT
Start: 2025-07-08 | End: 2025-07-08

## 2025-07-08 RX ORDER — NYSTATIN 100000 [USP'U]/ML
500000 SUSPENSION ORAL 4 TIMES DAILY
Qty: 200 ML | Refills: 0 | Status: SHIPPED | OUTPATIENT
Start: 2025-07-08 | End: 2025-07-18

## 2025-07-08 NOTE — TELEPHONE ENCOUNTER
Patient left a voicemail stating that she has spilled the bottle of liquid medication and will need a re-fill. Patient did not leave the name of the medication on the message.

## 2025-07-28 RX ORDER — PROMETHAZINE HYDROCHLORIDE 25 MG/1
TABLET ORAL
Qty: 60 TABLET | Refills: 0 | Status: SHIPPED | OUTPATIENT
Start: 2025-07-28

## 2025-08-01 DIAGNOSIS — F11.90 METHADONE USE: ICD-10-CM

## 2025-08-01 DIAGNOSIS — Z87.11 HISTORY OF GASTRIC ULCER: ICD-10-CM

## 2025-08-01 DIAGNOSIS — K21.9 GASTROESOPHAGEAL REFLUX DISEASE, UNSPECIFIED WHETHER ESOPHAGITIS PRESENT: ICD-10-CM

## 2025-08-01 DIAGNOSIS — B18.2 CHRONIC HEPATITIS C WITHOUT HEPATIC COMA (HCC): ICD-10-CM

## 2025-08-01 NOTE — TELEPHONE ENCOUNTER
Lupe Walton is calling to request a refill on the following medication(s):    Last Visit Date (If Applicable):  5/1/2025    Next Visit Date:    8/5/2025    Medication Request:  Requested Prescriptions     Pending Prescriptions Disp Refills    sucralfate (CARAFATE) 1 GM/10ML suspension [Pharmacy Med Name: SUCRALFATE 1GM/10ML SUSPENSION] 473 mL 3     Sig: SHAKE LIQUID AND TAKE 10 ML BY MOUTH FOUR TIMES DAILY

## 2025-08-02 RX ORDER — SUCRALFATE ORAL 1 G/10ML
SUSPENSION ORAL
Qty: 473 ML | Refills: 3 | Status: SHIPPED | OUTPATIENT
Start: 2025-08-02

## 2025-08-18 RX ORDER — PROMETHAZINE HYDROCHLORIDE 25 MG/1
TABLET ORAL
Qty: 60 TABLET | Refills: 0 | Status: SHIPPED | OUTPATIENT
Start: 2025-08-18

## 2025-09-01 DIAGNOSIS — E55.9 VITAMIN D DEFICIENCY: ICD-10-CM

## 2025-09-02 RX ORDER — ERGOCALCIFEROL 1.25 MG/1
50000 CAPSULE, LIQUID FILLED ORAL WEEKLY
Qty: 4 CAPSULE | Refills: 3 | Status: SHIPPED | OUTPATIENT
Start: 2025-09-02

## (undated) DEVICE — GLOVE ORANGE PI 7 1/2   MSG9075

## (undated) DEVICE — 3.0MM PRECISION NEURO (MATCH HEAD)

## (undated) DEVICE — DRAIN SURG 19FR 100% SIL RADPQ RND CHN FULL FLUT

## (undated) DEVICE — DRESSING TRNSPAR W5XL4.5IN FLM SHT SEMIPERMEABLE WIND

## (undated) DEVICE — STRAP,POSITIONING,KNEE/BODY,FOAM,4X60": Brand: MEDLINE

## (undated) DEVICE — SUTURE ETHBND EXCEL SZ 1 L30IN NONABSORBABLE GRN L36MM CT-1 X425H

## (undated) DEVICE — GLOVE SURG SZ 7 L12IN THK7.5MIL DK GRN LTX FREE MSG6570] MEDLINE INDUSTRIES INC]

## (undated) DEVICE — RELOAD STPL L75MM OPN H3.8MM CLS 1.5MM WIRE DIA0.2MM REG

## (undated) DEVICE — Device

## (undated) DEVICE — STAPLE REMOVER TRAY: Brand: MEDLINE INDUSTRIES, INC.

## (undated) DEVICE — STRAP ARMBRD W1.5XL32IN FOAM STR YET SFT W/ HK AND LOOP

## (undated) DEVICE — ENDO KIT W/SYRINGE: Brand: MEDLINE INDUSTRIES, INC.

## (undated) DEVICE — APPLICATOR MEDICATED 26 CC SOLUTION HI LT ORNG CHLORAPREP

## (undated) DEVICE — SOLUTION SCRB 4OZ 10% POVIDONE IOD ANTIMIC BTL

## (undated) DEVICE — CLIP INT M L GRN TI TRNSVRS GRV CHEVRON SHP W/ PRECIS TIP

## (undated) DEVICE — COVER OR TBL W40XL90IN ABSRB STD AND GRIPPY BK SAHARA

## (undated) DEVICE — 1LYRTR 16FR10ML100%SIL UMS SNP: Brand: MEDLINE INDUSTRIES, INC.

## (undated) DEVICE — GLOVE SURG SZ 6 THK91MIL LTX FREE SYN POLYISOPRENE ANTI

## (undated) DEVICE — BLADE ES ELASTOMERIC COAT INSUL DURABLE BEND UPTO 90DEG

## (undated) DEVICE — STVZ LUMBAR SPINE PACK: Brand: MEDLINE INDUSTRIES, INC.

## (undated) DEVICE — COVER,MAYO STAND,XL,STERILE: Brand: MEDLINE

## (undated) DEVICE — SOLUTION PREP POVIDONE IOD FOR SKIN MUCOUS MEM PRIOR TO

## (undated) DEVICE — MARKER,SKIN,WI/RULER AND LABELS: Brand: MEDLINE

## (undated) DEVICE — GOWN,AURORA,NONREINFORCED,LARGE: Brand: MEDLINE

## (undated) DEVICE — PAD,NON-ADHERENT,3X8,STERILE,LF,1/PK: Brand: MEDLINE

## (undated) DEVICE — DRAPE,REIN 53X77,STERILE: Brand: MEDLINE

## (undated) DEVICE — GAUZE,SPONGE,FLUFF,6"X6.75",STRL,5/TRAY: Brand: MEDLINE

## (undated) DEVICE — SUTURE PERMAHAND SZ 3-0 L18IN NONABSORBABLE BLK L26MM SH C013D

## (undated) DEVICE — BINDER ABD UNISX 4 PNL PREM 6INX6INX12IN L XL 4

## (undated) DEVICE — SUTURE PDS II SZ 0 L60IN ABSRB VLT L65MM TP-1 1/2 CIR Z991G

## (undated) DEVICE — ELECTRODE PT RET AD L9FT HI MOIST COND ADH HYDRGEL CORDED

## (undated) DEVICE — SUTURE STRATAFIX SYMMETRIC PDS + SZ 0 L18IN ABSRB L36MM SXPP1A401

## (undated) DEVICE — FORCEPS BX L240CM WRK CHN 2.8MM STD CAP W/ NDL MIC MESH

## (undated) DEVICE — PROTECTOR ULN NRV PUR FOAM HK LOOP STRP ANATOMICALLY

## (undated) DEVICE — GLOVE SURG SZ 75 L12IN FNGR THK79MIL GRN LTX FREE

## (undated) DEVICE — DRAPE,THYROID,SOFT,STERILE: Brand: MEDLINE

## (undated) DEVICE — CONTAINER,SPECIMEN,4OZ,OR STRL: Brand: MEDLINE

## (undated) DEVICE — GOWN,SIRUS,POLYRNF,BRTHSLV,XL,30/CS: Brand: MEDLINE

## (undated) DEVICE — DEFENDO AIR WATER SUCTION AND BIOPSY VALVE KIT FOR  OLYMPUS: Brand: DEFENDO AIR/WATER/SUCTION AND BIOPSY VALVE

## (undated) DEVICE — AGENT HEMOSTATIC SURGIFLOW MATRIX KIT W/THROMBIN

## (undated) DEVICE — TOTAL TRAY, 16FR 10ML SIL FOLEY, URN: Brand: MEDLINE

## (undated) DEVICE — SUTURE NONABSORBABLE MONOFILAMENT 3-0 PS-1 18 IN BLK ETHILON 1663H

## (undated) DEVICE — SPONGE,NEURO,1"X1",XR,STRL,LF,10/PK: Brand: MEDLINE

## (undated) DEVICE — INTENDED FOR TISSUE SEPARATION, AND OTHER PROCEDURES THAT REQUIRE A SHARP SURGICAL BLADE TO PUNCTURE OR CUT.: Brand: BARD-PARKER ® CARBON RIB-BACK BLADES

## (undated) DEVICE — SPONGE LAP W18XL18IN WHT COT 4 PLY FLD STRUNG RADPQ DISP ST

## (undated) DEVICE — DRAPE IRRIG FLD WRM W44XL66IN W/ AORN STD PRTBL INTRATEMP

## (undated) DEVICE — APPLICATOR MEDICATED 10.5 CC SOLUTION HI LT ORNG CHLORAPREP

## (undated) DEVICE — CATHETER,URETHRAL,REDRUBBER,STRL,16FR: Brand: MEDLINE

## (undated) DEVICE — C-ARM: Brand: UNBRANDED

## (undated) DEVICE — C-ARMOR C-ARM EQUIPMENT COVERS CLEAR STERILE UNIVERSAL FIT 12 PER CASE: Brand: C-ARMOR

## (undated) DEVICE — PACK SURG ABD SVMMC

## (undated) DEVICE — GLOVE ORANGE PI 8   MSG9080

## (undated) DEVICE — STAPLER INT L75MM CUT LN L73MM STPL LN L77MM BLU B FRM 8

## (undated) DEVICE — DRILL BIT G3606010 2.4MM

## (undated) DEVICE — BITEBLOCK 54FR W/ DENT RIM BLOX

## (undated) DEVICE — GLOVE ORANGE PI 7   MSG9070

## (undated) DEVICE — RESERVOIR,SUCTION,100CC,SILICONE: Brand: MEDLINE

## (undated) DEVICE — EXTRA LARGE VISCERA RETAINER: Brand: VISCERA RETAINER, FISH

## (undated) DEVICE — GARMENT,MEDLINE,DVT,INT,CALF,MED, GEN2: Brand: MEDLINE

## (undated) DEVICE — GARMENT,MEDLINE,DVT,INT,CALF,LG, GEN2: Brand: MEDLINE

## (undated) DEVICE — GLOVE SURG SZ 85 L12IN FNGR THK79MIL GRN LTX FREE

## (undated) DEVICE — SHEET,DRAPE,70X100,STERILE: Brand: MEDLINE

## (undated) DEVICE — TOWEL,OR,DSP,ST,NATURAL,DLX,4/PK,20PK/CS: Brand: MEDLINE

## (undated) DEVICE — GOWN,SIRUS,NONRNF,SETINSLV,XL,20/CS: Brand: MEDLINE

## (undated) DEVICE — SUTURE VCRL SZ 3-0 L27IN ABSRB UD L26MM SH 1/2 CIR J416H

## (undated) DEVICE — GLOVE SURG SZ 65 THK91MIL LTX FREE SYN POLYISOPRENE

## (undated) DEVICE — SYRINGE MED 10ML LUERLOCK TIP W/O SFTY DISP

## (undated) DEVICE — SUTURE VICRYL SZ 2-0 L18IN ABSRB UD CT-1 L36MM 1/2 CIR J839D

## (undated) DEVICE — SPONGE LAP W18XL18IN WHT COT 4 PLY FLD STRUNG RADPQ DISP ST 2 PER PACK

## (undated) DEVICE — PAD GEN USE BORDERED ADH 14IN 2IN AND 12IN 4IN GZ UNIV ST

## (undated) DEVICE — FORCEPS BX L240CM JAW DIA2.8MM L CAP W/ NDL MIC MESH TOOTH

## (undated) DEVICE — GLOVE SURG SZ 8 CRM LTX FREE POLYISOPRENE POLYMER BEAD ANTI

## (undated) DEVICE — SEALER ENDOSCP NANO COAT OPN DIV CRV L JAW LIGASURE IMPACT